# Patient Record
Sex: FEMALE | Race: WHITE | NOT HISPANIC OR LATINO | Employment: OTHER | URBAN - METROPOLITAN AREA
[De-identification: names, ages, dates, MRNs, and addresses within clinical notes are randomized per-mention and may not be internally consistent; named-entity substitution may affect disease eponyms.]

---

## 2017-01-04 ENCOUNTER — OFFICE VISIT (OUTPATIENT)
Dept: INTERNAL MEDICINE | Facility: CLINIC | Age: 77
End: 2017-01-04
Payer: MEDICARE

## 2017-01-04 VITALS
WEIGHT: 167.75 LBS | SYSTOLIC BLOOD PRESSURE: 132 MMHG | DIASTOLIC BLOOD PRESSURE: 86 MMHG | HEART RATE: 63 BPM | BODY MASS INDEX: 30.87 KG/M2 | HEIGHT: 62 IN

## 2017-01-04 DIAGNOSIS — R21 RASH: Primary | ICD-10-CM

## 2017-01-04 PROCEDURE — 99999 PR PBB SHADOW E&M-EST. PATIENT-LVL III: CPT | Mod: PBBFAC,,, | Performed by: INTERNAL MEDICINE

## 2017-01-04 PROCEDURE — 99213 OFFICE O/P EST LOW 20 MIN: CPT | Mod: S$PBB,,, | Performed by: INTERNAL MEDICINE

## 2017-01-04 PROCEDURE — 99213 OFFICE O/P EST LOW 20 MIN: CPT | Mod: PBBFAC | Performed by: INTERNAL MEDICINE

## 2017-01-04 RX ORDER — METOPROLOL SUCCINATE 25 MG/1
25 TABLET, EXTENDED RELEASE ORAL DAILY
COMMUNITY
End: 2017-08-16

## 2017-01-04 RX ORDER — PREDNISONE 20 MG/1
20 TABLET ORAL 2 TIMES DAILY
Qty: 6 TABLET | Refills: 0 | Status: SHIPPED | OUTPATIENT
Start: 2017-01-04 | End: 2017-01-07

## 2017-01-04 NOTE — PROGRESS NOTES
Subjective:       Patient ID: Luz Amaro is a 76 y.o. female.    Chief Complaint: Rash (x 1 week)    HPI Comments: Patient states she has no new clothing products detergent or perfume.  This started on the low back and then seemingly spread to the mid to lower abdomen.  Nothing on the wrists or ankles, eczema or between fingers or toes.  No new pets.  No one knew around the house.  No other family members with similar rashes.    Rash   The current episode started in the past 7 days. The problem has been waxing and waning since onset. The affected locations include the abdomen. The rash is characterized by itchiness (Small raised red bumps). She was exposed to nothing (She even had her relatively new house fumigated for insects but was told nothing was found.). Pertinent negatives include no cough, diarrhea, facial edema, fatigue, fever, joint pain, shortness of breath, sore throat or vomiting. Past treatments include nothing. Her past medical history is significant for allergies.     Review of Systems   Constitutional: Negative for appetite change, chills, fatigue and fever.   HENT: Negative for sore throat.    Respiratory: Negative for cough, shortness of breath and wheezing.    Cardiovascular: Negative for chest pain and leg swelling.   Gastrointestinal: Negative for abdominal pain, constipation, diarrhea and vomiting.   Genitourinary: Negative for difficulty urinating.   Musculoskeletal: Negative for joint pain, neck pain and neck stiffness.   Skin: Positive for rash.       Objective:      Physical Exam   Constitutional: She is oriented to person, place, and time. She appears well-developed and well-nourished. No distress.   HENT:   Head: Normocephalic and atraumatic.   Mouth/Throat: Oropharynx is clear and moist. No oropharyngeal exudate.   Eyes: Conjunctivae are normal. Pupils are equal, round, and reactive to light. No scleral icterus.   Neck: Normal range of motion. Neck supple. No thyromegaly  present.   Cardiovascular: Normal rate and regular rhythm.    No murmur heard.  Pulmonary/Chest: Effort normal and breath sounds normal. She has no wheezes.   Abdominal: Soft. Bowel sounds are normal. She exhibits no distension. There is no tenderness.   Musculoskeletal: She exhibits no tenderness.   Lymphadenopathy:     She has no cervical adenopathy.   Neurological: She is alert and oriented to person, place, and time.   Skin: Rash noted.   Mild rash on the mid to lower abdomen.  Diffuse small red bumps.  Nothing on the back arms legs neck or face.  She has approximately 20 small bumps that are very itchy and there are signs that she has been scratching them   Psychiatric: She has a normal mood and affect.       Assessment:       1. Rash        Plan:       Luz was seen today for rash.    Diagnoses and all orders for this visit:    Rash    Other orders  -     predniSONE (DELTASONE) 20 MG tablet; Take 1 tablet (20 mg total) by mouth 2 (two) times daily.         nonspecific rash, not  consistent with eczema or shingles or insect bites such as scabies or fleas.  Call if symptoms fail to improve or worsen with conservative treatment and observation

## 2017-01-04 NOTE — MR AVS SNAPSHOT
[facility]  [address]  [place] [address]  Phone: [phone]  Fax: [phone]                  Luz Amaro   2017 1:15 PM   Office Visit    Description:  Female : 1940   Provider:  Suman Moss MD   Department:  [facility]           Reason for Visit     Rash           Diagnoses this Visit        Comments    Rash    -  Primary            To Do List           Goals (5 Years of Data)     None       These Medications        Disp Refills Start End    predniSONE (DELTASONE) 20 MG tablet 6 tablet 0 2017    Take 1 tablet (20 mg total) by mouth 2 (two) times daily. - Oral    Pharmacy: [facility] [place] - [address]  #: [phone]         [facility]     [facility] Care [facility] -  Assistance  Registered nurses in the [facility] provide clinical advisement, health education, appointment booking, and other advisory services.  Call for this free service at [phone].             Medications           Message regarding Medications     Verify the changes and/or additions to your medication regime listed below are the same as discussed with your clinician today.  If any of these changes or additions are incorrect, please notify your healthcare provider.        START taking these NEW medications        Refills    predniSONE (DELTASONE) 20 MG tablet 0    Sig: Take 1 tablet (20 mg total) by mouth 2 (two) times daily.    Class: Normal    Route: Oral      STOP taking these medications     doxazosin (CARDURA) 8 MG Tab Take 1 tablet (8 mg total) by mouth once daily.           Verify that the below list of medications is an accurate representation of the medications you are currently taking.  If none reported, the list may be blank. If incorrect, please contact your healthcare provider. Carry this list with you in case of emergency.           Current Medications      "aspirin (ECOTRIN) 81 MG EC tablet Take 81 mg by mouth once daily.    atorvastatin (LIPITOR) 20 MG tablet Take 1 tablet (20 mg total) by mouth once daily.    cholecalciferol, vitamin D3, (VITAMIN D3) 5,000 unit Tab Take 5,000 Units by mouth once daily.    epinephrine (EPIPEN) 0.3 mg/0.3 mL AtIn Inject 0.3 mLs (0.3 mg total) into the muscle once.    lorazepam (ATIVAN) 0.5 MG tablet Take 1 tablet (0.5 mg total) by mouth every 12 (twelve) hours as needed for Anxiety (Panic Attack).    metoprolol succinate (TOPROL-XL) 25 MG 24 hr tablet Take 25 mg by mouth once daily.    oxybutynin (DITROPAN) 5 MG Tab Take 1 tablet (5 mg total) by mouth 2 (two) times daily.    predniSONE (DELTASONE) 20 MG tablet Take 1 tablet (20 mg total) by mouth 2 (two) times daily.           Clinical Reference Information           Vital Signs - Last Recorded  Most recent update: 1/4/2017  1:16 PM by Carol Rodríguez    BP Pulse Ht Wt BMI    132/86 63 5' 1.5" (1.562 m) 76.1 kg (167 lb 12.3 oz) 31.19 kg/m2      Blood Pressure          Most Recent Value    BP  132/86      Allergies as of 1/4/2017     Ace Inhibitors    Arb-angiotensin Receptor Antagonist    Amlodipine      Immunizations Administered on Date of Encounter - 1/4/2017     None      [facility] Sign-Up     Activating your [facility] account is as easy as 1-2-3!     1) Visit [email or web address], select Sign Up Now, enter this activation code and your date of birth, then select Next.  [email or web address]  Expires: 2/18/2017  1:43 PM      2) Create a username and password to use when you visit [facility] in the future and select a security question in case you lose your password and select Next.    3) Enter your e-mail address and click Sign Up!    Additional Information  If you have questions, please e-mail [email or web address] or call [phone] to talk to our [facility] staff. Remember, [facility] is NOT to be used for urgent needs. For medical emergencies, dial 911.         Instructions    Antihistamine " such as Zyrtec in addition to the prescription for the Prednisone.

## 2017-03-07 ENCOUNTER — HOSPITAL ENCOUNTER (OUTPATIENT)
Dept: RADIOLOGY | Facility: HOSPITAL | Age: 77
Discharge: HOME | End: 2017-03-07
Attending: INTERNAL MEDICINE
Payer: MEDICARE

## 2017-03-07 ENCOUNTER — OFFICE VISIT (OUTPATIENT)
Dept: INTERNAL MEDICINE | Facility: CLINIC | Age: 77
End: 2017-03-07
Payer: MEDICARE

## 2017-03-07 VITALS
BODY MASS INDEX: 31.63 KG/M2 | WEIGHT: 167.56 LBS | SYSTOLIC BLOOD PRESSURE: 130 MMHG | TEMPERATURE: 98 F | HEART RATE: 72 BPM | HEIGHT: 61 IN | DIASTOLIC BLOOD PRESSURE: 84 MMHG

## 2017-03-07 DIAGNOSIS — R05.9 COUGH: ICD-10-CM

## 2017-03-07 DIAGNOSIS — R09.89 ABNORMAL LUNG SOUNDS: ICD-10-CM

## 2017-03-07 DIAGNOSIS — I10 ESSENTIAL HYPERTENSION: ICD-10-CM

## 2017-03-07 DIAGNOSIS — R05.9 COUGH: Primary | ICD-10-CM

## 2017-03-07 PROCEDURE — 99999 PR PBB SHADOW E&M-EST. PATIENT-LVL III: CPT | Mod: PBBFAC,,, | Performed by: INTERNAL MEDICINE

## 2017-03-07 PROCEDURE — 71020 XR CHEST PA AND LATERAL: CPT | Mod: TC

## 2017-03-07 PROCEDURE — 99214 OFFICE O/P EST MOD 30 MIN: CPT | Mod: S$PBB,,, | Performed by: INTERNAL MEDICINE

## 2017-03-07 PROCEDURE — 71020 XR CHEST PA AND LATERAL: CPT | Mod: 26,,, | Performed by: RADIOLOGY

## 2017-03-07 RX ORDER — PREDNISONE 20 MG/1
20 TABLET ORAL SEE ADMIN INSTRUCTIONS
Qty: 12 TABLET | Refills: 0 | Status: SHIPPED | OUTPATIENT
Start: 2017-03-07 | End: 2017-03-13

## 2017-03-07 NOTE — PROGRESS NOTES
Subjective:       Patient ID: Luz Amaro is a 76 y.o. female.    Chief Complaint: Cough (x 9 days)    Cough   This is a new problem. The current episode started 1 to 4 weeks ago. The problem has been unchanged. Associated symptoms include nasal congestion, postnasal drip and rhinorrhea. Pertinent negatives include no chest pain, chills, ear congestion, ear pain, fever, headaches, heartburn, hemoptysis, rash, sore throat, shortness of breath, sweats or wheezing. She has tried OTC cough suppressant (Delsym and Nyquil) for the symptoms. The treatment provided mild relief. Her past medical history is significant for pneumonia (About age 28). There is no history of asthma, COPD or emphysema.     Review of Systems   Constitutional: Negative for chills and fever.   HENT: Positive for postnasal drip and rhinorrhea. Negative for ear pain and sore throat.    Respiratory: Positive for cough. Negative for hemoptysis, shortness of breath and wheezing.    Cardiovascular: Negative for chest pain.   Gastrointestinal: Negative for heartburn.   Skin: Negative for rash.   Neurological: Negative for headaches.       Objective:      Physical Exam   Constitutional: She appears well-developed and well-nourished.   HENT:   Head: Normocephalic and atraumatic.   Right Ear: External ear normal.   Left Ear: External ear normal.   Thick clear drainage in the back of the throat   Eyes: Conjunctivae and EOM are normal. Pupils are equal, round, and reactive to light.   Neck: Normal range of motion. Neck supple.   Cardiovascular: Normal rate and regular rhythm.    Pulmonary/Chest: No respiratory distress. She has wheezes (mid to upper posterior lung zone). She has rales (mid to upper posterior lung zone). She exhibits no tenderness.   Coughing throughout visit.   Skin: No rash noted. No pallor.   Psychiatric: She has a normal mood and affect. Her behavior is normal.       Assessment:       1. Cough    2. Abnormal lung sounds    3.  Essential hypertension        Plan:       Luz was seen today for cough.    Diagnoses and all orders for this visit:    Cough  -     X-Ray Chest PA And Lateral; Future    Abnormal lung sounds  -     X-Ray Chest PA And Lateral; Future    Essential hypertension        Chest x-ray with return    Addendum: Pt returns from CXR. Lungs are clear. Prednisone taper and contingent Doxy if not resolving

## 2017-03-27 ENCOUNTER — TELEPHONE (OUTPATIENT)
Dept: INTERNAL MEDICINE | Facility: CLINIC | Age: 77
End: 2017-03-27

## 2017-03-27 NOTE — TELEPHONE ENCOUNTER
----- Message from Kingsley Storm sent at 3/27/2017 10:08 AM CDT -----  Contact: self/[phone] cell  Pt would like to speak with someone in the office to discuss her mammogram.  She needs to know if she needs to have one this year or should she do it every couple of years.  Please call and advise.    Thank you

## 2017-03-27 NOTE — TELEPHONE ENCOUNTER
Spoke to pt, adv her mammogram every year is recommended. She stated she is choosing to skip her mammo this year. I adv her she can call to sched if she changes her mind. Put in recall for next year mammo.

## 2017-05-16 RX ORDER — METOPROLOL SUCCINATE 50 MG/1
TABLET, EXTENDED RELEASE ORAL
Qty: 90 TABLET | Refills: 2 | Status: SHIPPED | OUTPATIENT
Start: 2017-05-16 | End: 2018-04-15 | Stop reason: SDUPTHER

## 2017-05-24 ENCOUNTER — OFFICE VISIT (OUTPATIENT)
Dept: OPTOMETRY | Facility: CLINIC | Age: 77
End: 2017-05-24
Payer: MEDICARE

## 2017-05-24 DIAGNOSIS — H53.032 AMBLYOPIA, STRABISMIC, LEFT: ICD-10-CM

## 2017-05-24 DIAGNOSIS — H52.03 HYPEROPIA WITH PRESBYOPIA, BILATERAL: ICD-10-CM

## 2017-05-24 DIAGNOSIS — H25.13 NUCLEAR SCLEROSIS, BILATERAL: Primary | ICD-10-CM

## 2017-05-24 DIAGNOSIS — H52.4 HYPEROPIA WITH PRESBYOPIA, BILATERAL: ICD-10-CM

## 2017-05-24 PROCEDURE — 92014 COMPRE OPH EXAM EST PT 1/>: CPT | Mod: S$PBB,,, | Performed by: OPTOMETRIST

## 2017-05-24 PROCEDURE — 99212 OFFICE O/P EST SF 10 MIN: CPT | Mod: PBBFAC,PO | Performed by: OPTOMETRIST

## 2017-05-24 PROCEDURE — 92015 DETERMINE REFRACTIVE STATE: CPT | Mod: ,,, | Performed by: OPTOMETRIST

## 2017-05-24 PROCEDURE — 99999 PR PBB SHADOW E&M-EST. PATIENT-LVL II: CPT | Mod: PBBFAC,,, | Performed by: OPTOMETRIST

## 2017-05-25 NOTE — PROGRESS NOTES
HPI     TV not as clear.  Glasses about 2 yr old. Uses AT's 4 to 5 times a week.  Blur ou at dist, x mos, no assoc pain or red, no relief over time,   constant    Last edited by Cory Blakely, OD on 5/25/2017 10:13 AM. (History)        ROS     Negative for: Constitutional, Gastrointestinal, Neurological, Skin,   Genitourinary, Musculoskeletal, HENT, Endocrine, Cardiovascular, Eyes,   Respiratory, Psychiatric, Allergic/Imm, Heme/Lymph    Last edited by Cory Blakely, OD on 5/24/2017  3:18 PM. (History)        Assessment /Plan     For exam results, see Encounter Report.    Nuclear sclerosis, bilateral    Amblyopia, strabismic, left    Hyperopia with presbyopia, bilateral      1. Educated pt on presence of cataracts and effects on vision. No surgery at this time. Recheck in one year.  2. Monitor condition. Patient to report any changes. RTC 1 year recheck.  3. Spec Rx given. Different lens options discussed with patient. RTC 1 year full exam.

## 2017-07-05 ENCOUNTER — OFFICE VISIT (OUTPATIENT)
Dept: DERMATOLOGY | Facility: CLINIC | Age: 77
End: 2017-07-05
Payer: MEDICARE

## 2017-07-05 DIAGNOSIS — L57.0 AK (ACTINIC KERATOSIS): ICD-10-CM

## 2017-07-05 DIAGNOSIS — L82.0 INFLAMED SEBORRHEIC KERATOSIS: ICD-10-CM

## 2017-07-05 DIAGNOSIS — D18.00 ANGIOMA: ICD-10-CM

## 2017-07-05 DIAGNOSIS — L81.4 LENTIGO: ICD-10-CM

## 2017-07-05 DIAGNOSIS — D22.9 NEVUS: ICD-10-CM

## 2017-07-05 DIAGNOSIS — L90.5 SCAR: ICD-10-CM

## 2017-07-05 DIAGNOSIS — L82.1 SK (SEBORRHEIC KERATOSIS): Primary | ICD-10-CM

## 2017-07-05 DIAGNOSIS — L21.9 SEBORRHEIC DERMATITIS, UNSPECIFIED: ICD-10-CM

## 2017-07-05 DIAGNOSIS — Z85.828 PERSONAL HISTORY OF SKIN CANCER: ICD-10-CM

## 2017-07-05 PROCEDURE — 99212 OFFICE O/P EST SF 10 MIN: CPT | Mod: PBBFAC,PO | Performed by: DERMATOLOGY

## 2017-07-05 PROCEDURE — 17000 DESTRUCT PREMALG LESION: CPT | Mod: 59,PBBFAC,PO | Performed by: DERMATOLOGY

## 2017-07-05 PROCEDURE — 99214 OFFICE O/P EST MOD 30 MIN: CPT | Mod: 25,S$PBB,, | Performed by: DERMATOLOGY

## 2017-07-05 PROCEDURE — 1159F MED LIST DOCD IN RCRD: CPT | Mod: ,,, | Performed by: DERMATOLOGY

## 2017-07-05 PROCEDURE — 99999 PR PBB SHADOW E&M-EST. PATIENT-LVL II: CPT | Mod: PBBFAC,,, | Performed by: DERMATOLOGY

## 2017-07-05 PROCEDURE — 17000 DESTRUCT PREMALG LESION: CPT | Mod: 59,S$PBB,, | Performed by: DERMATOLOGY

## 2017-07-05 NOTE — PROGRESS NOTES
Subjective:       Patient ID:  Luz Amaro is a 76 y.o. female who presents for   Chief Complaint   Patient presents with    Lesion     Pt here today for a TBSE. Pt c/o scaly lesion on face x a few months. No bleeding or pain. No prev tx.   Pt also c/o red and irritated lesion on chest x a few years. No prev tx.         Review of Systems   Constitutional: Negative for fever, chills, weight loss, weight gain, fatigue, night sweats and malaise.   Skin: Positive for daily sunscreen use. Negative for sun sensitivity, activity-related sunscreen use and recent sunburn.   Hematologic/Lymphatic: Does not bruise/bleed easily.        Objective:    Physical Exam   Constitutional: She appears well-developed and well-nourished. No distress.   Neurological: She is alert and oriented to person, place, and time. She is not disoriented.   Psychiatric: She has a normal mood and affect.   Skin:   Areas Examined (abnormalities noted in diagram):   Scalp / Hair Palpated and Inspected  Head / Face Inspection Performed  Neck Inspection Performed  Chest / Axilla Inspection Performed  Abdomen Inspection Performed  Genitals / Buttocks / Groin Inspection Performed  Back Inspection Performed  RUE Inspected  LUE Inspection Performed  RLE Inspected  LLE Inspection Performed  Nails and Digits Inspection Performed                       Diagram Legend     Erythematous scaling macule/papule c/w actinic keratosis       Vascular papule c/w angioma      Pigmented verrucoid papule/plaque c/w seborrheic keratosis      Yellow umbilicated papule c/w sebaceous hyperplasia      Irregularly shaped tan macule c/w lentigo     1-2 mm smooth white papules consistent with Milia      Movable subcutaneous cyst with punctum c/w epidermal inclusion cyst      Subcutaneous movable cyst c/w pilar cyst      Firm pink to brown papule c/w dermatofibroma      Pedunculated fleshy papule(s) c/w skin tag(s)      Evenly pigmented macule c/w junctional nevus      Mildly variegated pigmented, slightly irregular-bordered macule c/w mildly atypical nevus      Flesh colored to evenly pigmented papule c/w intradermal nevus       Pink pearly papule/plaque c/w basal cell carcinoma      Erythematous hyperkeratotic cursted plaque c/w SCC      Surgical scar with no sign of skin cancer recurrence      Open and closed comedones      Inflammatory papules and pustules      Verrucoid papule consistent consistent with wart     Erythematous eczematous patches and plaques     Dystrophic onycholytic nail with subungual debris c/w onychomycosis     Umbilicated papule    Erythematous-base heme-crusted tan verrucoid plaque consistent with inflamed seborrheic keratosis     Erythematous Silvery Scaling Plaque c/w Psoriasis     See annotation      Assessment / Plan:        SK (seborrheic keratosis)  These are benign inherited growths without a malignant potential. Reassurance given to patient. No treatment is necessary.     AK (actinic keratosis)  Cryosurgery Procedure Note    Verbal consent from the patient is obtained and the patient is aware of the precancerous quality and need for treatment of these lesions. Liquid nitrogen cryosurgery is applied to the 1 actinic keratoses, as detailed in the physical exam, to produce a freeze injury. The patient is aware that blisters may form and is instructed on wound care with gentle cleansing and use of vaseline ointment to keep moist until healed. The patient is supplied a handout on cryosurgery and is instructed to call if lesions do not completely resolve.    Seborrheic dermatitis, unspecified    Recommend OTC medicated shampoo containing active ingredients of either selenium sulfide, zinc pyrithione, tar, salicylic acid, or ketoconazole. It is recommended that patient wash hair at least 2 times per week and let shampoo sit on damp scalp at least 5 minutes prior to rinsing.      Lentigo  This is a benign hyperpigmented sun induced lesion. Daily sun  protection will reduce the number of new lesions. Treatment of these benign lesions are considered cosmetic.  The nature of sun-induced photo-aging and skin cancers is discussed.  Sun avoidance, protective clothing, and the use of 30-SPF sunscreens is advised. Observe closely for skin damage/changes, and call if such occurs.    Angioma  These are benign vascular lesions that are inherited.  Treatment is not necessary.    Nevus  Discussed ABCDE's of nevi.  Monitor for new mole or moles that are becoming bigger, darker, irritated, or developing irregular borders. Brochure provided.    Personal history of skin cancer  Scar  Pt with history of non melanoma skin cancer  Total body skin examination performed today including at least 12 points as noted in physical examination. No suspicious lesions noted.    Inflamed seborrheic keratosis  Cryosurgery procedure note:    Verbal consent from the patient is obtained. Liquid nitrogen cryosurgery is applied to 2 lesions to produce a freeze injury. The patient is aware that blisters may form and is instructed on wound care with gentle cleansing and use of vaseline ointment to keep moist until healed. The patient is supplied a handout on cryosurgery and is instructed to call if lesions do not completely resolve.             Return in about 1 year (around 7/5/2018).

## 2017-08-16 ENCOUNTER — LAB VISIT (OUTPATIENT)
Dept: LAB | Facility: HOSPITAL | Age: 77
End: 2017-08-16
Attending: INTERNAL MEDICINE
Payer: MEDICARE

## 2017-08-16 ENCOUNTER — OFFICE VISIT (OUTPATIENT)
Dept: INTERNAL MEDICINE | Facility: CLINIC | Age: 77
End: 2017-08-16
Payer: MEDICARE

## 2017-08-16 VITALS
DIASTOLIC BLOOD PRESSURE: 86 MMHG | BODY MASS INDEX: 29.78 KG/M2 | SYSTOLIC BLOOD PRESSURE: 132 MMHG | WEIGHT: 161.81 LBS | HEART RATE: 68 BPM | HEIGHT: 62 IN

## 2017-08-16 DIAGNOSIS — T78.3XXA ANGIOEDEMA, INITIAL ENCOUNTER: ICD-10-CM

## 2017-08-16 DIAGNOSIS — I10 ESSENTIAL HYPERTENSION: Primary | ICD-10-CM

## 2017-08-16 DIAGNOSIS — E78.5 HYPERLIPIDEMIA, UNSPECIFIED HYPERLIPIDEMIA TYPE: ICD-10-CM

## 2017-08-16 DIAGNOSIS — R73.09 ELEVATED GLUCOSE: ICD-10-CM

## 2017-08-16 DIAGNOSIS — M79.672 LEFT FOOT PAIN: ICD-10-CM

## 2017-08-16 DIAGNOSIS — I10 ESSENTIAL HYPERTENSION: ICD-10-CM

## 2017-08-16 LAB
ALBUMIN SERPL BCP-MCNC: 4.1 G/DL
ALP SERPL-CCNC: 92 U/L
ALT SERPL W/O P-5'-P-CCNC: 53 U/L
ANION GAP SERPL CALC-SCNC: 13 MMOL/L
AST SERPL-CCNC: 38 U/L
BILIRUB SERPL-MCNC: 0.5 MG/DL
BUN SERPL-MCNC: 21 MG/DL
CALCIUM SERPL-MCNC: 9.6 MG/DL
CHLORIDE SERPL-SCNC: 102 MMOL/L
CHOLEST/HDLC SERPL: 3.1 {RATIO}
CO2 SERPL-SCNC: 25 MMOL/L
CREAT SERPL-MCNC: 0.7 MG/DL
EST. GFR  (AFRICAN AMERICAN): >60 ML/MIN/1.73 M^2
EST. GFR  (NON AFRICAN AMERICAN): >60 ML/MIN/1.73 M^2
GLUCOSE SERPL-MCNC: 94 MG/DL
HDL/CHOLESTEROL RATIO: 32 %
HDLC SERPL-MCNC: 247 MG/DL
HDLC SERPL-MCNC: 79 MG/DL
LDLC SERPL CALC-MCNC: 150 MG/DL
NONHDLC SERPL-MCNC: 168 MG/DL
POTASSIUM SERPL-SCNC: 4.6 MMOL/L
PROT SERPL-MCNC: 7.6 G/DL
SODIUM SERPL-SCNC: 140 MMOL/L
TRIGL SERPL-MCNC: 90 MG/DL

## 2017-08-16 PROCEDURE — 1126F AMNT PAIN NOTED NONE PRSNT: CPT | Mod: ,,, | Performed by: INTERNAL MEDICINE

## 2017-08-16 PROCEDURE — 99999 PR PBB SHADOW E&M-EST. PATIENT-LVL III: CPT | Mod: PBBFAC,,, | Performed by: INTERNAL MEDICINE

## 2017-08-16 PROCEDURE — 99214 OFFICE O/P EST MOD 30 MIN: CPT | Mod: S$PBB,,, | Performed by: INTERNAL MEDICINE

## 2017-08-16 PROCEDURE — 36415 COLL VENOUS BLD VENIPUNCTURE: CPT

## 2017-08-16 PROCEDURE — 99213 OFFICE O/P EST LOW 20 MIN: CPT | Mod: PBBFAC | Performed by: INTERNAL MEDICINE

## 2017-08-16 PROCEDURE — 80053 COMPREHEN METABOLIC PANEL: CPT

## 2017-08-16 PROCEDURE — 3079F DIAST BP 80-89 MM HG: CPT | Mod: ,,, | Performed by: INTERNAL MEDICINE

## 2017-08-16 PROCEDURE — 80061 LIPID PANEL: CPT

## 2017-08-16 PROCEDURE — 83036 HEMOGLOBIN GLYCOSYLATED A1C: CPT

## 2017-08-16 PROCEDURE — 3075F SYST BP GE 130 - 139MM HG: CPT | Mod: ,,, | Performed by: INTERNAL MEDICINE

## 2017-08-16 PROCEDURE — 1159F MED LIST DOCD IN RCRD: CPT | Mod: ,,, | Performed by: INTERNAL MEDICINE

## 2017-08-16 NOTE — PROGRESS NOTES
Subjective:       Patient ID: Luz Amaro is a 76 y.o. female.    Chief Complaint: Follow-up (yearly follow up)    History of present illness: Patient here for follow-up of medical problems including hypertension dyslipidemia and borderline glucose.  She's been having some left foot pain off and on for quite some time.  No trauma.  It is random.  Usually hurting when she has been walking for a length of time such as at the mall or grocery store.  It causes her to have to stop however and this is somewhat disconcerting.  Since she had angioedema she doesn't take anti-inflammatories and at times she felt like if she could take an Advil it may have helped the foot but she doesn't want to do that.  She has not tried Tylenol.  She doesn't want to see podiatry.  She uses good supportive shoes.   She has been following an Atkins diet for 2 weeks and has lost 3 pounds.  She hopes to lose about 10       Review of Systems   Constitutional: Negative for appetite change, chills and fever.   HENT: Negative for sore throat.    Respiratory: Negative for cough, shortness of breath and wheezing.    Cardiovascular: Negative for chest pain and leg swelling.   Gastrointestinal: Negative for abdominal pain, constipation and diarrhea.   Genitourinary: Negative for difficulty urinating.   Musculoskeletal: Positive for arthralgias (left foot). Negative for neck pain and neck stiffness.   Skin: Negative for rash.       Objective:      Physical Exam   Constitutional: She is oriented to person, place, and time. She appears well-developed and well-nourished. No distress.   HENT:   Head: Normocephalic and atraumatic.   Right Ear: External ear normal.   Left Ear: External ear normal.   Mouth/Throat: Oropharynx is clear and moist. No oropharyngeal exudate.   Eyes: Conjunctivae are normal. Pupils are equal, round, and reactive to light. No scleral icterus.   Neck: Normal range of motion. Neck supple. No thyromegaly present.    Cardiovascular: Normal rate and regular rhythm.    No murmur heard.  Pulmonary/Chest: Effort normal and breath sounds normal. She has no wheezes.   Abdominal: Soft. Bowel sounds are normal. She exhibits no distension. There is no tenderness.   Musculoskeletal: She exhibits no tenderness (  No bony deformities, no tenderness to palpation of the feet).   Lymphadenopathy:     She has no cervical adenopathy.   Neurological: She is alert and oriented to person, place, and time.   Skin: No rash noted.   Psychiatric: She has a normal mood and affect.       Assessment:       1. Essential hypertension    2. Hyperlipidemia, unspecified hyperlipidemia type    3. Angioedema, initial encounter    4. Left foot pain    5. Elevated glucose        Plan:       Luz was seen today for follow-up.    Diagnoses and all orders for this visit:    Essential hypertension  -     Lipid panel; Future  -     Comprehensive metabolic panel; Future  -     Hemoglobin A1c; Future    Hyperlipidemia, unspecified hyperlipidemia type  -     Lipid panel; Future  -     Comprehensive metabolic panel; Future  -     Hemoglobin A1c; Future    Angioedema, initial encounter  -     Lipid panel; Future  -     Comprehensive metabolic panel; Future  -     Hemoglobin A1c; Future    Left foot pain  -     Lipid panel; Future  -     Comprehensive metabolic panel; Future  -     Hemoglobin A1c; Future    Elevated glucose  -     Lipid panel; Future  -     Comprehensive metabolic panel; Future  -     Hemoglobin A1c; Future        Follow-up in 6 months

## 2017-08-17 ENCOUNTER — TELEPHONE (OUTPATIENT)
Dept: INTERNAL MEDICINE | Facility: CLINIC | Age: 77
End: 2017-08-17

## 2017-08-17 LAB
ESTIMATED AVG GLUCOSE: 111 MG/DL
HBA1C MFR BLD HPLC: 5.5 %

## 2017-08-17 NOTE — TELEPHONE ENCOUNTER
Spoke to patient and advised on test results. Pt stated that she's bad at remembering to take her cholesterol med sometimes. She states will try to take it on the regular basis now

## 2017-08-17 NOTE — TELEPHONE ENCOUNTER
Sugar is ok but cholesterol went up significantly. Did she stop or run out of her cholesterol medication?   \  Thanks for any info.     Suman Moss

## 2017-09-12 ENCOUNTER — LAB VISIT (OUTPATIENT)
Dept: LAB | Facility: HOSPITAL | Age: 77
End: 2017-09-12
Attending: INTERNAL MEDICINE
Payer: MEDICARE

## 2017-09-12 ENCOUNTER — OFFICE VISIT (OUTPATIENT)
Dept: INTERNAL MEDICINE | Facility: CLINIC | Age: 77
End: 2017-09-12
Payer: MEDICARE

## 2017-09-12 VITALS
HEART RATE: 66 BPM | DIASTOLIC BLOOD PRESSURE: 102 MMHG | BODY MASS INDEX: 29.98 KG/M2 | SYSTOLIC BLOOD PRESSURE: 160 MMHG | HEIGHT: 62 IN | WEIGHT: 162.94 LBS

## 2017-09-12 DIAGNOSIS — T78.3XXA ANGIOEDEMA, INITIAL ENCOUNTER: ICD-10-CM

## 2017-09-12 DIAGNOSIS — E78.5 HYPERLIPIDEMIA, UNSPECIFIED HYPERLIPIDEMIA TYPE: ICD-10-CM

## 2017-09-12 DIAGNOSIS — R35.0 FREQUENCY OF MICTURITION: ICD-10-CM

## 2017-09-12 DIAGNOSIS — R30.0 DYSURIA: Primary | ICD-10-CM

## 2017-09-12 DIAGNOSIS — R11.0 NAUSEA: ICD-10-CM

## 2017-09-12 DIAGNOSIS — R30.0 DYSURIA: ICD-10-CM

## 2017-09-12 PROCEDURE — 99213 OFFICE O/P EST LOW 20 MIN: CPT | Mod: PBBFAC | Performed by: INTERNAL MEDICINE

## 2017-09-12 PROCEDURE — 3080F DIAST BP >= 90 MM HG: CPT | Mod: ,,, | Performed by: INTERNAL MEDICINE

## 2017-09-12 PROCEDURE — 87186 SC STD MICRODIL/AGAR DIL: CPT

## 2017-09-12 PROCEDURE — 3077F SYST BP >= 140 MM HG: CPT | Mod: ,,, | Performed by: INTERNAL MEDICINE

## 2017-09-12 PROCEDURE — 99999 PR PBB SHADOW E&M-EST. PATIENT-LVL III: CPT | Mod: PBBFAC,,, | Performed by: INTERNAL MEDICINE

## 2017-09-12 PROCEDURE — 1126F AMNT PAIN NOTED NONE PRSNT: CPT | Mod: ,,, | Performed by: INTERNAL MEDICINE

## 2017-09-12 PROCEDURE — 87088 URINE BACTERIA CULTURE: CPT

## 2017-09-12 PROCEDURE — 1159F MED LIST DOCD IN RCRD: CPT | Mod: ,,, | Performed by: INTERNAL MEDICINE

## 2017-09-12 PROCEDURE — 87086 URINE CULTURE/COLONY COUNT: CPT

## 2017-09-12 PROCEDURE — 99213 OFFICE O/P EST LOW 20 MIN: CPT | Mod: S$PBB,,, | Performed by: INTERNAL MEDICINE

## 2017-09-12 PROCEDURE — 87077 CULTURE AEROBIC IDENTIFY: CPT

## 2017-09-12 RX ORDER — CETIRIZINE HYDROCHLORIDE 10 MG/1
10 TABLET ORAL DAILY
COMMUNITY

## 2017-09-12 RX ORDER — CIPROFLOXACIN 500 MG/1
500 TABLET ORAL 2 TIMES DAILY
Qty: 14 TABLET | Refills: 0 | Status: SHIPPED | OUTPATIENT
Start: 2017-09-12 | End: 2017-09-19

## 2017-09-12 NOTE — PROGRESS NOTES
Subjective:       Patient ID: Luz Amaro is a 77 y.o. female.    Chief Complaint: Urinary Tract Infection    Urinary Tract Infection    This is a new problem. The current episode started yesterday. The problem occurs every urination. The problem has been unchanged. The quality of the pain is described as burning. The pain is mild. There has been no fever. There is no history of pyelonephritis. Associated symptoms include frequency, nausea (One episode this AM) and urgency. Pertinent negatives include no flank pain, hematuria, possible pregnancy, weight loss or rash. She has tried home medications for the symptoms. The treatment provided mild (Less burning) relief. There is no history of diabetes mellitus, kidney stones, recurrent UTIs or a urological procedure.     Review of Systems   Constitutional: Negative for fever and weight loss.   Gastrointestinal: Positive for nausea (One episode this AM).   Genitourinary: Positive for frequency and urgency. Negative for flank pain and hematuria.   Skin: Negative for rash.       Objective:      Physical Exam   Constitutional: She appears well-developed and well-nourished.   HENT:   Head: Normocephalic and atraumatic.   Cardiovascular: Normal rate and regular rhythm.    Pulmonary/Chest: Effort normal and breath sounds normal.   Abdominal: Soft. Bowel sounds are normal. She exhibits no distension. There is no tenderness. There is no guarding.   Musculoskeletal: She exhibits no tenderness.   Skin: No rash noted.   Psychiatric: She has a normal mood and affect. Her behavior is normal.       Assessment:       1. Dysuria    2. Frequency of micturition    3. Angioedema, initial encounter    4. Hyperlipidemia, unspecified hyperlipidemia type    5. Nausea        Plan:       Luz was seen today for urinary tract infection.    Diagnoses and all orders for this visit:    Dysuria  Comments:  Started OTC pyridium  Orders:  -     Urine culture; Future  -     Cancel: POCT  urinalysis, dipstick or tablet reag    Frequency of micturition    Angioedema, initial encounter    Hyperlipidemia, unspecified hyperlipidemia type    Nausea    Other orders  -     ciprofloxacin HCl (CIPRO) 500 MG tablet; Take 1 tablet (500 mg total) by mouth 2 (two) times daily.        Review culture

## 2017-09-14 ENCOUNTER — TELEPHONE (OUTPATIENT)
Dept: INTERNAL MEDICINE | Facility: CLINIC | Age: 77
End: 2017-09-14

## 2017-09-14 LAB — BACTERIA UR CULT: NORMAL

## 2017-11-13 ENCOUNTER — OFFICE VISIT (OUTPATIENT)
Dept: INTERNAL MEDICINE | Facility: CLINIC | Age: 77
End: 2017-11-13
Payer: MEDICARE

## 2017-11-13 VITALS
BODY MASS INDEX: 30.08 KG/M2 | WEIGHT: 164.44 LBS | SYSTOLIC BLOOD PRESSURE: 160 MMHG | OXYGEN SATURATION: 99 % | HEART RATE: 55 BPM | DIASTOLIC BLOOD PRESSURE: 80 MMHG

## 2017-11-13 DIAGNOSIS — S39.012A STRAIN OF LUMBAR PARASPINOUS MUSCLE, INITIAL ENCOUNTER: Primary | ICD-10-CM

## 2017-11-13 PROCEDURE — 99999 PR PBB SHADOW E&M-EST. PATIENT-LVL III: CPT | Mod: PBBFAC,,, | Performed by: INTERNAL MEDICINE

## 2017-11-13 PROCEDURE — 99213 OFFICE O/P EST LOW 20 MIN: CPT | Mod: S$PBB,,, | Performed by: INTERNAL MEDICINE

## 2017-11-13 PROCEDURE — 99213 OFFICE O/P EST LOW 20 MIN: CPT | Mod: PBBFAC | Performed by: INTERNAL MEDICINE

## 2017-11-13 RX ORDER — CYCLOBENZAPRINE HCL 10 MG
10 TABLET ORAL 3 TIMES DAILY PRN
Qty: 30 TABLET | Refills: 0 | Status: SHIPPED | OUTPATIENT
Start: 2017-11-13 | End: 2017-11-25 | Stop reason: SDUPTHER

## 2017-11-13 NOTE — PROGRESS NOTES
Subjective:       Patient ID: Luz Amaro is a 77 y.o. female.    Chief Complaint: Back Pain    Back pain for 4 days. Started 15 minutes after mopping. Does not have pain down the leg. No urine or bowel troubles. Ice , Tylenol and Ace Wrap helping minimally.  She says she usually goes to her chiropractor for some treatment but has not been able to get there yet.  Last flare up was about 5 years ago.  She avoids anti-inflammatories fearing possible reaction or association with angioedema.  It was actually thought that Ace inhibitors however were the trigger medication.      Back Pain   Pertinent negatives include no abdominal pain, chest pain, fever, numbness or weakness.     Review of Systems   Constitutional: Negative for appetite change, chills and fever.   HENT: Negative for sore throat.    Respiratory: Negative for cough, shortness of breath and wheezing.    Cardiovascular: Negative for chest pain and leg swelling.   Gastrointestinal: Negative for abdominal pain, constipation and diarrhea.   Genitourinary: Negative for difficulty urinating.   Musculoskeletal: Positive for arthralgias, back pain and gait problem. Negative for neck pain and neck stiffness.   Skin: Negative for rash.   Neurological: Negative for weakness and numbness.       Objective:      Physical Exam   Constitutional: She is oriented to person, place, and time. She appears well-developed and well-nourished.   HENT:   Head: Normocephalic and atraumatic.   Cardiovascular: Normal rate and regular rhythm.    Pulmonary/Chest: Effort normal and breath sounds normal.   Abdominal: Soft. Bowel sounds are normal. She exhibits no distension.   Musculoskeletal: She exhibits tenderness. She exhibits no deformity.   Low back is tender to deep muscular palpation on the right and  Tilting, twisting and bending causes discomfort.  Straight leg raise however was negative   Neurological: She is alert and oriented to person, place, and time. No cranial  nerve deficit. Coordination normal.   Negative straight leg raise, normal sensation in the lower extremities   Skin: No rash noted.   Psychiatric: She has a normal mood and affect. Her behavior is normal.       Assessment:       1. Strain of lumbar paraspinous muscle, initial encounter        Plan:       Luz was seen today for back pain.    Diagnoses and all orders for this visit:    Strain of lumbar paraspinous muscle, initial encounter    Other orders  -     cyclobenzaprine (FLEXERIL) 10 MG tablet; Take 1 tablet (10 mg total) by mouth 3 (three) times daily as needed for Muscle spasms.         Tylenol, heat.  Side effects regarding Flexeril discussed.  Call if symptoms fail to improve worsen or change

## 2017-11-17 ENCOUNTER — HOSPITAL ENCOUNTER (OUTPATIENT)
Dept: RADIOLOGY | Facility: HOSPITAL | Age: 77
Discharge: HOME | End: 2017-11-17
Attending: NURSE PRACTITIONER
Payer: MEDICARE

## 2017-11-17 ENCOUNTER — OFFICE VISIT (OUTPATIENT)
Dept: INTERNAL MEDICINE | Facility: CLINIC | Age: 77
End: 2017-11-17
Payer: MEDICARE

## 2017-11-17 VITALS
WEIGHT: 164.25 LBS | DIASTOLIC BLOOD PRESSURE: 74 MMHG | HEART RATE: 71 BPM | SYSTOLIC BLOOD PRESSURE: 122 MMHG | OXYGEN SATURATION: 97 % | HEIGHT: 61 IN | BODY MASS INDEX: 31.01 KG/M2

## 2017-11-17 DIAGNOSIS — M54.5 ACUTE RIGHT-SIDED LOW BACK PAIN, WITH SCIATICA PRESENCE UNSPECIFIED: ICD-10-CM

## 2017-11-17 DIAGNOSIS — M54.5 ACUTE RIGHT-SIDED LOW BACK PAIN, WITH SCIATICA PRESENCE UNSPECIFIED: Primary | ICD-10-CM

## 2017-11-17 PROCEDURE — 72110 X-RAY EXAM L-2 SPINE 4/>VWS: CPT | Mod: TC

## 2017-11-17 PROCEDURE — 99214 OFFICE O/P EST MOD 30 MIN: CPT | Mod: PBBFAC | Performed by: NURSE PRACTITIONER

## 2017-11-17 PROCEDURE — 99213 OFFICE O/P EST LOW 20 MIN: CPT | Mod: S$PBB,,, | Performed by: NURSE PRACTITIONER

## 2017-11-17 PROCEDURE — 72110 X-RAY EXAM L-2 SPINE 4/>VWS: CPT | Mod: 26,,, | Performed by: RADIOLOGY

## 2017-11-17 PROCEDURE — 99999 PR PBB SHADOW E&M-EST. PATIENT-LVL IV: CPT | Mod: PBBFAC,,, | Performed by: NURSE PRACTITIONER

## 2017-11-17 RX ORDER — PREDNISONE 20 MG/1
20 TABLET ORAL DAILY
Qty: 10 TABLET | Refills: 0 | Status: SHIPPED | OUTPATIENT
Start: 2017-11-17 | End: 2017-11-27

## 2017-11-17 RX ORDER — CYCLOBENZAPRINE HCL 10 MG
10 TABLET ORAL 3 TIMES DAILY PRN
Qty: 30 TABLET | Refills: 0 | Status: SHIPPED | OUTPATIENT
Start: 2017-11-17 | End: 2017-12-17

## 2017-11-17 NOTE — PROGRESS NOTES
INTERNAL MEDICINE URGENT CARE NOTE    CHIEF COMPLAINT     Chief Complaint   Patient presents with    Follow-up     back pain, told to return by PCP        HPI     Luz Amaro is a 77 y.o. female with HTN, HLD, basal cell carcinoma of the left face and neck who presents for an urgent visit today.  She is an established pt of Dr. Moss.     Back pain- pt was seen by Dr. Moss 4 days ago with c/o lower back pain, Started 4 days prior after mopping. Pt avoid nsaids 2/2 concern about angioedema. Was started on flexeril and tylenol. Advised to use heat to the area.  Pt reports continued across lower back to the right buttock. No shooting down the leg. Denies numbness and tingling in the feet or legs. +muscle weakness- feels like right leg is giving out on her.   Flexeril decreases pain mildly.       Past Medical History:  Past Medical History:   Diagnosis Date    Amblyopia     Angio-edema     Basal cell carcinoma     [place] in [place]    Cataract     Hyperlipidemia     Hypertension     Strabismus        Home Medications:  Prior to Admission medications    Medication Sig Start Date End Date Taking? Authorizing Provider   aspirin (ECOTRIN) 81 MG EC tablet Take 81 mg by mouth once daily.    Historical Provider, MD   atorvastatin (LIPITOR) 20 MG tablet Take 1 tablet (20 mg total) by mouth once daily. 5/11/16   Suman Moss MD   cetirizine (ZYRTEC) 10 MG tablet Take 10 mg by mouth once daily.    Historical Provider, MD   cholecalciferol, vitamin D3, (VITAMIN D3) 5,000 unit Tab Take 5,000 Units by mouth once daily.    Historical Provider, MD   cyclobenzaprine (FLEXERIL) 10 MG tablet Take 1 tablet (10 mg total) by mouth 3 (three) times daily as needed for Muscle spasms. 11/13/17 12/13/17  Suman Moss MD   epinephrine (EPIPEN) 0.3 mg/0.3 mL AtIn Inject 0.3 mLs (0.3 mg total) into the muscle once. 9/27/16 9/12/17  Suman Moss MD   lorazepam (ATIVAN) 0.5 MG tablet Take 1  "tablet (0.5 mg total) by mouth every 12 (twelve) hours as needed for Anxiety (Panic Attack). 9/27/16   Suman Moss MD   MELATONIN ORAL Take by mouth.    Historical Provider, MD   metoprolol succinate (TOPROL-XL) 50 MG 24 hr tablet TAKE 1 TABLET DAILY 5/16/17   Suman Moss MD   oxybutynin (DITROPAN) 5 MG Tab Take 1 tablet (5 mg total) by mouth 2 (two) times daily. 2/24/16   Suman Moss MD       Review of Systems:  Review of Systems   Constitutional: Negative for chills and fever.   HENT: Negative for congestion, rhinorrhea, sinus pressure and sore throat.    Eyes: Negative for visual disturbance.   Respiratory: Negative for cough and shortness of breath.    Cardiovascular: Negative for chest pain, palpitations and leg swelling.   Gastrointestinal: Negative for abdominal pain, constipation, diarrhea, nausea and vomiting.   Genitourinary: Negative for dysuria, frequency and urgency.   Musculoskeletal: Positive for back pain. Negative for joint swelling and myalgias.   Neurological: Negative for dizziness, light-headedness and headaches.       Health Maintainence:   Immunizations:  Health Maintenance       Date Due Completion Date    Zoster Vaccine 08/24/1998 ---    Pneumococcal (65+) (2 of 2 - PPSV23) 10/14/2016 10/14/2015    DEXA SCAN 02/11/2018 2/11/2015    Lipid Panel 08/16/2022 8/16/2017    TETANUS VACCINE 09/27/2026 9/27/2016           PHYSICAL EXAM     /74 (BP Location: Left arm, Patient Position: Sitting, BP Method: Large (Manual))   Pulse 71   Ht 5' 1" (1.549 m)   Wt 74.5 kg (164 lb 3.9 oz)   SpO2 97%   BMI 31.03 kg/m²     Physical Exam   Constitutional: She is oriented to person, place, and time. She appears well-developed and well-nourished.   HENT:   Head: Normocephalic and atraumatic.   Eyes: Pupils are equal, round, and reactive to light.   Cardiovascular: Normal rate and regular rhythm.    Pulmonary/Chest: Effort normal.   Musculoskeletal:        Lumbar back: She exhibits " decreased range of motion, tenderness, bony tenderness, pain and spasm. She exhibits no swelling, no edema, no deformity, no laceration and normal pulse.        Back:    Neurological: She is alert and oriented to person, place, and time.   Reflex Scores:       Patellar reflexes are 1+ on the right side and 2+ on the left side.  Psychiatric: She has a normal mood and affect.       LABS     Lab Results   Component Value Date    HGBA1C 5.5 08/16/2017     CMP  Sodium   Date Value Ref Range Status   08/16/2017 140 136 - 145 mmol/L Final     Potassium   Date Value Ref Range Status   08/16/2017 4.6 3.5 - 5.1 mmol/L Final     Chloride   Date Value Ref Range Status   08/16/2017 102 95 - 110 mmol/L Final     CO2   Date Value Ref Range Status   08/16/2017 25 23 - 29 mmol/L Final     Glucose   Date Value Ref Range Status   08/16/2017 94 70 - 110 mg/dL Final     BUN, Bld   Date Value Ref Range Status   08/16/2017 21 8 - 23 mg/dL Final     Creatinine   Date Value Ref Range Status   08/16/2017 0.7 0.5 - 1.4 mg/dL Final     Calcium   Date Value Ref Range Status   08/16/2017 9.6 8.7 - 10.5 mg/dL Final     Total Protein   Date Value Ref Range Status   08/16/2017 7.6 6.0 - 8.4 g/dL Final     Albumin   Date Value Ref Range Status   08/16/2017 4.1 3.5 - 5.2 g/dL Final     Total Bilirubin   Date Value Ref Range Status   08/16/2017 0.5 0.1 - 1.0 mg/dL Final     Comment:     For infants and newborns, interpretation of results should be based  on gestational age, weight and in agreement with clinical  observations.  Premature Infant recommended reference ranges:  Up to 24 hours.............<8.0 mg/dL  Up to 48 hours............<12.0 mg/dL  3-5 days..................<15.0 mg/dL  6-29 days.................<15.0 mg/dL       Alkaline Phosphatase   Date Value Ref Range Status   08/16/2017 92 55 - 135 U/L Final     AST   Date Value Ref Range Status   08/16/2017 38 10 - 40 U/L Final     ALT   Date Value Ref Range Status   08/16/2017 53 (H) 10 - 44  U/L Final     Anion Gap   Date Value Ref Range Status   08/16/2017 13 8 - 16 mmol/L Final     eGFR if    Date Value Ref Range Status   08/16/2017 >60 >60 mL/min/1.73 m^2 Final     eGFR if non    Date Value Ref Range Status   08/16/2017 >60 >60 mL/min/1.73 m^2 Final     Comment:     Calculation used to obtain the estimated glomerular filtration  rate (eGFR) is the CKD-EPI equation. Since race is unknown   in our information system, the eGFR values for   -American and Non--American patients are given   for each creatinine result.       Lab Results   Component Value Date    WBC 5.65 10/09/2016    HGB 13.8 10/09/2016    HCT 41.0 10/09/2016    MCV 96 10/09/2016     10/09/2016     Lab Results   Component Value Date    CHOL 247 (H) 08/16/2017    CHOL 182 02/09/2015    CHOL 218 (H) 03/25/2014     Lab Results   Component Value Date    HDL 79 (H) 08/16/2017    HDL 55 02/09/2015    HDL 71 03/25/2014     Lab Results   Component Value Date    LDLCALC 150.0 08/16/2017    LDLCALC 87.8 02/09/2015    LDLCALC 109.0 03/25/2014     Lab Results   Component Value Date    TRIG 90 08/16/2017    TRIG 196 (H) 02/09/2015    TRIG 190 (H) 03/25/2014     Lab Results   Component Value Date    CHOLHDL 32.0 08/16/2017    CHOLHDL 30.2 02/09/2015    CHOLHDL 32.6 03/25/2014     Lab Results   Component Value Date    TSH 0.730 12/06/2016       ASSESSMENT/PLAN     Luz Amaro is a 77 y.o. female with  Past Medical History:   Diagnosis Date    Amblyopia     Angio-edema     Basal cell carcinoma     left cheek [place] in [place]    Cataract     Hyperlipidemia     Hypertension     Strabismus      Acute right-sided low back pain, with sciatica presence unspecified-  Still with c/o pain. Will cont flexeril. Unable to tolerate nsaids. Will start short steroid burst. May cont tylenol. Cont heat. Torres end for images.    -     X-Ray Lumbar Spine Complete 5 View; Future; Expected date:  11/17/2017  -     predniSONE (DELTASONE) 20 MG tablet; Take 1 tablet (20 mg total) by mouth once daily.  Dispense: 10 tablet; Refill: 0  -     cyclobenzaprine (FLEXERIL) 10 MG tablet; Take 1 tablet (10 mg total) by mouth 3 (three) times daily as needed for Muscle spasms.  Dispense: 30 tablet; Refill: 0      Follow up as needed     Patient education provided from [facility]. Patient was counseled on when and how to seek emergent care.       Gwendolyn GUEVARA, AGUS, FNP-c   Department of Internal Medicine - [facility]  4:24 PM

## 2017-11-19 ENCOUNTER — PATIENT MESSAGE (OUTPATIENT)
Dept: INTERNAL MEDICINE | Facility: CLINIC | Age: 77
End: 2017-11-19

## 2017-11-22 ENCOUNTER — PATIENT MESSAGE (OUTPATIENT)
Dept: INTERNAL MEDICINE | Facility: CLINIC | Age: 77
End: 2017-11-22

## 2017-11-25 RX ORDER — CYCLOBENZAPRINE HCL 10 MG
TABLET ORAL
Qty: 30 TABLET | Refills: 0 | Status: SHIPPED | OUTPATIENT
Start: 2017-11-25 | End: 2020-11-06

## 2018-04-15 RX ORDER — METOPROLOL SUCCINATE 50 MG/1
TABLET, EXTENDED RELEASE ORAL
Qty: 90 TABLET | Refills: 2 | Status: SHIPPED | OUTPATIENT
Start: 2018-04-15 | End: 2019-02-08 | Stop reason: SDUPTHER

## 2018-05-16 ENCOUNTER — OFFICE VISIT (OUTPATIENT)
Dept: INTERNAL MEDICINE | Facility: CLINIC | Age: 78
End: 2018-05-16
Payer: MEDICARE

## 2018-05-16 ENCOUNTER — TELEPHONE (OUTPATIENT)
Dept: INTERNAL MEDICINE | Facility: CLINIC | Age: 78
End: 2018-05-16

## 2018-05-16 VITALS
SYSTOLIC BLOOD PRESSURE: 134 MMHG | WEIGHT: 163.38 LBS | TEMPERATURE: 98 F | BODY MASS INDEX: 30.85 KG/M2 | DIASTOLIC BLOOD PRESSURE: 72 MMHG | HEART RATE: 70 BPM | OXYGEN SATURATION: 98 % | HEIGHT: 61 IN

## 2018-05-16 DIAGNOSIS — R05.9 COUGH: Primary | ICD-10-CM

## 2018-05-16 PROCEDURE — 99999 PR PBB SHADOW E&M-EST. PATIENT-LVL III: CPT | Mod: PBBFAC,,, | Performed by: INTERNAL MEDICINE

## 2018-05-16 PROCEDURE — 99213 OFFICE O/P EST LOW 20 MIN: CPT | Mod: S$PBB,,, | Performed by: INTERNAL MEDICINE

## 2018-05-16 PROCEDURE — 99213 OFFICE O/P EST LOW 20 MIN: CPT | Mod: PBBFAC | Performed by: INTERNAL MEDICINE

## 2018-05-16 RX ORDER — METHYLPREDNISOLONE 4 MG/1
TABLET ORAL
Qty: 1 PACKAGE | Refills: 0 | Status: SHIPPED | OUTPATIENT
Start: 2018-05-16 | End: 2018-05-28

## 2018-05-16 RX ORDER — HYDROCODONE BITARTRATE AND HOMATROPINE METHYLBROMIDE ORAL SOLUTION 5; 1.5 MG/5ML; MG/5ML
5 LIQUID ORAL EVERY 4 HOURS PRN
Qty: 120 ML | Refills: 0 | Status: SHIPPED | OUTPATIENT
Start: 2018-05-16 | End: 2018-05-26

## 2018-05-16 NOTE — TELEPHONE ENCOUNTER
----- Message from Nilton Bustos sent at 5/16/2018  1:23 PM CDT -----  Contact: self/[phone]  Pt is calling to speak with someone in the office in regards to getting a prescription for predniSONE (DELTASONE) 20 MG tablet like last time. She states that she has a horrible cough and it's been going on for over two weeks. She would like it sent to [facility] [id number] [facility] [address] [place] [address] AT [facility]. Please advise.    Thanks

## 2018-05-17 NOTE — PROGRESS NOTES
"Subjective:       Patient ID: Luz Amaro is a 77 y.o. female.    Chief Complaint: Cough (nasal congestion)    Cough   This is a new problem. The current episode started 1 to 4 weeks ago (2-3 weeks ago). Progression since onset: initially had "cold".  All those symptoms disappeared but cough has lingered.  Keeps her awake all night. The problem occurs constantly. The cough is non-productive. Associated symptoms include postnasal drip, rhinorrhea and a sore throat. Pertinent negatives include no chest pain, chills, ear congestion, ear pain, fever, headaches, heartburn, hemoptysis, myalgias, nasal congestion, rash, shortness of breath, sweats, weight loss or wheezing. The symptoms are aggravated by lying down. She has tried OTC cough suppressant for the symptoms. The treatment provided no relief.     Review of Systems   Constitutional: Negative for activity change, chills, fatigue, fever and weight loss.   HENT: Positive for postnasal drip, rhinorrhea and sore throat. Negative for congestion, ear pain, nosebleeds and sinus pressure.    Eyes: Negative.  Negative for visual disturbance.   Respiratory: Positive for cough. Negative for hemoptysis, chest tightness, shortness of breath and wheezing.    Cardiovascular: Negative for chest pain.   Gastrointestinal: Negative for abdominal pain, diarrhea, heartburn, nausea and vomiting.   Genitourinary: Negative for difficulty urinating, dysuria, frequency and urgency.   Musculoskeletal: Negative for arthralgias, myalgias and neck stiffness.   Skin: Negative for rash.   Neurological: Negative for dizziness, weakness and headaches.   Psychiatric/Behavioral: Negative for sleep disturbance. The patient is not nervous/anxious.        Objective:      Physical Exam   Constitutional: She is oriented to person, place, and time. She appears well-developed and well-nourished.  Non-toxic appearance. No distress.   HENT:   Head: Normocephalic and atraumatic.   Right Ear: Tympanic " membrane, external ear and ear canal normal.   Left Ear: Tympanic membrane, external ear and ear canal normal.   Eyes: EOM are normal. Pupils are equal, round, and reactive to light. No scleral icterus.   Neck: Normal range of motion. Neck supple. No thyromegaly present.   Cardiovascular: Normal rate, regular rhythm and normal heart sounds.    Pulmonary/Chest: Effort normal and breath sounds normal.   Abdominal: Soft. Bowel sounds are normal. She exhibits no mass. There is no tenderness. There is no rebound.   Musculoskeletal: Normal range of motion.   Lymphadenopathy:     She has no cervical adenopathy.   Neurological: She is alert and oriented to person, place, and time. She has normal reflexes. She displays normal reflexes. No cranial nerve deficit. She exhibits normal muscle tone. Coordination normal.   Skin: Skin is warm and dry.   Psychiatric: She has a normal mood and affect. Her behavior is normal.       Assessment:       1. Cough        Plan:   Luz was seen today for cough.    Diagnoses and all orders for this visit:    Cough    Other orders  -     methylPREDNISolone (MEDROL DOSEPACK) 4 mg tablet; use as directed  -     hydrocodone-homatropine 5-1.5 mg/5 ml (HYCODAN) 5-1.5 mg/5 mL Syrp; Take 5 mLs by mouth every 4 (four) hours as needed.

## 2018-05-28 ENCOUNTER — OFFICE VISIT (OUTPATIENT)
Dept: OPTOMETRY | Facility: CLINIC | Age: 78
End: 2018-05-28
Payer: MEDICARE

## 2018-05-28 DIAGNOSIS — H35.363 DEGENERATIVE RETINAL DRUSEN OF BOTH EYES: ICD-10-CM

## 2018-05-28 DIAGNOSIS — H52.03 HYPEROPIA WITH PRESBYOPIA, BILATERAL: ICD-10-CM

## 2018-05-28 DIAGNOSIS — H53.032 AMBLYOPIA, STRABISMIC, LEFT: ICD-10-CM

## 2018-05-28 DIAGNOSIS — H25.13 NUCLEAR SCLEROSIS, BILATERAL: Primary | ICD-10-CM

## 2018-05-28 DIAGNOSIS — H52.4 HYPEROPIA WITH PRESBYOPIA, BILATERAL: ICD-10-CM

## 2018-05-28 PROCEDURE — 92014 COMPRE OPH EXAM EST PT 1/>: CPT | Mod: S$PBB,,, | Performed by: OPTOMETRIST

## 2018-05-28 PROCEDURE — 92015 DETERMINE REFRACTIVE STATE: CPT | Mod: ,,, | Performed by: OPTOMETRIST

## 2018-05-28 PROCEDURE — 99212 OFFICE O/P EST SF 10 MIN: CPT | Mod: PBBFAC,PO | Performed by: OPTOMETRIST

## 2018-05-28 PROCEDURE — 99999 PR PBB SHADOW E&M-EST. PATIENT-LVL II: CPT | Mod: PBBFAC,,, | Performed by: OPTOMETRIST

## 2018-05-28 NOTE — PROGRESS NOTES
LAURA HUNT 05/2017 Glasses about 1 yr. Old.  Distance not as clear past few   months, gradual change. Doesn't clear with blinking.  Near still seems   fine.  Blur ou at dist, x mos, no assoc pain or red, no relief over time,   constant    Last edited by Cory Blakely, OD on 5/28/2018  2:51 PM. (History)            Assessment /Plan     For exam results, see Encounter Report.    Nuclear sclerosis, bilateral    Amblyopia, strabismic, left    Degenerative retinal drusen of both eyes    Hyperopia with presbyopia, bilateral      1. Educated pt on presence of cataracts and effects on vision. No surgery at this time. Recheck in one year.  2. Longstanding. Monitor condition. Patient to report any changes. RTC 1 year recheck.  3. Monitor condition. Patient to report any changes. RTC 1 year recheck.       4. Spec Rx given. Different lens options discussed with patient. RTC 1 year full exam.

## 2018-08-03 ENCOUNTER — TELEPHONE (OUTPATIENT)
Dept: INTERNAL MEDICINE | Facility: CLINIC | Age: 78
End: 2018-08-03

## 2018-08-03 DIAGNOSIS — I10 ESSENTIAL HYPERTENSION: Primary | ICD-10-CM

## 2018-08-03 DIAGNOSIS — E78.5 HYPERLIPIDEMIA, UNSPECIFIED HYPERLIPIDEMIA TYPE: ICD-10-CM

## 2018-08-03 NOTE — TELEPHONE ENCOUNTER
----- Message from Veronica Del Valle sent at 8/3/2018  8:41 AM CDT -----  Contact: fyi  Doctor appointment and lab have been scheduled.  Please link lab orders to the lab appointment.  Date of doctor appointment:  08/16/18  Physical or EP:  physical  Date of lab appointment:  08/09/18  Comments:

## 2018-08-07 ENCOUNTER — LAB VISIT (OUTPATIENT)
Dept: LAB | Facility: HOSPITAL | Age: 78
End: 2018-08-07
Attending: INTERNAL MEDICINE
Payer: MEDICARE

## 2018-08-07 DIAGNOSIS — E78.5 HYPERLIPIDEMIA, UNSPECIFIED HYPERLIPIDEMIA TYPE: ICD-10-CM

## 2018-08-07 DIAGNOSIS — I10 ESSENTIAL HYPERTENSION: ICD-10-CM

## 2018-08-07 LAB
ALBUMIN SERPL BCP-MCNC: 3.7 G/DL
ALP SERPL-CCNC: 91 U/L
ALT SERPL W/O P-5'-P-CCNC: 46 U/L
ANION GAP SERPL CALC-SCNC: 12 MMOL/L
AST SERPL-CCNC: 34 U/L
BASOPHILS # BLD AUTO: 0.03 K/UL
BASOPHILS NFR BLD: 0.7 %
BILIRUB SERPL-MCNC: 0.9 MG/DL
BUN SERPL-MCNC: 11 MG/DL
CALCIUM SERPL-MCNC: 9.4 MG/DL
CHLORIDE SERPL-SCNC: 104 MMOL/L
CHOLEST SERPL-MCNC: 240 MG/DL
CHOLEST/HDLC SERPL: 3.4 {RATIO}
CO2 SERPL-SCNC: 25 MMOL/L
CREAT SERPL-MCNC: 0.8 MG/DL
DIFFERENTIAL METHOD: ABNORMAL
EOSINOPHIL # BLD AUTO: 0.2 K/UL
EOSINOPHIL NFR BLD: 4.3 %
ERYTHROCYTE [DISTWIDTH] IN BLOOD BY AUTOMATED COUNT: 12.9 %
EST. GFR  (AFRICAN AMERICAN): >60 ML/MIN/1.73 M^2
EST. GFR  (NON AFRICAN AMERICAN): >60 ML/MIN/1.73 M^2
GLUCOSE SERPL-MCNC: 116 MG/DL
HCT VFR BLD AUTO: 43.3 %
HDLC SERPL-MCNC: 71 MG/DL
HDLC SERPL: 29.6 %
HGB BLD-MCNC: 14.1 G/DL
LDLC SERPL CALC-MCNC: 129.2 MG/DL
LYMPHOCYTES # BLD AUTO: 1.7 K/UL
LYMPHOCYTES NFR BLD: 37.1 %
MCH RBC QN AUTO: 32.5 PG
MCHC RBC AUTO-ENTMCNC: 32.6 G/DL
MCV RBC AUTO: 100 FL
MONOCYTES # BLD AUTO: 0.5 K/UL
MONOCYTES NFR BLD: 10.6 %
NEUTROPHILS # BLD AUTO: 2.1 K/UL
NEUTROPHILS NFR BLD: 47.1 %
NONHDLC SERPL-MCNC: 169 MG/DL
PLATELET # BLD AUTO: 194 K/UL
PMV BLD AUTO: 9.7 FL
POTASSIUM SERPL-SCNC: 4 MMOL/L
PROT SERPL-MCNC: 6.7 G/DL
RBC # BLD AUTO: 4.34 M/UL
SODIUM SERPL-SCNC: 141 MMOL/L
TRIGL SERPL-MCNC: 199 MG/DL
WBC # BLD AUTO: 4.45 K/UL

## 2018-08-07 PROCEDURE — 85025 COMPLETE CBC W/AUTO DIFF WBC: CPT

## 2018-08-07 PROCEDURE — 80061 LIPID PANEL: CPT

## 2018-08-07 PROCEDURE — 36415 COLL VENOUS BLD VENIPUNCTURE: CPT

## 2018-08-07 PROCEDURE — 80053 COMPREHEN METABOLIC PANEL: CPT

## 2018-08-16 ENCOUNTER — OFFICE VISIT (OUTPATIENT)
Dept: INTERNAL MEDICINE | Facility: CLINIC | Age: 78
End: 2018-08-16
Payer: MEDICARE

## 2018-08-16 VITALS
HEART RATE: 73 BPM | OXYGEN SATURATION: 98 % | HEIGHT: 61 IN | WEIGHT: 162.5 LBS | SYSTOLIC BLOOD PRESSURE: 136 MMHG | BODY MASS INDEX: 30.68 KG/M2 | DIASTOLIC BLOOD PRESSURE: 82 MMHG

## 2018-08-16 DIAGNOSIS — E78.5 HYPERLIPIDEMIA, UNSPECIFIED HYPERLIPIDEMIA TYPE: ICD-10-CM

## 2018-08-16 DIAGNOSIS — M85.80 OSTEOPENIA, UNSPECIFIED LOCATION: ICD-10-CM

## 2018-08-16 DIAGNOSIS — I10 ESSENTIAL HYPERTENSION: Primary | ICD-10-CM

## 2018-08-16 DIAGNOSIS — F40.243 FEAR OF FLYING: ICD-10-CM

## 2018-08-16 DIAGNOSIS — Z12.31 ENCOUNTER FOR SCREENING MAMMOGRAM FOR MALIGNANT NEOPLASM OF BREAST: ICD-10-CM

## 2018-08-16 PROCEDURE — 99999 PR PBB SHADOW E&M-EST. PATIENT-LVL IV: CPT | Mod: PBBFAC,,, | Performed by: INTERNAL MEDICINE

## 2018-08-16 PROCEDURE — 99214 OFFICE O/P EST MOD 30 MIN: CPT | Mod: S$PBB,,, | Performed by: INTERNAL MEDICINE

## 2018-08-16 PROCEDURE — 99214 OFFICE O/P EST MOD 30 MIN: CPT | Mod: PBBFAC | Performed by: INTERNAL MEDICINE

## 2018-08-16 RX ORDER — ATORVASTATIN CALCIUM 20 MG/1
20 TABLET, FILM COATED ORAL DAILY
Qty: 90 TABLET | Refills: 4 | Status: SHIPPED | OUTPATIENT
Start: 2018-08-16 | End: 2018-08-16 | Stop reason: SDUPTHER

## 2018-08-16 RX ORDER — LORAZEPAM 0.5 MG/1
0.5 TABLET ORAL EVERY 12 HOURS PRN
Qty: 20 TABLET | Refills: 1 | Status: SHIPPED | OUTPATIENT
Start: 2018-08-16 | End: 2020-04-27 | Stop reason: ALTCHOICE

## 2018-08-16 RX ORDER — ATORVASTATIN CALCIUM 20 MG/1
20 TABLET, FILM COATED ORAL DAILY
Qty: 90 TABLET | Refills: 4 | Status: SHIPPED | OUTPATIENT
Start: 2018-08-16 | End: 2019-11-21 | Stop reason: SDUPTHER

## 2018-08-16 NOTE — PROGRESS NOTES
Subjective:       Patient ID: Luz Amaro is a 77 y.o. female.    Chief Complaint: Annual Exam    Patient here for annual follow-up of problems including anxiety associated with flying, hypertension dyslipidemia arthritis and incontinence.  She is taking a trip to [facility] in 2 months and would like a prescription for anxiety medicine flying.  She understands benefits and side effects would like to proceed.  She expressed interest in losing weight and I gave her several tips for increasing exercise in decreasing calories and she will work on losing a bit of weight.  Labs remained stable.  We reviewed these in detail.  She is due mammogram      Review of Systems   Constitutional: Negative for appetite change, chills and fever.   HENT: Negative for nosebleeds, sinus pain and sore throat.    Eyes: Negative for visual disturbance.   Respiratory: Negative for cough, shortness of breath and wheezing.    Cardiovascular: Negative for chest pain and leg swelling.   Gastrointestinal: Negative for abdominal pain, constipation and diarrhea.   Genitourinary: Negative for difficulty urinating and hematuria.   Musculoskeletal: Positive for arthralgias. Negative for neck pain and neck stiffness.   Skin: Negative for pallor and rash.   Neurological: Negative for headaches.   Psychiatric/Behavioral: Negative for dysphoric mood and suicidal ideas. The patient is nervous/anxious (Flying anxiety).        Objective:      Physical Exam   Constitutional: She is oriented to person, place, and time. She appears well-developed and well-nourished. No distress.   HENT:   Head: Normocephalic and atraumatic.   Right Ear: External ear normal.   Left Ear: External ear normal.   Mouth/Throat: Oropharynx is clear and moist. No oropharyngeal exudate.   Eyes: Conjunctivae are normal. Pupils are equal, round, and reactive to light. No scleral icterus.   Neck: Normal range of motion. Neck supple. No thyromegaly present.    Cardiovascular: Normal rate and regular rhythm.   No murmur heard.  Pulmonary/Chest: Effort normal and breath sounds normal. She has no wheezes.   Abdominal: Soft. Bowel sounds are normal. She exhibits no distension. There is no tenderness.   Musculoskeletal: She exhibits no tenderness.   Lymphadenopathy:     She has no cervical adenopathy.   Neurological: She is alert and oriented to person, place, and time.   Skin: No rash noted.   Psychiatric: She has a normal mood and affect.       Assessment:       1. Essential hypertension    2. Fear of flying    3. Hyperlipidemia, unspecified hyperlipidemia type    4. Osteopenia, unspecified location    5. Encounter for screening mammogram for malignant neoplasm of breast        Plan:       Luz was seen today for annual exam.    Diagnoses and all orders for this visit:    Essential hypertension    Fear of flying    Hyperlipidemia, unspecified hyperlipidemia type    Osteopenia, unspecified location    Encounter for screening mammogram for malignant neoplasm of breast  -     Mammo Digital Screening Bilat with CAD; Future    Other orders  -     Discontinue: atorvastatin (LIPITOR) 20 MG tablet; Take 1 tablet (20 mg total) by mouth once daily.  -     LORazepam (ATIVAN) 0.5 MG tablet; Take 1 tablet (0.5 mg total) by mouth every 12 (twelve) hours as needed for Anxiety (Panic Attack).  -     atorvastatin (LIPITOR) 20 MG tablet; Take 1 tablet (20 mg total) by mouth once daily.

## 2018-08-17 ENCOUNTER — HOSPITAL ENCOUNTER (OUTPATIENT)
Dept: RADIOLOGY | Facility: HOSPITAL | Age: 78
Discharge: HOME | End: 2018-08-17
Attending: INTERNAL MEDICINE
Payer: MEDICARE

## 2018-08-17 DIAGNOSIS — Z12.31 ENCOUNTER FOR SCREENING MAMMOGRAM FOR MALIGNANT NEOPLASM OF BREAST: ICD-10-CM

## 2018-08-17 PROCEDURE — 77067 SCR MAMMO BI INCL CAD: CPT | Mod: TC

## 2018-08-17 PROCEDURE — 77067 SCR MAMMO BI INCL CAD: CPT | Mod: 26,,, | Performed by: RADIOLOGY

## 2018-09-12 ENCOUNTER — PES CALL (OUTPATIENT)
Dept: ADMINISTRATIVE | Facility: CLINIC | Age: 78
End: 2018-09-12

## 2018-09-19 ENCOUNTER — TELEPHONE (OUTPATIENT)
Dept: INTERNAL MEDICINE | Facility: CLINIC | Age: 78
End: 2018-09-19

## 2018-09-19 NOTE — TELEPHONE ENCOUNTER
[facility] is calling, stating the Rx is not available LORazepam (ATIVAN) 0.5 MG tablet, wants to know if Rx can be filled as 1 MG and patient can split in half. Thank you.

## 2018-09-19 NOTE — TELEPHONE ENCOUNTER
----- Message from Azael Torres sent at 9/19/2018  3:18 PM CDT -----  Contact: Pharmacy [facility] [phone]  Pharmacy calling, stating the Rx is not available LORazepam (ATIVAN) 0.5 MG tablet, wants to know if Rx can be filled as 1 MG and patient can split in half, would like a response please.    Pharmacy: [facility] [address] [place] [address] AT [facility] [phone] (Phone)  [phone] (Fax    Please call an advise  Thank you

## 2018-10-25 ENCOUNTER — PES CALL (OUTPATIENT)
Dept: ADMINISTRATIVE | Facility: CLINIC | Age: 78
End: 2018-10-25

## 2018-10-29 ENCOUNTER — OFFICE VISIT (OUTPATIENT)
Dept: DERMATOLOGY | Facility: CLINIC | Age: 78
End: 2018-10-29
Payer: MEDICARE

## 2018-10-29 DIAGNOSIS — D18.00 ANGIOMA: ICD-10-CM

## 2018-10-29 DIAGNOSIS — D22.9 NEVUS: ICD-10-CM

## 2018-10-29 DIAGNOSIS — L90.5 SCAR: ICD-10-CM

## 2018-10-29 DIAGNOSIS — L81.4 LENTIGO: ICD-10-CM

## 2018-10-29 DIAGNOSIS — L82.1 SK (SEBORRHEIC KERATOSIS): Primary | ICD-10-CM

## 2018-10-29 DIAGNOSIS — Z85.828 PERSONAL HISTORY OF SKIN CANCER: ICD-10-CM

## 2018-10-29 PROCEDURE — 99212 OFFICE O/P EST SF 10 MIN: CPT | Mod: PBBFAC,PO | Performed by: DERMATOLOGY

## 2018-10-29 PROCEDURE — 99999 PR PBB SHADOW E&M-EST. PATIENT-LVL II: CPT | Mod: PBBFAC,,, | Performed by: DERMATOLOGY

## 2018-10-29 PROCEDURE — 99214 OFFICE O/P EST MOD 30 MIN: CPT | Mod: S$PBB,,, | Performed by: DERMATOLOGY

## 2018-10-29 NOTE — PROGRESS NOTES
Subjective:       Patient ID:  Luz Amaro is a 78 y.o. female who presents for   Chief Complaint   Patient presents with    Skin Check     High risk pt with hx BCC x 2 here to check for the development of new lesions.  Pt denies any new or changing lesions            Review of Systems   Constitutional: Positive for malaise. Negative for fever, chills and fatigue.   Gastrointestinal: Positive for nausea, vomiting and diarrhea.        X 2 days   Skin: Positive for activity-related sunscreen use. Negative for daily sunscreen use.   Hematologic/Lymphatic: Bruises/bleeds easily.        Objective:    Physical Exam   Constitutional: She appears well-developed and well-nourished. No distress.   Neurological: She is alert and oriented to person, place, and time. She is not disoriented.   Psychiatric: She has a normal mood and affect.   Skin:   Areas Examined (abnormalities noted in diagram):   Scalp / Hair Palpated and Inspected  Head / Face Inspection Performed  Neck Inspection Performed  Chest / Axilla Inspection Performed  Abdomen Inspection Performed  Genitals / Buttocks / Groin Inspection Performed  Back Inspection Performed  RUE Inspected  LUE Inspection Performed  RLE Inspected  LLE Inspection Performed  Nails and Digits Inspection Performed                           Diagram Legend     Erythematous scaling macule/papule c/w actinic keratosis       Vascular papule c/w angioma      Pigmented verrucoid papule/plaque c/w seborrheic keratosis      Yellow umbilicated papule c/w sebaceous hyperplasia      Irregularly shaped tan macule c/w lentigo     1-2 mm smooth white papules consistent with Milia      Movable subcutaneous cyst with punctum c/w epidermal inclusion cyst      Subcutaneous movable cyst c/w pilar cyst      Firm pink to brown papule c/w dermatofibroma      Pedunculated fleshy papule(s) c/w skin tag(s)      Evenly pigmented macule c/w junctional nevus     Mildly variegated pigmented, slightly  irregular-bordered macule c/w mildly atypical nevus      Flesh colored to evenly pigmented papule c/w intradermal nevus       Pink pearly papule/plaque c/w basal cell carcinoma      Erythematous hyperkeratotic cursted plaque c/w SCC      Surgical scar with no sign of skin cancer recurrence      Open and closed comedones      Inflammatory papules and pustules      Verrucoid papule consistent consistent with wart     Erythematous eczematous patches and plaques     Dystrophic onycholytic nail with subungual debris c/w onychomycosis     Umbilicated papule    Erythematous-base heme-crusted tan verrucoid plaque consistent with inflamed seborrheic keratosis     Erythematous Silvery Scaling Plaque c/w Psoriasis     See annotation      Assessment / Plan:        SK (seborrheic keratosis)  These are benign inherited growths without a malignant potential. Reassurance given to patient. No treatment is necessary.     Angioma  These are benign vascular lesions that are inherited.  Treatment is not necessary.    Nevus  Discussed ABCDE's of nevi.  Monitor for new mole or moles that are becoming bigger, darker, irritated, or developing irregular borders. Brochure provided.    Lentigo  This is a benign hyperpigmented sun induced lesion. Daily sun protection will reduce the number of new lesions. Treatment of these benign lesions are considered cosmetic.  The nature of sun-induced photo-aging and skin cancers is discussed.  Sun avoidance, protective clothing, and the use of 30-SPF sunscreens is advised. Observe closely for skin damage/changes, and call if such occurs.    Personal history of skin cancer  Scar  Pt with history of non melanoma skin cancer  Total body skin examination performed today including at least 12 points as noted in physical examination. No suspicious lesions noted.             Follow-up in about 1 year (around 10/29/2019).

## 2018-11-08 ENCOUNTER — OFFICE VISIT (OUTPATIENT)
Dept: INTERNAL MEDICINE | Facility: CLINIC | Age: 78
End: 2018-11-08
Payer: MEDICARE

## 2018-11-08 VITALS
HEART RATE: 63 BPM | HEIGHT: 62 IN | WEIGHT: 162.94 LBS | OXYGEN SATURATION: 98 % | DIASTOLIC BLOOD PRESSURE: 84 MMHG | BODY MASS INDEX: 29.98 KG/M2 | SYSTOLIC BLOOD PRESSURE: 138 MMHG

## 2018-11-08 DIAGNOSIS — I10 ESSENTIAL HYPERTENSION: ICD-10-CM

## 2018-11-08 DIAGNOSIS — N39.46 MIXED INCONTINENCE: ICD-10-CM

## 2018-11-08 DIAGNOSIS — R42 VERTIGO: Primary | ICD-10-CM

## 2018-11-08 PROCEDURE — 99213 OFFICE O/P EST LOW 20 MIN: CPT | Mod: S$PBB,,, | Performed by: INTERNAL MEDICINE

## 2018-11-08 PROCEDURE — 99214 OFFICE O/P EST MOD 30 MIN: CPT | Mod: PBBFAC | Performed by: INTERNAL MEDICINE

## 2018-11-08 PROCEDURE — 99999 PR PBB SHADOW E&M-EST. PATIENT-LVL IV: CPT | Mod: PBBFAC,,, | Performed by: INTERNAL MEDICINE

## 2018-11-08 RX ORDER — MECLIZINE HYDROCHLORIDE 25 MG/1
25 TABLET ORAL 2 TIMES DAILY PRN
Qty: 20 TABLET | Refills: 0 | Status: SHIPPED | OUTPATIENT
Start: 2018-11-08 | End: 2021-06-03

## 2018-11-08 NOTE — PATIENT INSTRUCTIONS
Benign Paroxysmal Positional Vertigo    Benign paroxysmal positional vertigo is a common condition. You feel as if the room is spinning after changing position, moving your head quickly, or even just rolling over in bed.  Vertigo is a false feeling of motion plus disorientation that makes it seem as though the room is spinning. A vertigo attack may cause sudden nausea, vomiting, and heavy sweating. Severe vertigo causes a loss of balance. You may even fall down.  Vertigo is caused by a problem with the inner ear. The inner ear is located behind the middle ear. It is a part of the balance center of the body. It contains small calcium particles within fluid-filled canals (semi-circular canals). These particles can move out of position as a result of aging, head trauma, or disease of the inner ear. Once that happens, moving your head in certain ways may cause the particles to stimulate the inner ear. This creates the feeling of vertigo.  An episode of vertigo may last seconds, minutes, or hours. Once you are over the first episode of vertigo, it may never return. Sometimes symptoms return off and on over several weeks or longer.  Home care  Follow these guidelines when caring for yourself at home:  · Rest quietly in bed if your symptoms are severe. Change position slowly. There is usually one position that will feel best. This might be lying on one side or lying on your back with your head slightly raised on pillows.  · Do not drive or work with dangerous machinery for one week after symptoms disappear. This is in case symptoms return suddenly.  · Take medicine as prescribed to relieve your symptoms. Unless another medicine was prescribed for nausea, vomiting, and vertigo, you may use over-the-counter motion sickness medicine, such as meclizine or dimenhydrinate.  Follow-up care  Follow up with your healthcare provider, or as directed. Tell your provider about any ringing in the ear or hearing loss.  If you had a CT  or MRI scan, a specialist will review it. You will be told of any new findings that may affect your care.  When to seek medical advice  Call your healthcare provider right away if any of these occur:  · Vertigo gets worse even after taking prescribed medicine  · Repeated vomiting even after taking prescribed medicine  · Increased weakness or fainting  · Severe headache or unusual drowsiness or confusion  · Weakness of an arm or leg or one side of the face  · Difficulty walking  · Difficulty with speech or vision  · Seizure  · Trouble hearing  · Fever of 100.4ºF (38ºC) or higher, or as directed by your healthcare provider  · Fast heart rate  · Chest pain   Date Last Reviewed: 7/10/2015  © 8417-8875 [employer]. [address] [place] [address], [place] [address]. All rights reserved. This information is not intended as a substitute for professional medical care. Always follow your healthcare professional's instructions.

## 2018-11-08 NOTE — PROGRESS NOTES
Subjective:       Patient ID: Luz Amaro is a 78 y.o. female.    Chief Complaint: Dizziness (Started November 2nd ) and Nausea    HPI:  Patient with history of hypertension complains of spinning type dizziness for a few days with certain movements.  She started by reaching into the back seat of her car to get some flowers and as she turned everything started spinning.  It lasted a few minutes.  It has happened a couple of times over the last few days including getting out of bed.  She has had some nausea with it.  No headache double vision or hearing changes.  Minor sinus allergies but nothing significant.  No numbness or tingling or weakness in the extremities.  Retest blood pressure was slightly improved.      Review of Systems   Constitutional: Negative for appetite change, chills and fever.   HENT: Negative for nosebleeds, sinus pain and sore throat.    Eyes: Negative for visual disturbance.   Respiratory: Negative for cough, shortness of breath and wheezing.    Cardiovascular: Negative for chest pain and leg swelling.   Gastrointestinal: Positive for nausea. Negative for abdominal pain, constipation and diarrhea.   Genitourinary: Negative for difficulty urinating and hematuria.   Musculoskeletal: Negative for neck pain and neck stiffness.   Skin: Negative for pallor and rash.   Neurological: Positive for dizziness. Negative for headaches.   Psychiatric/Behavioral: Negative for dysphoric mood and suicidal ideas. The patient is not nervous/anxious.        Objective:      Physical Exam   Constitutional: She is oriented to person, place, and time. She appears well-developed and well-nourished. No distress.   HENT:   Head: Normocephalic and atraumatic.   Right Ear: External ear normal.   Left Ear: External ear normal.   Mouth/Throat: Oropharynx is clear and moist. No oropharyngeal exudate.   Eyes: Conjunctivae are normal. Pupils are equal, round, and reactive to light. No scleral icterus.   Neck: Normal  range of motion. Neck supple. No thyromegaly present.   Cardiovascular: Normal rate and regular rhythm.   No murmur heard.  Pulmonary/Chest: Effort normal and breath sounds normal. She has no wheezes.   Abdominal: Soft. Bowel sounds are normal. She exhibits no distension. There is no tenderness.   Musculoskeletal: She exhibits no tenderness.   Lymphadenopathy:     She has no cervical adenopathy.   Neurological: She is alert and oriented to person, place, and time. She displays normal reflexes. No cranial nerve deficit or sensory deficit. She exhibits normal muscle tone. Coordination normal.   Thorough neurologic exam was unremarkable.  No nystagmus   Skin: No rash noted.   Psychiatric: She has a normal mood and affect.       Assessment:       1. Vertigo    2. Essential hypertension    3. Mixed incontinence        Plan:       Luz was seen today for dizziness and nausea.    Diagnoses and all orders for this visit:    Vertigo    Essential hypertension    Mixed incontinence    Other orders  -     meclizine (ANTIVERT) 25 mg tablet; Take 1 tablet (25 mg total) by mouth 2 (two) times daily as needed.        side effects discussed.  Monitor blood pressure.  Rest.  Call if symptoms fail to improve or worsen

## 2019-02-08 RX ORDER — METOPROLOL SUCCINATE 50 MG/1
TABLET, EXTENDED RELEASE ORAL
Qty: 90 TABLET | Refills: 2 | Status: SHIPPED | OUTPATIENT
Start: 2019-02-08 | End: 2019-11-21 | Stop reason: SDUPTHER

## 2019-04-23 ENCOUNTER — PES CALL (OUTPATIENT)
Dept: ADMINISTRATIVE | Facility: CLINIC | Age: 79
End: 2019-04-23

## 2019-05-17 ENCOUNTER — OFFICE VISIT (OUTPATIENT)
Dept: INTERNAL MEDICINE | Facility: CLINIC | Age: 79
End: 2019-05-17
Payer: MEDICARE

## 2019-05-17 VITALS
OXYGEN SATURATION: 98 % | HEIGHT: 61 IN | SYSTOLIC BLOOD PRESSURE: 124 MMHG | DIASTOLIC BLOOD PRESSURE: 76 MMHG | WEIGHT: 165.56 LBS | HEART RATE: 71 BPM | BODY MASS INDEX: 31.26 KG/M2

## 2019-05-17 DIAGNOSIS — I10 ESSENTIAL HYPERTENSION: ICD-10-CM

## 2019-05-17 DIAGNOSIS — R01.1 CARDIAC MURMUR: Primary | ICD-10-CM

## 2019-05-17 DIAGNOSIS — R00.2 PALPITATION: ICD-10-CM

## 2019-05-17 PROCEDURE — 99214 OFFICE O/P EST MOD 30 MIN: CPT | Mod: PBBFAC,25 | Performed by: INTERNAL MEDICINE

## 2019-05-17 PROCEDURE — 99214 PR OFFICE/OUTPT VISIT, EST, LEVL IV, 30-39 MIN: ICD-10-PCS | Mod: S$PBB,,, | Performed by: INTERNAL MEDICINE

## 2019-05-17 PROCEDURE — 99214 OFFICE O/P EST MOD 30 MIN: CPT | Mod: S$PBB,,, | Performed by: INTERNAL MEDICINE

## 2019-05-17 PROCEDURE — 99999 PR PBB SHADOW E&M-EST. PATIENT-LVL IV: CPT | Mod: PBBFAC,,, | Performed by: INTERNAL MEDICINE

## 2019-05-17 PROCEDURE — 93010 EKG 12-LEAD: ICD-10-PCS | Mod: S$PBB,,, | Performed by: INTERNAL MEDICINE

## 2019-05-17 PROCEDURE — 99999 PR PBB SHADOW E&M-EST. PATIENT-LVL IV: ICD-10-PCS | Mod: PBBFAC,,, | Performed by: INTERNAL MEDICINE

## 2019-05-17 PROCEDURE — 93010 ELECTROCARDIOGRAM REPORT: CPT | Mod: S$PBB,,, | Performed by: INTERNAL MEDICINE

## 2019-05-17 PROCEDURE — 93005 ELECTROCARDIOGRAM TRACING: CPT | Mod: PBBFAC | Performed by: INTERNAL MEDICINE

## 2019-05-17 NOTE — PROGRESS NOTES
Subjective:       Patient ID: Luz Amaro is a 78 y.o. female.    Chief Complaint: Follow-up (Pt feels like her heart is skipping a beat)    HPI:  Patient comes in for complaint of noticing palpitations for a few days.  She said it felt like her heart was skipping a beat.  No chest pain or shortness of breath.  She has been under a little stress because she is trying to decide whether to moved to a new apartment because she hears traffic noise in airplanes.  She has been drinking a little more wine lately and wondered if that was contributing.  She denies any GI or  complaints.  No lower extremity edema PND or orthopnea.  No CHF or valvular heart disease in the family.  We did an EKG which showed normal sinus rhythm.  Some nonspecific changes and we will get cardiology's final reading on that.    Review of Systems   Constitutional: Negative for appetite change, chills and fever.   HENT: Negative for nosebleeds, sinus pain and sore throat.    Eyes: Negative for visual disturbance.   Respiratory: Negative for cough, shortness of breath and wheezing.    Cardiovascular: Positive for palpitations. Negative for chest pain and leg swelling.   Gastrointestinal: Negative for abdominal pain, constipation and diarrhea.   Genitourinary: Negative for difficulty urinating and hematuria.   Musculoskeletal: Negative for neck pain and neck stiffness.   Skin: Negative for pallor and rash.   Neurological: Negative for headaches.   Psychiatric/Behavioral: Negative for dysphoric mood and suicidal ideas. The patient is not nervous/anxious.        Objective:      Physical Exam   Constitutional: She is oriented to person, place, and time. She appears well-developed and well-nourished. No distress.   HENT:   Head: Normocephalic and atraumatic.   Right Ear: External ear normal.   Left Ear: External ear normal.   Mouth/Throat: Oropharynx is clear and moist. No oropharyngeal exudate.   No JVD   Eyes: Pupils are equal, round, and  reactive to light. Conjunctivae are normal. No scleral icterus.   Neck: Normal range of motion. Neck supple. No thyromegaly present.   Cardiovascular: Normal rate and regular rhythm.   Murmur (Murmur heard at the upper right sternal border and the lower left sternal border.  Systolic in nature 2 to 3/6) heard.  Pulmonary/Chest: Effort normal and breath sounds normal. She has no wheezes.   Abdominal: Soft. Bowel sounds are normal. She exhibits no distension. There is no tenderness.   Musculoskeletal: She exhibits no edema or tenderness.   Lymphadenopathy:     She has no cervical adenopathy.   Neurological: She is alert and oriented to person, place, and time.   Skin: No rash noted.   Psychiatric: She has a normal mood and affect.       Assessment:       1. Cardiac murmur    2. Essential hypertension    3. Palpitation        Plan:       Luz was seen today for follow-up.    Diagnoses and all orders for this visit:    Cardiac murmur  -     Transthoracic echo (TTE) 2D with Color Flow; Future  -     EKG 12-lead    Essential hypertension  -     Transthoracic echo (TTE) 2D with Color Flow; Future  -     EKG 12-lead    Palpitation          Patient sense of a skipped beat may have been a PVC year premature beat.  At this point I have asked her to decrease her alcohol, get better rest and monitor her symptoms however I would like to get a cardiac echo to evaluate the heart valves.

## 2019-05-20 ENCOUNTER — HOSPITAL ENCOUNTER (OUTPATIENT)
Dept: CARDIOLOGY | Facility: CLINIC | Age: 79
Discharge: HOME | End: 2019-05-20
Attending: INTERNAL MEDICINE
Payer: MEDICARE

## 2019-05-20 VITALS
SYSTOLIC BLOOD PRESSURE: 124 MMHG | HEART RATE: 65 BPM | HEIGHT: 61 IN | DIASTOLIC BLOOD PRESSURE: 76 MMHG | BODY MASS INDEX: 31.34 KG/M2 | WEIGHT: 166 LBS

## 2019-05-20 DIAGNOSIS — I10 ESSENTIAL HYPERTENSION: ICD-10-CM

## 2019-05-20 DIAGNOSIS — R01.1 CARDIAC MURMUR: ICD-10-CM

## 2019-05-20 LAB
ASCENDING AORTA: 3.1 CM
AV INDEX (PROSTH): 0.48
AV MEAN GRADIENT: 11.88 MMHG
AV PEAK GRADIENT: 21.34 MMHG
AV VALVE AREA: 1.65 CM2
AV VELOCITY RATIO: 0.48
BSA FOR ECHO PROCEDURE: 1.8 M2
CV ECHO LV RWT: 0.34 CM
DOP CALC AO PEAK VEL: 2.31 M/S
DOP CALC AO VTI: 58.54 CM
DOP CALC LVOT AREA: 3.46 CM2
DOP CALC LVOT DIAMETER: 2.1 CM
DOP CALC LVOT PEAK VEL: 1.12 M/S
DOP CALC LVOT STROKE VOLUME: 96.76 CM3
DOP CALCLVOT PEAK VEL VTI: 27.95 CM
E WAVE DECELERATION TIME: 204.93 MSEC
E/A RATIO: 0.84
E/E' RATIO: 22.22
ECHO LV POSTERIOR WALL: 0.83 CM (ref 0.6–1.1)
FRACTIONAL SHORTENING: 24 % (ref 28–44)
INTERVENTRICULAR SEPTUM: 0.83 CM (ref 0.6–1.1)
IVRT: 0.09 MSEC
LA MAJOR: 5.86 CM
LA MINOR: 5.2 CM
LA WIDTH: 3.66 CM
LEFT ATRIUM SIZE: 4.05 CM
LEFT ATRIUM VOLUME INDEX: 39.8 ML/M2
LEFT ATRIUM VOLUME: 69.43 CM3
LEFT INTERNAL DIMENSION IN SYSTOLE: 3.7 CM (ref 2.1–4)
LEFT VENTRICLE DIASTOLIC VOLUME INDEX: 64.01 ML/M2
LEFT VENTRICLE DIASTOLIC VOLUME: 111.71 ML
LEFT VENTRICLE MASS INDEX: 78.3 G/M2
LEFT VENTRICLE SYSTOLIC VOLUME INDEX: 33.4 ML/M2
LEFT VENTRICLE SYSTOLIC VOLUME: 58.28 ML
LEFT VENTRICULAR INTERNAL DIMENSION IN DIASTOLE: 4.88 CM (ref 3.5–6)
LEFT VENTRICULAR MASS: 136.64 G
LV LATERAL E/E' RATIO: 20
LV SEPTAL E/E' RATIO: 25
MV PEAK A VEL: 1.19 M/S
MV PEAK E VEL: 1 M/S
PISA TR MAX VEL: 2.27 M/S
PULM VEIN S/D RATIO: 1.7
PV PEAK D VEL: 0.43 M/S
PV PEAK S VEL: 0.73 M/S
RA MAJOR: 5.22 CM
RA PRESSURE: 3 MMHG
RA WIDTH: 4.08 CM
RIGHT VENTRICULAR END-DIASTOLIC DIMENSION: 3.61 CM
RV TISSUE DOPPLER FREE WALL SYSTOLIC VELOCITY 1 (APICAL 4 CHAMBER VIEW): 9.93 M/S
SINUS: 2.6 CM
STJ: 2.12 CM
TDI LATERAL: 0.05
TDI SEPTAL: 0.04
TDI: 0.05
TR MAX PG: 20.61 MMHG
TRICUSPID ANNULAR PLANE SYSTOLIC EXCURSION: 2.08 CM
TV REST PULMONARY ARTERY PRESSURE: 24 MMHG

## 2019-05-20 PROCEDURE — 93306 TTE W/DOPPLER COMPLETE: CPT | Mod: PBBFAC | Performed by: INTERNAL MEDICINE

## 2019-05-20 PROCEDURE — 93306 TRANSTHORACIC ECHO (TTE) COMPLETE (CUPID ONLY): ICD-10-PCS | Mod: 26,S$PBB,, | Performed by: INTERNAL MEDICINE

## 2019-05-24 ENCOUNTER — PATIENT MESSAGE (OUTPATIENT)
Dept: INTERNAL MEDICINE | Facility: CLINIC | Age: 79
End: 2019-05-24

## 2019-05-29 ENCOUNTER — OFFICE VISIT (OUTPATIENT)
Dept: OPTOMETRY | Facility: CLINIC | Age: 79
End: 2019-05-29
Payer: MEDICARE

## 2019-05-29 DIAGNOSIS — H25.13 NUCLEAR SCLEROSIS, BILATERAL: Primary | ICD-10-CM

## 2019-05-29 DIAGNOSIS — H35.363 DEGENERATIVE RETINAL DRUSEN OF BOTH EYES: ICD-10-CM

## 2019-05-29 DIAGNOSIS — H53.032 AMBLYOPIA, STRABISMIC, LEFT: ICD-10-CM

## 2019-05-29 PROCEDURE — 92014 COMPRE OPH EXAM EST PT 1/>: CPT | Mod: S$PBB,,, | Performed by: OPTOMETRIST

## 2019-05-29 PROCEDURE — 99999 PR PBB SHADOW E&M-EST. PATIENT-LVL II: ICD-10-PCS | Mod: PBBFAC,,, | Performed by: OPTOMETRIST

## 2019-05-29 PROCEDURE — 92014 PR EYE EXAM, EST PATIENT,COMPREHESV: ICD-10-PCS | Mod: S$PBB,,, | Performed by: OPTOMETRIST

## 2019-05-29 PROCEDURE — 99999 PR PBB SHADOW E&M-EST. PATIENT-LVL II: CPT | Mod: PBBFAC,,, | Performed by: OPTOMETRIST

## 2019-05-29 PROCEDURE — 99212 OFFICE O/P EST SF 10 MIN: CPT | Mod: PBBFAC,PO | Performed by: OPTOMETRIST

## 2019-05-29 NOTE — PROGRESS NOTES
LAURA HUNT 05/2018  Glasses about 2 yrs. Old.  Patient noticed some trouble   reading the words on TV but no trouble reading or seeing st. Signs. Uses   AT's prn.    Last edited by Chantal Dillard on 5/29/2019  9:31 AM. (History)            Assessment /Plan     For exam results, see Encounter Report.    Nuclear sclerosis, bilateral    Amblyopia, strabismic, left    Degenerative retinal drusen of both eyes      1. Causing some blur. Educated pt on presence of cataracts and effects on vision. No surgery at this time. Recheck in one year.  2. Monitor condition. Patient to report any changes. RTC 1 year recheck.  3. Monitor condition. Patient to report any changes. RTC 1 year recheck.

## 2019-06-02 ENCOUNTER — PATIENT MESSAGE (OUTPATIENT)
Dept: INTERNAL MEDICINE | Facility: CLINIC | Age: 79
End: 2019-06-02

## 2019-07-16 ENCOUNTER — TELEPHONE (OUTPATIENT)
Dept: DERMATOLOGY | Facility: CLINIC | Age: 79
End: 2019-07-16

## 2019-07-16 NOTE — TELEPHONE ENCOUNTER
I called the pt and put her on a waiting list and asked her to check on [email or web address] to see if someone cancel so she can move up her appt.    ----- Message from Anaid Alexander sent at 7/16/2019  3:31 PM CDT -----  Contact: patient  Please call above patient at [phone] wants sooner appointment thanks

## 2019-08-05 ENCOUNTER — OFFICE VISIT (OUTPATIENT)
Dept: DERMATOLOGY | Facility: CLINIC | Age: 79
End: 2019-08-05
Payer: MEDICARE

## 2019-08-05 DIAGNOSIS — D22.9 NEVUS: ICD-10-CM

## 2019-08-05 DIAGNOSIS — Z85.828 PERSONAL HISTORY OF SKIN CANCER: ICD-10-CM

## 2019-08-05 DIAGNOSIS — L57.0 AK (ACTINIC KERATOSIS): ICD-10-CM

## 2019-08-05 DIAGNOSIS — D18.00 ANGIOMA: ICD-10-CM

## 2019-08-05 DIAGNOSIS — L81.4 LENTIGO: Primary | ICD-10-CM

## 2019-08-05 DIAGNOSIS — L82.1 SK (SEBORRHEIC KERATOSIS): ICD-10-CM

## 2019-08-05 DIAGNOSIS — L30.9 DERMATITIS: ICD-10-CM

## 2019-08-05 PROCEDURE — 99214 OFFICE O/P EST MOD 30 MIN: CPT | Mod: 25,S$PBB,, | Performed by: DERMATOLOGY

## 2019-08-05 PROCEDURE — 17000 DESTRUCT PREMALG LESION: CPT | Mod: PBBFAC,PO | Performed by: DERMATOLOGY

## 2019-08-05 PROCEDURE — 99999 PR PBB SHADOW E&M-EST. PATIENT-LVL II: CPT | Mod: PBBFAC,,, | Performed by: DERMATOLOGY

## 2019-08-05 PROCEDURE — 99212 OFFICE O/P EST SF 10 MIN: CPT | Mod: PBBFAC,PO,25 | Performed by: DERMATOLOGY

## 2019-08-05 PROCEDURE — 99214 PR OFFICE/OUTPT VISIT, EST, LEVL IV, 30-39 MIN: ICD-10-PCS | Mod: 25,S$PBB,, | Performed by: DERMATOLOGY

## 2019-08-05 PROCEDURE — 17000 PR DESTRUCTION(LASER SURGERY,CRYOSURGERY,CHEMOSURGERY),PREMALIGNANT LESIONS,FIRST LESION: ICD-10-PCS | Mod: S$PBB,,, | Performed by: DERMATOLOGY

## 2019-08-05 PROCEDURE — 17000 DESTRUCT PREMALG LESION: CPT | Mod: S$PBB,,, | Performed by: DERMATOLOGY

## 2019-08-05 PROCEDURE — 99999 PR PBB SHADOW E&M-EST. PATIENT-LVL II: ICD-10-PCS | Mod: PBBFAC,,, | Performed by: DERMATOLOGY

## 2019-08-05 RX ORDER — TRIAMCINOLONE ACETONIDE 1 MG/G
CREAM TOPICAL
Qty: 60 G | Refills: 3 | Status: SHIPPED | OUTPATIENT
Start: 2019-08-05 | End: 2020-11-06 | Stop reason: ALTCHOICE

## 2019-08-05 NOTE — PROGRESS NOTES
"  Subjective:       Patient ID:  Luz Amaro is a 78 y.o. female who presents for   Chief Complaint   Patient presents with    Skin Check     Patient is here today for a "mole" check.   Pt has a history of no sun exposure in the past.   Pt recalls several blistering sunburns in the past- none  Pt has history of tanning bed use- none  Pt has  had moles removed in the past- 1 removed left wrist, x 30yrs ago,  Pt has history of melanoma in first degree relatives-  None    This is a high risk patient here to check for the development of new lesions. BCC x 2         C/o rash on post legs. Present for 1-2 months. Itches. Worse at night. No new meds. No treatment. Moderate in severity.       Review of Systems   Constitutional: Negative for fever and chills.   Skin: Positive for itching and rash. Negative for daily sunscreen use.   Hematologic/Lymphatic: Does not bruise/bleed easily.        Objective:    Physical Exam   Constitutional: She appears well-developed and well-nourished. No distress.   Neurological: She is alert and oriented to person, place, and time. She is not disoriented.   Psychiatric: She has a normal mood and affect.   Skin:   Areas Examined (abnormalities noted in diagram):   Scalp / Hair Palpated and Inspected  Head / Face Inspection Performed  Neck Inspection Performed  Chest / Axilla Inspection Performed  Abdomen Inspection Performed  Genitals / Buttocks / Groin Inspection Performed  Back Inspection Performed  RUE Inspected  LUE Inspection Performed  RLE Inspected  LLE Inspection Performed  Nails and Digits Inspection Performed                               Diagram Legend     Erythematous scaling macule/papule c/w actinic keratosis       Vascular papule c/w angioma      Pigmented verrucoid papule/plaque c/w seborrheic keratosis      Yellow umbilicated papule c/w sebaceous hyperplasia      Irregularly shaped tan macule c/w lentigo     1-2 mm smooth white papules consistent with Milia      " Movable subcutaneous cyst with punctum c/w epidermal inclusion cyst      Subcutaneous movable cyst c/w pilar cyst      Firm pink to brown papule c/w dermatofibroma      Pedunculated fleshy papule(s) c/w skin tag(s)      Evenly pigmented macule c/w junctional nevus     Mildly variegated pigmented, slightly irregular-bordered macule c/w mildly atypical nevus      Flesh colored to evenly pigmented papule c/w intradermal nevus       Pink pearly papule/plaque c/w basal cell carcinoma      Erythematous hyperkeratotic cursted plaque c/w SCC      Surgical scar with no sign of skin cancer recurrence      Open and closed comedones      Inflammatory papules and pustules      Verrucoid papule consistent consistent with wart     Erythematous eczematous patches and plaques     Dystrophic onycholytic nail with subungual debris c/w onychomycosis     Umbilicated papule    Erythematous-base heme-crusted tan verrucoid plaque consistent with inflamed seborrheic keratosis     Erythematous Silvery Scaling Plaque c/w Psoriasis     See annotation      Assessment / Plan:        Lentigo  This is a benign hyperpigmented sun induced lesion. Daily sun protection will reduce the number of new lesions. Treatment of these benign lesions are considered cosmetic.  The nature of sun-induced photo-aging and skin cancers is discussed.  Sun avoidance, protective clothing, and the use of 30-SPF sunscreens is advised. Observe closely for skin damage/changes, and call if such occurs.    Personal history of skin cancer  Pt with history of non melanoma skin cancer  Total body skin examination performed today including at least 12 points as noted in physical examination. No suspicious lesions noted.    SK (seborrheic keratosis)  These are benign inherited growths without a malignant potential. Reassurance given to patient. No treatment is necessary.     Dermatitis- lichenoid but unusual distribution   Discussed with patient good skin care regimen including  avoiding fragranced products and very hot showers.  Recommended dove sensitive skin bar soap or cerave hydrating cleanser or bar.  Recommend Cerave cream  For moisturization daily -2x daily.     -     triamcinolone acetonide 0.1% (KENALOG) 0.1 % cream; AAA bid; not more than 2 weeks straight in same location, avoid use on face and groin  Dispense: 60 g; Refill: 3    Angioma  These are benign vascular lesions that are inherited.  Treatment is not necessary.    Nevus  Discussed ABCDE's of nevi.  Monitor for new mole or moles that are becoming bigger, darker, irritated, or developing irregular borders. Brochure provided.    AK (actinic keratosis)  Cryosurgery Procedure Note    Verbal consent from the patient is obtained including, but not limited to, risk of hypopigmentation/hyperpigmentation, scar, recurrence of lesion. The patient is aware of the precancerous quality and need for treatment of these lesions. Liquid nitrogen cryosurgery is applied to the 1 actinic keratoses, as detailed in the physical exam, to produce a freeze injury. The patient is aware that blisters may form and is instructed on wound care with gentle cleansing and use of vaseline ointment to keep moist until healed. The patient is supplied a handout on cryosurgery and is instructed to call if lesions do not completely resolve.             Follow up in about 2 months (around 10/5/2019).

## 2019-10-04 ENCOUNTER — PATIENT OUTREACH (OUTPATIENT)
Dept: ADMINISTRATIVE | Facility: OTHER | Age: 79
End: 2019-10-04

## 2019-10-09 ENCOUNTER — OFFICE VISIT (OUTPATIENT)
Dept: DERMATOLOGY | Facility: CLINIC | Age: 79
End: 2019-10-09
Payer: MEDICARE

## 2019-10-09 DIAGNOSIS — L57.0 AK (ACTINIC KERATOSIS): Primary | ICD-10-CM

## 2019-10-09 PROCEDURE — 17000 PR DESTRUCTION(LASER SURGERY,CRYOSURGERY,CHEMOSURGERY),PREMALIGNANT LESIONS,FIRST LESION: ICD-10-PCS | Mod: S$PBB,,, | Performed by: DERMATOLOGY

## 2019-10-09 PROCEDURE — 99999 PR PBB SHADOW E&M-EST. PATIENT-LVL II: CPT | Mod: PBBFAC,,, | Performed by: DERMATOLOGY

## 2019-10-09 PROCEDURE — 17000 DESTRUCT PREMALG LESION: CPT | Mod: S$PBB,,, | Performed by: DERMATOLOGY

## 2019-10-09 PROCEDURE — 99499 UNLISTED E&M SERVICE: CPT | Mod: S$PBB,,, | Performed by: DERMATOLOGY

## 2019-10-09 PROCEDURE — 99212 OFFICE O/P EST SF 10 MIN: CPT | Mod: PBBFAC,PO,25 | Performed by: DERMATOLOGY

## 2019-10-09 PROCEDURE — 17000 DESTRUCT PREMALG LESION: CPT | Mod: PBBFAC,PO | Performed by: DERMATOLOGY

## 2019-10-09 PROCEDURE — 99999 PR PBB SHADOW E&M-EST. PATIENT-LVL II: ICD-10-PCS | Mod: PBBFAC,,, | Performed by: DERMATOLOGY

## 2019-10-09 PROCEDURE — 99499 NO LOS: ICD-10-PCS | Mod: S$PBB,,, | Performed by: DERMATOLOGY

## 2019-10-09 NOTE — PROGRESS NOTES
Subjective:       Patient ID:  Luz Amaro is a 79 y.o. female who presents for   Chief Complaint   Patient presents with    Actinic Keratosis     Pt here today for a 2 month follow up on a AK on right cheek tx with cryosurhgery.   Pt denies any new, dry,scaly or bleeding, lesions today.   Pt deferred skin check today.      Review of Systems     Objective:    Physical Exam   Skin:   Areas Examined (abnormalities noted in diagram):   Head / Face Inspection Performed              Diagram Legend     Erythematous scaling macule/papule c/w actinic keratosis       Vascular papule c/w angioma      Pigmented verrucoid papule/plaque c/w seborrheic keratosis      Yellow umbilicated papule c/w sebaceous hyperplasia      Irregularly shaped tan macule c/w lentigo     1-2 mm smooth white papules consistent with Milia      Movable subcutaneous cyst with punctum c/w epidermal inclusion cyst      Subcutaneous movable cyst c/w pilar cyst      Firm pink to brown papule c/w dermatofibroma      Pedunculated fleshy papule(s) c/w skin tag(s)      Evenly pigmented macule c/w junctional nevus     Mildly variegated pigmented, slightly irregular-bordered macule c/w mildly atypical nevus      Flesh colored to evenly pigmented papule c/w intradermal nevus       Pink pearly papule/plaque c/w basal cell carcinoma      Erythematous hyperkeratotic cursted plaque c/w SCC      Surgical scar with no sign of skin cancer recurrence      Open and closed comedones      Inflammatory papules and pustules      Verrucoid papule consistent consistent with wart     Erythematous eczematous patches and plaques     Dystrophic onycholytic nail with subungual debris c/w onychomycosis     Umbilicated papule    Erythematous-base heme-crusted tan verrucoid plaque consistent with inflamed seborrheic keratosis     Erythematous Silvery Scaling Plaque c/w Psoriasis     See annotation      Assessment / Plan:        AK (actinic keratosis)- small focal area  still present on right lower cheek but much improved from last OV  Cryosurgery Procedure Note    Verbal consent from the patient is obtained including, but not limited to, risk of hypopigmentation/hyperpigmentation, scar, recurrence of lesion. The patient is aware of the precancerous quality and need for treatment of these lesions. Liquid nitrogen cryosurgery is applied to the 1 actinic keratoses, as detailed in the physical exam, to produce a freeze injury. The patient is aware that blisters may form and is instructed on wound care with gentle cleansing and use of vaseline ointment to keep moist until healed. The patient is supplied a handout on cryosurgery and is instructed to call if lesions do not completely resolve.               Follow up in about 9 months (around 7/9/2020).

## 2019-11-21 RX ORDER — ATORVASTATIN CALCIUM 20 MG/1
TABLET, FILM COATED ORAL
Qty: 90 TABLET | Refills: 4 | Status: SHIPPED | OUTPATIENT
Start: 2019-11-21 | End: 2021-04-28

## 2019-11-21 RX ORDER — METOPROLOL SUCCINATE 50 MG/1
TABLET, EXTENDED RELEASE ORAL
Qty: 90 TABLET | Refills: 4 | Status: SHIPPED | OUTPATIENT
Start: 2019-11-21 | End: 2020-05-11 | Stop reason: ALTCHOICE

## 2020-01-20 ENCOUNTER — OFFICE VISIT (OUTPATIENT)
Dept: INTERNAL MEDICINE | Facility: CLINIC | Age: 80
End: 2020-01-20
Payer: MEDICARE

## 2020-01-20 VITALS
HEART RATE: 54 BPM | OXYGEN SATURATION: 97 % | DIASTOLIC BLOOD PRESSURE: 88 MMHG | WEIGHT: 161.81 LBS | SYSTOLIC BLOOD PRESSURE: 148 MMHG | BODY MASS INDEX: 30.58 KG/M2

## 2020-01-20 DIAGNOSIS — F32.A DEPRESSION, UNSPECIFIED DEPRESSION TYPE: Primary | ICD-10-CM

## 2020-01-20 DIAGNOSIS — E78.5 HYPERLIPIDEMIA, UNSPECIFIED HYPERLIPIDEMIA TYPE: ICD-10-CM

## 2020-01-20 DIAGNOSIS — I35.0 AORTIC VALVE STENOSIS, ETIOLOGY OF CARDIAC VALVE DISEASE UNSPECIFIED: ICD-10-CM

## 2020-01-20 DIAGNOSIS — I10 ESSENTIAL HYPERTENSION: ICD-10-CM

## 2020-01-20 PROCEDURE — 1159F PR MEDICATION LIST DOCUMENTED IN MEDICAL RECORD: ICD-10-PCS | Mod: ,,, | Performed by: INTERNAL MEDICINE

## 2020-01-20 PROCEDURE — 99214 OFFICE O/P EST MOD 30 MIN: CPT | Mod: S$PBB,,, | Performed by: INTERNAL MEDICINE

## 2020-01-20 PROCEDURE — 1126F AMNT PAIN NOTED NONE PRSNT: CPT | Mod: ,,, | Performed by: INTERNAL MEDICINE

## 2020-01-20 PROCEDURE — 99999 PR PBB SHADOW E&M-EST. PATIENT-LVL III: ICD-10-PCS | Mod: PBBFAC,,, | Performed by: INTERNAL MEDICINE

## 2020-01-20 PROCEDURE — 99213 OFFICE O/P EST LOW 20 MIN: CPT | Mod: PBBFAC | Performed by: INTERNAL MEDICINE

## 2020-01-20 PROCEDURE — 99999 PR PBB SHADOW E&M-EST. PATIENT-LVL III: CPT | Mod: PBBFAC,,, | Performed by: INTERNAL MEDICINE

## 2020-01-20 PROCEDURE — 1126F PR PAIN SEVERITY QUANTIFIED, NO PAIN PRESENT: ICD-10-PCS | Mod: ,,, | Performed by: INTERNAL MEDICINE

## 2020-01-20 PROCEDURE — 1159F MED LIST DOCD IN RCRD: CPT | Mod: ,,, | Performed by: INTERNAL MEDICINE

## 2020-01-20 PROCEDURE — 99214 PR OFFICE/OUTPT VISIT, EST, LEVL IV, 30-39 MIN: ICD-10-PCS | Mod: S$PBB,,, | Performed by: INTERNAL MEDICINE

## 2020-01-20 RX ORDER — BUPROPION HYDROCHLORIDE 150 MG/1
150 TABLET ORAL DAILY
Qty: 90 TABLET | Refills: 11 | Status: SHIPPED | OUTPATIENT
Start: 2020-01-20 | End: 2021-02-04

## 2020-01-20 NOTE — PROGRESS NOTES
Subjective:       Patient ID: Luz Amaro is a 79 y.o. female.    Chief Complaint: Depression    HPI:  Patient with a history of depression off and on over the years comes in to discuss same and consider restarting on medicine.  She said she has been on Wellbutrin in the past but not for years.  Things have been stable.  She has had some issues lately including her granddaughter fracturing her lower leg, being unable to care for herself and having to move in with her.  She worries about her financially and her daughter's 2 children who are currently with the father.  She has had other stresses over her life and just feels like her mood is declining.  Her son is a geriatrician and has suggested she talk with me about possibly getting back on medication which certainly seems reasonable.    Review of Systems   Constitutional: Negative for appetite change, chills and fever.   HENT: Negative for nosebleeds, sinus pain and sore throat.    Eyes: Negative for visual disturbance.   Respiratory: Negative for cough, shortness of breath and wheezing.    Cardiovascular: Negative for chest pain and leg swelling.   Gastrointestinal: Negative for abdominal pain, constipation and diarrhea.   Genitourinary: Negative for difficulty urinating and hematuria.   Musculoskeletal: Positive for arthralgias. Negative for neck pain and neck stiffness.   Skin: Negative for pallor and rash.   Neurological: Negative for headaches.   Psychiatric/Behavioral: Positive for dysphoric mood and sleep disturbance. Negative for suicidal ideas. The patient is not nervous/anxious.        Objective:      Physical Exam   Constitutional: She is oriented to person, place, and time. She appears well-developed and well-nourished.   HENT:   Head: Normocephalic and atraumatic.   Cardiovascular: Normal rate and regular rhythm.   Pulmonary/Chest: Effort normal and breath sounds normal.   Neurological: She is alert and oriented to person, place, and time.    Psychiatric: She has a normal mood and affect. Her behavior is normal.   Mood is down but behavior is appropriate.  Denies suicidal ideation.  Has good loulou, good relationships with her living children       Assessment:       1. Depression, unspecified depression type    2. Essential hypertension    3. Hyperlipidemia, unspecified hyperlipidemia type    4. Aortic valve stenosis, etiology of cardiac valve disease unspecified        Plan:       Luz was seen today for depression.    Diagnoses and all orders for this visit:    Depression, unspecified depression type    Essential hypertension    Hyperlipidemia, unspecified hyperlipidemia type    Aortic valve stenosis, etiology of cardiac valve disease unspecified    Other orders  -     buPROPion (WELLBUTRIN XL) 150 MG TB24 tablet; Take 1 tablet (150 mg total) by mouth once daily.          Benefits and side effects discussed.  Patient to update me in 2-4 weeks as to how she is progressing.

## 2020-03-27 ENCOUNTER — PATIENT MESSAGE (OUTPATIENT)
Dept: ADMINISTRATIVE | Facility: OTHER | Age: 80
End: 2020-03-27

## 2020-04-23 ENCOUNTER — PATIENT MESSAGE (OUTPATIENT)
Dept: INTERNAL MEDICINE | Facility: CLINIC | Age: 80
End: 2020-04-23

## 2020-04-23 RX ORDER — CLONIDINE HYDROCHLORIDE 0.1 MG/1
0.1 TABLET ORAL 2 TIMES DAILY
Qty: 20 TABLET | Refills: 0 | Status: SHIPPED | OUTPATIENT
Start: 2020-04-23 | End: 2020-05-05 | Stop reason: SDUPTHER

## 2020-04-24 ENCOUNTER — PATIENT MESSAGE (OUTPATIENT)
Dept: ADMINISTRATIVE | Facility: OTHER | Age: 80
End: 2020-04-24

## 2020-04-24 ENCOUNTER — PATIENT MESSAGE (OUTPATIENT)
Dept: INTERNAL MEDICINE | Facility: CLINIC | Age: 80
End: 2020-04-24

## 2020-04-27 ENCOUNTER — NURSE TRIAGE (OUTPATIENT)
Dept: ADMINISTRATIVE | Facility: CLINIC | Age: 80
End: 2020-04-27

## 2020-04-27 ENCOUNTER — OFFICE VISIT (OUTPATIENT)
Dept: INTERNAL MEDICINE | Facility: CLINIC | Age: 80
End: 2020-04-27
Payer: MEDICARE

## 2020-04-27 DIAGNOSIS — I10 ESSENTIAL HYPERTENSION: Primary | ICD-10-CM

## 2020-04-27 DIAGNOSIS — F10.939 ALCOHOL WITHDRAWAL SYNDROME WITH COMPLICATION: ICD-10-CM

## 2020-04-27 PROCEDURE — 99214 PR OFFICE/OUTPT VISIT, EST, LEVL IV, 30-39 MIN: ICD-10-PCS | Mod: 95,,, | Performed by: INTERNAL MEDICINE

## 2020-04-27 PROCEDURE — 99214 OFFICE O/P EST MOD 30 MIN: CPT | Mod: 95,,, | Performed by: INTERNAL MEDICINE

## 2020-04-27 RX ORDER — DIAZEPAM 2 MG/1
TABLET ORAL
Qty: 5 TABLET | Refills: 0 | Status: ON HOLD | OUTPATIENT
Start: 2020-04-27 | End: 2020-12-14 | Stop reason: HOSPADM

## 2020-04-27 RX ORDER — HYDROCHLOROTHIAZIDE 12.5 MG/1
12.5 CAPSULE ORAL DAILY
Qty: 30 CAPSULE | Refills: 11 | Status: SHIPPED | OUTPATIENT
Start: 2020-04-27 | End: 2021-05-27 | Stop reason: SDUPTHER

## 2020-04-27 NOTE — PROGRESS NOTES
The patient location is: [place], [place]  The chief complaint leading to consultation is: htn  Visit type: Virtual visit with synchronous audio and video  Total time spent with patient: 25 minutes  Each patient to whom he or she provides medical services by telemedicine is:  (1) informed of the relationship between the physician and patient and the respective role of any other health care provider with respect to management of the patient; and (2) notified that he or she may decline to receive medical services by telemedicine and may withdraw from such care at any time.      CHIEF COMPLAINT     No chief complaint on file.  TELEMEDICINE VISIT  CC: HTN    HPI     Luz Amaro is 79 y.o. female here today for hypertension    Patient daughter provide history during examination  Missed Sharda 79-year-old female with history of high blood pressure.  Reports that she checked her blood pressure approximately 5 days ago and had systolics in the 200s over low 100 diastolics.  Patient denies any symptoms of shortness of breath, leg swelling, headache, blurry vision.  She went to the emergency room at [facility] and had her home metoprolol increased from 50 mg to 100 mg.  Her blood pressure remained elevated.  Her primary care doctor gave her 0.1 mg clonidine b.i.d..  This medication has lowered her blood pressure however after taking medication she is sleepy, lightheaded, less alert.     Patient is unsure how long her blood pressure has been elevated.  Reports that she checked it 5 days ago but had not checked it frequently prior to that.  Of note patient reports that she was chronic daily drinker who quit cold turkey approximately 10 days ago.  Reports some increased anxiety over the past week but denies any sort of tremor.      Personally Reviewed Patient's Medical, surgical, family and social hx. Changes updated in [facility].  Care Team updated in Epic    Review of Systems:  Review of Systems   Eyes: Negative for  blurred vision.   Respiratory: Negative for cough.    Cardiovascular: Negative for chest pain and leg swelling.   Neurological: Negative for headaches.       Health Maintenance:   Reviewed with patient  Due for the following:      PHYSICAL EXAM       Gen: Well Appearing, NAD  HEENT: PERR, EOMI  Chest: Normal work of breathing,   Neuro: A&Ox3,  speech normal  Mood; Mood normal, behavior normal, thought process linear       LABS     Labs reviewed;   Lab Results   Component Value Date    CREATININE 0.8 08/07/2018    BUN 11 08/07/2018     08/07/2018    K 4.0 08/07/2018     08/07/2018    CO2 25 08/07/2018         ASSESSMENT     1. Essential hypertension  hydroCHLOROthiazide (MICROZIDE) 12.5 mg capsule    Basic metabolic panel   2. Alcohol withdrawal syndrome with complication  diazePAM (VALIUM) 2 MG tablet             Plan     Luz Amaro is a 79 y.o. female  1. Essential hypertension  New problem to me further treatment required  Uncontrolled,   Clonidine she has been taking has been causing symptoms of hypotension within 1 hr after taking medication.  Suspected brought alcohol cessation is contributing to hypertension, but suspect blood pressure is a more chronic issue due to lack of symptoms.  Patient has history of angioedema with Ace inhibitors and lower extremity edema with calcium channel blockers.  Will start patient on hydrochlorothiazide 12.5 mg daily.  Will hopefully be able to up titrate over the next week.  Decrease clonidine to 0.05 mg twice daily, will hopefully stop over the next 1-2 weeks  Continue metoprolol  mg daily  Patient is waiting establish with digital hypertension.  - hydroCHLOROthiazide (MICROZIDE) 12.5 mg capsule; Take 1 capsule (12.5 mg total) by mouth once daily.  Dispense: 30 capsule; Refill: 11  - Basic metabolic panel; Future    2. Alcohol withdrawal syndrome with complication  New problem further workup required.  Patient is approximately 10 days since last  drink.  Suspect that withdrawal may be contributing to the hypertension.Will start patient on low dose 7 day Valium taper.  Will have her take 2 mg daily for for 3 days then 1 mg tablets daily for the remaining 4 days.  - diazePAM (VALIUM) 2 MG tablet; Take 1 tablet daily for 3 days. Then take .5 tablet daily for the remaining 4 days.  Dispense: 5 tablet; Refill: 0        Thierno Arechiga MD

## 2020-04-27 NOTE — TELEPHONE ENCOUNTER
Pt's daughter states pt's BP is elevated. Pt was seen at [facility] ED for HTN Thursday. ED doubled pt's metoprolol and PCP called in clonidine Thursday. Pt has been taking medication as prescribed. Pt took clonidine TID yesterday d/t HTN. Current KF=786/108. Pt asymptomatic at this time.  Daughter advised per protocol and daughter verbalizes understanding. Daughter to be called back to set up virtual visit.     Reason for Disposition   Systolic BP >= 180 OR Diastolic >= 110    Additional Information   Negative: Sounds like a life-threatening emergency to the triager   Negative: Pregnant > 20 weeks and new hand or face swelling   Negative: Pregnant > 20 weeks and BP > 140/90   Negative: Systolic BP >= 160 OR Diastolic >= 100, and any cardiac or neurologic symptoms (e.g., chest pain, difficulty breathing, unsteady gait, blurred vision)   Negative: Patient sounds very sick or weak to the triager   Negative: BP Systolic BP >= 140 OR Diastolic >= 90 and postpartum < 4 weeks   Negative: Systolic BP >= 180 OR Diastolic >= 110, and missed most recent dose of blood pressure medication    Protocols used: HIGH BLOOD PRESSURE-A-OH

## 2020-04-28 ENCOUNTER — TELEPHONE (OUTPATIENT)
Dept: INTERNAL MEDICINE | Facility: CLINIC | Age: 80
End: 2020-04-28

## 2020-04-28 ENCOUNTER — OFFICE VISIT (OUTPATIENT)
Dept: INTERNAL MEDICINE | Facility: CLINIC | Age: 80
End: 2020-04-28
Payer: MEDICARE

## 2020-04-28 DIAGNOSIS — E78.5 HYPERLIPIDEMIA, UNSPECIFIED HYPERLIPIDEMIA TYPE: ICD-10-CM

## 2020-04-28 DIAGNOSIS — R73.09 ELEVATED GLUCOSE: ICD-10-CM

## 2020-04-28 DIAGNOSIS — I10 ESSENTIAL HYPERTENSION: Primary | ICD-10-CM

## 2020-04-28 DIAGNOSIS — F10.930 ALCOHOL WITHDRAWAL SYNDROME WITHOUT COMPLICATION: ICD-10-CM

## 2020-04-28 PROCEDURE — 99442 PR PHYSICIAN TELEPHONE EVALUATION 11-20 MIN: CPT | Mod: 95,,, | Performed by: INTERNAL MEDICINE

## 2020-04-28 PROCEDURE — 99442 PR PHYSICIAN TELEPHONE EVALUATION 11-20 MIN: ICD-10-PCS | Mod: 95,,, | Performed by: INTERNAL MEDICINE

## 2020-04-28 NOTE — PROGRESS NOTES
Subjective:       Patient ID: Luz Amaro is a 79 y.o. female.    Chief Complaint: Follow-up    The patient location is:  Home   The chief complaint leading to consultation is:  Elevated blood pressure, anxiety, possible alcohol withdrawal  Visit type: audio only  Total time spent with patient: 20 minutes  Each patient to whom he or she provides medical services by telemedicine is:  (1) informed of the relationship between the physician and patient and the respective role of any other health care provider with respect to management of the patient; and (2) notified that he or she may decline to receive medical services by telemedicine and may withdraw from such care at any time.    Notes:  Patient well known to me had a video visit with 1 of my colleagues yesterday.  The patient says that her blood pressure was improving but she was taking clonidine on a daily basis and it was making her sleepy.  After lengthy discussion they determined that she had been drinking several glasses a wine nightly and about a week or so ago she abruptly stop that and he was worried that she was having some alcohol withdrawals.  She did not want to go back on alcohol for now so he gave her a diazepam taper and asked her to reduce clonidine intake HCTZ.  She has only done that 1 day and her blood pressure has not improved much yet but she hopes to test regularly especially over the next couple days.  She probably will need some labs including blood count chemistry and thyroid.  She may need another evaluation of her heart and renal vasculature.  All of which would be reasonable    Review of Systems   Constitutional: Negative for fatigue and fever.   Respiratory: Negative for cough and shortness of breath.    Cardiovascular: Negative for chest pain and leg swelling.        Elevated blood pressure   Psychiatric/Behavioral: Positive for agitation. The patient is nervous/anxious.        Objective:      Physical Exam    Constitutional: She is oriented to person, place, and time.   Patient sounded well on the phone.  Able to talk and answer questions appropriately   Pulmonary/Chest: No respiratory distress.   Neurological: She is alert and oriented to person, place, and time. No cranial nerve deficit. Coordination normal.       Assessment:       1. Essential hypertension    2. Alcohol withdrawal syndrome without complication    3. Hyperlipidemia, unspecified hyperlipidemia type    4. Elevated glucose        Plan:       Luz was seen today for follow-up.    Diagnoses and all orders for this visit:    Essential hypertension  -     CBC auto differential; Future  -     Comprehensive metabolic panel; Future  -     Hemoglobin A1c; Future  -     TSH; Future    Alcohol withdrawal syndrome without complication  -     CBC auto differential; Future  -     Comprehensive metabolic panel; Future  -     Hemoglobin A1c; Future  -     TSH; Future    Hyperlipidemia, unspecified hyperlipidemia type  -     CBC auto differential; Future  -     Comprehensive metabolic panel; Future  -     Hemoglobin A1c; Future  -     TSH; Future    Elevated glucose  -     CBC auto differential; Future  -     Comprehensive metabolic panel; Future  -     Hemoglobin A1c; Future  -     TSH; Future

## 2020-04-28 NOTE — TELEPHONE ENCOUNTER
----- Message from Suman Moss MD sent at 4/28/2020 11:19 AM CDT -----  Pt needs an in person visit in 1-2 weeks to check BP

## 2020-05-01 ENCOUNTER — PATIENT OUTREACH (OUTPATIENT)
Dept: OTHER | Facility: OTHER | Age: 80
End: 2020-05-01

## 2020-05-01 NOTE — PROGRESS NOTES
Digital Medicine: Health  Introduction    Introduced Luz Amaro to Digital Medicine. Discussed health  role and recommended lifestyle modifications.    Helped patient set up device. Patient is very concerned about BP readings. Patient reports she is having mild symptoms. Encouraged patient to reach out to PCP. Provided patient with [facility] on call number.     The history is provided by the patient and a relative. No  was used.     HYPERTENSION  Our goal is to get BP to consistently below 130/80mmHg and make the process convenient so patient can avoid extra trips to the office. Getting your blood pressure below 130/80mmHg (definition of control) will reduce your risk for heart attack, kidney failure, stroke and death (as well as kidney failure, eye disease, & dementia)      Reviewed that the Digital Medicine care team - consisting of a clinician and a health  - will follow the most current evidence-based national guidelines for treating your condition.  The health  will focus on lifestyle modifications and motivation while the clinician will focus on medication therapy.  The care team will review all data on a regular basis and reach out as needed.      Explained that one of the key parts of the program is communication with the care team.  Asked patient to respond to outreach attempts and complete questionnaires.  Stressed importance of medication adherence.    Reviewed non-pharmacologic therapies and impact on BP.      Explained that we expect patient to obtain several blood pressures per week at random times of day.  Instructed patient not to allow anyone else to use phone and monitoring device.  Confirmed appropriate BP monitoring technique.      Explained to patient that the digital medicine team is not available for emergencies.  Patient will call [facility] on-call ([phone] or [phone]) or [phone] if needed.      Patient's BP goal is 140/90.Patient's BP average is  197/93 mmHg, which is above goal, per 2017 ACC/AHA Hypertension Guidelines.          Last 5 Patient Entered Readings                                      Current 30 Day Average: 197/93     Recent Readings 5/1/2020 4/30/2020 4/30/2020 4/30/2020 4/29/2020    SBP (mmHg) 215 194 207 214 222    DBP (mmHg) 97 92 93 96 96    Pulse 54 45 48 51 55        INTERVENTION(S)  encouragement/support    PLAN  patient verbalizes understanding, Clinician follow-up and continue monitoring      There are no preventive care reminders to display for this patient.    Reviewed the importance of self-monitoring, medication adherence, and that the health  can be used as a resource for lifestyle modifications to help reduce or maintain a healthy lifestyle.    Sent link to [facility] webpages and my contact information via [facility] for future questions. Follow up scheduled.    Screenings    I will follow-up in a couple weeks to complete health caoch introduction.

## 2020-05-01 NOTE — LETTER
May 1, 2020     Luz Amaro  [address] [address]       Dear Luz,    Welcome to [facility]! Our goal is to make care effective, proactive and convenient by using data you send us from home to better treat your chronic conditions.              My name is Josselyn Rodriguez, and I am your dedicated Digital Medicine clinician. As an expert in medication management, I will help ensure that the medications you are taking continue to provide the intended benefits and help you reach your goals. You can reach me directly at [phone] or by sending me a message directly through your [facility] account.      I am Valeri Alexander and I will be your health . My job is to help you identify lifestyle changes to improve your disease control. We will talk about nutrition, exercise, and other ways you may be able to adjust your current habits to better your health. Additionally, we will help ensure you are completing the tests and screenings that are necessary to help manage your conditions. You can reach me directly at [phone] or by sending me a message directly through your [facility] account.    Most importantly, YOU are at the center of this team. Together, we will work to improve your overall health and encourage you to meet your goals for a healthier lifestyle.     What we expect from YOU:  · Please take frequent home blood pressure measurements. We ask that you take at least 1 blood pressure reading per week, but more information will better help us get you know you. Be sure you rest for a few minutes before taking the reading in a quiet, comfortable place.     Be available to receive phone calls or [employer] messages, when appropriate, from your care team. Please let us know if there are any specific days or times that work best for us to reach you via phone.     Complete routine tests and screenings. Dont worry, we will help keep you on track!           What  you should expect from your Digital Medicine Care Team:   We will work with you to create a personalized plan of care and provide you with encouragement and education, including regarding lifestyle changes, that could help you manage your disease states.     We will adjust your current medications, if needed, and continue to monitor your long-term progress.     We will provide you and your physician with monthly progress reports after you have been in the program for more than 30 days.     We will send you reminders through [employer] and text messages to help ensure you do not miss any testing deadlines to help manage your disease states.    You will be able to reach us by phone or through your [facility] account by clicking our names under Care Team on the right side of the home screen.    I look forward to working with you to achieve your blood pressure goals!    We look forward to working with you to help manage your health,    Sincerely,    Your Digital Medicine Team    Please visit our websites to learn more:   · Hypertension: [email or web address]      Remember, we are not available for emergencies. If you have an emergency, please contact your doctors office directly or call [facility] ([phone] or [phone]) or [phone].

## 2020-05-04 ENCOUNTER — PATIENT MESSAGE (OUTPATIENT)
Dept: INTERNAL MEDICINE | Facility: CLINIC | Age: 80
End: 2020-05-04

## 2020-05-05 ENCOUNTER — HOSPITAL ENCOUNTER (OUTPATIENT)
Dept: CARDIOLOGY | Facility: CLINIC | Age: 80
Discharge: HOME | End: 2020-05-05
Attending: INTERNAL MEDICINE
Payer: MEDICARE

## 2020-05-05 ENCOUNTER — OFFICE VISIT (OUTPATIENT)
Dept: INTERNAL MEDICINE | Facility: CLINIC | Age: 80
End: 2020-05-05
Payer: MEDICARE

## 2020-05-05 ENCOUNTER — HOSPITAL ENCOUNTER (OUTPATIENT)
Dept: RADIOLOGY | Facility: HOSPITAL | Age: 80
Discharge: HOME | End: 2020-05-05
Attending: INTERNAL MEDICINE
Payer: MEDICARE

## 2020-05-05 VITALS
BODY MASS INDEX: 29.45 KG/M2 | SYSTOLIC BLOOD PRESSURE: 152 MMHG | DIASTOLIC BLOOD PRESSURE: 69 MMHG | HEART RATE: 68 BPM | OXYGEN SATURATION: 99 % | WEIGHT: 155.88 LBS

## 2020-05-05 VITALS
DIASTOLIC BLOOD PRESSURE: 78 MMHG | SYSTOLIC BLOOD PRESSURE: 122 MMHG | HEIGHT: 61 IN | HEART RATE: 56 BPM | WEIGHT: 155 LBS | BODY MASS INDEX: 29.27 KG/M2

## 2020-05-05 DIAGNOSIS — I10 ESSENTIAL HYPERTENSION: Primary | ICD-10-CM

## 2020-05-05 DIAGNOSIS — I10 ESSENTIAL HYPERTENSION: ICD-10-CM

## 2020-05-05 DIAGNOSIS — I35.0 AORTIC VALVE STENOSIS, ETIOLOGY OF CARDIAC VALVE DISEASE UNSPECIFIED: ICD-10-CM

## 2020-05-05 LAB
ASCENDING AORTA: 2.82 CM
AV INDEX (PROSTH): 0.4
AV MEAN GRADIENT: 20 MMHG
AV PEAK GRADIENT: 36 MMHG
AV VALVE AREA: 1.38 CM2
AV VELOCITY RATIO: 0.41
BSA FOR ECHO PROCEDURE: 1.74 M2
CV ECHO LV RWT: 0.38 CM
DOP CALC AO PEAK VEL: 2.98 M/S
DOP CALC AO VTI: 80.42 CM
DOP CALC LVOT AREA: 3.4 CM2
DOP CALC LVOT DIAMETER: 2.09 CM
DOP CALC LVOT PEAK VEL: 1.22 M/S
DOP CALC LVOT STROKE VOLUME: 111 CM3
DOP CALCLVOT PEAK VEL VTI: 32.37 CM
E WAVE DECELERATION TIME: 236.93 MSEC
E/A RATIO: 0.97
E/E' RATIO: 19.78 M/S
ECHO LV POSTERIOR WALL: 0.87 CM (ref 0.6–1.1)
FRACTIONAL SHORTENING: 35 % (ref 28–44)
INTERVENTRICULAR SEPTUM: 0.84 CM (ref 0.6–1.1)
IVRT: 114.18 MSEC
LA MAJOR: 5.7 CM
LA MINOR: 5.24 CM
LA WIDTH: 4.2 CM
LEFT ATRIUM SIZE: 3.9 CM
LEFT ATRIUM VOLUME INDEX: 44.8 ML/M2
LEFT ATRIUM VOLUME: 76.02 CM3
LEFT INTERNAL DIMENSION IN SYSTOLE: 2.97 CM (ref 2.1–4)
LEFT VENTRICLE DIASTOLIC VOLUME INDEX: 56.71 ML/M2
LEFT VENTRICLE DIASTOLIC VOLUME: 96.14 ML
LEFT VENTRICLE MASS INDEX: 75 G/M2
LEFT VENTRICLE SYSTOLIC VOLUME INDEX: 20.1 ML/M2
LEFT VENTRICLE SYSTOLIC VOLUME: 34.11 ML
LEFT VENTRICULAR INTERNAL DIMENSION IN DIASTOLE: 4.58 CM (ref 3.5–6)
LEFT VENTRICULAR MASS: 127.72 G
LV LATERAL E/E' RATIO: 17.8 M/S
LV SEPTAL E/E' RATIO: 22.25 M/S
MV PEAK A VEL: 0.92 M/S
MV PEAK E VEL: 0.89 M/S
PISA TR MAX VEL: 2.36 M/S
PULM VEIN S/D RATIO: 1.2
PV PEAK D VEL: 0.66 M/S
PV PEAK S VEL: 0.79 M/S
RA MAJOR: 4.94 CM
RA PRESSURE: 3 MMHG
RA WIDTH: 3.45 CM
RIGHT VENTRICULAR END-DIASTOLIC DIMENSION: 3.2 CM
RV TISSUE DOPPLER FREE WALL SYSTOLIC VELOCITY 1 (APICAL 4 CHAMBER VIEW): 8.61 CM/S
SINUS: 2.57 CM
STJ: 2.2 CM
TDI LATERAL: 0.05 M/S
TDI SEPTAL: 0.04 M/S
TDI: 0.05 M/S
TR MAX PG: 22 MMHG
TRICUSPID ANNULAR PLANE SYSTOLIC EXCURSION: 2.03 CM
TV REST PULMONARY ARTERY PRESSURE: 25 MMHG

## 2020-05-05 PROCEDURE — 93975 VASCULAR STUDY: CPT | Mod: 26,,, | Performed by: RADIOLOGY

## 2020-05-05 PROCEDURE — 99999 PR PBB SHADOW E&M-EST. PATIENT-LVL IV: CPT | Mod: PBBFAC,,, | Performed by: INTERNAL MEDICINE

## 2020-05-05 PROCEDURE — 99214 OFFICE O/P EST MOD 30 MIN: CPT | Mod: S$PBB,,, | Performed by: INTERNAL MEDICINE

## 2020-05-05 PROCEDURE — 93306 TTE W/DOPPLER COMPLETE: CPT | Mod: PBBFAC | Performed by: INTERNAL MEDICINE

## 2020-05-05 PROCEDURE — 99214 OFFICE O/P EST MOD 30 MIN: CPT | Mod: PBBFAC | Performed by: INTERNAL MEDICINE

## 2020-05-05 PROCEDURE — 93975 US RENAL ARTERY STENOSIS HYPERTEN (XPD): ICD-10-PCS | Mod: 26,,, | Performed by: RADIOLOGY

## 2020-05-05 PROCEDURE — 99214 PR OFFICE/OUTPT VISIT, EST, LEVL IV, 30-39 MIN: ICD-10-PCS | Mod: S$PBB,,, | Performed by: INTERNAL MEDICINE

## 2020-05-05 PROCEDURE — 93306 ECHO (CUPID ONLY): ICD-10-PCS | Mod: 26,S$PBB,, | Performed by: INTERNAL MEDICINE

## 2020-05-05 PROCEDURE — 99999 PR PBB SHADOW E&M-EST. PATIENT-LVL IV: ICD-10-PCS | Mod: PBBFAC,,, | Performed by: INTERNAL MEDICINE

## 2020-05-05 PROCEDURE — 93975 VASCULAR STUDY: CPT | Mod: TC

## 2020-05-05 RX ORDER — CLONIDINE HYDROCHLORIDE 0.1 MG/1
0.1 TABLET ORAL 2 TIMES DAILY
Qty: 30 TABLET | Refills: 2 | Status: SHIPPED | OUTPATIENT
Start: 2020-05-05 | End: 2020-05-11 | Stop reason: ALTCHOICE

## 2020-05-05 NOTE — PROGRESS NOTES
Subjective:       Patient ID: Luz Amaro is a 79 y.o. female.    Chief Complaint: Hypertension    HPI:  Patient known to me is attended by her daughter because she has just felt a bit unsteady possibly from medication.  She was feeling poorly, at some point stop drinking alcohol which she says she was doing a few times per day.  She stopped taking that then thought she was maybe having some anxiety issues but that did not seem to fix things.  She went to the emergency room at [facility] her pressure was 196 systolic.  They gave her clonidine and it came down nicely.  She was enrolled in digital hypertension and saw 1 of my partners and had medications added.  I am worried that her home machine is giving bad readings because my staff got about 160/70, I repeated it manually and got a similar reading.  Her machine gave 200/80.  Then I got our automated machine and got 159/69.  She will reach out to the digital hypertension program asking about consideration for a new machine.  Still her readings or perfect but I feel better about these readings.  She has reducing salt and has lost a few lb and is walking.  Last year we heard an aortic murmur so I would like to get a cardiac ultrasound.     Hypertension   This is a recurrent problem. The current episode started more than 1 year ago. The problem has been rapidly worsening since onset. Associated symptoms include anxiety, headaches and malaise/fatigue. Pertinent negatives include no blurred vision, chest pain, neck pain, orthopnea, palpitations, peripheral edema, PND, shortness of breath or sweats. Agents associated with hypertension include NSAIDs. Risk factors for coronary artery disease include dyslipidemia, family history, obesity and stress. The current treatment provides no improvement. Compliance problems include exercise.      Review of Systems   Constitutional: Positive for malaise/fatigue. Negative for appetite change, chills and fever.   HENT:  Negative for nosebleeds, sinus pain and sore throat.    Eyes: Negative for blurred vision and visual disturbance.   Respiratory: Negative for cough, shortness of breath and wheezing.    Cardiovascular: Negative for chest pain, palpitations, orthopnea, leg swelling and PND.   Gastrointestinal: Negative for abdominal pain, constipation and diarrhea.   Genitourinary: Negative for difficulty urinating and hematuria.   Musculoskeletal: Negative for neck pain and neck stiffness.   Skin: Negative for pallor and rash.   Neurological: Positive for headaches.   Psychiatric/Behavioral: Negative for dysphoric mood and suicidal ideas. The patient is not nervous/anxious.        Objective:      Physical Exam   Constitutional: She is oriented to person, place, and time. She appears well-developed and well-nourished. No distress.   HENT:   Head: Normocephalic and atraumatic.   Mouth/Throat: Oropharynx is clear and moist. No oropharyngeal exudate.   Eyes: Pupils are equal, round, and reactive to light. Conjunctivae are normal. No scleral icterus.   Neck: Normal range of motion. Neck supple. No thyromegaly present.   Cardiovascular: Normal rate and regular rhythm.   Murmur heard.  Pulmonary/Chest: Effort normal and breath sounds normal. She has no wheezes.   Abdominal: Soft. Bowel sounds are normal. She exhibits no distension. There is no tenderness.   Musculoskeletal: She exhibits no tenderness.   Lymphadenopathy:     She has no cervical adenopathy.   Neurological: She is alert and oriented to person, place, and time.   Skin: No rash noted.   Psychiatric: She has a normal mood and affect.       Assessment:       1. Essential hypertension    2. Aortic valve stenosis, etiology of cardiac valve disease unspecified        Plan:       Luz was seen today for hypertension.    Diagnoses and all orders for this visit:    Essential hypertension  -     US Renal Artery Stenosis [employer]; Future  -     Echo Color Flow Doppler?  Yes    Aortic valve stenosis, etiology of cardiac valve disease unspecified  -     US Renal Artery Stenosis [employer]; Future  -     Echo Color Flow Doppler? Yes    Other orders  -     cloNIDine (CATAPRES) 0.1 MG tablet; Take 1 tablet (0.1 mg total) by mouth 2 (two) times daily.        continue to monitor pressure but discuss getting a new machine with digital hypertension.  Review labs +ultrasounds and proceed

## 2020-05-06 ENCOUNTER — PATIENT OUTREACH (OUTPATIENT)
Dept: OTHER | Facility: OTHER | Age: 80
End: 2020-05-06

## 2020-05-06 ENCOUNTER — PATIENT MESSAGE (OUTPATIENT)
Dept: INTERNAL MEDICINE | Facility: CLINIC | Age: 80
End: 2020-05-06

## 2020-05-06 ENCOUNTER — PATIENT MESSAGE (OUTPATIENT)
Dept: OTHER | Facility: OTHER | Age: 80
End: 2020-05-06

## 2020-05-08 ENCOUNTER — PATIENT MESSAGE (OUTPATIENT)
Dept: INTERNAL MEDICINE | Facility: CLINIC | Age: 80
End: 2020-05-08

## 2020-05-08 DIAGNOSIS — I35.0 AORTIC VALVE STENOSIS, ETIOLOGY OF CARDIAC VALVE DISEASE UNSPECIFIED: Primary | ICD-10-CM

## 2020-05-08 DIAGNOSIS — I10 HYPERTENSION, UNSPECIFIED TYPE: ICD-10-CM

## 2020-05-11 ENCOUNTER — OFFICE VISIT (OUTPATIENT)
Dept: CARDIOLOGY | Facility: CLINIC | Age: 80
End: 2020-05-11
Payer: MEDICARE

## 2020-05-11 ENCOUNTER — PATIENT MESSAGE (OUTPATIENT)
Dept: CARDIOLOGY | Facility: CLINIC | Age: 80
End: 2020-05-11

## 2020-05-11 ENCOUNTER — PATIENT OUTREACH (OUTPATIENT)
Dept: ADMINISTRATIVE | Facility: OTHER | Age: 80
End: 2020-05-11

## 2020-05-11 ENCOUNTER — TELEPHONE (OUTPATIENT)
Dept: INTERNAL MEDICINE | Facility: CLINIC | Age: 80
End: 2020-05-11

## 2020-05-11 VITALS
SYSTOLIC BLOOD PRESSURE: 170 MMHG | WEIGHT: 156.06 LBS | HEART RATE: 58 BPM | OXYGEN SATURATION: 98 % | BODY MASS INDEX: 29.47 KG/M2 | DIASTOLIC BLOOD PRESSURE: 72 MMHG | HEIGHT: 61 IN

## 2020-05-11 DIAGNOSIS — I51.89 DIASTOLIC DYSFUNCTION: ICD-10-CM

## 2020-05-11 DIAGNOSIS — M76.60 ACHILLES TENDINITIS, UNSPECIFIED LATERALITY: Primary | ICD-10-CM

## 2020-05-11 DIAGNOSIS — I35.0 AORTIC VALVE STENOSIS, ETIOLOGY OF CARDIAC VALVE DISEASE UNSPECIFIED: ICD-10-CM

## 2020-05-11 DIAGNOSIS — I35.0 NONRHEUMATIC AORTIC VALVE STENOSIS: ICD-10-CM

## 2020-05-11 DIAGNOSIS — I10 HYPERTENSION, UNSPECIFIED TYPE: Primary | ICD-10-CM

## 2020-05-11 DIAGNOSIS — E78.5 HYPERLIPIDEMIA, UNSPECIFIED HYPERLIPIDEMIA TYPE: ICD-10-CM

## 2020-05-11 PROCEDURE — 99999 PR PBB SHADOW E&M-EST. PATIENT-LVL IV: CPT | Mod: PBBFAC,,, | Performed by: INTERNAL MEDICINE

## 2020-05-11 PROCEDURE — 99204 PR OFFICE/OUTPT VISIT, NEW, LEVL IV, 45-59 MIN: ICD-10-PCS | Mod: S$PBB,,, | Performed by: INTERNAL MEDICINE

## 2020-05-11 PROCEDURE — 99214 OFFICE O/P EST MOD 30 MIN: CPT | Mod: PBBFAC | Performed by: INTERNAL MEDICINE

## 2020-05-11 PROCEDURE — 99204 OFFICE O/P NEW MOD 45 MIN: CPT | Mod: S$PBB,,, | Performed by: INTERNAL MEDICINE

## 2020-05-11 PROCEDURE — 99999 PR PBB SHADOW E&M-EST. PATIENT-LVL IV: ICD-10-PCS | Mod: PBBFAC,,, | Performed by: INTERNAL MEDICINE

## 2020-05-11 RX ORDER — NIFEDIPINE 30 MG/1
30 TABLET, EXTENDED RELEASE ORAL DAILY
Qty: 30 TABLET | Refills: 11 | Status: SHIPPED | OUTPATIENT
Start: 2020-05-11 | End: 2020-11-06 | Stop reason: SDUPTHER

## 2020-05-11 RX ORDER — METOPROLOL SUCCINATE 50 MG/1
50 TABLET, EXTENDED RELEASE ORAL 2 TIMES DAILY
COMMUNITY
End: 2020-05-11 | Stop reason: ALTCHOICE

## 2020-05-11 RX ORDER — CARVEDILOL 12.5 MG/1
12.5 TABLET ORAL 2 TIMES DAILY WITH MEALS
Qty: 60 TABLET | Refills: 11 | Status: SHIPPED | OUTPATIENT
Start: 2020-05-11 | End: 2021-03-23 | Stop reason: SDUPTHER

## 2020-05-11 NOTE — PATIENT INSTRUCTIONS
.Mediteranian diet recommended with sodium restriction  Work up to Graded exercise for 30 minutes a day at least 5 days a week suggested.  Change Metoprolol to carvedilol  Stop Clonidine  Start Nifedipine 30 mg twice daily ( may cause some ankle puffiness)

## 2020-05-11 NOTE — LETTER
May 11, 2020      Suman Moss MD  [address]  [facility] [address]           [facility]  [address]  [facility] [address]  Phone: [phone]          Patient: Luz Amaro   MR Number: [id number]   YOB: 1940   Date of Visit: 5/11/2020       Dear Dr. Suman Moss:    Thank you for referring Luz Amaro to me for evaluation. Attached you will find relevant portions of my assessment and plan of care.    If you have questions, please do not hesitate to call me. I look forward to following Luz Amaro along with you.    Sincerely,    Michael Moore MD    Enclosure  CC:  No Recipients    If you would like to receive this communication electronically, please contact [email or web address] or [phone] to request more information on [facility] access.    For providers and/or their staff who would like to refer a patient to [facility], please contact us through our one-stop-shop provider referral line, [facility] , at [phone].    If you feel you have received this communication in error or would no longer like to receive these types of communications, please e-mail [email or web address]

## 2020-05-11 NOTE — TELEPHONE ENCOUNTER
Pt called back. She is having pains in her left achilles tendon for 5 days. She thinks its tendonitis like she has had before. She uses ice, massages and does the exercises she remembers from last time. No over the counter meds taken.     She would like a referral to Ortho please

## 2020-05-11 NOTE — PROGRESS NOTES
"Subjective:   Patient ID:  Luz Amaro is a 79 y.o. female who presents for evaluation of Hypertension      HPI:   Luz Amaro presents for assistance in managing hypertension and valvular heart disease with moderate AS on recent echo.The patient has had angioneurotic edema for ACE and ARBs resulting in 3 trips to the ED. Currently in the Digital Monitoring Program but her Monitor appears to be giving falsely high readings although Chcks with other monitors still not in normal range.She is on low dose Clonidine BID. Swelling is reported with Amlodipine but she has no recollection of that.She has become more sedentary with her daughter stating she has progressively shortened their walks due to fatigue and SOB ( has diastolic dysfunction) . Luz Amaro denies chest pain, h, palpitations, presyncope , or syncope. Luz Amaro has dyslipidemia. on moderate intensity statin therapy due for a recheck.  Review of Systems   Constitution: Negative for malaise/fatigue, weight gain and weight loss.   Eyes: Negative for blurred vision.   Cardiovascular: Negative for chest pain, claudication, cyanosis, dyspnea on exertion, irregular heartbeat, leg swelling, near-syncope, orthopnea, palpitations, paroxysmal nocturnal dyspnea and syncope.        Occasional " skipped beat"   Respiratory: Negative for cough, shortness of breath and wheezing.    Musculoskeletal: Positive for back pain. Negative for falls and myalgias.        Achilles strain   Gastrointestinal: Positive for constipation. Negative for abdominal pain, heartburn, nausea and vomiting.   Genitourinary: Negative for nocturia.   Neurological: Negative for brief paralysis, dizziness, focal weakness, headaches, numbness, paresthesias and weakness.   Psychiatric/Behavioral: Negative for altered mental status.       Current Outpatient Medications   Medication Sig    aspirin (ECOTRIN) 81 MG EC tablet Take 81 mg by mouth once daily.    " atorvastatin (LIPITOR) 20 MG tablet TAKE 1 TABLET DAILY    buPROPion (WELLBUTRIN XL) 150 MG TB24 tablet Take 1 tablet (150 mg total) by mouth once daily.    cetirizine (ZYRTEC) 10 MG tablet Take 10 mg by mouth once daily.    cholecalciferol, vitamin D3, (VITAMIN D3) 5,000 unit Tab Take 5,000 Units by mouth once daily.    hydrALAZINE (APRESOLINE) 10 MG tablet Take 1 tablet (10 mg total) by mouth 3 (three) times daily.    hydroCHLOROthiazide (MICROZIDE) 12.5 mg capsule Take 1 capsule (12.5 mg total) by mouth once daily.    MELATONIN ORAL Take by mouth.    carvediloL (COREG) 12.5 MG tablet Take 1 tablet (12.5 mg total) by mouth 2 (two) times daily with meals.    cyclobenzaprine (FLEXERIL) 10 MG tablet TAKE 1 TABLET(10 MG) BY MOUTH THREE TIMES DAILY AS NEEDED FOR MUSCLE SPASMS (Patient not taking: Reported on 1/20/2020)    diazePAM (VALIUM) 2 MG tablet Take 1 tablet daily for 3 days. Then take .5 tablet daily for the remaining 4 days. (Patient not taking: Reported on 5/5/2020)    epinephrine (EPIPEN) 0.3 mg/0.3 mL AtIn Inject 0.3 mLs (0.3 mg total) into the muscle once. (Patient not taking: Reported on 1/20/2020)    meclizine (ANTIVERT) 25 mg tablet Take 1 tablet (25 mg total) by mouth 2 (two) times daily as needed. (Patient not taking: Reported on 5/5/2020)    NIFEdipine (PROCARDIA-XL) 30 MG (OSM) 24 hr tablet Take 1 tablet (30 mg total) by mouth once daily.    oxybutynin (DITROPAN) 5 MG Tab Take 1 tablet (5 mg total) by mouth 2 (two) times daily. (Patient not taking: Reported on 1/20/2020)    triamcinolone acetonide 0.1% (KENALOG) 0.1 % cream AAA bid; not more than 2 weeks straight in same location, avoid use on face and groin (Patient not taking: Reported on 5/11/2020)     No current facility-administered medications for this visit.      Objective:   Physical Exam   Constitutional: She is oriented to person, place, and time. She appears well-developed. No distress.   BP (!) 170/72 (BP Location: Left  "arm, Patient Position: Sitting, BP Method: Large (Manual))   Pulse (!) 58   Ht 5' 1" (1.549 m)   Wt 70.8 kg (156 lb 1.4 oz)   SpO2 98%   BMI 29.49 kg/m²    HENT:   Head: Normocephalic.   Eyes: Pupils are equal, round, and reactive to light. Conjunctivae are normal. No scleral icterus.   Neck: Neck supple. No JVD present. No thyromegaly present.   Cardiovascular: Normal rate, regular rhythm and intact distal pulses. PMI is not displaced. Exam reveals no gallop and no friction rub.   Murmur heard.   Harsh midsystolic murmur is present with a grade of 2/6 at the upper right sternal border, upper left sternal border and lower left sternal border radiating to the neck.  Pulmonary/Chest: Effort normal and breath sounds normal. No respiratory distress. She has no wheezes. She has no rales.   Abdominal: Soft. She exhibits no distension. There is no splenomegaly or hepatomegaly. There is no tenderness.   Musculoskeletal: She exhibits no edema or tenderness.   In a wheelchair   Neurological: She is alert and oriented to person, place, and time.   Skin: Skin is warm and dry. She is not diaphoretic.   Psychiatric: She has a normal mood and affect. Her behavior is normal.       Lab Results   Component Value Date     05/05/2020    K 3.9 05/05/2020     05/05/2020    CO2 28 05/05/2020    BUN 14 05/05/2020    CREATININE 0.8 05/05/2020     (H) 05/05/2020    HGBA1C 5.4 05/05/2020    AST 18 05/05/2020    ALT 16 05/05/2020    ALBUMIN 3.9 05/05/2020    PROT 7.5 05/05/2020    BILITOT 0.6 05/05/2020    WBC 6.75 05/05/2020    HGB 14.0 05/05/2020    HCT 44.2 05/05/2020     (H) 05/05/2020     05/05/2020    TSH 0.506 05/05/2020    CHOL 240 (H) 08/07/2018    HDL 71 08/07/2018    LDLCALC 129.2 08/07/2018    TRIG 199 (H) 08/07/2018       Assessment:     1. Hypertension, unspecified type : Not well controlled       2. Nonrheumatic aortic valve stenosis : Moderate   4. Diastolic dysfunction : Compensated but " may be contributing to sx   5. Hyperlipidemia, unspecified hyperlipidemia type :  on moderate intensity statin therapy.       Plan:     Luz was seen today for hypertension.    Diagnoses and all orders for this visit:    Hypertension, unspecified type    -     carvediloL (COREG) 12.5 MG tablet; Take 1 tablet (12.5 mg total) by mouth 2 (two) times daily with meals in place of Metoprolol.  -     NIFEdipine (PROCARDIA-XL) 30 MG (OSM) 24 hr tablet; Take 1 tablet (30 mg total) by mouth once daily ( informed of the potential for edema).  She will get a new Monitor from the [facility] as well as a reliable backup ..  Stop Clonidine    Nonrheumatic aortic valve stenosis  Periodic repeat echos  Discussed anatomy and clinical implications to the patient and daughterDiastolic dysfunction    Hyperlipidemia, unspecified hyperlipidemia type  -     Lipid Panel; Future; Expected date: 05/11/2020  .Mediteranian diet recommended with sodium restriction  Other orders  -     Discontinue: metoprolol succinate (TOPROL-XL) 50 MG 24 hr tablet; Take 50 mg by mouth 2 (two) times daily.

## 2020-05-11 NOTE — TELEPHONE ENCOUNTER
----- Message from Veronica Del Valle sent at 5/11/2020  3:45 PM CDT -----  Contact: patient [phone]  Patient would like to get a referral.  Referral to what specialty:  orthopedics  Does the patient want the referral with a specific physician  no:    [facility] the [facility]:    Reason (be specific):  Painful left achilles tendon pain x 5 days  Does the patient already have the specialty clinic appointment scheduled:  no  If yes, what date is the appointment scheduled:     Is the insurance listed in Epic correct?  Yes  Comments:  Patient lives close to the [facility] but wants to see the first available doctor either at [place] or [facility].    ##Advise the patient that once the physician approves this either a nurse or the  will return their call##

## 2020-05-11 NOTE — TELEPHONE ENCOUNTER
----- Message from Veronica Del Valle sent at 5/11/2020  3:45 PM CDT -----  Contact: patient [phone]  Patient would like to get a referral.  Referral to what specialty:  orthopedics  Does the patient want the referral with a specific physician  no:    [facility] the specialist [facility]:    Reason (be specific):  Painful left achilles tendon pain x 5 days  Does the patient already have the specialty clinic appointment scheduled:  no  If yes, what date is the appointment scheduled:     Is the insurance listed in Epic correct?  Yes  Comments:  Patient lives close to the [facility] but wants to see the first available doctor either at [facility] or [facility].    ##Advise the patient that once the physician approves this either a nurse or the  will return their call##

## 2020-05-12 ENCOUNTER — HOSPITAL ENCOUNTER (OUTPATIENT)
Dept: RADIOLOGY | Facility: HOSPITAL | Age: 80
Discharge: HOME | End: 2020-05-12
Attending: NEUROMUSCULOSKELETAL MEDICINE & OMM
Payer: MEDICARE

## 2020-05-12 ENCOUNTER — OFFICE VISIT (OUTPATIENT)
Dept: SPORTS MEDICINE | Facility: CLINIC | Age: 80
End: 2020-05-12
Payer: MEDICARE

## 2020-05-12 VITALS
WEIGHT: 156 LBS | HEIGHT: 61 IN | DIASTOLIC BLOOD PRESSURE: 83 MMHG | BODY MASS INDEX: 29.45 KG/M2 | SYSTOLIC BLOOD PRESSURE: 151 MMHG | HEART RATE: 55 BPM

## 2020-05-12 DIAGNOSIS — M76.62 ACHILLES TENDINITIS OF LEFT LOWER EXTREMITY: ICD-10-CM

## 2020-05-12 DIAGNOSIS — M99.06 SOMATIC DYSFUNCTION OF LOWER EXTREMITY: ICD-10-CM

## 2020-05-12 DIAGNOSIS — M77.52 RETROCALCANEAL BURSITIS (BACK OF HEEL), LEFT: ICD-10-CM

## 2020-05-12 DIAGNOSIS — M76.62 ACHILLES TENDINITIS OF LEFT LOWER EXTREMITY: Primary | ICD-10-CM

## 2020-05-12 PROCEDURE — 97110 THERAPEUTIC EXERCISES: CPT | Mod: GP,S$PBB,, | Performed by: NEUROMUSCULOSKELETAL MEDICINE & OMM

## 2020-05-12 PROCEDURE — 99999 PR PBB SHADOW E&M-EST. PATIENT-LVL III: CPT | Mod: PBBFAC,,, | Performed by: NEUROMUSCULOSKELETAL MEDICINE & OMM

## 2020-05-12 PROCEDURE — 99204 OFFICE O/P NEW MOD 45 MIN: CPT | Mod: 25,S$PBB,, | Performed by: NEUROMUSCULOSKELETAL MEDICINE & OMM

## 2020-05-12 PROCEDURE — 99213 OFFICE O/P EST LOW 20 MIN: CPT | Mod: PBBFAC,25 | Performed by: NEUROMUSCULOSKELETAL MEDICINE & OMM

## 2020-05-12 PROCEDURE — 98925 OSTEOPATH MANJ 1-2 REGIONS: CPT | Mod: PBBFAC | Performed by: NEUROMUSCULOSKELETAL MEDICINE & OMM

## 2020-05-12 PROCEDURE — 98925 OSTEOPATH MANJ 1-2 REGIONS: CPT | Mod: S$PBB,,, | Performed by: NEUROMUSCULOSKELETAL MEDICINE & OMM

## 2020-05-12 PROCEDURE — 99204 PR OFFICE/OUTPT VISIT, NEW, LEVL IV, 45-59 MIN: ICD-10-PCS | Mod: 25,S$PBB,, | Performed by: NEUROMUSCULOSKELETAL MEDICINE & OMM

## 2020-05-12 PROCEDURE — 73610 X-RAY EXAM OF ANKLE: CPT | Mod: 26,LT,, | Performed by: RADIOLOGY

## 2020-05-12 PROCEDURE — 73610 X-RAY EXAM OF ANKLE: CPT | Mod: TC,LT

## 2020-05-12 PROCEDURE — 99999 PR PBB SHADOW E&M-EST. PATIENT-LVL III: ICD-10-PCS | Mod: PBBFAC,,, | Performed by: NEUROMUSCULOSKELETAL MEDICINE & OMM

## 2020-05-12 PROCEDURE — 97110 PR THERAPEUTIC EXERCISES: ICD-10-PCS | Mod: GP,S$PBB,, | Performed by: NEUROMUSCULOSKELETAL MEDICINE & OMM

## 2020-05-12 PROCEDURE — 98925 PR OSTEOPATHIC MANIP,1-2 BODY REGN: ICD-10-PCS | Mod: S$PBB,,, | Performed by: NEUROMUSCULOSKELETAL MEDICINE & OMM

## 2020-05-12 PROCEDURE — 73610 XR ANKLE COMPLETE 3 VIEW LEFT: ICD-10-PCS | Mod: 26,LT,, | Performed by: RADIOLOGY

## 2020-05-12 RX ORDER — DICLOFENAC SODIUM 10 MG/G
2 GEL TOPICAL 3 TIMES DAILY
Qty: 1 TUBE | Refills: 2 | Status: SHIPPED | OUTPATIENT
Start: 2020-05-12 | End: 2020-07-10 | Stop reason: ALTCHOICE

## 2020-05-12 NOTE — PROGRESS NOTES
Subjective:     Luz Amaro     Chief Complaint   Patient presents with    Left Ankle - Pain       HPI    Luz is a 79 y.o. female coming in today for left achilles pain that began about 5-6  day(s) ago, referred by Dr. Moss. Pt. describes the pain as a 4/10 sharp pain that does not radiate. She is very point tender at the back of her heel. There was not a fall/injury/ or trauma associated with the onset of symptoms. She had driven to the [facility] to watch the [employer] [place] flyover and when she returned home had pain in her posterior heel and calf. The pain is better with rest and worse with walking, standing, shoe wear. She took aspirin with minimal relief. She is hesitant to take NSAIDs because she has has angioedema several times and felt it may be related to these medications. Pt. Denies any other musculoskeletal complaints at this time.     Joint instability? no  Mechanical locking/clicking? no  Affecting ADL's? yes  Affecting sleep? yes    Occupation: retired    Review of Systems   Constitutional: Negative for chills and fever.   HENT: Negative for hearing loss and tinnitus.    Eyes: Negative for blurred vision and photophobia.   Respiratory: Negative for cough and shortness of breath.    Cardiovascular: Negative for chest pain and leg swelling.   Gastrointestinal: Negative for abdominal pain, heartburn, nausea and vomiting.   Genitourinary: Negative for dysuria and hematuria.   Musculoskeletal: Positive for joint pain. Negative for back pain, falls, myalgias and neck pain.   Skin: Negative for rash.   Neurological: Negative for dizziness, tingling, focal weakness, weakness and headaches.   Endo/Heme/Allergies: Negative for environmental allergies. Does not bruise/bleed easily.   Psychiatric/Behavioral: Positive for depression. The patient is nervous/anxious.        PAST MEDICAL HISTORY:   Past Medical History:   Diagnosis Date    Amblyopia     Angio-edema     Basal cell carcinoma 2011     left cheek     BCC (basal cell carcinoma) 2010    left neck    Cataract     Hyperlipidemia     Hypertension     Strabismus      PAST SURGICAL HISTORY:   Past Surgical History:   Procedure Laterality Date    APPENDECTOMY      CHOLECYSTECTOMY      ECTOPIC PREGNANCY SURGERY      OOPHORECTOMY      SKIN BIOPSY       FAMILY HISTORY:   Family History   Problem Relation Age of Onset    Hypertension Mother     Stroke Mother     Cancer Father         prostate cancer    Asthma Sister     Amblyopia Neg Hx     Blindness Neg Hx     Cataracts Neg Hx     Diabetes Neg Hx     Glaucoma Neg Hx     Macular degeneration Neg Hx     Retinal detachment Neg Hx     Strabismus Neg Hx     Thyroid disease Neg Hx     Melanoma Neg Hx      SOCIAL HISTORY:   Social History     Socioeconomic History    Marital status:      Spouse name: Not on file    Number of children: Not on file    Years of education: Not on file    Highest education level: Not on file   Occupational History    Not on file   Social Needs    Financial resource strain: Not hard at all    Food insecurity:     Worry: Never true     Inability: Never true    Transportation needs:     Medical: No     Non-medical: No   Tobacco Use    Smoking status: Former Smoker     Types: Cigarettes     Last attempt to quit: 2/10/1983     Years since quittin.2    Smokeless tobacco: Never Used   Substance and Sexual Activity    Alcohol use: Yes     Alcohol/week: 2.0 standard drinks     Types: 2 Glasses of wine per week     Frequency: 4 or more times a week     Drinks per session: 3 or 4     Binge frequency: Less than monthly     Comment: wine daily     Drug use: No    Sexual activity: Not on file   Lifestyle    Physical activity:     Days per week: 3 days     Minutes per session: 60 min    Stress: Rather much   Relationships    Social connections:     Talks on phone: More than three times a week     Gets together: Three times a week     Attends  [employer] service: Never     Active member of club or organization: No     Attends meetings of clubs or organizations: Never     Relationship status:    Other Topics Concern    Are you pregnant or think you may be? 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       MEDICATIONS:   Current Outpatient Medications:     aspirin (ECOTRIN) 81 MG EC tablet, Take 81 mg by mouth once daily., Disp: , Rfl:     atorvastatin (LIPITOR) 20 MG tablet, TAKE 1 TABLET DAILY, Disp: 90 tablet, Rfl: 4    buPROPion (WELLBUTRIN XL) 150 MG TB24 tablet, Take 1 tablet (150 mg total) by mouth once daily., Disp: 90 tablet, Rfl: 11    carvediloL (COREG) 12.5 MG tablet, Take 1 tablet (12.5 mg total) by mouth 2 (two) times daily with meals., Disp: 60 tablet, Rfl: 11    cetirizine (ZYRTEC) 10 MG tablet, Take 10 mg by mouth once daily., Disp: , Rfl:     cholecalciferol, vitamin D3, (VITAMIN D3) 5,000 unit Tab, Take 5,000 Units by mouth once daily., Disp: , Rfl:     hydrALAZINE (APRESOLINE) 10 MG tablet, Take 1 tablet (10 mg total) by mouth 3 (three) times daily., Disp: 90 tablet, Rfl: 0    hydroCHLOROthiazide (MICROZIDE) 12.5 mg capsule, Take 1 capsule (12.5 mg total) by mouth once daily., Disp: 30 capsule, Rfl: 11    NIFEdipine (PROCARDIA-XL) 30 MG (OSM) 24 hr tablet, Take 1 tablet (30 mg total) by mouth once daily., Disp: 30 tablet, Rfl: 11    cyclobenzaprine (FLEXERIL) 10 MG tablet, TAKE 1 TABLET(10 MG) BY MOUTH THREE TIMES DAILY AS NEEDED FOR MUSCLE SPASMS (Patient not taking: Reported on 1/20/2020), Disp: 30 tablet, Rfl: 0    diazePAM (VALIUM) 2 MG tablet, Take 1 tablet daily for 3 days. Then take .5 tablet daily for the remaining 4 days. (Patient not taking: Reported on 5/5/2020), Disp: 5 tablet, Rfl: 0    diclofenac sodium (VOLTAREN) 1 % Gel, Apply 2 g topically 3 (three) times daily., Disp: 1 Tube, Rfl: 2    epinephrine (EPIPEN) 0.3 mg/0.3 mL  "AtIn, Inject 0.3 mLs (0.3 mg total) into the muscle once. (Patient not taking: Reported on 1/20/2020), Disp: 0.3 mL, Rfl: 0    meclizine (ANTIVERT) 25 mg tablet, Take 1 tablet (25 mg total) by mouth 2 (two) times daily as needed. (Patient not taking: Reported on 5/5/2020), Disp: 20 tablet, Rfl: 0    MELATONIN ORAL, Take by mouth., Disp: , Rfl:     oxybutynin (DITROPAN) 5 MG Tab, Take 1 tablet (5 mg total) by mouth 2 (two) times daily. (Patient not taking: Reported on 1/20/2020), Disp: 180 tablet, Rfl: 3    triamcinolone acetonide 0.1% (KENALOG) 0.1 % cream, AAA bid; not more than 2 weeks straight in same location, avoid use on face and groin (Patient not taking: Reported on 5/11/2020), Disp: 60 g, Rfl: 3  ALLERGIES:   Review of patient's allergies indicates:   Allergen Reactions    Ace inhibitors     Arb-angiotensin receptor antagonist     Amlodipine Swelling     Tongue swells       Objective:     VITAL SIGNS: BP (!) 151/83   Pulse (!) 55   Ht 5' 1" (1.549 m)   Wt 70.8 kg (156 lb)   BMI 29.48 kg/m²    General    Nursing note and vitals reviewed.  Constitutional: She is oriented to person, place, and time. She appears well-developed and well-nourished.   HENT:   Head: Normocephalic and atraumatic.   no nasal discharge, no external ear redness or discharge   Eyes:   EOM is full and smooth  No eye redness or discharge   Neck: Neck supple. No tracheal deviation present.   Cardiovascular: Normal rate.    2+ Radial pulse bilaterally  2+ Dorsalis Pedis pulse bilaterally  No LE edema appreciated   Pulmonary/Chest: Effort normal. No respiratory distress.   Abdominal: She exhibits no distension.   No rigidity   Neurological: She is alert and oriented to person, place, and time. She exhibits normal muscle tone. Coordination normal.   See details below   Psychiatric: She has a normal mood and affect. Her behavior is normal.               MUSCULOSKELETAL EXAM  ANKLE: left ANKLE  The affected ankle is compared to the " contralateral ankle.    Observation:    There is no edema, erythema, or ecchymosis. + mild retrocalcaneal bursa edema on left compared to right  Shoes reveal a normal wear pattern.  No structural deformities including pes planus/cavus, hallux valgus, or toe deformities.  Negative too-many toes sign.    Normal callus pattern on the feet bilaterally.    No leg or intrinsic foot muscle atrophy.  Squatting reveals symmetric pronation of the bilateral feet.   Able to rise on toes with symmetric supination.    left antalgic gait    ROM (* = with pain):  Active dorsiflexion to 20° on left and 20° on right  Active plantarflexion to 50° on left and 50° on right    Active ankle inversion to 35° on left and 35° on right  Active ankle eversion to 15° on left and 15° on right  Full active flexion/extension of the toes bilaterally.   Heel cords without tightness bilaterally.    Tenderness To Palpation:  No tenderness at the ATFL, CFL, PTFL, or deltoid ligaments  No tenderness over the distal anterior syndesmosis, distal tibia/fibula, fibular head/shaft  No tenderness at medial or lateral malleoli   No tenderness at navicular, cuboid, cuneiforms, talus, or calcaneous  No tenderness along the metatarsals or phalanges  + tenderness at the Achilles tendon calcaneal insertion  + tenderness at the retrocalcaneal bursa  No tenderness at the posterior tibial or peroneal tendons    Strength Testing (* = with pain):  Dorsiflexion - 5/5 on left and 5/5 on right  Platarflexion - 5/5 on left and 5/5 on right  Resisted Inversion - 5/5 on left and 5/5 on right  Resisted Eversion - 5/5 on left and 5/5 on right  Great Toe Extension - 5/5 on left and 5/5 on right  Great Toe Flexion - 5/5 on left and 5/5 on right    Special Tests:  Anterior talar drawer - negative and without dimpling  Talar tilt - negative  Reverse Talar tilt - negative    External rotation stress (Kleiger) test - negative  Quintero squeeze test - negative    Metatarsal squeeze  test - negative  Midfoot stress test - negative  Calcaneal squeeze test - negative    No subluxation of the peroneal tendons with resisted eversion.    Vascular/Sensory Exam:  DP and PT pulses intact bilaterally  No skin changes, no abnormal hair distribution  Sensation intact to light touch throughout the saphenous, sural, superficial peroneal, deep peroneal, and tibial nerve distributions  Negative Tinel's test over tarsal tunnel  2+/4 reflexes at L4 and S1 dermatomes  Capillary refill intact <2 seconds in all toes bilaterally.    TART (Tissue texture abnormality, Asymmetry,  Restriction of motion and/or Tenderness) changes:    Lower Extremity:  · Leg lengths symmetric    Location/joint Finding/restriction   Fibular head Neutral   Tibia Neutral   Talocrural joint Left   Subtalar Joint Neutral   Cuboid Neutral   Talo-navicular joint Left   Navicular-cuneiform joint Left   1st, 2nd, 3rd, 4th, 5th Cuneiform-metatarsal joint Neutral   1st, 2nd, 3rd, 4th, 5th metatarsal Neutral   1st, 2nd, 3rd, 4th, 5th phalange Neutral       Key   F= Flexed   E = Extended   R = Rotated   S = Sidebent   TTA = tissue texture abnormality     IMAGIN. X-ray ordered due to left ankle pain. (AP, lateral, and oblique views) taken today.   2. X-ray images were reviewed personally by me and then directly with patient.  3. FINDINGS: X-ray images obtained demonstrate is diffuse osteopenia.  Talar dome maintains rounded contour.  Mortise joint appears intact.  Bulky inferior and posterior calcaneal spurs are present.  There is also degenerative change and spurring in the midfoot. No fracture, dislocation, radiopaque retained foreign body, lytic or blastic lesion, erosion or chondrocalcinosis appreciated.  4. IMPRESSION: Degenerative changes of the midfoot. Bulky inferior and posterior calcaneal spurs are present.      Assessment:      Encounter Diagnoses   Name Primary?    Achilles tendinitis of left lower extremity Yes    Retrocalcaneal  bursitis (back of heel), left     Somatic dysfunction of lower extremity           Plan:   1. Acute left heel pain secondary to left Achilles tendinitis with associated retrocalcaneal bursitis   - Continue Ice up to 20 minutes at a time prn for pain control  - Patient prescribed Voltaren 1% gel to apply up to 3 times a day to left heel as needed for pain control  - OMT performed today to address associated biomechanical restrictions  and HEP started.   - Pt. Given bilateral heel cups to wear in her shoes for the next 2 weeks consistently, then prn for pain control  -  X-ray images of left ankle taken today (AP, lateral, and oblique views) showed degenerative changes of the midfoot. Bulky inferior and posterior calcaneal spurs are present. Images were personally reviewed with patient.    2. OMT 1-2 regions. Oral consent obtained.  Reviewed benefits and potential side effects.   - OMT indicated today due to signs and symptoms as well as local and remote somatic dysfunction findings and their related neurokinetic, lymphatic, fascial and/or arteriovenous body connections.   - OMT techniques used: Muscle Energy and Articulatory   - Treatment was tolerated well. Improvement noted in segmental mobility post-treatment in dysfunctional regions. There were no adverse events and no complications immediately following treatment.     3. Pt. Given the following HEP:  A) Start with:   A) Isotonic calf contraction:  Hold toe raise for 30 sec, repeating 10 reps twice daily.  Handout given  Once non-painful, add in:  B) Bilateral Calf stretching with knee flexed and extended: hold stretch for 30 seconds, repeating 2-3 times on each side. Do stretch twice daily  Once non-painful, add in:  C) Eccentric calf stretches:  Lower heels off of a step slowly until reaching end rand plantarflexion, repeating 10 reps twice daily.  Handout given      [id number] HOME EXERCISE PROGRAM (HEP):  The patient was taught a homegoing physical therapy regimen  as described above. The patient demonstrated understanding of the exercises and proper technique of their execution. This interaction took 15 minutes.     4. Follow-up in 4  weeks for reevaluation via a virtual visit    5. Patient agreeable to today's plan and all questions were answered    This note is dictated using the M*Modal Fluency Direct word recognition program. There are word recognition mistakes that are occasionally missed on review.    As per the guidelines from [facility], this patient's condition is considered time sensitive.  The visit could not be done via telemedicine. Treatment performed during this visit was medically necessary is to avoid further harm to the patient's underlying condition.

## 2020-05-12 NOTE — LETTER
May 12, 2020      Suman Moss MD  [address]  [facility] [address]           [facility]  [address]  [facility] [address]  Phone: [phone]          Patient: Luz Amaro   MR Number: [id number]   YOB: 1940   Date of Visit: 5/12/2020       Dear Dr. Suman Moss:    Thank you for referring Luz Amaro to me for evaluation. Attached you will find relevant portions of my assessment and plan of care.    If you have questions, please do not hesitate to call me. I look forward to following Luz Amaro along with you.    Sincerely,    Zhanna Poole,     Enclosure  CC:  No Recipients    If you would like to receive this communication electronically, please contact [email or web address] or [phone] to request more information on [facility] Link access.    For providers and/or their staff who would like to refer a patient to [facility], please contact us through our one-stop-shop provider referral line, [facility], at [phone].    If you feel you have received this communication in error or would no longer like to receive these types of communications, please e-mail [email or web address]

## 2020-05-18 ENCOUNTER — PATIENT MESSAGE (OUTPATIENT)
Dept: CARDIOLOGY | Facility: CLINIC | Age: 80
End: 2020-05-18

## 2020-05-25 ENCOUNTER — PATIENT MESSAGE (OUTPATIENT)
Dept: OTHER | Facility: OTHER | Age: 80
End: 2020-05-25

## 2020-05-31 PROCEDURE — 99454 REM MNTR PHYSIOL PARAM 16-30: CPT | Mod: PBBFAC | Performed by: INTERNAL MEDICINE

## 2020-07-09 NOTE — PROGRESS NOTES
Subjective:     Luz Amaro     Chief Complaint   Patient presents with    Right Foot - Pain       HPI    Luz is a 79 y.o. female coming in today for right foot pain that began about 10 day(s) ago, referred by self (patient seen for L Achilles pain 5/2020). Pt. describes the pain as a 4/10 achy pain that does not radiate. There was not a fall/injury/ or trauma associated with the onset of symptoms. The pain is better with rest, ice and worse with activity, standing, walking, dorsiflexion. Patient has been using Voltaren topical gel,  Doing her home exercise program, and wearing heel cups without any significant relief. Pt. Denies any other musculoskeletal complaints at this time.     Joint instability? no  Mechanical locking/clicking? no  Affecting ADL's? yes  Affecting sleep? yes    Occupation: retired    Review of Systems   Constitutional: Negative for chills and fever.   HENT: Negative for hearing loss and tinnitus.    Eyes: Negative for blurred vision and photophobia.   Respiratory: Negative for cough and shortness of breath.    Cardiovascular: Positive for leg swelling (LE edema from cardiac meds). Negative for chest pain.   Gastrointestinal: Negative for abdominal pain, heartburn, nausea and vomiting.   Genitourinary: Negative for dysuria and hematuria.   Musculoskeletal: Positive for myalgias. Negative for back pain, falls, joint pain and neck pain.   Skin: Negative for rash.   Neurological: Negative for dizziness, tingling, focal weakness, weakness and headaches.   Endo/Heme/Allergies: Negative for environmental allergies. Does not bruise/bleed easily.   Psychiatric/Behavioral: Negative for depression. The patient is not nervous/anxious.        PAST MEDICAL HISTORY:   Past Medical History:   Diagnosis Date    Amblyopia     Angio-edema     Basal cell carcinoma 2011    left cheek     BCC (basal cell carcinoma) 2010    left neck    Cataract     Hyperlipidemia     Hypertension      Strabismus      PAST SURGICAL HISTORY:   Past Surgical History:   Procedure Laterality Date    APPENDECTOMY      CHOLECYSTECTOMY      ECTOPIC PREGNANCY SURGERY      OOPHORECTOMY      SKIN BIOPSY       FAMILY HISTORY:   Family History   Problem Relation Age of Onset    Hypertension Mother     Stroke Mother     Cancer Father         prostate cancer    Asthma Sister     Amblyopia Neg Hx     Blindness Neg Hx     Cataracts Neg Hx     Diabetes Neg Hx     Glaucoma Neg Hx     Macular degeneration Neg Hx     Retinal detachment Neg Hx     Strabismus Neg Hx     Thyroid disease Neg Hx     Melanoma Neg Hx      SOCIAL HISTORY:   Social History     Socioeconomic History    Marital status:      Spouse name: Not on file    Number of children: Not on file    Years of education: Not on file    Highest education level: Not on file   Occupational History    Not on file   Social Needs    Financial resource strain: Not hard at all    Food insecurity     Worry: Never true     Inability: Never true    Transportation needs     Medical: No     Non-medical: No   Tobacco Use    Smoking status: Former Smoker     Types: Cigarettes     Quit date: 2/10/1983     Years since quittin.4    Smokeless tobacco: Never Used   Substance and Sexual Activity    Alcohol use: Yes     Alcohol/week: 2.0 standard drinks     Types: 2 Glasses of wine per week     Frequency: 4 or more times a week     Drinks per session: 3 or 4     Binge frequency: Less than monthly     Comment: wine daily     Drug use: No    Sexual activity: Not on file   Lifestyle    Physical activity     Days per week: 3 days     Minutes per session: 60 min    Stress: Rather much   Relationships    Social connections     Talks on phone: More than three times a week     Gets together: Three times a week     Attends [employer] service: Never     Active member of club or organization: No     Attends meetings of clubs or organizations: Never      Relationship status:    Other Topics Concern    Are you pregnant or think you may be? 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       MEDICATIONS:   Current Outpatient Medications:     aspirin (ECOTRIN) 81 MG EC tablet, Take 81 mg by mouth once daily., Disp: , Rfl:     atorvastatin (LIPITOR) 20 MG tablet, TAKE 1 TABLET DAILY, Disp: 90 tablet, Rfl: 4    buPROPion (WELLBUTRIN XL) 150 MG TB24 tablet, Take 1 tablet (150 mg total) by mouth once daily., Disp: 90 tablet, Rfl: 11    carvediloL (COREG) 12.5 MG tablet, Take 1 tablet (12.5 mg total) by mouth 2 (two) times daily with meals., Disp: 60 tablet, Rfl: 11    cetirizine (ZYRTEC) 10 MG tablet, Take 10 mg by mouth once daily., Disp: , Rfl:     cholecalciferol, vitamin D3, (VITAMIN D3) 5,000 unit Tab, Take 5,000 Units by mouth once daily., Disp: , Rfl:     diazePAM (VALIUM) 2 MG tablet, Take 1 tablet daily for 3 days. Then take .5 tablet daily for the remaining 4 days., Disp: 5 tablet, Rfl: 0    hydroCHLOROthiazide (MICROZIDE) 12.5 mg capsule, Take 1 capsule (12.5 mg total) by mouth once daily., Disp: 30 capsule, Rfl: 11    MELATONIN ORAL, Take by mouth., Disp: , Rfl:     NIFEdipine (PROCARDIA-XL) 30 MG (OSM) 24 hr tablet, Take 1 tablet (30 mg total) by mouth once daily., Disp: 30 tablet, Rfl: 11    oxybutynin (DITROPAN) 5 MG Tab, Take 1 tablet (5 mg total) by mouth 2 (two) times daily., Disp: 180 tablet, Rfl: 3    triamcinolone acetonide 0.1% (KENALOG) 0.1 % cream, AAA bid; not more than 2 weeks straight in same location, avoid use on face and groin, Disp: 60 g, Rfl: 3    cyclobenzaprine (FLEXERIL) 10 MG tablet, TAKE 1 TABLET(10 MG) BY MOUTH THREE TIMES DAILY AS NEEDED FOR MUSCLE SPASMS (Patient not taking: Reported on 1/20/2020), Disp: 30 tablet, Rfl: 0    epinephrine (EPIPEN) 0.3 mg/0.3 mL AtIn, Inject 0.3 mLs (0.3 mg total) into the muscle once. (Patient not taking:  "Reported on 1/20/2020), Disp: 0.3 mL, Rfl: 0    hydrALAZINE (APRESOLINE) 10 MG tablet, Take 1 tablet (10 mg total) by mouth 3 (three) times daily., Disp: 90 tablet, Rfl: 0    meclizine (ANTIVERT) 25 mg tablet, Take 1 tablet (25 mg total) by mouth 2 (two) times daily as needed. (Patient not taking: Reported on 5/5/2020), Disp: 20 tablet, Rfl: 0    meloxicam (MOBIC) 7.5 MG tablet, Take 1 tablet (7.5 mg total) by mouth once daily. Take with food, Disp: 30 tablet, Rfl: 0  ALLERGIES:   Review of patient's allergies indicates:   Allergen Reactions    Ace inhibitors     Arb-angiotensin receptor antagonist     Amlodipine Swelling     Tongue swells       Objective:     VITAL SIGNS: BP (!) 153/81   Pulse 73   Ht 5' 1" (1.549 m)   Wt 70.8 kg (156 lb)   BMI 29.48 kg/m²    General    Nursing note and vitals reviewed.  Constitutional: She is oriented to person, place, and time. She appears well-developed and well-nourished.   HENT:   Head: Normocephalic and atraumatic.   no nasal discharge, no external ear redness or discharge   Eyes:   EOM is full and smooth  No eye redness or discharge   Neck: Neck supple. No tracheal deviation present.   Cardiovascular: Normal rate.    2+ Radial pulse bilaterally  2+ Dorsalis Pedis pulse bilaterally  No LE edema appreciated   Pulmonary/Chest: Effort normal. No respiratory distress.   Abdominal: She exhibits no distension.   No rigidity   Neurological: She is alert and oriented to person, place, and time. She exhibits normal muscle tone. Coordination normal.   See details below   Psychiatric: She has a normal mood and affect. Her behavior is normal.               MUSCULOSKELETAL EXAM  ANKLE: right ANKLE  The affected ankle is compared to the contralateral ankle.    Observation:    There is + edema and erythema at retrocalcaneal bursa. + right dorsal 1st MTP ecchymosis.   Shoes reveal a normal wear pattern.  No structural deformities including pes planus/cavus, hallux valgus, or toe " deformities.  Negative too-many toes sign.    Normal callus pattern on the feet bilaterally.    No leg or intrinsic foot muscle atrophy.   right antalgic gait    ROM (* = with pain):  Active dorsiflexion to 20° on left and 20° on right  Active plantarflexion to 50° on left and 50° on right*    Active ankle inversion to 35° on left and 35° on right  Active ankle eversion to 15° on left and 15° on right  Full active flexion/extension of the toes bilaterally.   Heel cords without tightness bilaterally.    Tenderness To Palpation:  No tenderness at the ATFL, CFL, PTFL, or deltoid ligaments  No tenderness over the distal anterior syndesmosis, distal tibia/fibula, fibular head/shaft  No tenderness at medial or lateral malleoli   No tenderness at navicular, cuboid, cuneiforms, talus, or calcaneous  No tenderness along the metatarsals or phalanges  + tenderness at the Achilles tendon calcaneal insertion  +  Tenderness at the retrocalcaneal bursa  No tenderness at the posterior tibial or peroneal tendons    Strength Testing (* = with pain):  Dorsiflexion - 5/5 on left and 5/5 on right  Platarflexion - 5/5 on left and 5-/5 on right*  Resisted Inversion - 5/5 on left and 5/5 on right  Resisted Eversion - 5/5 on left and 5/5 on right  Great Toe Extension - 5/5 on left and 5/5 on right  Great Toe Flexion - 5/5 on left and 5/5 on right    Special Tests:  Anterior talar drawer - negative and without dimpling  Talar tilt - negative  Reverse Talar tilt - negative    Heel tap test - negative  Distal tib/fib squeeze test - negative  External rotation stress (Kleiger) test - negative  Quintero squeeze test - negative    Metatarsal squeeze test - negative  Midfoot stress test - negative  Calcaneal squeeze test -  positive    No subluxation of the peroneal tendons with resisted eversion.    Vascular/Sensory Exam:  DP and PT pulses intact bilaterally  No skin changes, no abnormal hair distribution  Sensation intact to light touch  throughout the saphenous, sural, superficial peroneal, deep peroneal, and tibial nerve distributions  Negative Tinel's test over tarsal tunnel  2+/4 reflexes at L4 and S1 dermatomes  Capillary refill intact <2 seconds in all toes bilaterally.    TART (Tissue texture abnormality, Asymmetry,  Restriction of motion and/or Tenderness) changes:      Lower Extremity:  · Leg lengths symmetric     Location/joint Finding/restriction   Fibular head Neutral   Tibia Neutral   Talocrural joint right   Subtalar Joint Neutral   Cuboid Neutral   Talo-navicular joint right   Navicular-cuneiform joint right   1st, 2nd, 3rd, 4th, 5th Cuneiform-metatarsal joint Neutral   1st, 2nd, and 3rd metatarsal right   1st, 2nd, 3rd, 4th, 5th phalange Neutral        Key   F= Flexed   E = Extended   R = Rotated   S = Sidebent   TTA = tissue texture abnormality     IMAGIN. X-ray ordered due to right foot pain. (AP, lateral, and oblique views) taken today.   2. X-ray images were reviewed personally by me and then directly with patient.  3. FINDINGS: X-ray images obtained demonstrate is baseline DJD at the mid and forefoot.  There is significant spurring on the calcaneus at both the plantar surface and Achilles attachment with associated soft tissue edema posterior to the Achilles tendon.  No fracture dislocation bone destruction or coalition seen.  4. 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  There is significant spurring on the calcaneus at both the plantar surface and the achilles attachment with associated soft tissue edema posterior to the achilles tendon.      Assessment:      Encounter Diagnoses   Name Primary?    Right foot pain     Retrocalcaneal bursitis, right Yes    Somatic dysfunction of lower extremity           Plan:   1.  Acute right foot pain secondary to retrocalcaneal bursitis.  No significant relief with topical Voltaren gel, heel cups, and home exercise program.    - Patient given a right Cam walking boot today,  fitted by Ren Chan, brace specialist, to wear for the next 3 weeks to complete rest during the day  -  Prescription given for Meloxicam 7.5 mg p.o. q.day x 14 days then prn for pain control. Pt. Advised to avoid all other NSAIDS while on this medication.   -  Continue ice of 20 min at a time as needed pain control  - OMT performed today to address associated biomechanical restrictions of the foot  - Referral to outpatient PT ([facility]) for right retrocalcaneal bursa iontophoresis and progression to calf eccentric stretching program once pain improves  -  X-ray images of right foot taken today (AP, lateral, and oblique views) showed baseline DJD at the mid and forefoot.  There is significant spurring on the calcaneus at both the plantar surface and the achilles attachment with associated soft tissue edema posterior to the achilles tendon.. Images were personally reviewed with patient.    2. OMT 1-2 regions. Oral consent obtained.  Reviewed benefits and potential side effects.   - OMT indicated today due to signs and symptoms as well as local and remote somatic dysfunction findings and their related neurokinetic, lymphatic, fascial and/or arteriovenous body connections.   - OMT techniques used: Articulatory   - Treatment was tolerated well. Improvement noted in segmental mobility post-treatment in dysfunctional regions. There were no adverse events and no complications immediately following treatment.     3.  Follow-up in 3 weeks for reevaluation    4. Patient agreeable to today's plan and all questions were answered    This note is dictated using the M*Modal Fluency Direct word recognition program. There are word recognition mistakes that are occasionally missed on review.

## 2020-07-10 ENCOUNTER — OFFICE VISIT (OUTPATIENT)
Dept: SPORTS MEDICINE | Facility: CLINIC | Age: 80
End: 2020-07-10
Payer: MEDICARE

## 2020-07-10 ENCOUNTER — HOSPITAL ENCOUNTER (OUTPATIENT)
Dept: RADIOLOGY | Facility: HOSPITAL | Age: 80
Discharge: HOME | End: 2020-07-10
Attending: NEUROMUSCULOSKELETAL MEDICINE & OMM
Payer: MEDICARE

## 2020-07-10 VITALS
SYSTOLIC BLOOD PRESSURE: 153 MMHG | DIASTOLIC BLOOD PRESSURE: 81 MMHG | HEART RATE: 73 BPM | HEIGHT: 61 IN | WEIGHT: 156 LBS | BODY MASS INDEX: 29.45 KG/M2

## 2020-07-10 DIAGNOSIS — M79.671 RIGHT FOOT PAIN: ICD-10-CM

## 2020-07-10 DIAGNOSIS — M99.06 SOMATIC DYSFUNCTION OF LOWER EXTREMITY: ICD-10-CM

## 2020-07-10 DIAGNOSIS — M77.51 RETROCALCANEAL BURSITIS, RIGHT: Primary | ICD-10-CM

## 2020-07-10 PROCEDURE — 99214 OFFICE O/P EST MOD 30 MIN: CPT | Mod: 25,S$PBB,, | Performed by: NEUROMUSCULOSKELETAL MEDICINE & OMM

## 2020-07-10 PROCEDURE — 98925 PR OSTEOPATHIC MANIP,1-2 BODY REGN: ICD-10-PCS | Mod: S$PBB,,, | Performed by: NEUROMUSCULOSKELETAL MEDICINE & OMM

## 2020-07-10 PROCEDURE — 73630 X-RAY EXAM OF FOOT: CPT | Mod: 26,RT,, | Performed by: RADIOLOGY

## 2020-07-10 PROCEDURE — 98925 OSTEOPATH MANJ 1-2 REGIONS: CPT | Mod: S$PBB,,, | Performed by: NEUROMUSCULOSKELETAL MEDICINE & OMM

## 2020-07-10 PROCEDURE — 99214 PR OFFICE/OUTPT VISIT, EST, LEVL IV, 30-39 MIN: ICD-10-PCS | Mod: 25,S$PBB,, | Performed by: NEUROMUSCULOSKELETAL MEDICINE & OMM

## 2020-07-10 PROCEDURE — 98925 OSTEOPATH MANJ 1-2 REGIONS: CPT | Mod: PBBFAC | Performed by: NEUROMUSCULOSKELETAL MEDICINE & OMM

## 2020-07-10 PROCEDURE — 73630 X-RAY EXAM OF FOOT: CPT | Mod: TC,RT

## 2020-07-10 PROCEDURE — 99999 PR PBB SHADOW E&M-EST. PATIENT-LVL V: ICD-10-PCS | Mod: PBBFAC,,, | Performed by: NEUROMUSCULOSKELETAL MEDICINE & OMM

## 2020-07-10 PROCEDURE — 99215 OFFICE O/P EST HI 40 MIN: CPT | Mod: PBBFAC,25 | Performed by: NEUROMUSCULOSKELETAL MEDICINE & OMM

## 2020-07-10 PROCEDURE — 73630 XR FOOT COMPLETE 3 VIEW RIGHT: ICD-10-PCS | Mod: 26,RT,, | Performed by: RADIOLOGY

## 2020-07-10 PROCEDURE — 99999 PR PBB SHADOW E&M-EST. PATIENT-LVL V: CPT | Mod: PBBFAC,,, | Performed by: NEUROMUSCULOSKELETAL MEDICINE & OMM

## 2020-07-10 RX ORDER — MELOXICAM 7.5 MG/1
7.5 TABLET ORAL DAILY
Qty: 30 TABLET | Refills: 0 | Status: SHIPPED | OUTPATIENT
Start: 2020-07-10 | End: 2020-07-29 | Stop reason: SDUPTHER

## 2020-07-12 ENCOUNTER — PATIENT OUTREACH (OUTPATIENT)
Dept: ADMINISTRATIVE | Facility: OTHER | Age: 80
End: 2020-07-12

## 2020-07-13 ENCOUNTER — OFFICE VISIT (OUTPATIENT)
Dept: OPTOMETRY | Facility: CLINIC | Age: 80
End: 2020-07-13
Payer: MEDICARE

## 2020-07-13 DIAGNOSIS — H25.13 NUCLEAR SCLEROSIS, BILATERAL: Primary | ICD-10-CM

## 2020-07-13 DIAGNOSIS — H52.7 REFRACTIVE ERROR: ICD-10-CM

## 2020-07-13 DIAGNOSIS — H53.032 AMBLYOPIA, STRABISMIC, LEFT: ICD-10-CM

## 2020-07-13 DIAGNOSIS — H35.363 DEGENERATIVE RETINAL DRUSEN OF BOTH EYES: ICD-10-CM

## 2020-07-13 PROCEDURE — 92015 PR REFRACTION: ICD-10-PCS | Mod: ,,, | Performed by: OPTOMETRIST

## 2020-07-13 PROCEDURE — 92014 COMPRE OPH EXAM EST PT 1/>: CPT | Mod: S$PBB,,, | Performed by: OPTOMETRIST

## 2020-07-13 PROCEDURE — 99213 OFFICE O/P EST LOW 20 MIN: CPT | Mod: PBBFAC,PO | Performed by: OPTOMETRIST

## 2020-07-13 PROCEDURE — 99999 PR PBB SHADOW E&M-EST. PATIENT-LVL III: CPT | Mod: PBBFAC,,, | Performed by: OPTOMETRIST

## 2020-07-13 PROCEDURE — 92014 PR EYE EXAM, EST PATIENT,COMPREHESV: ICD-10-PCS | Mod: S$PBB,,, | Performed by: OPTOMETRIST

## 2020-07-13 PROCEDURE — 99999 PR PBB SHADOW E&M-EST. PATIENT-LVL III: ICD-10-PCS | Mod: PBBFAC,,, | Performed by: OPTOMETRIST

## 2020-07-13 PROCEDURE — 92015 DETERMINE REFRACTIVE STATE: CPT | Mod: ,,, | Performed by: OPTOMETRIST

## 2020-07-13 NOTE — PROGRESS NOTES
HPI     DLS 05/29/2019  HX   Nuclear sclerosis  Patient complaint of distance vision going blurry, Cant read articles on   television. Patient deny any near vision changes at present.  Patient deny   HA-  DIPLOPIA-  FLASHES-  FLOATERS-  PAIN-      Last edited by Deny Holbrook on 7/13/2020  2:48 PM. (History)            Assessment /Plan     For exam results, see Encounter Report.    Nuclear sclerosis, bilateral    Amblyopia, strabismic, left    Degenerative retinal drusen of both eyes    Refractive error      1. Educated pt on presence of cataracts and effects on vision. No surgery at this time. Recheck in one year.  2. Monitor condition. Patient to report any changes. RTC 1 year recheck.  3. Monitor condition. Patient to report any changes. RTC 1 year recheck.     4. New Spec Rx given. Different lens options discussed with patient. RTC 1 year full exam.

## 2020-07-17 DIAGNOSIS — Z71.89 COMPLEX CARE COORDINATION: ICD-10-CM

## 2020-07-28 ENCOUNTER — PATIENT OUTREACH (OUTPATIENT)
Dept: ADMINISTRATIVE | Facility: OTHER | Age: 80
End: 2020-07-28

## 2020-07-28 NOTE — PROGRESS NOTES
Subjective:     Luz Amaro     Chief Complaint   Patient presents with    Follow-up     R Achilles       HPI      Luz is a 79 y.o. female coming in today for right foot pain. Since last visit the pain has Improved. The pain is better with rest, boot (wearing left even up lift as recommended), meloxicam and worse with increased activity outside of the boot with posterior shoe irritation. Pt. describes the pain as a 1-2/10 achy pain that does not radiate. There has not been any new a fall/injury/ or traumas since last visit.  Pt. denies any new musculoskeletal complaints at this time.      Office note from 7/10/20 reviewed    Joint instability? no  Mechanical locking/clicking? no  Affecting ADL's? yes  Affecting sleep? yes    Occupation: retired    Review of Systems   Constitutional: Negative for chills and fever.   Cardiovascular: Positive for leg swelling.   Musculoskeletal: Positive for myalgias. Negative for back pain, falls, joint pain and neck pain.   Neurological: Negative for dizziness, tingling, focal weakness, weakness and headaches.       PAST MEDICAL HISTORY:   Past Medical History:   Diagnosis Date    Amblyopia     Angio-edema     Basal cell carcinoma 2011    left cheek     BCC (basal cell carcinoma) 2010    left neck    Cataract     Hyperlipidemia     Hypertension     Strabismus      PAST SURGICAL HISTORY:   Past Surgical History:   Procedure Laterality Date    APPENDECTOMY      CHOLECYSTECTOMY      ECTOPIC PREGNANCY SURGERY      OOPHORECTOMY      SKIN BIOPSY       FAMILY HISTORY:   Family History   Problem Relation Age of Onset    Hypertension Mother     Stroke Mother     Cancer Father         prostate cancer    Asthma Sister     Amblyopia Neg Hx     Blindness Neg Hx     Cataracts Neg Hx     Diabetes Neg Hx     Glaucoma Neg Hx     Macular degeneration Neg Hx     Retinal detachment Neg Hx     Strabismus Neg Hx     Thyroid disease Neg Hx     Melanoma Neg Hx       SOCIAL HISTORY:   Social History     Socioeconomic History    Marital status:      Spouse name: Not on file    Number of children: Not on file    Years of education: Not on file    Highest education level: Not on file   Occupational History    Not on file   Social Needs    Financial resource strain: Not hard at all    Food insecurity     Worry: Never true     Inability: Never true    Transportation needs     Medical: No     Non-medical: No   Tobacco Use    Smoking status: Former Smoker     Types: Cigarettes     Quit date: 2/10/1983     Years since quittin.4    Smokeless tobacco: Never Used   Substance and Sexual Activity    Alcohol use: Yes     Alcohol/week: 2.0 standard drinks     Types: 2 Glasses of wine per week     Frequency: 4 or more times a week     Drinks per session: 3 or 4     Binge frequency: Less than monthly     Comment: wine daily     Drug use: No    Sexual activity: Not on file   Lifestyle    Physical activity     Days per week: 3 days     Minutes per session: 60 min    Stress: Rather much   Relationships    Social connections     Talks on phone: More than three times a week     Gets together: Three times a week     Attends [employer] service: Never     Active member of club or organization: No     Attends meetings of clubs or organizations: Never     Relationship status:    Other Topics Concern    Are you pregnant or think you may be? 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       MEDICATIONS:   Current Outpatient Medications:     aspirin (ECOTRIN) 81 MG EC tablet, Take 81 mg by mouth once daily., Disp: , Rfl:     atorvastatin (LIPITOR) 20 MG tablet, TAKE 1 TABLET DAILY, Disp: 90 tablet, Rfl: 4    buPROPion (WELLBUTRIN XL) 150 MG TB24 tablet, Take 1 tablet (150 mg total) by mouth once daily., Disp: 90 tablet, Rfl: 11    carvediloL (COREG) 12.5 MG tablet, Take 1 tablet (12.5 mg  total) by mouth 2 (two) times daily with meals., Disp: 60 tablet, Rfl: 11    cetirizine (ZYRTEC) 10 MG tablet, Take 10 mg by mouth once daily., Disp: , Rfl:     cholecalciferol, vitamin D3, (VITAMIN D3) 5,000 unit Tab, Take 5,000 Units by mouth once daily., Disp: , Rfl:     cyclobenzaprine (FLEXERIL) 10 MG tablet, TAKE 1 TABLET(10 MG) BY MOUTH THREE TIMES DAILY AS NEEDED FOR MUSCLE SPASMS (Patient not taking: Reported on 1/20/2020), Disp: 30 tablet, Rfl: 0    diazePAM (VALIUM) 2 MG tablet, Take 1 tablet daily for 3 days. Then take .5 tablet daily for the remaining 4 days., Disp: 5 tablet, Rfl: 0    epinephrine (EPIPEN) 0.3 mg/0.3 mL AtIn, Inject 0.3 mLs (0.3 mg total) into the muscle once. (Patient not taking: Reported on 1/20/2020), Disp: 0.3 mL, Rfl: 0    hydrALAZINE (APRESOLINE) 10 MG tablet, Take 1 tablet (10 mg total) by mouth 3 (three) times daily., Disp: 90 tablet, Rfl: 0    hydroCHLOROthiazide (MICROZIDE) 12.5 mg capsule, Take 1 capsule (12.5 mg total) by mouth once daily., Disp: 30 capsule, Rfl: 11    meclizine (ANTIVERT) 25 mg tablet, Take 1 tablet (25 mg total) by mouth 2 (two) times daily as needed. (Patient not taking: Reported on 5/5/2020), Disp: 20 tablet, Rfl: 0    MELATONIN ORAL, Take by mouth., Disp: , Rfl:     meloxicam (MOBIC) 7.5 MG tablet, Take 1 tablet (7.5 mg total) by mouth once daily. Take with food, Disp: 30 tablet, Rfl: 0    NIFEdipine (PROCARDIA-XL) 30 MG (OSM) 24 hr tablet, Take 1 tablet (30 mg total) by mouth once daily., Disp: 30 tablet, Rfl: 11    oxybutynin (DITROPAN) 5 MG Tab, Take 1 tablet (5 mg total) by mouth 2 (two) times daily., Disp: 180 tablet, Rfl: 3    triamcinolone acetonide 0.1% (KENALOG) 0.1 % cream, AAA bid; not more than 2 weeks straight in same location, avoid use on face and groin, Disp: 60 g, Rfl: 3  ALLERGIES:   Review of patient's allergies indicates:   Allergen Reactions    Ace inhibitors     Arb-angiotensin receptor antagonist     Amlodipine  "Swelling     Tongue swells     IMAGIN. X-ray ordered due to right foot pain. (AP, lateral, and oblique views) taken 7/10/2020.   2. X-ray images were reviewed personally by me and then directly with patient.  3. FINDINGS: X-ray images obtained demonstrate is baseline DJD at the mid and forefoot.  There is significant spurring on the calcaneus at both the plantar surface and Achilles attachment with associated soft tissue edema posterior to the Achilles tendon.  No fracture dislocation bone destruction or coalition seen.  4. 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  There is significant spurring on the calcaneus at both the plantar surface and the achilles attachment with associated soft tissue edema posterior to the achilles tendon.     Objective:     VITAL SIGNS: /68   Ht 5' 1" (1.549 m)   Wt 70.8 kg (156 lb)   BMI 29.48 kg/m²    General    Vitals reviewed.  Constitutional: She is oriented to person, place, and time. She appears well-developed and well-nourished.   Neurological: She is alert and oriented to person, place, and time.   Psychiatric: She has a normal mood and affect. Her behavior is normal.               MUSCULOSKELETAL EXAM  ANKLE: right ANKLE  The affected ankle is compared to the contralateral ankle.    Observation:    There no edema and erythema at retrocalcaneal bursa.   + bony farida's deformity present bilaterally  No structural deformities including pes planus/cavus, hallux valgus, or toe deformities.  Negative too-many toes sign.    Normal callus pattern on the feet bilaterally.    No leg or intrinsic foot muscle atrophy.  Non-antalgic gait    ROM (* = with pain):  Active dorsiflexion to 20° on left and 20° on right  Active plantarflexion to 50° on left and 50° on right    Active ankle inversion to 35° on left and 35° on right  Active ankle eversion to 15° on left and 15° on right  Full active flexion/extension of the toes bilaterally.   + right Heel cords tightness "     Tenderness To Palpation:  No tenderness at the ATFL, CFL, PTFL, or deltoid ligaments  No tenderness over the distal anterior syndesmosis, distal tibia/fibula, fibular head/shaft  No tenderness at medial or lateral malleoli   No tenderness at navicular, cuboid, cuneiforms, talus, or calcaneous  No tenderness along the metatarsals or phalanges  + tenderness at the Achilles tendon calcaneal insertion  +  Tenderness at the retrocalcaneal bursa/farida's deformity  No tenderness at the posterior tibial or peroneal tendons    Strength Testing (* = with pain):  Dorsiflexion - 5/5 on left and 5/5 on right  Platarflexion - 5/5 on left and 5/5 on right  Resisted Inversion - 5/5 on left and 5/5 on right  Resisted Eversion - 5/5 on left and 5/5 on right  Great Toe Extension - 5/5 on left and 5/5 on right  Great Toe Flexion - 5/5 on left and 5/5 on right    Special Tests:  Anterior talar drawer - negative and without dimpling  Talar tilt - negative  Reverse Talar tilt - negative    Heel tap test - negative  Distal tib/fib squeeze test - negative  External rotation stress (Kleiger) test - negative  Quintero squeeze test - negative    Metatarsal squeeze test - negative  Midfoot stress test - negative  Calcaneal squeeze test -  negative    No subluxation of the peroneal tendons with resisted eversion.    Vascular/Sensory Exam:  DP and PT pulses intact bilaterally  No skin changes, no abnormal hair distribution  Sensation intact to light touch throughout the saphenous, sural, superficial peroneal, deep peroneal, and tibial nerve distributions  Negative Tinel's test over tarsal tunnel  2+/4 reflexes at L4 and S1 dermatomes  Capillary refill intact <2 seconds in all toes bilaterally.    TART (Tissue texture abnormality, Asymmetry,  Restriction of motion and/or Tenderness) changes:      Lower Extremity:  · Leg lengths symmetric     Location/joint Finding/restriction   Fibular head Neutral   Tibia Neutral   Talocrural joint right    Subtalar Joint Neutral   Cuboid Neutral   Talo-navicular joint right   Navicular-cuneiform joint right   1st, 2nd, 3rd, 4th, 5th Cuneiform-metatarsal joint Neutral   1st, 2nd, and 3rd metatarsal right   1st, 2nd, 3rd, 4th, 5th phalange Neutral        Key   F= Flexed   E = Extended   R = Rotated   S = Sidebent   TTA = tissue texture abnormality       Assessment:      Encounter Diagnoses   Name Primary?    Retrocalcaneal bursitis, right Yes    Sushil's deformity of both heels           Plan:   1.  Acute right foot pain secondary to retrocalcaneal bursitis- improved with underlying bilateral sushil's deformities.  Patient cleared to discontinue right walking boot this point, transitioning to a firm soled supportive shoe with heel cups instead. Once heel cord tightness improves with PT, recommend transitioning out of heel cup wear as well.   -  Recommend continuing Meloxicam 7.5 mg p.o. q.day prn for pain control, prescription refilled today. Pt. Advised to avoid all other NSAIDS while on this medication.   - Continue ice of 20 min at a time as needed pain control  - patient starting outpatient PT ([facility]) next week for right retrocalcaneal bursa iontophoresis and progression to calf eccentric stretching program. Recommend balance retraining as well.   -  X-ray images of right foot taken 7/10/20 (AP, lateral, and oblique views) showed baseline DJD at the mid and forefoot.  There is significant spurring on the calcaneus at both the plantar surface and the achilles attachment with associated soft tissue edema posterior to the achilles tendon.     2.  Follow-up upon completion of PT if pain persist or deteriorates    3. Patient agreeable to today's plan and all questions were answered    This note is dictated using the M*Modal Fluency Direct word recognition program. There are word recognition mistakes that are occasionally missed on review.

## 2020-07-29 ENCOUNTER — OFFICE VISIT (OUTPATIENT)
Dept: ORTHOPEDICS | Facility: CLINIC | Age: 80
End: 2020-07-29
Payer: MEDICARE

## 2020-07-29 VITALS
BODY MASS INDEX: 29.45 KG/M2 | SYSTOLIC BLOOD PRESSURE: 110 MMHG | WEIGHT: 156 LBS | HEIGHT: 61 IN | DIASTOLIC BLOOD PRESSURE: 68 MMHG

## 2020-07-29 DIAGNOSIS — M92.61 HAGLUND'S DEFORMITY OF BOTH HEELS: ICD-10-CM

## 2020-07-29 DIAGNOSIS — M92.62 HAGLUND'S DEFORMITY OF BOTH HEELS: ICD-10-CM

## 2020-07-29 DIAGNOSIS — M77.51 RETROCALCANEAL BURSITIS, RIGHT: Primary | ICD-10-CM

## 2020-07-29 PROCEDURE — 99213 OFFICE O/P EST LOW 20 MIN: CPT | Mod: PBBFAC,PO | Performed by: NEUROMUSCULOSKELETAL MEDICINE & OMM

## 2020-07-29 PROCEDURE — 99999 PR PBB SHADOW E&M-EST. PATIENT-LVL III: ICD-10-PCS | Mod: PBBFAC,,, | Performed by: NEUROMUSCULOSKELETAL MEDICINE & OMM

## 2020-07-29 PROCEDURE — 99999 PR PBB SHADOW E&M-EST. PATIENT-LVL III: CPT | Mod: PBBFAC,,, | Performed by: NEUROMUSCULOSKELETAL MEDICINE & OMM

## 2020-07-29 PROCEDURE — 99213 OFFICE O/P EST LOW 20 MIN: CPT | Mod: S$PBB,,, | Performed by: NEUROMUSCULOSKELETAL MEDICINE & OMM

## 2020-07-29 PROCEDURE — 99213 PR OFFICE/OUTPT VISIT, EST, LEVL III, 20-29 MIN: ICD-10-PCS | Mod: S$PBB,,, | Performed by: NEUROMUSCULOSKELETAL MEDICINE & OMM

## 2020-07-29 RX ORDER — MELOXICAM 7.5 MG/1
7.5 TABLET ORAL DAILY
Qty: 30 TABLET | Refills: 0 | Status: SHIPPED | OUTPATIENT
Start: 2020-07-29 | End: 2020-11-06

## 2020-08-03 ENCOUNTER — CLINICAL SUPPORT (OUTPATIENT)
Dept: REHABILITATION | Facility: HOSPITAL | Age: 80
End: 2020-08-03
Attending: NEUROMUSCULOSKELETAL MEDICINE & OMM
Payer: MEDICARE

## 2020-08-03 DIAGNOSIS — M25.674 DECREASED RANGE OF MOTION OF RIGHT FOOT: ICD-10-CM

## 2020-08-03 DIAGNOSIS — M77.51 RETROCALCANEAL BURSITIS, RIGHT: ICD-10-CM

## 2020-08-03 DIAGNOSIS — Z74.09 IMPAIRED FUNCTIONAL MOBILITY, BALANCE, AND ENDURANCE: ICD-10-CM

## 2020-08-03 DIAGNOSIS — R53.1 DECREASED STRENGTH: ICD-10-CM

## 2020-08-03 PROBLEM — M25.676 DECREASED RANGE OF MOTION OF FOOT: Status: ACTIVE | Noted: 2020-08-03

## 2020-08-03 PROCEDURE — 97110 THERAPEUTIC EXERCISES: CPT | Mod: PO

## 2020-08-03 PROCEDURE — 97161 PT EVAL LOW COMPLEX 20 MIN: CPT | Mod: PO

## 2020-08-03 NOTE — PLAN OF CARE
"[facility]  Physical Therapy Initial Evaluation    Date: 8/3/2020   Name: Luz Amaro  Clinic Number: [id number]    Therapy Diagnosis:   Encounter Diagnoses   Name Primary?    Retrocalcaneal bursitis, right     Decreased range of motion of right foot     Decreased strength     Impaired functional mobility, balance, and endurance      Physician: Zhanna Poole DO    Physician Orders: PT eval and treat  Medical Diagnosis from Referral: M77.51 (ICD-10-CM) - Retrocalcaneal bursitis, right  Evaluation Date: 8/3/2020  Authorization Period Expiration: 7/10/2021  Plan of Care Expiration: 11/3/20  Visit # / Visits authorized: 1/ 1    Time In: 12:00pm  Time Out: 12:50  Total Appointment Time (timed & untimed codes): 50 minutes    Precautions: Standard    Subjective   Date of onset: R heel pain started about 3-4 weeks ago.  History of current condition - Luz reports: When it first started, I was trying not to walk on it because it hurt too badly. Went to MD. This happened to L heel, and the pain went away. For L heel, she put ice on it. MD started pt on exercises, put Voltarin on it and it went away. Took a while.  "I'm also concerned about my balance."     Medical History:   Past Medical History:   Diagnosis Date    Amblyopia     Angio-edema     Basal cell carcinoma 2011    left cheek     BCC (basal cell carcinoma) 2010    left neck    Cataract     Hyperlipidemia     Hypertension     Strabismus        Surgical History:   Luz Amaro  has a past surgical history that includes Appendectomy; Ectopic pregnancy surgery; Oophorectomy; Cholecystectomy; and Skin biopsy.    Medications:   Luz has a current medication list which includes the following prescription(s): aspirin, atorvastatin, bupropion, carvedilol, cetirizine, cholecalciferol (vitamin d3), cyclobenzaprine, diazepam, epinephrine, hydralazine, hydrochlorothiazide, meclizine, melatonin, meloxicam, " nifedipine, oxybutynin, and triamcinolone acetonide 0.1%.    Allergies:   Review of patient's allergies indicates:   Allergen Reactions    Ace inhibitors     Arb-angiotensin receptor antagonist     Amlodipine Swelling     Tongue swells        Imaging, CT scan films: FINDINGS:  Three views: There is baseline DJD.  There is spurring on the calcaneus.  No fracture dislocation bone destruction or coalition seen.    Prior Therapy: Yes for Achilles tendonitis. 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   Prior Level of Function: Independent. Feels at risk with stepping over the tub.  DME: none  Bathroom set up: Handle on side of tub.  Current Level of Function: Balance is getting worse. Used to walk with friend 3x/wk and has stopped lately.     Pain:  Current 1/10, worst 9/10, best 1/10   After therapy: 0/10 pain  Location: right heel  Description: Variable, sore. Haglun's deformity.  Aggravating Factors: Walking and Night Time  Easing Factors: relaxation, pain medication and ice    Patients goals: To be completely pain and discomfort free and well-balanced.    Objective     Observation/gait: (standing) Ambulates with toe-out, swing-through gait pattern, no assistive device, independent.  Slight eversion of calcaneus but no overpronation noted. No navicular drop. Sufficient medial longitudinal arch.  Right foot- Significant Sushil deformity or calcaneal spur prominent.    Posture: Slight forward shoulders.        Ankle Active/Passive ROM: (measured in degrees)   Ankle RLE LLE   Dorsiflexion with knee straight  4 degrees  8 degrees   Dorsiflexion with knee bent 2 degrees NT   Plantarflexion  62 degrees 52 degrees      Strength: (graded 1-5 out of 5)    RLE LLE   Hip flexion:  4/5 4+/5   Hip ER: 4+/5 5/5   Hip IR: 4/5 5/5   Knee extension: 5/5 5/5   Ankle DF: 5/5 5/5   Great Toe extension: 4/5 5/5   Posterior fibers of Gluteus medius: 4+/5 4+/5   Ankle IV: 4+/5 5/5    Ankle EV: 5/5 5/5   Knee flexion:  5/5 5/5   Ankle PF 5/5 5/5   Hip extension 4/5 4/5     Special Tests:    RLE LLE   Quintero Test NT NT   Calcaneal Squeeze test - -   Subtalar Neutral - -   Marianne's sign - -   Double leg squat NT NT   Repeated Heel raises - -     Sensation: Intact    Joint Mobility: Slightly soft end-feel/ increased joint play with Anterior drawer test but not sufficient enough to be a concern (anterior talofibular ligament). Increased joint play with talar tilt test (calcaneofibular ligament) in neutral. Both lateral stability ligaments.    Palpation: tenderness and pain over farida's deformity; tenderness noted over gastrocnemius of R foot. Symmetrical tone in both gastrocnemius muscles.    Flexibility:  Test: RLE LLE   90/90 HS length - -   Abdias test - -   Gladis's test - -   Ely's Test- rectus femoris + +     Edema: n/a    FRITZ BALANCE SCALE     1.SITTING TO STANDING:  (4) Pt able to stand without using hands and stabilize independently     2. STANDING UNSUPPORTED: (4) Pt able to stand safely 2 minutes     3. SITTING WITH BACK UNSUPPORTED BUT FEET SUPPORTED ON FLOOR: (4) Pt able to sit safely and securely 2 minutes     4. STANDING TO SITTING:(4) Pt sits safely with minimal use of hands     5. TRANSFERS:(4) Pt  able to transfer safely with minor use of hands    6. STANDING UNSUPPORTED WITH EYES CLOSED:(4) Pt  able to stand 10 seconds safely    7. STANDING UNSUPPORTED WITH FEET TOGETHER:(4) Pt   able to place feet together independently and stand 1 minute safely    8. REACHING FORWARD WITH OUTSTRETCHED ARM WHILE STANDING:(3) Pt can reach forward >12.5 cm safely (5 inches)    9.  OBJECT FROM THE FLOOR FROM A STANDING POSITION:(3) Pt able to  slipper but needs supervision    10. TURNING TO LOOK BEHIND OVER LEFT AND RIGHT SHOULDERS WHILE STANDING:(3) Pt looks behind one side only other side shows less weight shift    11. TURN 360 DEGREES:(2) Pt able to turn 360 degrees safely but  slowly    12. PLACING ALTERNATE FOOT ON STEP OR STOOL WHILE STANDING UNSUPPORTED:(1) Pt able to complete >2 steps needs minimal assist    13. STANDING UNSUPPORTED ONE FOOT IN FRONT: (0) Pt loses balance while stepping or standing     14. STANDING ON ONE LEG:(2)  Pt able to lift leg independently and hold = or >3 seconds    Total Score 42/56      41-56 = low fall risk  21-40 = medium fall risk  0 -20 = high fall risk        Test Sensitivity/specificity (%) Right Left   Bump test (stress fx, distal calaneus)  -    Metatarsal load test, longitudinal load)  NT    Anterior drawer, ATF laxity, translate calcaneus/talus anterior on tibia/fib 86/88 -    Talar tilt- laxity of ATF (PF), CF (neutral), PTF (DF)- varus stress  Slight laxity noted in neutral (calcaneofibular)    Syndesmotic squeeze test (prox to dist)  NT    Cotton (shunk) test- Lat/med movement of calc. (High ankle sprain)  NT    ER Stress test (ER in PF, deltoid + ER in DF, syndesmosis) Spec: 95 NT    Impingement sign (talocrural, pressure into DF from tibia as foot is brought into DF) 95/88 NT    Quintero test- Achilles (passively flex knee, squeeze middle 1/3 of calf 96/93 NT    Matles test- prone, flexed 90, angle of dangle 88/85 NT    Peroneal Tendon dislocation- prone, DF/PF against resist, look for dislocation behind lat mall.  NT    Windlass test- plantar fasciitis, NWB- DF big toe, WB- DF big toe (pain along medial long. Arch)    Spec: 100 -    Lopez Block test- stand on edge of block, assess posterior tib tightness  NT    Marsh test- squeeze tranverse arch  NT    Tinel test- Tap over posterior tibial nerve (inf/post to med.mall) 58 -    Homans sign (thrombophlebiti)s 48/41 -            Limitation/Restriction for FOTO Foot/ankle Survey    Therapist reviewed FOTO scores for Luz Amaro on 8/3/2020.   FOTO documents entered into [employer] - see Media section.    Limitation Score: 67%         TREATMENT   Treatment Time In: 1245  Treatment Time  Out: 1250  Total Treatment time (time-based codes) separate from Evaluation: 8 minutes    Luz received therapeutic exercises to develop strength, endurance, ROM and flexibility for 5 minutes including:  · Heel raises (focus on eccentric control) x 10 reps (progression: at edge of step, when ready)  · Heel cord stretch (gastroc and soleus) in standing (via lunge) x 10 second hold for each  · Side-lying hip abduction with orange band x 10 reps    Next session:  · Bike x 5 mins to start to promote good blood flow, active range of motion in both ankles  · Heel raises, gastroc and soleus stretch, side-lying abduction  · Rectus femoris stretch (prone or otherwise), IT band stretch, 4-ways with orange band (inversion, eversion, dorsiflexion, plantarflexion), balance pad (with eyes open, eyes closed- depending on patient), reverse clam shells (working internal rotators with orange band)  · Avoid overstretching the Achilles tendon.      Home Exercises and Patient Education Provided    Education provided:   -Maintaining home exercise program  -Icing for 20 mins at the end of the day    Written Home Exercises Provided: yes.  Exercises were reviewed and Luz was able to demonstrate them prior to the end of the session.  Luz demonstrated good  understanding of the education provided.     See EMR under Patient Instructions for exercises provided 8/3/2020.    Assessment   Luz is a 79 y.o. female referred to outpatient Physical Therapy with a medical diagnosis of retrocalcaneal bursitis. Patient presents with impaired rectus femoris length bilaterally (+Ely's test), impaired R hip strength (internal, external rotation, extension, abduction), impaired active range of motion into dorsiflexion on both feet (right more so than left), slightly increased joint play of subtalar joint (thus slight laxity) and complaints of pain, limiting functional mobility and activities such as walking with her friend 3x/wk and nervousness  regarding her balance. She will benefit from continued PT services, focusing on improving her lower extremity strength, core strength, overall balance/proprioception and flexibility to return to her every day activities with confidence.    Patient prognosis is Good.   Patientt will benefit from skilled outpatient Physical Therapy to address the deficits stated above and in the chart below, provide patient /family education, and to maximize patientt's level of independence.     Plan of care discussed with patient: Yes  Patient's spiritual, cultural and educational needs considered and patient is agreeable to the plan of care and goals as stated below:     Anticipated Barriers for therapy: none  2  Medical Necessity is demonstrated by the following  History  Co-morbidities and personal factors that may impact the plan of care Co-morbidities:   advanced age    Personal Factors:   age     low   Examination  Body Structures and Functions, activity limitations and participation restrictions that may impact the plan of care Body Regions:   lower extremities    Body Systems:    ROM  strength  balance    Participation Restrictions:   Decreased ability to walk with friend, Impaired balance    Activity limitations:   Learning and applying knowledge  no deficits    General Tasks and Commands  no deficits    Communication  no deficits    Mobility  no deficits    Self care  no deficits    Domestic Life  no deficits    Interactions/Relationships  no deficits    Life Areas  no deficits    Community and Social Life  no deficits         high   Clinical Presentation evolving clinical presentation with changing clinical characteristics low   Decision Making/ Complexity Score: low     Goals:  Short Term Goals: 4 weeks - 9/7  Pt will increase her R foot dorsiflexion from 4 degrees to 8 degrees, symmetrical with L foot, to promote better gait mechanics and functional mobility.  Pt will present with a negative Ely test to demonstrate  sufficient flexibility in her rectus femoris, promoting better gait mechanics and posture.  Pt will improve her Ge Balance score from 42 to 47 (Minimal detectable change is 4.9) to denote improvement in her balance and a lower fall risk.  Pt will improve her FOTO foot/ankle score from 67% to 77%, denoting progress functionally since evaluation.  Pt will endorse 1/10 pain continually for 1 week to denote progress in treatment and the ability to increase her activity level.    Long Term Goals: 10 weeks - 10/5  Pt will be able to ambulate 1 mile (5280 feet) with minimal difficulty, allowing her to return to taking walks with her friend 3x/wk  Pt will improve both feet dorsiflexion from 8 degrees to 10 degrees, allowing for better gait mechanics and functional mobility.  Pt will improve her Ge balnace score to 52, denoting an improvement in balance and a lower fall risk.  Pt will improve her FOTO foot/ankle score to 87 %, denoting progress functionally since evaluation, promoting more functional activity.  Pt will demonstrate a negative SANDY's test to denote flexibility in her Tensor Fascia Latae and Iliotibial band.  Pt will endorse 0/10 pain continually for 1 week to denote the ability to manage her own care at home, returning to prior level of function.      Plan   Plan of care Certification: 8/3/2020 to 11/3/20.    Outpatient Physical Therapy 2 times weekly for 10 weeks to include the following interventions: Gait Training, Manual Therapy, Moist Heat/ Ice, Neuromuscular Re-ed, Patient Education, Therapeutic Activites and Therapeutic Exercise.     Chiquis Amaya, PT

## 2020-08-06 NOTE — PROGRESS NOTES
Physical Therapy Daily Treatment Note     Name: Luz Jo [facility] Number: [id number]    Therapy Diagnosis:   Encounter Diagnoses   Name Primary?    Decreased range of motion of right foot     Decreased strength     Impaired functional mobility, balance, and endurance      Physician: Zhanna Poole DO    Visit Date: 8/7/2020    Physician Orders: PT eval and treat  Medical Diagnosis from Referral: M77.51 (ICD-10-CM) - Retrocalcaneal bursitis, right  Evaluation Date: 8/3/2020  Authorization Period Expiration: 8/3/2021  Plan of Care Expiration: 11/3/20  Visit # / Visits authorized: 1/ pending (2 visits total)      Time In: 12:30 pm   Time Out: 1:15 pm   Total Billable Time: 45 minutes      Precautions: Standard    Subjective     Pt reports: she feels an ever so slight twinge, but she does not have any real pain anymore.   She was compliant with home exercise program.  Response to previous treatment: no adverse effects   Functional change: none    Pain: 0/10  Location: Right heel     Objective     Luz received therapeutic exercises to develop strength, endurance, ROM and flexibility for 45 minutes including:    · Recumbent bike: x 5 mins to start to promote good blood flow, active range of motion in both ankles  · Heel raises (focus on eccentric control): 3 x 5 reps    · Heel cord stretch (gastroc and soleus) in standing (via lunge): 4 x 10 second hold for each  · Side-lying hip abduction with orange band x 10 reps  · 4-ways with orange band (inversion, eversion, dorsiflexion, plantarflexion): x 10 ea way  ·  Reverse clam shells: x 10 reps (try with band next if able)     Try to perform at next session :   · Rectus femoris stretch (prone or otherwise)  · IT band stretch  · balance pad (with eyes open, eyes closed- depending on patient)    Home Exercises Provided and Patient Education Provided     Education provided:   - Continue previous HEP and addition of new HEP provided today (added bety  reverse clamshells, and ankle thera-band 4-way).    Written Home Exercises Provided: yes.  Exercises were reviewed and Luz was able to demonstrate them prior to the end of the session.  Luz demonstrated good  understanding of the education provided.     See EMR under Patient Instructions for exercises provided 8/7/2020.    Assessment     Pt presents for first session following initial evaluation . She presents with no pain and a good response to her HEP sofar. She performed addition of recumbent bike, ankle 4-way , clamshells and reverse clamshells which she tolerated well. She exhibited significant muscular tightness in her R achilles and soleus and benefits from stretches performed. She demonstrated poor endurance overall and was only able to complete low repetitions and required frequent rest breaks. Will continue to progress stretching and strengthening next as tolerated.   Luz is progressing well towards her goals.   Pt prognosis is Good.     Pt will continue to benefit from skilled outpatient physical therapy to address the deficits listed in the problem list box on initial evaluation, provide pt/family education and to maximize pt's level of independence in the home and community environment.   Pt's spiritual, cultural and educational needs considered and pt agreeable to plan of care and goals.    Anticipated barriers to physical therapy: none    Goals:  Short Term Goals: 4 weeks - 9/7  Pt will increase her R foot dorsiflexion from 4 degrees to 8 degrees, symmetrical with L foot, to promote better gait mechanics and functional mobility.  Pt will present with a negative Ely test to demonstrate sufficient flexibility in her rectus femoris, promoting better gait mechanics and posture.  Pt will improve her Ge Balance score from 42 to 47 (Minimal detectable change is 4.9) to denote improvement in her balance and a lower fall risk.  Pt will improve her FOTO foot/ankle score from 67% to 77%, denoting  progress functionally since evaluation.  Pt will endorse 1/10 pain continually for 1 week to denote progress in treatment and the ability to increase her activity level.     Long Term Goals: 10 weeks - 10/5  Pt will be able to ambulate 1 mile (5280 feet) with minimal difficulty, allowing her to return to taking walks with her friend 3x/wk  Pt will improve both feet dorsiflexion from 8 degrees to 10 degrees, allowing for better gait mechanics and functional mobility.  Pt will improve her Ge balnace score to 52, denoting an improvement in balance and a lower fall risk.  Pt will improve her FOTO foot/ankle score to 87 %, denoting progress functionally since evaluation, promoting more functional activity.  Pt will demonstrate a negative SANDY's test to denote flexibility in her Tensor Fascia Latae and Iliotibial band.  Pt will endorse 0/10 pain continually for 1 week to denote the ability to manage her own care at home, returning to prior level of function.    Plan     Continue to progress ankle mobility and strengthening next .     Ronel Miranda, PTA

## 2020-08-07 ENCOUNTER — CLINICAL SUPPORT (OUTPATIENT)
Dept: REHABILITATION | Facility: HOSPITAL | Age: 80
End: 2020-08-07
Attending: NEUROMUSCULOSKELETAL MEDICINE & OMM
Payer: MEDICARE

## 2020-08-07 DIAGNOSIS — Z74.09 IMPAIRED FUNCTIONAL MOBILITY, BALANCE, AND ENDURANCE: ICD-10-CM

## 2020-08-07 DIAGNOSIS — M25.674 DECREASED RANGE OF MOTION OF RIGHT FOOT: ICD-10-CM

## 2020-08-07 DIAGNOSIS — R53.1 DECREASED STRENGTH: ICD-10-CM

## 2020-08-07 PROCEDURE — 97110 THERAPEUTIC EXERCISES: CPT | Mod: PO,CQ

## 2020-08-10 ENCOUNTER — CLINICAL SUPPORT (OUTPATIENT)
Dept: REHABILITATION | Facility: HOSPITAL | Age: 80
End: 2020-08-10
Attending: NEUROMUSCULOSKELETAL MEDICINE & OMM
Payer: MEDICARE

## 2020-08-10 DIAGNOSIS — Z74.09 IMPAIRED FUNCTIONAL MOBILITY, BALANCE, AND ENDURANCE: ICD-10-CM

## 2020-08-10 DIAGNOSIS — R53.1 DECREASED STRENGTH: ICD-10-CM

## 2020-08-10 DIAGNOSIS — M25.674 DECREASED RANGE OF MOTION OF RIGHT FOOT: ICD-10-CM

## 2020-08-10 PROCEDURE — 97110 THERAPEUTIC EXERCISES: CPT | Mod: PO,CQ

## 2020-08-10 NOTE — PROGRESS NOTES
Physical Therapy Daily Treatment Note     Name: Luz Jo [facility] [place]  Clinic Number: [id number]    Therapy Diagnosis:   Encounter Diagnoses   Name Primary?    Decreased range of motion of right foot     Decreased strength     Impaired functional mobility, balance, and endurance      Physician: Zhanna Poole DO    Visit Date: 8/10/2020    Physician Orders: PT eval and treat  Medical Diagnosis from Referral: M77.51 (ICD-10-CM) - Retrocalcaneal bursitis, right  Evaluation Date: 8/3/2020  Authorization Period Expiration: 8/3/2021  Plan of Care Expiration: 11/3/20  Visit # / Visits authorized: 2/ pending (3 visits total)      Time In: 12:45 PM  Time Out: 1:30 PM  Total Billable Time: 45 min     Precautions: Standard    Subjective     Pt reports: she has a stationary bike at home that she has been using. She also stated that she has been doing her exercises every morning before getting out of bed.   She was compliant with home exercise program.  Response to previous treatment: no adverse effects   Functional change: none    Pain: 0/10  Location: n/a    Objective     Luz received therapeutic exercises to develop strength, endurance, ROM and flexibility for 45 minutes including:    · Recumbent bike: x 5 mins to start to promote good blood flow, active range of motion in both ankles  · Heel raises (focus on eccentric control): 3 x 5 reps    · Heel cord stretch (gastroc and soleus) in standing (via lunge): 4 x 10 second hold for each  · Side-lying clamshells with orange band x 10 reps  · 4-ways with orange band (inversion, eversion, dorsiflexion, plantarflexion): x 10 ea way  ·  Reverse clam shells: x 10 reps (try with band next if able)   · Hip flexor stretch with LE off mat: [id number]  · Standing on balance pad eyes open: [id number]    Try to perform at next session :   · IT band stretch      Home Exercises Provided and Patient Education Provided     Education provided:   - Continue previous HEP, exercise technique.      Written Home Exercises Provided: yes.  Exercises were reviewed and Luz was able to demonstrate them prior to the end of the session.  Luz demonstrated good  understanding of the education provided.     See EMR under Patient Instructions for exercises provided 8/7/2020.    Assessment     Patient tolerated treatment well with no adverse effects. Patient required cueing and demonstration for proper technique during stretching and exercises.  Patient was able to tolerate increased activity this session and is improving with balance and strength.     Luz is progressing well towards her goals.   Pt prognosis is Good.     Pt will continue to benefit from skilled outpatient physical therapy to address the deficits listed in the problem list box on initial evaluation, provide pt/family education and to maximize pt's level of independence in the home and community environment.   Pt's spiritual, cultural and educational needs considered and pt agreeable to plan of care and goals.    Anticipated barriers to physical therapy: none    Goals:  Short Term Goals: 4 weeks - 9/7  Pt will increase her R foot dorsiflexion from 4 degrees to 8 degrees, symmetrical with L foot, to promote better gait mechanics and functional mobility.  Pt will present with a negative Ely test to demonstrate sufficient flexibility in her rectus femoris, promoting better gait mechanics and posture.  Pt will improve her Ge Balance score from 42 to 47 (Minimal detectable change is 4.9) to denote improvement in her balance and a lower fall risk.  Pt will improve her FOTO foot/ankle score from 67% to 77%, denoting progress functionally since evaluation.  Pt will endorse 1/10 pain continually for 1 week to denote progress in treatment and the ability to increase her activity level.     Long Term Goals: 10 weeks - 10/5  Pt will be able to ambulate 1 mile (5280 feet) with minimal difficulty, allowing her to return to taking walks with her friend  3x/wk  Pt will improve both feet dorsiflexion from 8 degrees to 10 degrees, allowing for better gait mechanics and functional mobility.  Pt will improve her Ge balnace score to 52, denoting an improvement in balance and a lower fall risk.  Pt will improve her FOTO foot/ankle score to 87 %, denoting progress functionally since evaluation, promoting more functional activity.  Pt will demonstrate a negative SANDY's test to denote flexibility in her Tensor Fascia Latae and Iliotibial band.  Pt will endorse 0/10 pain continually for 1 week to denote the ability to manage her own care at home, returning to prior level of function.    Plan     Continue PT POC to progress ankle mobility and strengthening next .     Vickie Cardenas, PTA

## 2020-08-13 NOTE — PROGRESS NOTES
"  Physical Therapy Daily Treatment Note     [place]: [place] [facility] [place]  [place] Number: [id number]    Therapy Diagnosis:   Encounter Diagnoses   Name Primary?    Decreased range of motion of right foot     Decreased strength     Impaired functional mobility, balance, and endurance      Physician: Zhanna Poole DO    Visit Date: 8/17/2020    Physician Orders: PT eval and treat  Medical Diagnosis from Referral: M77.51 (ICD-10-CM) - Retrocalcaneal bursitis, right  Evaluation Date: 8/3/2020  Authorization Period Expiration: 8/3/2021  Plan of Care Expiration: 11/3/20  Visit # / Visits authorized: 4/ pending 20      Time In: 2:50pm- 3:45pm  Total Billable Time: 55 min   1 MT, 3 TE    Precautions: Standard    Subjective     Pt reports: "I have no discomfort at all." "I struggled with the pain about 2 weeks before coming here." "I haven't felt pain for at least a week."  She was compliant with home exercise program.  Response to previous treatment: no adverse effects   Functional change: No pain    Pain: 0/10  After therapy: 0/10  Location: R heel      Objective     Luz received therapeutic exercises to develop strength, endurance, ROM and flexibility for 45 minutes including:    · Recumbent bike: x 5 mins, L2, to start to promote good blood flow, active range of motion in both ankles  · Standing on balance pad eyes open (feet together): 60 seconds  · Standing on balance pad, eyes closed (feet together): 20 seconds x 3 trials  · Heel raises (focus on eccentric control): 3 x 10 reps    · Standing hip extenesion (glute max) x 10 reps  · Heel cord stretch (gastroc and soleus) in standing (via lunge): 6 x 10 second hold for each    · Side-lying clamshells with orange band x 20 reps (hip ER)  · Reverse clam shells with orange band: x 10 reps (hip IR)  · 4-ways with orange band (inversion, eversion, dorsiflexion, plantarflexion): x 30 ea way  · Hip flexor stretch with LE off mat: [id number]  · Side-lying IT band stretch " 3x30 sec hold    Active dorsiflexion (knee extended) RLE: +17 degrees (goal met)  Positive Ely's test (LLE)    Manual therapy: 10 mins to RLE -- soft tissue massage to gastroc/soleus complex, mobilization of Achilles tendon, passive dorsiflexion, soft tissue massage to plantar aspect of foot. No reports of pain.    Home Exercises Provided and Patient Education Provided     Education provided:   - Continue previous HEP, exercise technique.     Written Home Exercises Provided: yes.  Exercises were reviewed and Luz was able to demonstrate them prior to the end of the session.  Luz demonstrated good  understanding of the education provided.     See EMR under Patient Instructions for exercises provided 8/7/2020.    Assessment   Pt continues to report no pain upon entrance to PT clinic and met two goals noted below. She is responding positively to therapy treatment and/or her home exercise program. Will continue to benefit from therapy services to show functional gains via goals met below combined with no pain reported with/without activity.     Luz is progressing well towards her goals.   Pt prognosis is Good.     Pt will continue to benefit from skilled outpatient physical therapy to address the deficits listed in the problem list box on initial evaluation, provide pt/family education and to maximize pt's level of independence in the home and community environment.   Pt's spiritual, cultural and educational needs considered and pt agreeable to plan of care and goals.    Anticipated barriers to physical therapy: none    Goals:  Short Term Goals: 4 weeks - 9/7  Pt will increase her R foot dorsiflexion from 4 degrees to 8 degrees, symmetrical with L foot, to promote better gait mechanics and functional mobility. Met (8/17/2020)  Pt will present with a bilateral negative Ely test to demonstrate sufficient flexibility in her rectus femoris, promoting better gait mechanics and posture.  Pt will improve her Ge  Balance score from 42 to 47 (Minimal detectable change is 4.9) to denote improvement in her balance and a lower fall risk.  Pt will improve her FOTO foot/ankle score from 67% to 77%, denoting progress functionally since evaluation.  Pt will endorse 1/10 pain continually for 1 week to denote progress in treatment and the ability to increase her activity level. Met (8/17/2020)     Long Term Goals: 10 weeks - 10/5  Pt will be able to ambulate 1 mile (5280 feet) with minimal difficulty, allowing her to return to taking walks with her friend 3x/wk  Pt will improve both feet dorsiflexion from 8 degrees to 10 degrees, allowing for better gait mechanics and functional mobility.  Pt will improve her Ge balnace score to 52, denoting an improvement in balance and a lower fall risk.  Pt will improve her FOTO foot/ankle score to 87 %, denoting progress functionally since evaluation, promoting more functional activity.  Pt will demonstrate a negative SANDY's test to denote flexibility in her Tensor Fascia Latae and Iliotibial band.  Pt will endorse 0/10 pain continually for 1 week to denote the ability to manage her own care at home, returning to prior level of function.    Plan     Plan of care Certification: 8/3/2020 to 11/3/20.     Outpatient Physical Therapy 2 times weekly for 10 weeks to include the following interventions: Gait Training, Manual Therapy, Moist Heat/ Ice, Neuromuscular Re-ed, Patient Education, Therapeutic Activites and Therapeutic Exercise.     Chiquis Amaya, PT

## 2020-08-17 ENCOUNTER — CLINICAL SUPPORT (OUTPATIENT)
Dept: REHABILITATION | Facility: HOSPITAL | Age: 80
End: 2020-08-17
Attending: NEUROMUSCULOSKELETAL MEDICINE & OMM
Payer: MEDICARE

## 2020-08-17 DIAGNOSIS — Z74.09 IMPAIRED FUNCTIONAL MOBILITY, BALANCE, AND ENDURANCE: ICD-10-CM

## 2020-08-17 DIAGNOSIS — M25.674 DECREASED RANGE OF MOTION OF RIGHT FOOT: ICD-10-CM

## 2020-08-17 DIAGNOSIS — R53.1 DECREASED STRENGTH: ICD-10-CM

## 2020-08-17 PROCEDURE — 97140 MANUAL THERAPY 1/> REGIONS: CPT | Mod: PO

## 2020-08-17 PROCEDURE — 97110 THERAPEUTIC EXERCISES: CPT | Mod: PO

## 2020-08-18 NOTE — PROGRESS NOTES
Physical Therapy Daily Treatment Note     Name: Luz Jo [facility] [place]  Clinic Number: [id number]    Therapy Diagnosis:   Encounter Diagnoses   Name Primary?    Decreased range of motion of right foot     Decreased strength     Impaired functional mobility, balance, and endurance      Physician: Zhanna Poole DO    Visit Date: 8/19/2020    Physician Orders: PT eval and treat  Medical Diagnosis from Referral: M77.51 (ICD-10-CM) - Retrocalcaneal bursitis, right  Evaluation Date: 8/3/2020  Authorization Period Expiration: 8/3/2021  Plan of Care Expiration: 11/3/20  Visit # / Visits authorized: 5/ pending 20      Time In: 12:30 pm   Time Out: 1:15 pm  Total Billable Time: 45 minutes     Precautions: Standard    Subjective     Pt reports: she really does not have any pain anymore and has been doing well.   She was compliant with home exercise program.  Response to previous treatment: no adverse effects   Functional change: No pain    Pain: 0/10  After therapy: 0/10  Location: R heel      Objective     Luz received therapeutic exercises to develop strength, endurance, ROM and flexibility for 45 minutes including:    · Recumbent bike: x 5 mins, L2, to start to promote good blood flow, active range of motion in both ankles  · Standing on balance pad eyes open (feet together): 60 seconds  · Standing on balance pad, eyes closed (feet together): 20 seconds x 3 trials  · Heel raises (focus on eccentric control): 3 x 10 reps    · Standing hip extenesion (glute max) x 10 reps  · Heel cord stretch (gastroc and soleus) in standing (via lunge): 6 x 10 second hold for each (used slant board today at L3)    · Side-lying clamshells with orange band x 20 reps (hip ER)  · Reverse clam shells with orange band: x 10 reps (hip IR)  · 4-ways with orange band (inversion, eversion, dorsiflexion, plantarflexion): x 30 ea way  · Hip flexor stretch with LE off mat: [id number]  · Side-lying IT band stretch 3x30 sec hold      Home Exercises  Provided and Patient Education Provided     Education provided:   - Continue previous HEP, exercise technique.     Written Home Exercises Provided: yes.  Exercises were reviewed and Luz was able to demonstrate them prior to the end of the session.  Luz demonstrated good  understanding of the education provided.     See EMR under Patient Instructions for exercises provided 8/7/2020.    Assessment     Pt presents with no pain and reports that she has not suffered with any discomfort lately . She exhibited improved endurance and mobility , especially in dorsiflexion. Pt was able to complete session with no adverse effects . Will continue to progress next as tolerated.     Luz is progressing well towards her goals.   Pt prognosis is Good.     Pt will continue to benefit from skilled outpatient physical therapy to address the deficits listed in the problem list box on initial evaluation, provide pt/family education and to maximize pt's level of independence in the home and community environment.   Pt's spiritual, cultural and educational needs considered and pt agreeable to plan of care and goals.    Anticipated barriers to physical therapy: none    Goals:  Short Term Goals: 4 weeks - 9/7  Pt will increase her R foot dorsiflexion from 4 degrees to 8 degrees, symmetrical with L foot, to promote better gait mechanics and functional mobility. Met (8/19/2020)  Pt will present with a bilateral negative Ely test to demonstrate sufficient flexibility in her rectus femoris, promoting better gait mechanics and posture.  Pt will improve her Ge Balance score from 42 to 47 (Minimal detectable change is 4.9) to denote improvement in her balance and a lower fall risk.  Pt will improve her FOTO foot/ankle score from 67% to 77%, denoting progress functionally since evaluation.  Pt will endorse 1/10 pain continually for 1 week to denote progress in treatment and the ability to increase her activity level. Met  (8/19/2020)     Long Term Goals: 10 weeks - 10/5  Pt will be able to ambulate 1 mile (5280 feet) with minimal difficulty, allowing her to return to taking walks with her friend 3x/wk  Pt will improve both feet dorsiflexion from 8 degrees to 10 degrees, allowing for better gait mechanics and functional mobility.  Pt will improve her Ge balnace score to 52, denoting an improvement in balance and a lower fall risk.  Pt will improve her FOTO foot/ankle score to 87 %, denoting progress functionally since evaluation, promoting more functional activity.  Pt will demonstrate a negative SANDY's test to denote flexibility in her Tensor Fascia Latae and Iliotibial band.  Pt will endorse 0/10 pain continually for 1 week to denote the ability to manage her own care at home, returning to prior level of function.    Plan     Plan of care Certification: 8/3/2020 to 11/3/20.     Outpatient Physical Therapy 2 times weekly for 10 weeks to include the following interventions: Gait Training, Manual Therapy, Moist Heat/ Ice, Neuromuscular Re-ed, Patient Education, Therapeutic Activites and Therapeutic Exercise.     Ronel Miranda, PTA

## 2020-08-19 ENCOUNTER — CLINICAL SUPPORT (OUTPATIENT)
Dept: REHABILITATION | Facility: HOSPITAL | Age: 80
End: 2020-08-19
Attending: NEUROMUSCULOSKELETAL MEDICINE & OMM
Payer: MEDICARE

## 2020-08-19 DIAGNOSIS — R53.1 DECREASED STRENGTH: ICD-10-CM

## 2020-08-19 DIAGNOSIS — M25.674 DECREASED RANGE OF MOTION OF RIGHT FOOT: ICD-10-CM

## 2020-08-19 DIAGNOSIS — Z74.09 IMPAIRED FUNCTIONAL MOBILITY, BALANCE, AND ENDURANCE: ICD-10-CM

## 2020-08-19 PROCEDURE — 97110 THERAPEUTIC EXERCISES: CPT | Mod: PO,CQ

## 2020-08-25 NOTE — PROGRESS NOTES
Physical Therapy Daily PROGRESS* Note     Name: Luz Jo [facility] Number: [id number]    Therapy Diagnosis:   Encounter Diagnoses   Name Primary?    Decreased range of motion of right foot     Decreased strength     Impaired functional mobility, balance, and endurance      Physician: Zhanna Poole DO    Visit Date: 8/27/2020    Physician Orders: PT eval and treat  Medical Diagnosis from Referral: M77.51 (ICD-10-CM) - Retrocalcaneal bursitis, right  Evaluation Date: 8/3/2020  Authorization Period Expiration: 8/3/2021  Plan of Care Expiration: 11/3/20  Visit # / Visits authorized: 6/ 20      Time In: 11:15am   Time Out:12:00 pm  Total Billable Time: 45 minutes     Precautions: Standard    Subjective     Pt reports: Pt feels as though she does not need to come any more due to complete pain relief.   She was compliant with home exercise program.  Response to previous treatment: no adverse effects   Functional change: No pain    Pain: 0/10  After therapy: 0/10  Location: R heel      Objective     Luz received therapeutic exercises to develop strength, endurance, ROM and flexibility for 45 minutes including:    · Treadmill: x 10 mins, no pain (Speed-- 0.7mph) to start to promote good blood flow, active range of motion in both ankles  · Heel cord stretch (gastroc and soleus) in standing (via lunge): 6 x 10 second hold for each (used slant board today at L3)    Gladis test RLE: negative  Ely's test RLE: negative  DF- 11 degrees    Ge Balance Scale:    TOTAL SCORE 53/56    Interpretation:  41-56 = low fall risk  21-40 = medium fall risk  0 -20 = high fall risk        Home Exercises Provided and Patient Education Provided     Education provided:   - Continue previous HEP, exercise technique.     Written Home Exercises Provided: yes.  Exercises were reviewed and Luz was able to demonstrate them prior to the end of the session.  Luz demonstrated good  understanding of the education provided.      See EMR under Patient Instructions for exercises provided 8/7/2020.    Assessment   Pt had no pain today and is agreeable to next session being her last. She met nearly all of her short and long-term goals except for three (2 FOTO goals, 1 gait goal). Will attempt the gait (1/2 mile) goal next session.    Luz is progressing well towards her goals.   Pt prognosis is Good.     Pt will continue to benefit from skilled outpatient physical therapy to address the deficits listed in the problem list box on initial evaluation, provide pt/family education and to maximize pt's level of independence in the home and community environment.   Pt's spiritual, cultural and educational needs considered and pt agreeable to plan of care and goals.    Anticipated barriers to physical therapy: none    Goals:  Short Term Goals: 4 weeks - 9/7  Pt will increase her R foot dorsiflexion from 4 degrees to 8 degrees, symmetrical with L foot, to promote better gait mechanics and functional mobility. Met (8/27/2020)  Pt will present with a bilateral negative Ely test to demonstrate sufficient flexibility in her rectus femoris, promoting better gait mechanics and posture. Met (8/27/2020)  Pt will improve her Ge Balance score from 42 to 47 (Minimal detectable change is 4.9) to denote improvement in her balance and a lower fall risk. Met (8/27/2020)  Pt will improve her FOTO foot/ankle score from 67% to 77%, denoting progress functionally since evaluation. Not met  Pt will endorse 1/10 pain continually for 1 week to denote progress in treatment and the ability to increase her activity level. Met (8/27/2020)     Long Term Goals: 10 weeks - 10/5  Pt will be able to ambulate 1/2 mile with minimal difficulty, allowing her to return to taking walks with her friend 3x/wk. Not met- not attempted.  Pt will improve both feet dorsiflexion from 8 degrees to 10 degrees, allowing for better gait mechanics and functional mobility. Met (8/27/2020)  Pt  will improve her Ge balance score to 52, denoting an improvement in balance and a lower fall risk. Met (8/27/2020)  Pt will improve her FOTO foot/ankle score to 87 %, denoting progress functionally since evaluation, promoting more functional activity.  Pt will demonstrate a negative SANDY's test to denote flexibility in her Tensor Fascia Latae and Iliotibial band. Met (8/27/2020)  Pt will endorse 0/10 pain continually for 1 week to denote the ability to manage her own care at home, returning to prior level of function. Met (8/27)    Plan     Plan of care Certification: 8/3/2020 to 11/3/20.     Outpatient Physical Therapy 2 times weekly for 10 weeks to include the following interventions: Gait Training, Manual Therapy, Moist Heat/ Ice, Neuromuscular Re-ed, Patient Education, Therapeutic Activites and Therapeutic Exercise.     Chiquis Amaya, PT

## 2020-08-27 ENCOUNTER — CLINICAL SUPPORT (OUTPATIENT)
Dept: REHABILITATION | Facility: HOSPITAL | Age: 80
End: 2020-08-27
Attending: NEUROMUSCULOSKELETAL MEDICINE & OMM
Payer: MEDICARE

## 2020-08-27 DIAGNOSIS — Z74.09 IMPAIRED FUNCTIONAL MOBILITY, BALANCE, AND ENDURANCE: ICD-10-CM

## 2020-08-27 DIAGNOSIS — M25.674 DECREASED RANGE OF MOTION OF RIGHT FOOT: ICD-10-CM

## 2020-08-27 DIAGNOSIS — R53.1 DECREASED STRENGTH: ICD-10-CM

## 2020-08-27 PROCEDURE — 97150 GROUP THERAPEUTIC PROCEDURES: CPT | Mod: PO

## 2020-08-27 PROCEDURE — 97140 MANUAL THERAPY 1/> REGIONS: CPT | Mod: PO

## 2020-08-27 PROCEDURE — 97110 THERAPEUTIC EXERCISES: CPT | Mod: PO

## 2020-08-27 NOTE — PLAN OF CARE
Testing performed today:  Gladis test Right lower extremity (RLE): negative  Ely's test RLE: negative  Dorsiflexion of R foot: 11 degrees    Ge Balance Scale:    SITTING TO STANDING 4  INSTRUCTIONS: Please stand up. Try not to use your hand for support.  4 able to stand without using hands and stabilize independently  3 able to stand independently using hands  2 able to stand using hands after several tries  1 needs minimal aid to stand or stabilize  0 needs moderate or maximal assist to stand    STANDING UNSUPPORTED 4  INSTRUCTIONS: Please stand for two minutes without holding on.  4 able to stand safely for 2 minutes  3 able to stand 2 minutes with supervision  2 able to stand 30 seconds unsupported  1 needs several tries to stand 30 seconds unsupported  0 unable to stand 30 seconds unsupported    SITTING WITH BACK UNSUPPORTED BUT FEET SUPPORTED ON FLOOR OR ON A STOOL 4  INSTRUCTIONS: Please sit with arms folded for 2 minutes.  4 able to sit safely and securely for 2 minutes  3 able to sit 2 minutes under supervision  2 able to able to sit 30 seconds  1 able to sit 10 seconds  0 unable to sit without support 10 seconds    STANDING TO SITTING 4  INSTRUCTIONS: Please sit down.  4 sits safely with minimal use of hands  3 controls descent by using hands  2 uses back of legs against chair to control descent  1 sits independently but has uncontrolled descent  0 needs assist to sit    TRANSFERS 4  INSTRUCTIONS: Arrange chair(s) for pivot transfer. Ask subject to transfer one way toward a seat with armrests and one way  toward a seat without armrests. You may use two chairs (one with and one without armrests) or a bed and a chair.  4 able to transfer safely with minor use of hands  3 able to transfer safely definite need of hands  2 able to transfer with verbal cuing and/or supervision  1 needs one person to assist  0 needs two people to assist or supervise to be saf    STANDING UNSUPPORTED WITH EYES CLOSED  4  INSTRUCTIONS: Please close your eyes and stand still for 10 seconds.  4 able to stand 10 seconds safely  3 able to stand 10 seconds with supervision  2 able to stand 3 seconds  1 unable to keep eyes closed 3 seconds but stays safely  0 needs help to keep from falling    STANDING UNSUPPORTED WITH FEET TOGETHER 4  INSTRUCTIONS: Place your feet together and stand without holding on.  4 able to place feet together independently and stand 1 minute safely  3 able to place feet together independently and stand 1 minute with supervision  2 able to place feet together independently but unable to hold for 30 seconds  1 needs help to attain position but able to stand 15 seconds feet together  0 needs help to attain position and unable to hold for 15 seconds     REACHING FORWARD WITH OUTSTRETCHED ARM WHILE STANDING 3  INSTRUCTIONS: Lift arm to 90 degrees. Stretch out your fingers and reach forward as far as you can. (Examiner places a ruler at  the end of fingertips when arm is at 90 degrees. Fingers should not touch the ruler while reaching forward. The recorded measure is  the distance forward that the fingers reach while the subject is in the most forward lean position. When possible, ask subject to use  both arms when reaching to avoid rotation of the trunk.)  4 can reach forward confidently 25 cm (10 inches)  3 can reach forward 12 cm (5 inches)  2 can reach forward 5 cm (2 inches)  1 reaches forward but needs supervision  0 loses balance while trying/requires external support     OBJECT FROM THE FLOOR FROM A STANDING POSITION 4  INSTRUCTIONS:  the shoe/slipper, which is in front of your feet.  4 able to  slipper safely and easily  3 able to  slipper but needs supervision  2 unable to  but reaches 2-5 cm(1-2 inches) from slipper and keeps balance independently  1 unable to  and needs supervision while trying  0 unable to try/needs assist to keep from losing balance or  falling    TURNING TO LOOK BEHIND OVER LEFT AND RIGHT SHOULDERS WHILE STANDING 4  INSTRUCTIONS: Turn to look directly behind you over toward the left shoulder. Repeat to the right. (Examiner may pick an object  to look at directly behind the subject to encourage a better twist turn.)  4 looks behind from both sides and weight shifts well  3 looks behind one side only other side shows less weight shift  2 turns sideways only but maintains balance  1 needs supervision when turning  0 needs assist to keep from losing balance or falling    TURN 360 DEGREES  INSTRUCTIONS: Turn completely around in a full [place]. Pause. Then turn a full [place] in the other direction. 4  4 able to turn 360 degrees safely in 4 seconds or less  3 able to turn 360 degrees safely one side only 4 seconds or less  2 able to turn 360 degrees safely but slowly  1 needs close supervision or verbal cuing  0 needs assistance while turning    PLACE ALTERNATE FOOT ON STEP OR STOOL WHILE STANDING UNSUPPORTED 4  INSTRUCTIONS: Place each foot alternately on the step/stool. Continue until each foot has touched the step/stool four times.  4 able to stand independently and safely and complete 8 steps in 20 seconds  3 able to stand independently and complete 8 steps in > 20 seconds  2 able to complete 4 steps without aid with supervision  1 able to complete > 2 steps needs minimal assist  0 needs assistance to keep from falling/unable to try    STANDING UNSUPPORTED ONE FOOT IN FRONT  INSTRUCTIONS: (DEMONSTRATE TO SUBJECT) Place one foot directly in front of the other. If you feel that you cannot place your foot directly in front, try to step far enough ahead that the heel of your forward foot is ahead of the toes of the other foot. 3  4 able to place foot tandem independently and hold 30 seconds  3 able to place foot ahead independently and hold 30 seconds  2 able to take small step independently and hold 30 seconds  1 needs help to step but can hold 15  seconds  0 loses balance while stepping or standing    STANDING ON ONE LEG  INSTRUCTIONS: Stand on one leg as long as you can without holding on. 3  4 able to lift leg independently and hold > 10 seconds  3 able to lift leg independently and hold 5-10 seconds  2 able to lift leg independently and hold = 3 seconds  1 tries to lift leg unable to hold 3 seconds but remains standing independently.  0 unable to try of needs assist to prevent fall      TOTAL SCORE 53/56      Interpretation:  41-56 = low fall risk  21-40 = medium fall risk  0 -20 = high fall risk

## 2020-08-28 NOTE — PROGRESS NOTES
"  Physical Therapy Daily DISCHARGE Note     Name: Luz Jo [facility] Number: [id number]    Therapy Diagnosis:   Encounter Diagnoses   Name Primary?    Decreased range of motion of right foot     Decreased strength     Impaired functional mobility, balance, and endurance      Physician: Zhanna Poole DO    Visit Date: 8/31/2020    Physician Orders: PT eval and treat  Medical Diagnosis from Referral: M77.51 (ICD-10-CM) - Retrocalcaneal bursitis, right  Evaluation Date: 8/3/2020  Authorization Period Expiration: 8/3/2021  Plan of Care Expiration: 11/3/20  Visit # / Visits authorized: 7/ 20      Time In: 12:00- 12:38 pm  Total Billable Time: 38 minutes  2TE, 1 TA     Precautions: Standard    Subjective     Pt reports: No pain. "I'm excited to start walking again with my friend."  She was compliant with home exercise program.  Response to previous treatment: no adverse effects   Functional change: No pain    Pain: 0/10  After therapy: 0/10  Location: R heel      Objective     Luz received therapeutic exercises to develop strength, endurance, ROM and flexibility for 45 minutes including:    Ambulated 1/2 mile with no assistance, no assistive device, 15 mins.  20 heel raises, 3 gastroc and soleus stretches with RLE.  All questions answered within scope of practice. Recommended pt continue her HEP to prevent further pain in R heel due to calcaneal bone spur.    FOTO outake score: 86%      Home Exercises Provided and Patient Education Provided     Education provided:   - Continue previous HEP, exercise technique.     Written Home Exercises Provided: yes.  Exercises were reviewed and Luz was able to demonstrate them prior to the end of the session.  Luz demonstrated good  understanding of the education provided.     See EMR under Patient Instructions for exercises provided 8/7/2020.      Assessment   Pt has met all of her goals and is ready to discharge from PT services.    Luz is progressing " well towards her goals.   Pt prognosis is Good.     Pt will continue to benefit from skilled outpatient physical therapy to address the deficits listed in the problem list box on initial evaluation, provide pt/family education and to maximize pt's level of independence in the home and community environment.   Pt's spiritual, cultural and educational needs considered and pt agreeable to plan of care and goals.    Anticipated barriers to physical therapy: none    Goals:  Short Term Goals: 4 weeks - 9/7  Pt will increase her R foot dorsiflexion from 4 degrees to 8 degrees, symmetrical with L foot, to promote better gait mechanics and functional mobility. Met (8/31/2020)  Pt will present with a bilateral negative Ely test to demonstrate sufficient flexibility in her rectus femoris, promoting better gait mechanics and posture. Met (8/31/2020)  Pt will improve her Ge Balance score from 42 to 47 (Minimal detectable change is 4.9) to denote improvement in her balance and a lower fall risk. Met (8/31/2020)  Pt will endorse 1/10 pain continually for 1 week to denote progress in treatment and the ability to increase her activity level. Met (8/31/2020)     Long Term Goals: 10 weeks - 10/5  Pt will be able to ambulate 1/2 mile with minimal difficulty, allowing her to return to taking walks with her friend 3x/wk. Met (8/31/2020)  Pt will improve both feet dorsiflexion from 8 degrees to 10 degrees, allowing for better gait mechanics and functional mobility. Met (8/31/2020)  Pt will improve her Ge balance score to 52, denoting an improvement in balance and a lower fall risk. Met (8/31/2020)  Pt will improve her FOTO foot/ankle score to 87 % (+/- 2 points), denoting progress functionally since evaluation, promoting more functional activity.met (8/31/2020)  Pt will demonstrate a negative SANDY's test to denote flexibility in her Tensor Fascia Latae and Iliotibial band. Met (8/31/2020)  Pt will endorse 0/10 pain continually for 1  week to denote the ability to manage her own care at home, returning to prior level of function. Met (8/27)    Plan     Pt will be discharging from therapy today, as she has met all of her goals and has remained pain-free for the last 2-3 weeks. She will benefit from continuing her home exercise program to avoid return of pain and/or tightness of the Achilles tendon.    Chiquis Amaya, PT

## 2020-08-31 ENCOUNTER — CLINICAL SUPPORT (OUTPATIENT)
Dept: REHABILITATION | Facility: HOSPITAL | Age: 80
End: 2020-08-31
Attending: NEUROMUSCULOSKELETAL MEDICINE & OMM
Payer: MEDICARE

## 2020-08-31 DIAGNOSIS — M25.674 DECREASED RANGE OF MOTION OF RIGHT FOOT: ICD-10-CM

## 2020-08-31 DIAGNOSIS — R53.1 DECREASED STRENGTH: ICD-10-CM

## 2020-08-31 DIAGNOSIS — Z74.09 IMPAIRED FUNCTIONAL MOBILITY, BALANCE, AND ENDURANCE: ICD-10-CM

## 2020-08-31 PROBLEM — M25.676 DECREASED RANGE OF MOTION OF FOOT: Status: RESOLVED | Noted: 2020-08-03 | Resolved: 2020-08-31

## 2020-08-31 PROCEDURE — 97530 THERAPEUTIC ACTIVITIES: CPT | Mod: PO

## 2020-08-31 PROCEDURE — 97110 THERAPEUTIC EXERCISES: CPT | Mod: PO

## 2020-09-14 ENCOUNTER — OFFICE VISIT (OUTPATIENT)
Dept: INTERNAL MEDICINE | Facility: CLINIC | Age: 80
End: 2020-09-14
Payer: MEDICARE

## 2020-09-14 VITALS
OXYGEN SATURATION: 98 % | HEART RATE: 82 BPM | WEIGHT: 155.19 LBS | SYSTOLIC BLOOD PRESSURE: 118 MMHG | DIASTOLIC BLOOD PRESSURE: 74 MMHG | BODY MASS INDEX: 29.33 KG/M2

## 2020-09-14 DIAGNOSIS — Z12.11 COLON CANCER SCREENING: ICD-10-CM

## 2020-09-14 DIAGNOSIS — K59.00 CONSTIPATION, UNSPECIFIED CONSTIPATION TYPE: Primary | ICD-10-CM

## 2020-09-14 PROCEDURE — 99213 PR OFFICE/OUTPT VISIT, EST, LEVL III, 20-29 MIN: ICD-10-PCS | Mod: S$PBB,,, | Performed by: INTERNAL MEDICINE

## 2020-09-14 PROCEDURE — 99215 OFFICE O/P EST HI 40 MIN: CPT | Mod: PBBFAC | Performed by: INTERNAL MEDICINE

## 2020-09-14 PROCEDURE — 99999 PR PBB SHADOW E&M-EST. PATIENT-LVL V: CPT | Mod: PBBFAC,,, | Performed by: INTERNAL MEDICINE

## 2020-09-14 PROCEDURE — 99999 PR PBB SHADOW E&M-EST. PATIENT-LVL V: ICD-10-PCS | Mod: PBBFAC,,, | Performed by: INTERNAL MEDICINE

## 2020-09-14 PROCEDURE — 99213 OFFICE O/P EST LOW 20 MIN: CPT | Mod: S$PBB,,, | Performed by: INTERNAL MEDICINE

## 2020-09-14 NOTE — PROGRESS NOTES
Subjective:       Patient ID: Luz Amaro is a 80 y.o. female.    Chief Complaint: Constipation    HPI:  Patient complains of about 3 months of change in bowel habits with a tendency to her constipation.  She seem like it almost happened overnight.  She said she typically was having bowel movement every morning after her cup of coffee and then she noticed she was going sometimes 3-4 days without a bowel movement.  She said her last colonoscopy was about 14 years ago that she says she stopped having them because they were too uncomfortable to do while awake.  I asked her if she was sure it was colonoscopies are may be flexible sigmoidoscopies and she was not sure but she said she was awake and could watch the monitor.  I explained that we generally used propofol  for our sedation and that patients are not awake.  No blood in the stool.  Colace allows her to have a bowel movement but sometimes it still isn't every day for the in the stool is hard.  If sedated she said she would definitely take the colonoscopy.    Review of Systems   Constitutional: Negative for appetite change, chills and fever.   HENT: Negative for nosebleeds and sore throat.    Eyes: Negative for visual disturbance.   Respiratory: Negative for cough, shortness of breath and wheezing.    Cardiovascular: Negative for chest pain and leg swelling.   Gastrointestinal: Positive for change in bowel habit, constipation and change in bowel habit. Negative for abdominal distention, abdominal pain, anal bleeding, blood in stool and diarrhea.   Genitourinary: Negative for difficulty urinating and hematuria.   Musculoskeletal: Negative for neck pain and neck stiffness.   Integumentary:  Negative for pallor and rash.   Neurological: Negative for headaches.   Psychiatric/Behavioral: Negative for dysphoric mood and suicidal ideas. The patient is not nervous/anxious.          Objective:      Physical Exam  Constitutional:       General: She is not in  acute distress.     Appearance: She is well-developed.   HENT:      Head: Normocephalic and atraumatic.      Right Ear: External ear normal.      Left Ear: External ear normal.      Mouth/Throat:      Pharynx: No oropharyngeal exudate.   Eyes:      General: No scleral icterus.     Conjunctiva/sclera: Conjunctivae normal.      Pupils: Pupils are equal, round, and reactive to light.   Neck:      Musculoskeletal: Normal range of motion and neck supple.      Thyroid: No thyromegaly.   Cardiovascular:      Rate and Rhythm: Normal rate and regular rhythm.      Heart sounds: No murmur.   Pulmonary:      Effort: Pulmonary effort is normal.      Breath sounds: Normal breath sounds. No wheezing.   Abdominal:      General: Bowel sounds are normal. There is no distension.      Palpations: Abdomen is soft. There is no mass.      Tenderness: There is no abdominal tenderness. There is no guarding or rebound.      Hernia: No hernia is present.   Musculoskeletal:         General: No tenderness.   Lymphadenopathy:      Cervical: No cervical adenopathy.   Skin:     Findings: No rash.   Neurological:      Mental Status: She is alert and oriented to person, place, and time.         Assessment:       1. Constipation, unspecified constipation type    2. Colon cancer screening        Plan:       Luz was seen today for constipation.    Diagnoses and all orders for this visit:    Constipation, unspecified constipation type  -     Case request GI: COLONOSCOPY    Colon cancer screening  -     Case request GI: COLONOSCOPY        recent labs were reviewed including thyroid.  That was stable.  May continue with Colace or could tried MiraLax.  Increase fiber in the diet.  Keep hydrated, exercise may help as well  Call for any changes or problems.  Will review C scope

## 2020-09-14 NOTE — PATIENT INSTRUCTIONS
OK to try Colace or Miralax to regulate bowel movements.   But we should get a C-scope to make sure there are no polyps or other kinks, obstructions etc.     Call [phone] to schedule the colonoscopy.

## 2020-10-20 ENCOUNTER — PATIENT OUTREACH (OUTPATIENT)
Dept: ADMINISTRATIVE | Facility: OTHER | Age: 80
End: 2020-10-20

## 2020-10-21 ENCOUNTER — OFFICE VISIT (OUTPATIENT)
Dept: DERMATOLOGY | Facility: CLINIC | Age: 80
End: 2020-10-21
Payer: MEDICARE

## 2020-10-21 DIAGNOSIS — L57.0 AK (ACTINIC KERATOSIS): Primary | ICD-10-CM

## 2020-10-21 DIAGNOSIS — D22.9 NEVUS: ICD-10-CM

## 2020-10-21 DIAGNOSIS — D48.5 NEOPLASM OF UNCERTAIN BEHAVIOR OF SKIN: ICD-10-CM

## 2020-10-21 DIAGNOSIS — L90.5 SCAR: ICD-10-CM

## 2020-10-21 DIAGNOSIS — D18.01 CHERRY ANGIOMA: ICD-10-CM

## 2020-10-21 DIAGNOSIS — L81.4 LENTIGO: ICD-10-CM

## 2020-10-21 DIAGNOSIS — Z85.828 PERSONAL HISTORY OF SKIN CANCER: ICD-10-CM

## 2020-10-21 DIAGNOSIS — L30.8 PSORIASIFORM DERMATITIS: ICD-10-CM

## 2020-10-21 PROCEDURE — 17003 DESTRUCT PREMALG LES 2-14: CPT | Mod: 59,S$PBB,, | Performed by: DERMATOLOGY

## 2020-10-21 PROCEDURE — 17000 DESTRUCT PREMALG LESION: CPT | Mod: 59,S$PBB,, | Performed by: DERMATOLOGY

## 2020-10-21 PROCEDURE — 99213 OFFICE O/P EST LOW 20 MIN: CPT | Mod: PBBFAC,PO | Performed by: DERMATOLOGY

## 2020-10-21 PROCEDURE — 99214 OFFICE O/P EST MOD 30 MIN: CPT | Mod: 25,S$PBB,, | Performed by: DERMATOLOGY

## 2020-10-21 PROCEDURE — 88305 TISSUE EXAM BY PATHOLOGIST: ICD-10-PCS | Mod: 26,,, | Performed by: PATHOLOGY

## 2020-10-21 PROCEDURE — 11102 TANGNTL BX SKIN SINGLE LES: CPT | Mod: S$PBB,,, | Performed by: DERMATOLOGY

## 2020-10-21 PROCEDURE — 17000 PR DESTRUCTION(LASER SURGERY,CRYOSURGERY,CHEMOSURGERY),PREMALIGNANT LESIONS,FIRST LESION: ICD-10-PCS | Mod: 59,S$PBB,, | Performed by: DERMATOLOGY

## 2020-10-21 PROCEDURE — 11102 PR TANGENTIAL BIOPSY, SKIN, SINGLE LESION: ICD-10-PCS | Mod: S$PBB,,, | Performed by: DERMATOLOGY

## 2020-10-21 PROCEDURE — 88305 TISSUE EXAM BY PATHOLOGIST: CPT | Performed by: PATHOLOGY

## 2020-10-21 PROCEDURE — 99214 PR OFFICE/OUTPT VISIT, EST, LEVL IV, 30-39 MIN: ICD-10-PCS | Mod: 25,S$PBB,, | Performed by: DERMATOLOGY

## 2020-10-21 PROCEDURE — 17000 DESTRUCT PREMALG LESION: CPT | Mod: 59,PBBFAC,PO | Performed by: DERMATOLOGY

## 2020-10-21 PROCEDURE — 88305 TISSUE EXAM BY PATHOLOGIST: CPT | Mod: 26,,, | Performed by: PATHOLOGY

## 2020-10-21 PROCEDURE — 99999 PR PBB SHADOW E&M-EST. PATIENT-LVL III: CPT | Mod: PBBFAC,,, | Performed by: DERMATOLOGY

## 2020-10-21 PROCEDURE — 17003 DESTRUCT PREMALG LES 2-14: CPT | Mod: 59,PBBFAC,PO | Performed by: DERMATOLOGY

## 2020-10-21 PROCEDURE — 17003 DESTRUCTION, PREMALIGNANT LESIONS; SECOND THROUGH 14 LESIONS: ICD-10-PCS | Mod: 59,S$PBB,, | Performed by: DERMATOLOGY

## 2020-10-21 PROCEDURE — 99999 PR PBB SHADOW E&M-EST. PATIENT-LVL III: ICD-10-PCS | Mod: PBBFAC,,, | Performed by: DERMATOLOGY

## 2020-10-21 PROCEDURE — 11102 TANGNTL BX SKIN SINGLE LES: CPT | Mod: PBBFAC,PO | Performed by: DERMATOLOGY

## 2020-10-21 RX ORDER — BETAMETHASONE DIPROPIONATE 0.5 MG/G
CREAM TOPICAL
Qty: 60 G | Refills: 1 | Status: SHIPPED | OUTPATIENT
Start: 2020-10-21 | End: 2021-06-03

## 2020-10-21 NOTE — PROGRESS NOTES
"  Subjective:       Patient ID:  Luz Amaro is a 80 y.o. female who presents for   Chief Complaint   Patient presents with    Skin Check     Pt here today for a TBSE  Pt c/o lesions to both legs x 1yr. Prev tx with TAC. Recurred in the 3-4 wks. occ itch. No pain or bleeding.  C/o lesion to L chest x 1 yr. Itches. Irritated. Prev tx OTC antibiotic cream. No relief.    Patient is here today for a "mole" check.   Pt has a history of no sun exposure in the past.   Pt recalls several blistering sunburns in the past- none  Pt has history of tanning bed use- none  Pt has  had moles removed in the past- 1 removed left wrist, x 30yrs ago,  Pt has history of melanoma in first degree relatives-  None      Review of Systems   Skin: Positive for itching, rash and activity-related sunscreen use. Negative for tendency to form keloidal scars.   Hematologic/Lymphatic: Bruises/bleeds easily.        Objective:    Physical Exam   Constitutional: She appears well-developed and well-nourished. No distress.   Neurological: She is alert and oriented to person, place, and time. She is not disoriented.   Psychiatric: She has a normal mood and affect.   Skin:   Areas Examined (abnormalities noted in diagram):   Scalp / Hair Palpated and Inspected  Head / Face Inspection Performed  Neck Inspection Performed  Chest / Axilla Inspection Performed  Abdomen Inspection Performed  Genitals / Buttocks / Groin Inspection Performed  Back Inspection Performed  RUE Inspected  LUE Inspection Performed  RLE Inspected  LLE Inspection Performed  Nails and Digits Inspection Performed                                   Diagram Legend     Erythematous scaling macule/papule c/w actinic keratosis       Vascular papule c/w angioma      Pigmented verrucoid papule/plaque c/w seborrheic keratosis      Yellow umbilicated papule c/w sebaceous hyperplasia      Irregularly shaped tan macule c/w lentigo     1-2 mm smooth white papules consistent with Milia      " Movable subcutaneous cyst with punctum c/w epidermal inclusion cyst      Subcutaneous movable cyst c/w pilar cyst      Firm pink to brown papule c/w dermatofibroma      Pedunculated fleshy papule(s) c/w skin tag(s)      Evenly pigmented macule c/w junctional nevus     Mildly variegated pigmented, slightly irregular-bordered macule c/w mildly atypical nevus      Flesh colored to evenly pigmented papule c/w intradermal nevus       Pink pearly papule/plaque c/w basal cell carcinoma      Erythematous hyperkeratotic cursted plaque c/w SCC      Surgical scar with no sign of skin cancer recurrence      Open and closed comedones      Inflammatory papules and pustules      Verrucoid papule consistent consistent with wart     Erythematous eczematous patches and plaques     Dystrophic onycholytic nail with subungual debris c/w onychomycosis     Umbilicated papule    Erythematous-base heme-crusted tan verrucoid plaque consistent with inflamed seborrheic keratosis     Erythematous Silvery Scaling Plaque c/w Psoriasis     See annotation      Assessment / Plan:      Pathology Orders:     Normal Orders This Visit    Specimen to Pathology, Dermatology     Questions:    Procedure Type: Dermatology and skin neoplasms    Number of Specimens: 1    ------------------------: -------------------------    Spec 1 Procedure: Biopsy    Spec 1 Clinical Impression: r/o scc v inflamed keratosis    Spec 1 Source: left upper chest        AK (actinic keratosis)  Cryosurgery Procedure Note    Verbal consent from the patient is obtained including, but not limited to, risk of hypopigmentation/hyperpigmentation, scar, recurrence of lesion. The patient is aware of the precancerous quality and need for treatment of these lesions. Liquid nitrogen cryosurgery is applied to the 2 actinic keratoses, as detailed in the physical exam, to produce a freeze injury. The patient is aware that blisters may form and is instructed on wound care with gentle cleansing  and use of vaseline ointment to keep moist until healed. The patient is supplied a handout on cryosurgery and is instructed to call if lesions do not completely resolve.    Neoplasm of uncertain behavior of skin  Shave biopsy procedure note:    Shave biopsy performed after verbal consent including risk of infection, scar, recurrence, need for additional treatment of site. Area prepped with alcohol, anesthetized with approximately 1.0cc of 1% lidocaine with epinephrine. Lesional tissue shaved with razor blade. Hemostasis achieved with application of aluminum chloride followed by hyfrecation. No complications. Dressing applied. Wound care explained.    If biopsy positive, schedule procedure for definitive excision.   -     Specimen to Pathology, Dermatology    Lentigo  This is a benign hyperpigmented sun induced lesion. Daily sun protection will reduce the number of new lesions. Treatment of these benign lesions are considered cosmetic.  The nature of sun-induced photo-aging and skin cancers is discussed.  Sun avoidance, protective clothing, and the use of 30-SPF sunscreens is advised. Observe closely for skin damage/changes, and call if such occurs.    Nevus  Discussed ABCDE's of nevi.  Monitor for new mole or moles that are becoming bigger, darker, irritated, or developing irregular borders. Brochure provided.    Psoriasiform dermatitis  Discussed with patient good skin care regimen including avoiding fragranced products and very hot showers.  Recommended dove sensitive skin bar soap or cerave hydrating cleanser or bar.  Recommend Cerave cream  For moisturization daily -2x daily.     -     betamethasone dipropionate (DIPROLENE) 0.05 % cream; AAA bid to legs prn flare; not more than 2 weeks in same location. Avoid use on face and groin  Dispense: 60 g; Refill: 1  Pt educated that overuse of steroids can lead to skin thinning/atrophy, hypopigmentation, striae.    Cherry angioma  These are benign vascular lesions that  are inherited.  Treatment is not necessary.    Personal history of skin cancer\  Scar  Patient with a history of non melanoma skin cancer.   Total body skin examination performed today including at least 12 points as noted in physical examination. Suspicious lesions noted.             Follow up in 1 year (on 10/21/2021) for prn bx report.

## 2020-10-26 LAB
FINAL PATHOLOGIC DIAGNOSIS: NORMAL
GROSS: NORMAL

## 2020-10-28 ENCOUNTER — PATIENT MESSAGE (OUTPATIENT)
Dept: DERMATOLOGY | Facility: CLINIC | Age: 80
End: 2020-10-28

## 2020-11-05 ENCOUNTER — PATIENT OUTREACH (OUTPATIENT)
Dept: OTHER | Facility: OTHER | Age: 80
End: 2020-11-05

## 2020-11-05 DIAGNOSIS — I10 HYPERTENSION, UNSPECIFIED TYPE: ICD-10-CM

## 2020-11-06 RX ORDER — NIFEDIPINE 30 MG/1
30 TABLET, EXTENDED RELEASE ORAL DAILY
Qty: 90 TABLET | Refills: 3 | Status: SHIPPED | OUTPATIENT
Start: 2020-11-06 | End: 2020-11-06 | Stop reason: CLARIF

## 2020-11-06 RX ORDER — NIFEDIPINE 30 MG/1
30 TABLET, EXTENDED RELEASE ORAL DAILY
Qty: 90 TABLET | Refills: 3 | Status: SHIPPED | OUTPATIENT
Start: 2020-11-06 | End: 2020-12-09 | Stop reason: SDUPTHER

## 2020-11-06 NOTE — PROGRESS NOTES
Digital Medicine: Clinician Introduction    Luz mAaro is a 80 y.o. female who is newly enrolled in the Digital Medicine Clinic.    Luz Amaro returned missed clinician outreach for introduction to Hypertension Digital Medicine program. BP avg 164/78.    Things are going well. In reviewing her blood pressure regimen, patient reports that hydralazine was stopped by cardiologist Oscar after starting carvedilol and nifedipine. She is also not taking carvedilol twice daily as prescribed. She is taking blood pressure BEFORE blood pressure medications are administered and following up with measure afterwards. She is also only measuring blood pressure every 10-14 days. Discussed my role in program, her technique and the need for more frequent readings for monitoring in great detail.    Patient reports angioedema was assumed to be caused by ACEI/ARBs but it was never confirmed. She has not filled prescription for Epipen due to cost.    Completed medication reconciliation. [facility] is her preferred pharmacy. Patient requests 90-day supply of nifedipine prescription.        The history is provided by the patient.      Review of patient's allergies indicates:   -- Ace inhibitors    -- Arb-angiotensin receptor antagonist    -- Amlodipine -- Swelling    --  Tongue swells  Completed Medication Reconciliation  Verified pharmacy information.    HYPERTENSION  Explained hypertension digital medicine goals including BP goal less than or equal to 130/80mmHg, improved convenience of BP management and reduced risk of heart attack, kidney failure, stroke, eye disease, dementia, and death.     Explained non-pharmacologic therapies like low salt diet and physical activity can reduce blood pressure.       Explained that we expect patient to submit several blood pressure readings per week at random times of the day, but at least 30 minutes after taking blood pressure medications. Instructed patient not to allow anyone else  to use their blood pressure monitor and phone as data submitted is directly entered into medical record. Reviewed and confirmed appropriate blood pressure monitoring technique.         Reviewed signs/symptoms of hypertension (headache, changes in vision, chest pain, shortness of breath)   Reviewed signs/symptoms of hypotension (lightheaded, dizziness, weakness)     Patient's BP goal is less than or equal to 140/90. Patients BP average is 164/78 mmHg, which is above goal, per 2017 ACC/AHA Hypertension Guidelines.         Last 5 Patient Entered Readings                                      Current 30 Day Average: 164/78     Recent Readings 11/2/2020 10/24/2020 10/15/2020 10/5/2020 9/27/2020    SBP (mmHg) 166 168 158 144 123    DBP (mmHg) 81 77 75 67 69    Pulse 78 70 76 82 73                Depression Screening  Did not address depression screening.    Sleep Apnea Screening    Did not address sleep apnea screening.     Medication Affordability Screening  Did not address medication affordability screening.     Medication Adherence-Medication adherence was assessed.    Patient reported missing medication: more than once a month.        Patient is not taking carvedilol twice daily as prescribed. Reviewed medication instructions with patient where she confirmed label states twice daily. She was unaware and agrees to correct.      ASSESSMENT(S)  Patients BP average is 164/78 mmHg, which is above goal. Patient's BP goal is less than or equal to 140/90.     Home BP is poorly controlled with current regimen likely 2/2 poor medication adherence where carvedilol is not being taken twice daily, inaccurate measuring technique where BP is measured before medication is taken      Hypertension Plan  Hypertension Medication Change. Correct administration of carvedilol - take twice daily.  Additional monitoring needed.  Continue current therapy.  Continue current diet/physical activity routine.  Provided patient  education.  Discussed signs/symptoms of hyper/hypotension; reviewed BP measuring technique; reviewed dose schedule; reviewed device/charging/frequency of measuring       Addressed patient questions and patient has my contact information if needed prior to next outreach. Patient verbalizes understanding.      Explained the importance of self-monitoring and medication adherence. Encouraged the patient to communicate with their health  for lifestyle modifications to help improve or maintain a healthy lifestyle.        Sent link to [facility] [employer] [facility] webpages and my contact information via [employer] for future questions.        Explained to the patient that the Digital Medicine team is not available for emergencies. Advised patient call [facility] ([phone] or [phone]) or 911 if needed.            There are no preventive care reminders to display for this patient.       Current Medication Regimen:  Hypertension Medications             carvediloL (COREG) 12.5 MG tablet Take 1 tablet (12.5 mg total) by mouth 2 (two) times daily with meals.    hydroCHLOROthiazide (MICROZIDE) 12.5 mg capsule Take 1 capsule (12.5 mg total) by mouth once daily.    NIFEdipine (PROCARDIA-XL) 30 MG (OSM) 24 hr tablet Take 1 tablet (30 mg total) by mouth once daily.

## 2020-11-16 DIAGNOSIS — Z01.818 PRE-OP TESTING: Primary | ICD-10-CM

## 2020-11-16 DIAGNOSIS — Z12.11 SCREENING FOR COLON CANCER: Primary | ICD-10-CM

## 2020-11-16 RX ORDER — SODIUM, POTASSIUM,MAG SULFATES 17.5-3.13G
SOLUTION, RECONSTITUTED, ORAL ORAL
Qty: 1 KIT | Refills: 0 | Status: ON HOLD | OUTPATIENT
Start: 2020-11-16 | End: 2020-12-14

## 2020-11-30 PROCEDURE — 99457 PR MONITORING, PHYSIOL PARAM, REMOTE, 1ST 20 MINS, PER MONTH: ICD-10-PCS | Mod: S$PBB,,, | Performed by: INTERNAL MEDICINE

## 2020-11-30 PROCEDURE — 99457 RPM TX MGMT 1ST 20 MIN: CPT | Mod: S$PBB,,, | Performed by: INTERNAL MEDICINE

## 2020-12-09 ENCOUNTER — PATIENT OUTREACH (OUTPATIENT)
Dept: OTHER | Facility: OTHER | Age: 80
End: 2020-12-09

## 2020-12-09 DIAGNOSIS — I10 HYPERTENSION, UNSPECIFIED TYPE: ICD-10-CM

## 2020-12-09 RX ORDER — NIFEDIPINE 30 MG/1
30 TABLET, EXTENDED RELEASE ORAL DAILY
Qty: 90 TABLET | Refills: 3 | Status: SHIPPED | OUTPATIENT
Start: 2020-12-09 | End: 2021-09-10

## 2020-12-09 NOTE — PROGRESS NOTES
Digital Medicine: Clinician Follow-Up    Called Luz Amaro for Hypertension Digital Medicine follow-up due to medication change where patient was instructed to take carvedilol twice daily as prescribed instead of once daily: BP avg 132/69.    Patient confirmed taking carvedilol twice daily as instructed. She is very pleased with the improved BP readings. She denies any SEs or AEs of concern. She continues to take HCTZ and nifedipine as prescribed.     Patient does express concern that mail order pharmacy is not sending her a 90-day supply of nifedipine and causing her an increase out of pocket expense. Confirmed prescription is for 90-day. Offered to call [facility] to attempt to resolve on her behalf.     The history is provided by the patient.      Review of patient's allergies indicates:   -- Ace inhibitors    -- Arb-angiotensin receptor antagonist    -- Amlodipine -- Swelling    --  Tongue swells  Follow-up reason(s): medication change follow-up.     Hypertension    Readings are trending down due to medication adherence.       Patient did make medication change.    Is patient tolerating med change? yes            Last 5 Patient Entered Readings                                      Current 30 Day Average: 132/69     Recent Readings 12/9/2020 12/7/2020 12/7/2020 12/1/2020 11/30/2020    SBP (mmHg) 131 124 124 138 125    DBP (mmHg) 72 67 67 70 64    Pulse 62 70 70 64 79                 Depression Screening  Did not address depression screening.    Sleep Apnea Screening    Did not address sleep apnea screening.     Medication Affordability Screening  Did not address medication affordability screening.     Medication Adherence-Medication adherence was assessed.        Taking BP medications as prescribed.       ASSESSMENT(S)  Patients BP average is 132/69 mmHg, which is at goal. Patient's BP goal is less than or equal to 140/90.    Home BP readings are meeting goal 100% of the time. BP avg significantly  improved from 164/78 to 132/69 with carvedilol twice daily. BP now meeting goal. No additional medication changes needed at this time.     Called [facility] mail order pharmacy. They did not have prescription sent on 11/6/20 on file. New electronic prescription sent. Confirmed receipt and process of 90-day supply for nifedipine as it was time for refill. Called patient back to inform.    F/u in 6 months.      Hypertension Plan  Continue current therapy.  Continue current diet/physical activity routine.       Addressed patient questions and patient has my contact information if needed prior to next outreach. Patient verbalizes understanding.             There are no preventive care reminders to display for this patient.  There are no preventive care reminders to display for this patient.      Hypertension Medications             carvediloL (COREG) 12.5 MG tablet Take 1 tablet (12.5 mg total) by mouth 2 (two) times daily with meals.    hydroCHLOROthiazide (MICROZIDE) 12.5 mg capsule Take 1 capsule (12.5 mg total) by mouth once daily.    NIFEdipine (PROCARDIA-XL) 30 MG (OSM) 24 hr tablet Take 1 tablet (30 mg total) by mouth once daily.

## 2020-12-11 ENCOUNTER — PATIENT MESSAGE (OUTPATIENT)
Dept: ADMINISTRATIVE | Facility: OTHER | Age: 80
End: 2020-12-11

## 2020-12-11 ENCOUNTER — LAB VISIT (OUTPATIENT)
Dept: INTERNAL MEDICINE | Facility: CLINIC | Age: 80
End: 2020-12-11
Payer: MEDICARE

## 2020-12-11 DIAGNOSIS — Z01.818 PRE-OP TESTING: ICD-10-CM

## 2020-12-11 LAB — SARS-COV-2 RNA RESP QL NAA+PROBE: NOT DETECTED

## 2020-12-11 PROCEDURE — U0003 INFECTIOUS AGENT DETECTION BY NUCLEIC ACID (DNA OR RNA); SEVERE ACUTE RESPIRATORY SYNDROME CORONAVIRUS 2 (SARS-COV-2) (CORONAVIRUS DISEASE [COVID-19]), AMPLIFIED PROBE TECHNIQUE, MAKING USE OF HIGH THROUGHPUT TECHNOLOGIES AS DESCRIBED BY CMS-2020-01-R: HCPCS

## 2020-12-14 ENCOUNTER — ANESTHESIA (OUTPATIENT)
Dept: ENDOSCOPY | Facility: HOSPITAL | Age: 80
End: 2020-12-14
Payer: MEDICARE

## 2020-12-14 ENCOUNTER — HOSPITAL ENCOUNTER (OUTPATIENT)
Facility: HOSPITAL | Age: 80
Discharge: HOME | End: 2020-12-14
Attending: STUDENT IN AN ORGANIZED HEALTH CARE EDUCATION/TRAINING PROGRAM | Admitting: STUDENT IN AN ORGANIZED HEALTH CARE EDUCATION/TRAINING PROGRAM
Payer: MEDICARE

## 2020-12-14 ENCOUNTER — ANESTHESIA EVENT (OUTPATIENT)
Dept: ENDOSCOPY | Facility: HOSPITAL | Age: 80
End: 2020-12-14
Payer: MEDICARE

## 2020-12-14 VITALS
HEART RATE: 69 BPM | WEIGHT: 154 LBS | RESPIRATION RATE: 19 BRPM | OXYGEN SATURATION: 96 % | TEMPERATURE: 98 F | SYSTOLIC BLOOD PRESSURE: 127 MMHG | BODY MASS INDEX: 29.07 KG/M2 | HEIGHT: 61 IN | DIASTOLIC BLOOD PRESSURE: 61 MMHG

## 2020-12-14 DIAGNOSIS — Z86.010 ENCOUNTER FOR COLONOSCOPY DUE TO HISTORY OF COLONIC POLYP: Primary | ICD-10-CM

## 2020-12-14 DIAGNOSIS — Z12.11 ENCOUNTER FOR COLONOSCOPY DUE TO HISTORY OF COLONIC POLYP: Primary | ICD-10-CM

## 2020-12-14 DIAGNOSIS — Z86.010 HISTORY OF COLON POLYPS: ICD-10-CM

## 2020-12-14 PROBLEM — Z86.0100 HISTORY OF COLON POLYPS: Status: ACTIVE | Noted: 2020-12-14

## 2020-12-14 PROCEDURE — 45385 PR COLONOSCOPY,REMV LESN,SNARE: ICD-10-PCS | Mod: ,,, | Performed by: STUDENT IN AN ORGANIZED HEALTH CARE EDUCATION/TRAINING PROGRAM

## 2020-12-14 PROCEDURE — 45385 COLONOSCOPY W/LESION REMOVAL: CPT | Performed by: STUDENT IN AN ORGANIZED HEALTH CARE EDUCATION/TRAINING PROGRAM

## 2020-12-14 PROCEDURE — 27201089 HC SNARE, DISP (ANY): Performed by: STUDENT IN AN ORGANIZED HEALTH CARE EDUCATION/TRAINING PROGRAM

## 2020-12-14 PROCEDURE — 25000003 PHARM REV CODE 250: Performed by: STUDENT IN AN ORGANIZED HEALTH CARE EDUCATION/TRAINING PROGRAM

## 2020-12-14 PROCEDURE — 63600175 PHARM REV CODE 636 W HCPCS: Performed by: NURSE ANESTHETIST, CERTIFIED REGISTERED

## 2020-12-14 PROCEDURE — 88305 TISSUE EXAM BY PATHOLOGIST: CPT | Mod: 26,,, | Performed by: PATHOLOGY

## 2020-12-14 PROCEDURE — 37000009 HC ANESTHESIA EA ADD 15 MINS: Performed by: STUDENT IN AN ORGANIZED HEALTH CARE EDUCATION/TRAINING PROGRAM

## 2020-12-14 PROCEDURE — 37000008 HC ANESTHESIA 1ST 15 MINUTES: Performed by: STUDENT IN AN ORGANIZED HEALTH CARE EDUCATION/TRAINING PROGRAM

## 2020-12-14 PROCEDURE — 25000003 PHARM REV CODE 250: Performed by: NURSE ANESTHETIST, CERTIFIED REGISTERED

## 2020-12-14 PROCEDURE — 88305 TISSUE EXAM BY PATHOLOGIST: ICD-10-PCS | Mod: 26,,, | Performed by: PATHOLOGY

## 2020-12-14 PROCEDURE — 88305 TISSUE EXAM BY PATHOLOGIST: CPT | Performed by: PATHOLOGY

## 2020-12-14 PROCEDURE — 45385 COLONOSCOPY W/LESION REMOVAL: CPT | Mod: ,,, | Performed by: STUDENT IN AN ORGANIZED HEALTH CARE EDUCATION/TRAINING PROGRAM

## 2020-12-14 PROCEDURE — E9220 PRA ENDO ANESTHESIA: ICD-10-PCS | Mod: ,,, | Performed by: NURSE ANESTHETIST, CERTIFIED REGISTERED

## 2020-12-14 PROCEDURE — E9220 PRA ENDO ANESTHESIA: HCPCS | Mod: ,,, | Performed by: NURSE ANESTHETIST, CERTIFIED REGISTERED

## 2020-12-14 RX ORDER — SODIUM CHLORIDE 9 MG/ML
INJECTION, SOLUTION INTRAVENOUS CONTINUOUS
Status: DISCONTINUED | OUTPATIENT
Start: 2020-12-14 | End: 2020-12-14 | Stop reason: HOSPADM

## 2020-12-14 RX ORDER — PROPOFOL 10 MG/ML
VIAL (ML) INTRAVENOUS
Status: DISCONTINUED | OUTPATIENT
Start: 2020-12-14 | End: 2020-12-14

## 2020-12-14 RX ORDER — SODIUM CHLORIDE 0.9 % (FLUSH) 0.9 %
10 SYRINGE (ML) INJECTION
Status: CANCELLED | OUTPATIENT
Start: 2020-12-14

## 2020-12-14 RX ORDER — PROPOFOL 10 MG/ML
VIAL (ML) INTRAVENOUS CONTINUOUS PRN
Status: DISCONTINUED | OUTPATIENT
Start: 2020-12-14 | End: 2020-12-14

## 2020-12-14 RX ORDER — LIDOCAINE HYDROCHLORIDE 20 MG/ML
INJECTION INTRAVENOUS
Status: DISCONTINUED | OUTPATIENT
Start: 2020-12-14 | End: 2020-12-14

## 2020-12-14 RX ADMIN — LIDOCAINE HYDROCHLORIDE 80 MG: 20 INJECTION, SOLUTION INTRAVENOUS at 09:12

## 2020-12-14 RX ADMIN — SODIUM CHLORIDE 10 ML/HR: 0.9 INJECTION, SOLUTION INTRAVENOUS at 09:12

## 2020-12-14 RX ADMIN — PROPOFOL 20 MG: 10 INJECTION, EMULSION INTRAVENOUS at 09:12

## 2020-12-14 RX ADMIN — PROPOFOL 150 MCG/KG/MIN: 10 INJECTION, EMULSION INTRAVENOUS at 09:12

## 2020-12-14 RX ADMIN — PROPOFOL 30 MG: 10 INJECTION, EMULSION INTRAVENOUS at 09:12

## 2020-12-14 NOTE — H&P
Short Stay Endoscopy History and Physical    PCP - Suman Moss MD  Referring Physician - Suman Moss MD  [address] [place] [address]    Procedure - Colonoscopy  ASA - per anesthesia  Mallampati - per anesthesia  History of Anesthesia problems - no  Family history Anesthesia problems -  no   Plan of anesthesia - General    HPI  80 y.o. female  Reason for procedure:   Screen for colon cancer [Z12.11]  Personal history of colonic polyps [Z86.010]        ROS:  Constitutional: No fevers, chills, No weight loss  CV: No chest pain  Pulm: No cough, No shortness of breath  GI: see HPI    Medical History:  has a past medical history of Amblyopia, Angio-edema, Basal cell carcinoma (2011), BCC (basal cell carcinoma) (2010), Cataract, Hyperlipidemia, Hypertension, and Strabismus.    Surgical History:  has a past surgical history that includes Appendectomy; Ectopic pregnancy surgery; Oophorectomy; Cholecystectomy; and Skin biopsy.    Family History: family history includes Asthma in her sister; Cancer in her father; Hypertension in her mother; Stroke in her mother..    Social History:  reports that she quit smoking about 37 years ago. Her smoking use included cigarettes. She has never used smokeless tobacco. She reports current alcohol use of about 2.0 standard drinks of alcohol per week. She reports that she does not use drugs.    Review of patient's allergies indicates:   Allergen Reactions    Ace inhibitors     Arb-angiotensin receptor antagonist     Amlodipine Swelling     Tongue swells       Medications:   Medications Prior to Admission   Medication Sig Dispense Refill Last Dose    aspirin (ECOTRIN) 81 MG EC tablet Take 81 mg by mouth once daily.       atorvastatin (LIPITOR) 20 MG tablet TAKE 1 TABLET DAILY 90 tablet 4     betamethasone dipropionate (DIPROLENE) 0.05 % cream AAA bid to legs prn flare; not more than 2 weeks in same location. Avoid use on face and groin 60 g 1      buPROPion (WELLBUTRIN XL) 150 MG TB24 tablet Take 1 tablet (150 mg total) by mouth once daily. 90 tablet 11     carvediloL (COREG) 12.5 MG tablet Take 1 tablet (12.5 mg total) by mouth 2 (two) times daily with meals. 60 tablet 11     cetirizine (ZYRTEC) 10 MG tablet Take 10 mg by mouth once daily.       cholecalciferol, vitamin D3, (VITAMIN D3) 5,000 unit Tab Take 5,000 Units by mouth once daily.       diazePAM (VALIUM) 2 MG tablet Take 1 tablet daily for 3 days. Then take .5 tablet daily for the remaining 4 days. (Patient not taking: Reported on 9/14/2020) 5 tablet 0     epinephrine (EPIPEN) 0.3 mg/0.3 mL AtIn Inject 0.3 mLs (0.3 mg total) into the muscle once. (Patient not taking: Reported on 1/20/2020) 0.3 mL 0     hydroCHLOROthiazide (MICROZIDE) 12.5 mg capsule Take 1 capsule (12.5 mg total) by mouth once daily. 30 capsule 11     meclizine (ANTIVERT) 25 mg tablet Take 1 tablet (25 mg total) by mouth 2 (two) times daily as needed. 20 tablet 0     MELATONIN ORAL Take by mouth.       multivit-min/iron/folic/lutein (CENTRUM SILVER WOMEN ORAL) Take 1 tablet by mouth every morning.       NIFEdipine (PROCARDIA-XL) 30 MG (OSM) 24 hr tablet Take 1 tablet (30 mg total) by mouth once daily. 90 tablet 3     sodium,potassium,mag sulfates (SUPREP BOWEL PREP KIT) 17.5-3.13-1.6 gram SolR Please dispense as directed/ 1 kit 1 kit 0        Physical Exam:    Vital Signs: There were no vitals filed for this visit.    General Appearance: Well appearing in no acute distress  Abdomen: Soft, non tender, non distended with normal bowel sounds, no masses    Labs:  Lab Results   Component Value Date    WBC 6.75 05/05/2020    HGB 14.0 05/05/2020    HCT 44.2 05/05/2020     05/05/2020    CHOL 122 05/12/2020    TRIG 75 05/12/2020    HDL 54 05/12/2020    ALT 16 05/05/2020    AST 18 05/05/2020     05/05/2020    K 3.9 05/05/2020     05/05/2020    CREATININE 0.8 05/05/2020    BUN 14 05/05/2020    CO2 28 05/05/2020     TSH 0.506 05/05/2020    HGBA1C 5.4 05/05/2020       I have explained the risks and benefits of this endoscopic procedure to the patient including but not limited to bleeding, inflammation, infection, perforation, and death.      Giovanny Chao MD

## 2020-12-14 NOTE — [employer] PATIENT INSTRUCTIONS
Discharge Summary/Instructions after an Endoscopic Procedure  Patient Name: Luz Amaro  Patient MRN: [id number]  Patient YOB: 1940 Monday, December 14, 2020  Giovanny Chao MD  RESTRICTIONS:  During your procedure today, you received medications for sedation.  These   medications may affect your judgment, balance and coordination.  Therefore,   for 24 hours, you have the following restrictions:   - DO NOT drive a car, operate machinery, make legal/financial decisions,   sign important papers or drink alcohol.    ACTIVITY:  Today: no heavy lifting, straining or running due to procedural   sedation/anesthesia.  The following day: return to full activity including work.  DIET:  Eat and drink normally unless instructed otherwise.     TREATMENT FOR COMMON SIDE EFFECTS:  - Mild abdominal pain, nausea, belching, bloating or excessive gas:  rest,   eat lightly and use a heating pad.  - Sore Throat: treat with throat lozenges and/or gargle with warm salt   water.  - Because air was used during the procedure, expelling large amounts of air   from your rectum or belching is normal.  - If a bowel prep was taken, you may not have a bowel movement for 1-3 days.    This is normal.  SYMPTOMS TO WATCH FOR AND REPORT TO YOUR PHYSICIAN:  1. Abdominal pain or bloating, other than gas cramps.  2. Chest pain.  3. Back pain.  4. Signs of infection such as: chills or fever occurring within 24 hours   after the procedure.  5. Rectal bleeding, which would show as bright red, maroon, or black stools.   (A tablespoon of blood from the rectum is not serious, especially if   hemorrhoids are present.)  6. Vomiting.  7. Weakness or dizziness.  GO DIRECTLY TO THE NEAREST EMERGENCY ROOM IF YOU HAVE ANY OF THE FOLLOWING:      Difficulty breathing              Chills and/or fever over 101 F   Persistent vomiting and/or vomiting blood   Severe abdominal pain   Severe chest pain   Black, tarry stools   Bleeding- more than one  tablespoon   Any other symptom or condition that you feel may need urgent attention  Your doctor recommends these additional instructions:  If any biopsies were taken, your doctors clinic will contact you in 1 to 2   weeks with any results.  - Discharge patient to home.   - The findings and recommendations were discussed with the patient.   - Repeat colonoscopy is not recommended due to current age (66 years or   older) for screening purposes.   - Patient has a contact number available for emergencies.  The signs and   symptoms of potential delayed complications were discussed with the   patient.  Return to normal activities tomorrow.  Written discharge   instructions were provided to the patient.  For questions, problems or results please call your physician - Giovanny Chao MD at Work:  [phone].  [facility], EMERGENCY ROOM PHONE NUMBER: [phone]  IF A COMPLICATION OR EMERGENCY SITUATION ARISES AND YOU ARE UNABLE TO REACH   YOUR PHYSICIAN - GO DIRECTLY TO THE EMERGENCY ROOM.  Giovanny Chao MD  12/14/2020 10:09:04 AM  This report has been verified and signed electronically.  [employer]

## 2020-12-14 NOTE — ANESTHESIA PREPROCEDURE EVALUATION
12/14/2020  Luz Amaro is a 80 y.o., female.    Past Medical History:   Diagnosis Date    Amblyopia     Angio-edema     Basal cell carcinoma 2011    left cheek     BCC (basal cell carcinoma) 2010    left neck    Cataract     Hyperlipidemia     Hypertension     Strabismus      Patient Active Problem List   Diagnosis    Hypertension    Hyperlipidemia    Mixed incontinence    Nuclear sclerosis    Amblyopia, strabismic    Fear of flying    Chronic sialoadenitis    Osteopenia    Angioedema    Personal history of skin cancer    Nonrheumatic aortic valve stenosis    Diastolic dysfunction     Past Surgical History:   Procedure Laterality Date    APPENDECTOMY      CHOLECYSTECTOMY      ECTOPIC PREGNANCY SURGERY      OOPHORECTOMY      SKIN BIOPSY           Anesthesia Evaluation    I have reviewed the Patient Summary Reports.    I have reviewed the Nursing Notes. I have reviewed the NPO Status.   I have reviewed the Medications.     Review of Systems  Anesthesia Hx:  No problems with previous Anesthesia  Denies Family Hx of Anesthesia complications.   Denies Personal Hx of Anesthesia complications.   Social:  Social Alcohol Use    Hematology/Oncology:  Hematology Normal   Oncology Normal     EENT/Dental:EENT/Dental Normal   Cardiovascular:   Exercise tolerance: good Hypertension ECG has been reviewed.    Pulmonary:  Pulmonary Normal    Renal/:  Renal/ Normal     Hepatic/GI:  Hepatic/GI Normal    Musculoskeletal:  Musculoskeletal Normal    Neurological:  Neurology Normal    Endocrine:  Endocrine Normal    Dermatological:  Skin Normal    Psych:  Psychiatric Normal           Physical Exam  General:  Well nourished    Airway/Jaw/Neck:  Airway Findings: Mouth Opening: Normal Tongue: Normal  General Airway Assessment: Adult  Mallampati: II  TM Distance: Normal, at least 6 cm  Jaw/Neck  Findings:     Neck ROM: Normal ROM     Eyes/Ears/Nose:  Eyes/Ears/Nose Findings:    Dental:  Dental Findings: Upper Dentures, Lower Dentures   Chest/Lungs:  Chest/Lungs Findings: Clear to auscultation, Normal Respiratory Rate     Heart/Vascular:  Heart Findings: Rate: Normal  Rhythm: Regular Rhythm  Sounds: Normal     Abdomen:  Abdomen Findings: Normal    Musculoskeletal:  Musculoskeletal Findings: Normal   Skin:  Skin Findings: Normal    Mental Status:  Mental Status Findings:  Cooperative         Anesthesia Plan  Type of Anesthesia, risks & benefits discussed:  Anesthesia Type:  general  Patient's Preference:   Intra-op Monitoring Plan: standard ASA monitors  Intra-op Monitoring Plan Comments:   Post Op Pain Control Plan:   Post Op Pain Control Plan Comments:   Induction:   IV  Beta Blocker:  Patient is on a Beta-Blocker and has received one dose within the past 24 hours (No further documentation required).       Informed Consent: Patient understands risks and agrees with Anesthesia plan.  Questions answered. Anesthesia consent signed with patient.  ASA Score: 2     Day of Surgery Review of History & Physical: I have interviewed and examined the patient. I have reviewed the patient's H&P dated:  There are no significant changes.  H&P update referred to the provider.         Ready For Surgery From Anesthesia Perspective.

## 2020-12-14 NOTE — DISCHARGE INSTRUCTIONS
Colonoscopy     A camera attached to a flexible tube with a viewing lens is used to take video pictures.     Colonoscopy is a test to view the inside of your lower digestive tract (colon and rectum). Sometimes it can show the last part of the small intestine (ileum). During the test, small pieces of tissue may be removed for testing. This is called a biopsy. Small growths, such as polyps, may also be removed.   Why is colonoscopy done?  The test is done to help look for colon cancer. And it can help find the source of abdominal pain, bleeding, and changes in bowel habits. It may be needed once a year, depending on factors such as your:  · Age  · Health history  · Family health history  · Symptoms  · Results from any prior colonoscopy  Risks and possible complications  These include:  · Bleeding               · A puncture or tear in the colon   · Risks of anesthesia  · A cancer lesion not being seen  Getting ready   To prepare for the test:  · Talk with your healthcare provider about the risks of the test (see below). Also ask your healthcare provider about alternatives to the test.  · Tell your healthcare provider about any medicines you take. Also tell him or her about any health conditions you may have.  · Make sure your rectum and colon are empty for the test. Follow the diet and bowel prep instructions exactly. If you dont, the test may need to be rescheduled.  · Plan for a friend or family member to drive you home after the test.     Colonoscopy provides an inside view of the entire colon.     You may discuss the results with your doctor right away or at a future visit.  During the test   The test is usually done in the hospital on an outpatient basis. This means you go home the same day. The procedure takes about 30 minutes. During that time:  · You are given relaxing (sedating) medicine through an IV line. You may be drowsy, or fully asleep.  · The healthcare provider will first give you a physical exam to  check for anal and rectal problems.  · Then the anus is lubricated and the scope inserted.  · If you are awake, you may have a feeling similar to needing to have a bowel movement. You may also feel pressure as air is pumped into the colon. Its OK to pass gas during the procedure.  · Biopsy, polyp removal, or other treatments may be done during the test.  After the test   You may have gas right after the test. It can help to try to pass it to help prevent later bloating. Your healthcare provider may discuss the results with you right away. Or you may need to schedule a follow-up visit to talk about the results. After the test, you can go back to your normal eating and other activities. You may be tired from the sedation and need to rest for a few hours.  Date Last Reviewed: 11/1/2016 © 2000-2017 [employer]. [address] [place] [address], [place] [address]. All rights reserved. This information is not intended as a substitute for professional medical care. Always follow your healthcare professional's instructions.

## 2020-12-14 NOTE — TRANSFER OF CARE
"Anesthesia Transfer of Care Note    Patient: Luz Amaro    Procedure(s) Performed: Procedure(s) (LRB):  COLONOSCOPY (N/A)    Patient location: GI    Anesthesia Type: general    Transport from OR: Transported from OR on room air with adequate spontaneous ventilation    Post pain: adequate analgesia    Post assessment: no apparent anesthetic complications and tolerated procedure well    Post vital signs: stable    Level of consciousness: alert and awake    Nausea/Vomiting: no nausea/vomiting    Complications: none    Transfer of care protocol was followed      Last vitals:   Visit Vitals  BP (!) 118/56   Pulse 61   Temp 36.4 °C (97.5 °F)   Resp 20   Ht 5' 1" (1.549 m)   Wt 69.9 kg (154 lb)   SpO2 96%   Breastfeeding No   BMI 29.10 kg/m²     "

## 2020-12-14 NOTE — ANESTHESIA POSTPROCEDURE EVALUATION
Anesthesia Post Evaluation    Patient: Luz Amaro    Procedure(s) Performed: Procedure(s) (LRB):  COLONOSCOPY (N/A)    Final Anesthesia Type: general      Patient location during evaluation: PACU  Patient participation: Yes- Able to Participate  Level of consciousness: awake and alert  Post-procedure vital signs: reviewed and stable  Pain management: adequate  Airway patency: patent    PONV status at discharge: No PONV  Anesthetic complications: no      Cardiovascular status: stable  Respiratory status: unassisted, room air and spontaneous ventilation  Hydration status: euvolemic  Follow-up not needed.          Vitals Value Taken Time   /61 12/14/20 1042   Temp 36.4 °C (97.5 °F) 12/14/20 1012   Pulse 69 12/14/20 1042   Resp 19 12/14/20 1042   SpO2 96 % 12/14/20 1042         Event Time   Out of Recovery 10:43:14         Pain/Denton Score: Denton Score: 10 (12/14/2020 10:42 AM)

## 2020-12-17 LAB
FINAL PATHOLOGIC DIAGNOSIS: NORMAL
GROSS: NORMAL
Lab: NORMAL

## 2021-01-09 ENCOUNTER — IMMUNIZATION (OUTPATIENT)
Dept: INTERNAL MEDICINE | Facility: CLINIC | Age: 81
End: 2021-01-09
Payer: MEDICARE

## 2021-01-09 DIAGNOSIS — Z23 NEED FOR VACCINATION: ICD-10-CM

## 2021-01-09 PROCEDURE — 91300 COVID-19, MRNA, LNP-S, PF, 30 MCG/0.3 ML DOSE VACCINE: CPT | Mod: PBBFAC | Performed by: NURSE PRACTITIONER

## 2021-01-30 ENCOUNTER — IMMUNIZATION (OUTPATIENT)
Dept: INTERNAL MEDICINE | Facility: CLINIC | Age: 81
End: 2021-01-30
Payer: MEDICARE

## 2021-01-30 DIAGNOSIS — Z23 NEED FOR VACCINATION: Primary | ICD-10-CM

## 2021-01-30 PROCEDURE — 0002A PR IMMUNIZ ADMIN, SARS-COV-2 COVID-19 VACC, 30MCG/0.3ML, 2ND DOSE: CPT | Mod: PBBFAC

## 2021-01-30 PROCEDURE — 91300 PR SARS-COV- 2 COVID-19 VACCINE, NO PRSV, 30MCG/0.3ML, IM: CPT | Mod: PBBFAC

## 2021-01-30 RX ADMIN — RNA INGREDIENT BNT-162B2 0.3 ML: 0.23 INJECTION, SUSPENSION INTRAMUSCULAR at 02:01

## 2021-02-02 ENCOUNTER — TELEPHONE (OUTPATIENT)
Dept: INTERNAL MEDICINE | Facility: CLINIC | Age: 81
End: 2021-02-02

## 2021-02-22 ENCOUNTER — TELEPHONE (OUTPATIENT)
Dept: INTERNAL MEDICINE | Facility: CLINIC | Age: 81
End: 2021-02-22

## 2021-02-28 ENCOUNTER — EXTERNAL CHRONIC CARE MANAGEMENT (OUTPATIENT)
Dept: PRIMARY CARE CLINIC | Facility: CLINIC | Age: 81
End: 2021-02-28
Payer: MEDICARE

## 2021-02-28 PROCEDURE — 99439 CHRNC CARE MGMT STAF EA ADDL: CPT | Mod: S$PBB,,, | Performed by: INTERNAL MEDICINE

## 2021-02-28 PROCEDURE — 99439 CHRNC CARE MGMT STAF EA ADDL: CPT | Mod: PBBFAC | Performed by: INTERNAL MEDICINE

## 2021-02-28 PROCEDURE — 99490 CHRNC CARE MGMT STAFF 1ST 20: CPT | Mod: S$PBB,,, | Performed by: INTERNAL MEDICINE

## 2021-02-28 PROCEDURE — 99439 PR CHRONIC CARE MGMT, EA ADDTL 20 MIN: ICD-10-PCS | Mod: S$PBB,,, | Performed by: INTERNAL MEDICINE

## 2021-02-28 PROCEDURE — 99490 PR CHRONIC CARE MGMT, 1ST 20 MIN: ICD-10-PCS | Mod: S$PBB,,, | Performed by: INTERNAL MEDICINE

## 2021-02-28 PROCEDURE — 99490 CHRNC CARE MGMT STAFF 1ST 20: CPT | Mod: PBBFAC | Performed by: INTERNAL MEDICINE

## 2021-03-01 ENCOUNTER — OFFICE VISIT (OUTPATIENT)
Dept: INTERNAL MEDICINE | Facility: CLINIC | Age: 81
End: 2021-03-01
Payer: MEDICARE

## 2021-03-01 VITALS
SYSTOLIC BLOOD PRESSURE: 130 MMHG | HEIGHT: 61 IN | OXYGEN SATURATION: 97 % | HEART RATE: 67 BPM | BODY MASS INDEX: 30.39 KG/M2 | DIASTOLIC BLOOD PRESSURE: 82 MMHG | WEIGHT: 160.94 LBS

## 2021-03-01 DIAGNOSIS — F32.A DEPRESSION, UNSPECIFIED DEPRESSION TYPE: ICD-10-CM

## 2021-03-01 DIAGNOSIS — E78.5 HYPERLIPIDEMIA, UNSPECIFIED HYPERLIPIDEMIA TYPE: ICD-10-CM

## 2021-03-01 DIAGNOSIS — R73.09 ELEVATED GLUCOSE: ICD-10-CM

## 2021-03-01 DIAGNOSIS — I10 HYPERTENSION, UNSPECIFIED TYPE: ICD-10-CM

## 2021-03-01 DIAGNOSIS — K59.00 CONSTIPATION, UNSPECIFIED CONSTIPATION TYPE: ICD-10-CM

## 2021-03-01 DIAGNOSIS — F41.9 ANXIETY: Primary | ICD-10-CM

## 2021-03-01 PROCEDURE — 99213 PR OFFICE/OUTPT VISIT, EST, LEVL III, 20-29 MIN: ICD-10-PCS | Mod: S$PBB,,, | Performed by: INTERNAL MEDICINE

## 2021-03-01 PROCEDURE — 99999 PR PBB SHADOW E&M-EST. PATIENT-LVL IV: CPT | Mod: PBBFAC,,, | Performed by: INTERNAL MEDICINE

## 2021-03-01 PROCEDURE — 99999 PR PBB SHADOW E&M-EST. PATIENT-LVL IV: ICD-10-PCS | Mod: PBBFAC,,, | Performed by: INTERNAL MEDICINE

## 2021-03-01 PROCEDURE — 99214 OFFICE O/P EST MOD 30 MIN: CPT | Mod: PBBFAC | Performed by: INTERNAL MEDICINE

## 2021-03-01 PROCEDURE — 99213 OFFICE O/P EST LOW 20 MIN: CPT | Mod: S$PBB,,, | Performed by: INTERNAL MEDICINE

## 2021-03-01 RX ORDER — BUPROPION HYDROCHLORIDE 300 MG/1
300 TABLET ORAL DAILY
Qty: 90 TABLET | Refills: 4 | Status: SHIPPED | OUTPATIENT
Start: 2021-03-01 | End: 2022-03-14 | Stop reason: SDUPTHER

## 2021-03-01 RX ORDER — LORAZEPAM 0.5 MG/1
0.5 TABLET ORAL DAILY PRN
Qty: 30 TABLET | Refills: 0 | Status: SHIPPED | OUTPATIENT
Start: 2021-03-01 | End: 2023-03-07

## 2021-03-23 DIAGNOSIS — I10 HYPERTENSION, UNSPECIFIED TYPE: ICD-10-CM

## 2021-03-23 RX ORDER — CARVEDILOL 12.5 MG/1
12.5 TABLET ORAL 2 TIMES DAILY WITH MEALS
Qty: 60 TABLET | Refills: 3 | Status: SHIPPED | OUTPATIENT
Start: 2021-03-23 | End: 2021-06-03

## 2021-03-31 ENCOUNTER — EXTERNAL CHRONIC CARE MANAGEMENT (OUTPATIENT)
Dept: PRIMARY CARE CLINIC | Facility: CLINIC | Age: 81
End: 2021-03-31
Payer: MEDICARE

## 2021-03-31 PROCEDURE — 99490 PR CHRONIC CARE MGMT, 1ST 20 MIN: ICD-10-PCS | Mod: S$PBB,,, | Performed by: INTERNAL MEDICINE

## 2021-03-31 PROCEDURE — 99490 CHRNC CARE MGMT STAFF 1ST 20: CPT | Mod: PBBFAC | Performed by: INTERNAL MEDICINE

## 2021-03-31 PROCEDURE — 99490 CHRNC CARE MGMT STAFF 1ST 20: CPT | Mod: S$PBB,,, | Performed by: INTERNAL MEDICINE

## 2021-04-26 ENCOUNTER — LAB VISIT (OUTPATIENT)
Dept: LAB | Facility: HOSPITAL | Age: 81
End: 2021-04-26
Attending: INTERNAL MEDICINE
Payer: MEDICARE

## 2021-04-26 DIAGNOSIS — R73.09 ELEVATED GLUCOSE: ICD-10-CM

## 2021-04-26 DIAGNOSIS — E78.5 HYPERLIPIDEMIA, UNSPECIFIED HYPERLIPIDEMIA TYPE: ICD-10-CM

## 2021-04-26 DIAGNOSIS — F32.A DEPRESSION, UNSPECIFIED DEPRESSION TYPE: ICD-10-CM

## 2021-04-26 DIAGNOSIS — F41.9 ANXIETY: ICD-10-CM

## 2021-04-26 DIAGNOSIS — K59.00 CONSTIPATION, UNSPECIFIED CONSTIPATION TYPE: ICD-10-CM

## 2021-04-26 DIAGNOSIS — I10 ESSENTIAL HYPERTENSION: ICD-10-CM

## 2021-04-26 DIAGNOSIS — I10 HYPERTENSION, UNSPECIFIED TYPE: ICD-10-CM

## 2021-04-26 LAB
ALBUMIN SERPL BCP-MCNC: 3.8 G/DL (ref 3.5–5.2)
ALBUMIN SERPL BCP-MCNC: 3.8 G/DL (ref 3.5–5.2)
ALP SERPL-CCNC: 157 U/L (ref 55–135)
ALP SERPL-CCNC: 157 U/L (ref 55–135)
ALT SERPL W/O P-5'-P-CCNC: 18 U/L (ref 10–44)
ALT SERPL W/O P-5'-P-CCNC: 18 U/L (ref 10–44)
ANION GAP SERPL CALC-SCNC: 12 MMOL/L (ref 8–16)
ANION GAP SERPL CALC-SCNC: 12 MMOL/L (ref 8–16)
AST SERPL-CCNC: 17 U/L (ref 10–40)
AST SERPL-CCNC: 17 U/L (ref 10–40)
BASOPHILS # BLD AUTO: 0.05 K/UL (ref 0–0.2)
BASOPHILS NFR BLD: 1 % (ref 0–1.9)
BILIRUB SERPL-MCNC: 0.5 MG/DL (ref 0.1–1)
BILIRUB SERPL-MCNC: 0.5 MG/DL (ref 0.1–1)
BUN SERPL-MCNC: 16 MG/DL (ref 8–23)
BUN SERPL-MCNC: 16 MG/DL (ref 8–23)
CALCIUM SERPL-MCNC: 9.5 MG/DL (ref 8.7–10.5)
CALCIUM SERPL-MCNC: 9.5 MG/DL (ref 8.7–10.5)
CHLORIDE SERPL-SCNC: 105 MMOL/L (ref 95–110)
CHLORIDE SERPL-SCNC: 105 MMOL/L (ref 95–110)
CHOLEST SERPL-MCNC: 216 MG/DL (ref 120–199)
CHOLEST/HDLC SERPL: 2.7 {RATIO} (ref 2–5)
CO2 SERPL-SCNC: 26 MMOL/L (ref 23–29)
CO2 SERPL-SCNC: 26 MMOL/L (ref 23–29)
CREAT SERPL-MCNC: 0.8 MG/DL (ref 0.5–1.4)
CREAT SERPL-MCNC: 0.8 MG/DL (ref 0.5–1.4)
DIFFERENTIAL METHOD: ABNORMAL
EOSINOPHIL # BLD AUTO: 0.2 K/UL (ref 0–0.5)
EOSINOPHIL NFR BLD: 4.2 % (ref 0–8)
ERYTHROCYTE [DISTWIDTH] IN BLOOD BY AUTOMATED COUNT: 13.2 % (ref 11.5–14.5)
EST. GFR  (AFRICAN AMERICAN): >60 ML/MIN/1.73 M^2
EST. GFR  (AFRICAN AMERICAN): >60 ML/MIN/1.73 M^2
EST. GFR  (NON AFRICAN AMERICAN): >60 ML/MIN/1.73 M^2
EST. GFR  (NON AFRICAN AMERICAN): >60 ML/MIN/1.73 M^2
ESTIMATED AVG GLUCOSE: 117 MG/DL (ref 68–131)
GLUCOSE SERPL-MCNC: 111 MG/DL (ref 70–110)
GLUCOSE SERPL-MCNC: 111 MG/DL (ref 70–110)
HBA1C MFR BLD: 5.7 % (ref 4–5.6)
HCT VFR BLD AUTO: 41.9 % (ref 37–48.5)
HDLC SERPL-MCNC: 79 MG/DL (ref 40–75)
HDLC SERPL: 36.6 % (ref 20–50)
HGB BLD-MCNC: 13.6 G/DL (ref 12–16)
IMM GRANULOCYTES # BLD AUTO: 0.02 K/UL (ref 0–0.04)
IMM GRANULOCYTES NFR BLD AUTO: 0.4 % (ref 0–0.5)
LDLC SERPL CALC-MCNC: 120.2 MG/DL (ref 63–159)
LYMPHOCYTES # BLD AUTO: 1.7 K/UL (ref 1–4.8)
LYMPHOCYTES NFR BLD: 34.2 % (ref 18–48)
MCH RBC QN AUTO: 31.6 PG (ref 27–31)
MCHC RBC AUTO-ENTMCNC: 32.5 G/DL (ref 32–36)
MCV RBC AUTO: 97 FL (ref 82–98)
MONOCYTES # BLD AUTO: 0.5 K/UL (ref 0.3–1)
MONOCYTES NFR BLD: 10.3 % (ref 4–15)
NEUTROPHILS # BLD AUTO: 2.5 K/UL (ref 1.8–7.7)
NEUTROPHILS NFR BLD: 49.9 % (ref 38–73)
NONHDLC SERPL-MCNC: 137 MG/DL
NRBC BLD-RTO: 0 /100 WBC
PLATELET # BLD AUTO: 245 K/UL (ref 150–450)
PMV BLD AUTO: 9.6 FL (ref 9.2–12.9)
POTASSIUM SERPL-SCNC: 4 MMOL/L (ref 3.5–5.1)
POTASSIUM SERPL-SCNC: 4 MMOL/L (ref 3.5–5.1)
PROT SERPL-MCNC: 7.6 G/DL (ref 6–8.4)
PROT SERPL-MCNC: 7.6 G/DL (ref 6–8.4)
RBC # BLD AUTO: 4.3 M/UL (ref 4–5.4)
SODIUM SERPL-SCNC: 143 MMOL/L (ref 136–145)
SODIUM SERPL-SCNC: 143 MMOL/L (ref 136–145)
TRIGL SERPL-MCNC: 84 MG/DL (ref 30–150)
TSH SERPL DL<=0.005 MIU/L-ACNC: 1.41 UIU/ML (ref 0.4–4)
WBC # BLD AUTO: 5.03 K/UL (ref 3.9–12.7)

## 2021-04-26 PROCEDURE — 80061 LIPID PANEL: CPT | Performed by: INTERNAL MEDICINE

## 2021-04-26 PROCEDURE — 84443 ASSAY THYROID STIM HORMONE: CPT | Performed by: INTERNAL MEDICINE

## 2021-04-26 PROCEDURE — 85025 COMPLETE CBC W/AUTO DIFF WBC: CPT | Performed by: INTERNAL MEDICINE

## 2021-04-26 PROCEDURE — 83036 HEMOGLOBIN GLYCOSYLATED A1C: CPT | Performed by: INTERNAL MEDICINE

## 2021-04-26 PROCEDURE — 36415 COLL VENOUS BLD VENIPUNCTURE: CPT | Performed by: INTERNAL MEDICINE

## 2021-04-26 PROCEDURE — 80053 COMPREHEN METABOLIC PANEL: CPT | Performed by: INTERNAL MEDICINE

## 2021-04-27 ENCOUNTER — PATIENT MESSAGE (OUTPATIENT)
Dept: INTERNAL MEDICINE | Facility: CLINIC | Age: 81
End: 2021-04-27

## 2021-04-28 RX ORDER — ATORVASTATIN CALCIUM 20 MG/1
TABLET, FILM COATED ORAL
Qty: 90 TABLET | Refills: 0 | Status: SHIPPED | OUTPATIENT
Start: 2021-04-28 | End: 2021-05-03 | Stop reason: SDUPTHER

## 2021-04-30 ENCOUNTER — EXTERNAL CHRONIC CARE MANAGEMENT (OUTPATIENT)
Dept: PRIMARY CARE CLINIC | Facility: CLINIC | Age: 81
End: 2021-04-30
Payer: MEDICARE

## 2021-04-30 PROCEDURE — 99490 PR CHRONIC CARE MGMT, 1ST 20 MIN: ICD-10-PCS | Mod: S$PBB,,, | Performed by: INTERNAL MEDICINE

## 2021-04-30 PROCEDURE — 99490 CHRNC CARE MGMT STAFF 1ST 20: CPT | Mod: PBBFAC | Performed by: INTERNAL MEDICINE

## 2021-04-30 PROCEDURE — 99490 CHRNC CARE MGMT STAFF 1ST 20: CPT | Mod: S$PBB,,, | Performed by: INTERNAL MEDICINE

## 2021-05-03 RX ORDER — ATORVASTATIN CALCIUM 20 MG/1
20 TABLET, FILM COATED ORAL DAILY
Qty: 90 TABLET | Refills: 4 | Status: SHIPPED | OUTPATIENT
Start: 2021-05-03 | End: 2021-06-03

## 2021-05-13 ENCOUNTER — PES CALL (OUTPATIENT)
Dept: ADMINISTRATIVE | Facility: CLINIC | Age: 81
End: 2021-05-13

## 2021-05-24 ENCOUNTER — PATIENT MESSAGE (OUTPATIENT)
Dept: ADMINISTRATIVE | Facility: OTHER | Age: 81
End: 2021-05-24

## 2021-05-25 ENCOUNTER — PATIENT OUTREACH (OUTPATIENT)
Dept: ADMINISTRATIVE | Facility: OTHER | Age: 81
End: 2021-05-25

## 2021-05-26 ENCOUNTER — OFFICE VISIT (OUTPATIENT)
Dept: DERMATOLOGY | Facility: CLINIC | Age: 81
End: 2021-05-26
Payer: MEDICARE

## 2021-05-26 DIAGNOSIS — L82.1 SK (SEBORRHEIC KERATOSIS): ICD-10-CM

## 2021-05-26 DIAGNOSIS — L30.9 DERMATITIS: ICD-10-CM

## 2021-05-26 DIAGNOSIS — D48.5 NEOPLASM OF UNCERTAIN BEHAVIOR OF SKIN: Primary | ICD-10-CM

## 2021-05-26 PROCEDURE — 88305 TISSUE EXAM BY PATHOLOGIST: CPT | Mod: 59 | Performed by: PATHOLOGY

## 2021-05-26 PROCEDURE — 99214 OFFICE O/P EST MOD 30 MIN: CPT | Mod: 25,S$PBB,, | Performed by: DERMATOLOGY

## 2021-05-26 PROCEDURE — 88342 IMHCHEM/IMCYTCHM 1ST ANTB: CPT | Performed by: PATHOLOGY

## 2021-05-26 PROCEDURE — 11104 PR PUNCH BIOPSY, SKIN, SINGLE LESION: ICD-10-PCS | Mod: S$PBB,,, | Performed by: DERMATOLOGY

## 2021-05-26 PROCEDURE — 99213 OFFICE O/P EST LOW 20 MIN: CPT | Mod: PBBFAC,PO,25 | Performed by: DERMATOLOGY

## 2021-05-26 PROCEDURE — 11103 PR TANGENTIAL BIOPSY, SKIN, EA ADDTL LESION: ICD-10-PCS | Mod: S$PBB,,, | Performed by: DERMATOLOGY

## 2021-05-26 PROCEDURE — 11104 PUNCH BX SKIN SINGLE LESION: CPT | Mod: S$PBB,,, | Performed by: DERMATOLOGY

## 2021-05-26 PROCEDURE — 99999 PR PBB SHADOW E&M-EST. PATIENT-LVL III: CPT | Mod: PBBFAC,,, | Performed by: DERMATOLOGY

## 2021-05-26 PROCEDURE — 88342 CHG IMMUNOCYTOCHEMISTRY: ICD-10-PCS | Mod: 26,,, | Performed by: PATHOLOGY

## 2021-05-26 PROCEDURE — 11103 TANGNTL BX SKIN EA SEP/ADDL: CPT | Mod: S$PBB,,, | Performed by: DERMATOLOGY

## 2021-05-26 PROCEDURE — 88342 IMHCHEM/IMCYTCHM 1ST ANTB: CPT | Mod: 26,,, | Performed by: PATHOLOGY

## 2021-05-26 PROCEDURE — 99999 PR PBB SHADOW E&M-EST. PATIENT-LVL III: ICD-10-PCS | Mod: PBBFAC,,, | Performed by: DERMATOLOGY

## 2021-05-26 PROCEDURE — 11102 TANGNTL BX SKIN SINGLE LES: CPT | Mod: PBBFAC,PO | Performed by: DERMATOLOGY

## 2021-05-26 PROCEDURE — 11104 PUNCH BX SKIN SINGLE LESION: CPT | Mod: PBBFAC,PO | Performed by: DERMATOLOGY

## 2021-05-26 PROCEDURE — 88305 TISSUE EXAM BY PATHOLOGIST: ICD-10-PCS | Mod: 26,,, | Performed by: PATHOLOGY

## 2021-05-26 PROCEDURE — 88305 TISSUE EXAM BY PATHOLOGIST: CPT | Mod: 26,,, | Performed by: PATHOLOGY

## 2021-05-26 PROCEDURE — 99214 PR OFFICE/OUTPT VISIT, EST, LEVL IV, 30-39 MIN: ICD-10-PCS | Mod: 25,S$PBB,, | Performed by: DERMATOLOGY

## 2021-05-26 RX ORDER — MOMETASONE FUROATE 1 MG/ML
SOLUTION TOPICAL
Qty: 60 ML | Refills: 3 | Status: SHIPPED | OUTPATIENT
Start: 2021-05-26 | End: 2021-09-27

## 2021-05-27 ENCOUNTER — PATIENT MESSAGE (OUTPATIENT)
Dept: INTERNAL MEDICINE | Facility: CLINIC | Age: 81
End: 2021-05-27

## 2021-05-27 DIAGNOSIS — I10 ESSENTIAL HYPERTENSION: ICD-10-CM

## 2021-05-27 RX ORDER — HYDROCHLOROTHIAZIDE 12.5 MG/1
12.5 CAPSULE ORAL DAILY
Qty: 30 CAPSULE | Refills: 11 | Status: SHIPPED | OUTPATIENT
Start: 2021-05-27 | End: 2021-06-03 | Stop reason: ALTCHOICE

## 2021-05-29 ENCOUNTER — PATIENT MESSAGE (OUTPATIENT)
Dept: DERMATOLOGY | Facility: CLINIC | Age: 81
End: 2021-05-29

## 2021-05-31 ENCOUNTER — EXTERNAL CHRONIC CARE MANAGEMENT (OUTPATIENT)
Dept: PRIMARY CARE CLINIC | Facility: CLINIC | Age: 81
End: 2021-05-31
Payer: MEDICARE

## 2021-05-31 PROCEDURE — 99490 PR CHRONIC CARE MGMT, 1ST 20 MIN: ICD-10-PCS | Mod: S$PBB,,, | Performed by: INTERNAL MEDICINE

## 2021-05-31 PROCEDURE — 99490 CHRNC CARE MGMT STAFF 1ST 20: CPT | Mod: PBBFAC | Performed by: INTERNAL MEDICINE

## 2021-05-31 PROCEDURE — 99490 CHRNC CARE MGMT STAFF 1ST 20: CPT | Mod: S$PBB,,, | Performed by: INTERNAL MEDICINE

## 2021-06-03 ENCOUNTER — OFFICE VISIT (OUTPATIENT)
Dept: CARDIOLOGY | Facility: CLINIC | Age: 81
End: 2021-06-03
Payer: MEDICARE

## 2021-06-03 VITALS
BODY MASS INDEX: 30.2 KG/M2 | SYSTOLIC BLOOD PRESSURE: 136 MMHG | HEIGHT: 61 IN | WEIGHT: 159.94 LBS | DIASTOLIC BLOOD PRESSURE: 65 MMHG | HEART RATE: 64 BPM

## 2021-06-03 DIAGNOSIS — M54.2 NECK PAIN: ICD-10-CM

## 2021-06-03 DIAGNOSIS — E78.5 HYPERLIPIDEMIA, UNSPECIFIED HYPERLIPIDEMIA TYPE: ICD-10-CM

## 2021-06-03 DIAGNOSIS — I10 HYPERTENSION, UNSPECIFIED TYPE: ICD-10-CM

## 2021-06-03 DIAGNOSIS — I35.0 NONRHEUMATIC AORTIC VALVE STENOSIS: Primary | ICD-10-CM

## 2021-06-03 PROCEDURE — 99204 PR OFFICE/OUTPT VISIT, NEW, LEVL IV, 45-59 MIN: ICD-10-PCS | Mod: S$PBB,,, | Performed by: INTERNAL MEDICINE

## 2021-06-03 PROCEDURE — 99999 PR PBB SHADOW E&M-EST. PATIENT-LVL III: ICD-10-PCS | Mod: PBBFAC,,, | Performed by: INTERNAL MEDICINE

## 2021-06-03 PROCEDURE — 99204 OFFICE O/P NEW MOD 45 MIN: CPT | Mod: S$PBB,,, | Performed by: INTERNAL MEDICINE

## 2021-06-03 PROCEDURE — 99999 PR PBB SHADOW E&M-EST. PATIENT-LVL III: CPT | Mod: PBBFAC,,, | Performed by: INTERNAL MEDICINE

## 2021-06-03 PROCEDURE — 93005 ELECTROCARDIOGRAM TRACING: CPT | Mod: PBBFAC,PO | Performed by: INTERNAL MEDICINE

## 2021-06-03 PROCEDURE — 93010 EKG 12-LEAD: ICD-10-PCS | Mod: S$PBB,,, | Performed by: INTERNAL MEDICINE

## 2021-06-03 PROCEDURE — 93010 ELECTROCARDIOGRAM REPORT: CPT | Mod: S$PBB,,, | Performed by: INTERNAL MEDICINE

## 2021-06-03 PROCEDURE — 99213 OFFICE O/P EST LOW 20 MIN: CPT | Mod: PBBFAC,PO | Performed by: INTERNAL MEDICINE

## 2021-06-03 RX ORDER — ATORVASTATIN CALCIUM 20 MG/1
40 TABLET, FILM COATED ORAL DAILY
Qty: 90 TABLET | Refills: 4 | Status: SHIPPED | OUTPATIENT
Start: 2021-06-03 | End: 2021-09-27

## 2021-06-03 RX ORDER — CARVEDILOL 12.5 MG/1
25 TABLET ORAL 2 TIMES DAILY WITH MEALS
Qty: 60 TABLET | Refills: 3 | Status: SHIPPED | OUTPATIENT
Start: 2021-06-03 | End: 2021-07-19

## 2021-06-07 LAB
FINAL PATHOLOGIC DIAGNOSIS: NORMAL
GROSS: NORMAL
Lab: NORMAL
MICROSCOPIC EXAM: NORMAL

## 2021-06-09 ENCOUNTER — TELEPHONE (OUTPATIENT)
Dept: INTERNAL MEDICINE | Facility: CLINIC | Age: 81
End: 2021-06-09

## 2021-06-10 ENCOUNTER — OFFICE VISIT (OUTPATIENT)
Dept: INTERNAL MEDICINE | Facility: CLINIC | Age: 81
End: 2021-06-10
Payer: MEDICARE

## 2021-06-10 ENCOUNTER — OFFICE VISIT (OUTPATIENT)
Dept: DERMATOLOGY | Facility: CLINIC | Age: 81
End: 2021-06-10
Payer: MEDICARE

## 2021-06-10 VITALS
SYSTOLIC BLOOD PRESSURE: 124 MMHG | HEART RATE: 75 BPM | OXYGEN SATURATION: 98 % | DIASTOLIC BLOOD PRESSURE: 68 MMHG | HEIGHT: 61 IN | BODY MASS INDEX: 30.63 KG/M2 | WEIGHT: 162.25 LBS

## 2021-06-10 DIAGNOSIS — F33.41 RECURRENT MAJOR DEPRESSIVE DISORDER, IN PARTIAL REMISSION: ICD-10-CM

## 2021-06-10 DIAGNOSIS — I35.0 NONRHEUMATIC AORTIC VALVE STENOSIS: ICD-10-CM

## 2021-06-10 DIAGNOSIS — M85.80 OSTEOPENIA, UNSPECIFIED LOCATION: ICD-10-CM

## 2021-06-10 DIAGNOSIS — I10 ESSENTIAL HYPERTENSION: ICD-10-CM

## 2021-06-10 DIAGNOSIS — I70.0 AORTIC ATHEROSCLEROSIS: ICD-10-CM

## 2021-06-10 DIAGNOSIS — Z78.0 POST-MENOPAUSAL: ICD-10-CM

## 2021-06-10 DIAGNOSIS — E78.5 HYPERLIPIDEMIA, UNSPECIFIED HYPERLIPIDEMIA TYPE: ICD-10-CM

## 2021-06-10 DIAGNOSIS — N39.46 MIXED INCONTINENCE: ICD-10-CM

## 2021-06-10 DIAGNOSIS — I51.89 DIASTOLIC DYSFUNCTION: ICD-10-CM

## 2021-06-10 DIAGNOSIS — D04.30 SQUAMOUS CELL CARCINOMA IN SITU (SCCIS) OF SKIN OF FACE: Primary | ICD-10-CM

## 2021-06-10 DIAGNOSIS — Z00.00 ENCOUNTER FOR PREVENTIVE HEALTH EXAMINATION: Primary | ICD-10-CM

## 2021-06-10 DIAGNOSIS — L43.9 LICHEN PLANUS: ICD-10-CM

## 2021-06-10 PROBLEM — M54.2 NECK PAIN: Status: RESOLVED | Noted: 2021-06-03 | Resolved: 2021-06-10

## 2021-06-10 PROCEDURE — 99999 PR PBB SHADOW E&M-EST. PATIENT-LVL V: ICD-10-PCS | Mod: PBBFAC,,, | Performed by: NURSE PRACTITIONER

## 2021-06-10 PROCEDURE — 99999 PR PBB SHADOW E&M-EST. PATIENT-LVL III: CPT | Mod: PBBFAC,,, | Performed by: DERMATOLOGY

## 2021-06-10 PROCEDURE — 99215 OFFICE O/P EST HI 40 MIN: CPT | Mod: PBBFAC | Performed by: NURSE PRACTITIONER

## 2021-06-10 PROCEDURE — 99214 PR OFFICE/OUTPT VISIT, EST, LEVL IV, 30-39 MIN: ICD-10-PCS | Mod: S$PBB,,, | Performed by: DERMATOLOGY

## 2021-06-10 PROCEDURE — G0439 PR MEDICARE ANNUAL WELLNESS SUBSEQUENT VISIT: ICD-10-PCS | Mod: ,,, | Performed by: NURSE PRACTITIONER

## 2021-06-10 PROCEDURE — G0439 PPPS, SUBSEQ VISIT: HCPCS | Mod: ,,, | Performed by: NURSE PRACTITIONER

## 2021-06-10 PROCEDURE — 99999 PR PBB SHADOW E&M-EST. PATIENT-LVL III: ICD-10-PCS | Mod: PBBFAC,,, | Performed by: DERMATOLOGY

## 2021-06-10 PROCEDURE — 99214 OFFICE O/P EST MOD 30 MIN: CPT | Mod: S$PBB,,, | Performed by: DERMATOLOGY

## 2021-06-10 PROCEDURE — 99999 PR PBB SHADOW E&M-EST. PATIENT-LVL V: CPT | Mod: PBBFAC,,, | Performed by: NURSE PRACTITIONER

## 2021-06-10 PROCEDURE — 99213 OFFICE O/P EST LOW 20 MIN: CPT | Mod: PBBFAC,27,PO | Performed by: DERMATOLOGY

## 2021-06-10 RX ORDER — IMIQUIMOD 12.5 MG/.25G
CREAM TOPICAL
Qty: 12 PACKET | Refills: 1 | Status: SHIPPED | OUTPATIENT
Start: 2021-06-10 | End: 2021-09-27

## 2021-06-10 RX ORDER — TACROLIMUS 1 MG/G
OINTMENT TOPICAL
Qty: 60 G | Refills: 2 | Status: SHIPPED | OUTPATIENT
Start: 2021-06-10 | End: 2024-03-05

## 2021-06-17 ENCOUNTER — HOSPITAL ENCOUNTER (OUTPATIENT)
Dept: CARDIOLOGY | Facility: HOSPITAL | Age: 81
Discharge: HOME | End: 2021-06-17
Attending: INTERNAL MEDICINE
Payer: MEDICARE

## 2021-06-17 VITALS
DIASTOLIC BLOOD PRESSURE: 64 MMHG | SYSTOLIC BLOOD PRESSURE: 128 MMHG | HEIGHT: 61 IN | WEIGHT: 159 LBS | BODY MASS INDEX: 30.02 KG/M2 | HEART RATE: 60 BPM

## 2021-06-17 DIAGNOSIS — I35.0 NONRHEUMATIC AORTIC VALVE STENOSIS: ICD-10-CM

## 2021-06-17 LAB
ASCENDING AORTA: 3.15 CM
AV INDEX (PROSTH): 0.36
AV MEAN GRADIENT: 29 MMHG
AV PEAK GRADIENT: 47 MMHG
AV VALVE AREA: 1.08 CM2
AV VELOCITY RATIO: 0.36
BSA FOR ECHO PROCEDURE: 1.76 M2
CV ECHO LV RWT: 0.38 CM
DOP CALC AO PEAK VEL: 3.41 M/S
DOP CALC AO VTI: 91.61 CM
DOP CALC LVOT AREA: 3 CM2
DOP CALC LVOT DIAMETER: 1.96 CM
DOP CALC LVOT PEAK VEL: 1.23 M/S
DOP CALC LVOT STROKE VOLUME: 99.28 CM3
DOP CALCLVOT PEAK VEL VTI: 32.92 CM
E WAVE DECELERATION TIME: 169.91 MSEC
E/A RATIO: 0.94
E/E' RATIO: 17.64 M/S
ECHO LV POSTERIOR WALL: 0.86 CM (ref 0.6–1.1)
EJECTION FRACTION: 65 %
FRACTIONAL SHORTENING: 39 % (ref 28–44)
INTERVENTRICULAR SEPTUM: 0.99 CM (ref 0.6–1.1)
IVRT: 82.78 MSEC
LA MAJOR: 5.37 CM
LA MINOR: 5.12 CM
LA WIDTH: 3.52 CM
LEFT ATRIUM SIZE: 3.58 CM
LEFT ATRIUM VOLUME INDEX MOD: 29.2 ML/M2
LEFT ATRIUM VOLUME INDEX: 32.8 ML/M2
LEFT ATRIUM VOLUME MOD: 49.87 CM3
LEFT ATRIUM VOLUME: 56.15 CM3
LEFT INTERNAL DIMENSION IN SYSTOLE: 2.74 CM (ref 2.1–4)
LEFT VENTRICLE DIASTOLIC VOLUME INDEX: 53.39 ML/M2
LEFT VENTRICLE DIASTOLIC VOLUME: 91.3 ML
LEFT VENTRICLE MASS INDEX: 80 G/M2
LEFT VENTRICLE SYSTOLIC VOLUME INDEX: 16.4 ML/M2
LEFT VENTRICLE SYSTOLIC VOLUME: 28.07 ML
LEFT VENTRICULAR INTERNAL DIMENSION IN DIASTOLE: 4.48 CM (ref 3.5–6)
LEFT VENTRICULAR MASS: 136.82 G
LV LATERAL E/E' RATIO: 16.17 M/S
LV SEPTAL E/E' RATIO: 19.4 M/S
MV PEAK A VEL: 1.03 M/S
MV PEAK E VEL: 0.97 M/S
MV STENOSIS PRESSURE HALF TIME: 49.27 MS
MV VALVE AREA P 1/2 METHOD: 4.47 CM2
PISA TR MAX VEL: 2.55 M/S
PULM VEIN S/D RATIO: 1.56
PV PEAK D VEL: 0.52 M/S
PV PEAK S VEL: 0.81 M/S
RA MAJOR: 4.82 CM
RA PRESSURE: 8 MMHG
RA WIDTH: 3.22 CM
RIGHT VENTRICULAR END-DIASTOLIC DIMENSION: 2.38 CM
SINUS: 2.65 CM
STJ: 2.35 CM
TDI LATERAL: 0.06 M/S
TDI SEPTAL: 0.05 M/S
TDI: 0.06 M/S
TR MAX PG: 26 MMHG
TRICUSPID ANNULAR PLANE SYSTOLIC EXCURSION: 1.76 CM
TV REST PULMONARY ARTERY PRESSURE: 34 MMHG

## 2021-06-17 PROCEDURE — 93306 TTE W/DOPPLER COMPLETE: CPT | Mod: 26,,, | Performed by: INTERNAL MEDICINE

## 2021-06-17 PROCEDURE — 93306 ECHO (CUPID ONLY): ICD-10-PCS | Mod: 26,,, | Performed by: INTERNAL MEDICINE

## 2021-06-17 PROCEDURE — 93306 TTE W/DOPPLER COMPLETE: CPT

## 2021-06-30 ENCOUNTER — EXTERNAL CHRONIC CARE MANAGEMENT (OUTPATIENT)
Dept: PRIMARY CARE CLINIC | Facility: CLINIC | Age: 81
End: 2021-06-30
Payer: MEDICARE

## 2021-06-30 PROCEDURE — 99490 PR CHRONIC CARE MGMT, 1ST 20 MIN: ICD-10-PCS | Mod: S$PBB,,, | Performed by: INTERNAL MEDICINE

## 2021-06-30 PROCEDURE — 99490 CHRNC CARE MGMT STAFF 1ST 20: CPT | Mod: S$PBB,,, | Performed by: INTERNAL MEDICINE

## 2021-06-30 PROCEDURE — 99490 CHRNC CARE MGMT STAFF 1ST 20: CPT | Mod: PBBFAC | Performed by: INTERNAL MEDICINE

## 2021-07-18 DIAGNOSIS — I10 HYPERTENSION, UNSPECIFIED TYPE: ICD-10-CM

## 2021-07-19 RX ORDER — CARVEDILOL 25 MG/1
25 TABLET ORAL 2 TIMES DAILY WITH MEALS
Qty: 180 TABLET | Refills: 3 | Status: SHIPPED | OUTPATIENT
Start: 2021-07-19 | End: 2022-10-16 | Stop reason: SDUPTHER

## 2021-08-07 ENCOUNTER — PATIENT OUTREACH (OUTPATIENT)
Dept: ADMINISTRATIVE | Facility: OTHER | Age: 81
End: 2021-08-07

## 2021-08-09 ENCOUNTER — OFFICE VISIT (OUTPATIENT)
Dept: DERMATOLOGY | Facility: CLINIC | Age: 81
End: 2021-08-09
Payer: MEDICARE

## 2021-08-09 DIAGNOSIS — D18.01 CHERRY ANGIOMA: ICD-10-CM

## 2021-08-09 DIAGNOSIS — L90.5 SCAR: Primary | ICD-10-CM

## 2021-08-09 DIAGNOSIS — D22.9 NEVUS: ICD-10-CM

## 2021-08-09 DIAGNOSIS — Z85.828 PERSONAL HISTORY OF SKIN CANCER: ICD-10-CM

## 2021-08-09 DIAGNOSIS — D04.30 SQUAMOUS CELL CARCINOMA IN SITU (SCCIS) OF SKIN OF FACE: ICD-10-CM

## 2021-08-09 DIAGNOSIS — L82.1 SK (SEBORRHEIC KERATOSIS): ICD-10-CM

## 2021-08-09 DIAGNOSIS — L57.0 AK (ACTINIC KERATOSIS): ICD-10-CM

## 2021-08-09 DIAGNOSIS — L43.9 LICHEN PLANUS: ICD-10-CM

## 2021-08-09 PROCEDURE — 17003 DESTRUCTION, PREMALIGNANT LESIONS; SECOND THROUGH 14 LESIONS: ICD-10-PCS | Mod: S$PBB,,, | Performed by: DERMATOLOGY

## 2021-08-09 PROCEDURE — 17003 DESTRUCT PREMALG LES 2-14: CPT | Mod: PBBFAC,PO | Performed by: DERMATOLOGY

## 2021-08-09 PROCEDURE — 99213 OFFICE O/P EST LOW 20 MIN: CPT | Mod: 25,S$PBB,, | Performed by: DERMATOLOGY

## 2021-08-09 PROCEDURE — 99999 PR PBB SHADOW E&M-EST. PATIENT-LVL III: CPT | Mod: PBBFAC,,, | Performed by: DERMATOLOGY

## 2021-08-09 PROCEDURE — 99213 OFFICE O/P EST LOW 20 MIN: CPT | Mod: PBBFAC,PO | Performed by: DERMATOLOGY

## 2021-08-09 PROCEDURE — 99999 PR PBB SHADOW E&M-EST. PATIENT-LVL III: ICD-10-PCS | Mod: PBBFAC,,, | Performed by: DERMATOLOGY

## 2021-08-09 PROCEDURE — 17000 DESTRUCT PREMALG LESION: CPT | Mod: PBBFAC,PO | Performed by: DERMATOLOGY

## 2021-08-09 PROCEDURE — 17000 DESTRUCT PREMALG LESION: CPT | Mod: S$PBB,,, | Performed by: DERMATOLOGY

## 2021-08-09 PROCEDURE — 17003 DESTRUCT PREMALG LES 2-14: CPT | Mod: S$PBB,,, | Performed by: DERMATOLOGY

## 2021-08-09 PROCEDURE — 99213 PR OFFICE/OUTPT VISIT, EST, LEVL III, 20-29 MIN: ICD-10-PCS | Mod: 25,S$PBB,, | Performed by: DERMATOLOGY

## 2021-08-09 PROCEDURE — 17000 PR DESTRUCTION(LASER SURGERY,CRYOSURGERY,CHEMOSURGERY),PREMALIGNANT LESIONS,FIRST LESION: ICD-10-PCS | Mod: S$PBB,,, | Performed by: DERMATOLOGY

## 2021-08-17 ENCOUNTER — HOSPITAL ENCOUNTER (OUTPATIENT)
Dept: RADIOLOGY | Facility: CLINIC | Age: 81
Discharge: HOME | End: 2021-08-17
Attending: NURSE PRACTITIONER
Payer: MEDICARE

## 2021-08-17 DIAGNOSIS — Z78.0 POST-MENOPAUSAL: ICD-10-CM

## 2021-08-17 PROCEDURE — 77080 DXA BONE DENSITY AXIAL: CPT | Mod: 26,,, | Performed by: INTERNAL MEDICINE

## 2021-08-17 PROCEDURE — 77080 DEXA BONE DENSITY SPINE HIP: ICD-10-PCS | Mod: 26,,, | Performed by: INTERNAL MEDICINE

## 2021-08-17 PROCEDURE — 77080 DXA BONE DENSITY AXIAL: CPT | Mod: TC

## 2021-08-23 ENCOUNTER — LAB VISIT (OUTPATIENT)
Dept: LAB | Facility: HOSPITAL | Age: 81
End: 2021-08-23
Attending: INTERNAL MEDICINE
Payer: MEDICARE

## 2021-08-23 ENCOUNTER — OFFICE VISIT (OUTPATIENT)
Dept: INTERNAL MEDICINE | Facility: CLINIC | Age: 81
End: 2021-08-23
Payer: MEDICARE

## 2021-08-23 VITALS
HEART RATE: 68 BPM | OXYGEN SATURATION: 99 % | WEIGHT: 158 LBS | DIASTOLIC BLOOD PRESSURE: 78 MMHG | SYSTOLIC BLOOD PRESSURE: 126 MMHG | BODY MASS INDEX: 29.83 KG/M2 | HEIGHT: 61 IN

## 2021-08-23 DIAGNOSIS — E78.5 HYPERLIPIDEMIA, UNSPECIFIED HYPERLIPIDEMIA TYPE: ICD-10-CM

## 2021-08-23 DIAGNOSIS — I10 ESSENTIAL HYPERTENSION: ICD-10-CM

## 2021-08-23 DIAGNOSIS — R29.898 WEAKNESS OF BOTH LOWER EXTREMITIES: ICD-10-CM

## 2021-08-23 DIAGNOSIS — R53.83 FATIGUE, UNSPECIFIED TYPE: ICD-10-CM

## 2021-08-23 DIAGNOSIS — R73.09 ELEVATED GLUCOSE LEVEL: ICD-10-CM

## 2021-08-23 DIAGNOSIS — R53.83 FATIGUE, UNSPECIFIED TYPE: Primary | ICD-10-CM

## 2021-08-23 LAB
ALBUMIN SERPL BCP-MCNC: 4 G/DL (ref 3.5–5.2)
ALP SERPL-CCNC: 169 U/L (ref 55–135)
ALT SERPL W/O P-5'-P-CCNC: 20 U/L (ref 10–44)
ANION GAP SERPL CALC-SCNC: 12 MMOL/L (ref 8–16)
AST SERPL-CCNC: 21 U/L (ref 10–40)
BASOPHILS # BLD AUTO: 0.07 K/UL (ref 0–0.2)
BASOPHILS NFR BLD: 1.2 % (ref 0–1.9)
BILIRUB SERPL-MCNC: 0.6 MG/DL (ref 0.1–1)
BUN SERPL-MCNC: 14 MG/DL (ref 8–23)
CALCIUM SERPL-MCNC: 9.6 MG/DL (ref 8.7–10.5)
CHLORIDE SERPL-SCNC: 105 MMOL/L (ref 95–110)
CHOLEST SERPL-MCNC: 166 MG/DL (ref 120–199)
CHOLEST/HDLC SERPL: 2.4 {RATIO} (ref 2–5)
CO2 SERPL-SCNC: 23 MMOL/L (ref 23–29)
CREAT SERPL-MCNC: 0.8 MG/DL (ref 0.5–1.4)
DIFFERENTIAL METHOD: ABNORMAL
EOSINOPHIL # BLD AUTO: 0.3 K/UL (ref 0–0.5)
EOSINOPHIL NFR BLD: 4.9 % (ref 0–8)
ERYTHROCYTE [DISTWIDTH] IN BLOOD BY AUTOMATED COUNT: 12.8 % (ref 11.5–14.5)
ERYTHROCYTE [SEDIMENTATION RATE] IN BLOOD BY WESTERGREN METHOD: 40 MM/HR (ref 0–36)
EST. GFR  (AFRICAN AMERICAN): >60 ML/MIN/1.73 M^2
EST. GFR  (NON AFRICAN AMERICAN): >60 ML/MIN/1.73 M^2
ESTIMATED AVG GLUCOSE: 111 MG/DL (ref 68–131)
GLUCOSE SERPL-MCNC: 119 MG/DL (ref 70–110)
HBA1C MFR BLD: 5.5 % (ref 4–5.6)
HCT VFR BLD AUTO: 42 % (ref 37–48.5)
HDLC SERPL-MCNC: 69 MG/DL (ref 40–75)
HDLC SERPL: 41.6 % (ref 20–50)
HGB BLD-MCNC: 13.5 G/DL (ref 12–16)
IMM GRANULOCYTES # BLD AUTO: 0.01 K/UL (ref 0–0.04)
IMM GRANULOCYTES NFR BLD AUTO: 0.2 % (ref 0–0.5)
LDLC SERPL CALC-MCNC: 72.2 MG/DL (ref 63–159)
LYMPHOCYTES # BLD AUTO: 1.4 K/UL (ref 1–4.8)
LYMPHOCYTES NFR BLD: 24.1 % (ref 18–48)
MCH RBC QN AUTO: 32.4 PG (ref 27–31)
MCHC RBC AUTO-ENTMCNC: 32.1 G/DL (ref 32–36)
MCV RBC AUTO: 101 FL (ref 82–98)
MONOCYTES # BLD AUTO: 0.5 K/UL (ref 0.3–1)
MONOCYTES NFR BLD: 8.4 % (ref 4–15)
NEUTROPHILS # BLD AUTO: 3.6 K/UL (ref 1.8–7.7)
NEUTROPHILS NFR BLD: 61.2 % (ref 38–73)
NONHDLC SERPL-MCNC: 97 MG/DL
NRBC BLD-RTO: 0 /100 WBC
PLATELET # BLD AUTO: 255 K/UL (ref 150–450)
PMV BLD AUTO: 9.9 FL (ref 9.2–12.9)
POTASSIUM SERPL-SCNC: 5.1 MMOL/L (ref 3.5–5.1)
PROT SERPL-MCNC: 7.7 G/DL (ref 6–8.4)
RBC # BLD AUTO: 4.17 M/UL (ref 4–5.4)
SODIUM SERPL-SCNC: 140 MMOL/L (ref 136–145)
TRIGL SERPL-MCNC: 124 MG/DL (ref 30–150)
TSH SERPL DL<=0.005 MIU/L-ACNC: 0.77 UIU/ML (ref 0.4–4)
WBC # BLD AUTO: 5.93 K/UL (ref 3.9–12.7)

## 2021-08-23 PROCEDURE — 80053 COMPREHEN METABOLIC PANEL: CPT | Performed by: INTERNAL MEDICINE

## 2021-08-23 PROCEDURE — 80061 LIPID PANEL: CPT | Performed by: INTERNAL MEDICINE

## 2021-08-23 PROCEDURE — 99214 PR OFFICE/OUTPT VISIT, EST, LEVL IV, 30-39 MIN: ICD-10-PCS | Mod: S$PBB,,, | Performed by: INTERNAL MEDICINE

## 2021-08-23 PROCEDURE — 99999 PR PBB SHADOW E&M-EST. PATIENT-LVL IV: CPT | Mod: PBBFAC,,, | Performed by: INTERNAL MEDICINE

## 2021-08-23 PROCEDURE — 36415 COLL VENOUS BLD VENIPUNCTURE: CPT | Performed by: INTERNAL MEDICINE

## 2021-08-23 PROCEDURE — 83036 HEMOGLOBIN GLYCOSYLATED A1C: CPT | Performed by: INTERNAL MEDICINE

## 2021-08-23 PROCEDURE — 99214 OFFICE O/P EST MOD 30 MIN: CPT | Mod: PBBFAC | Performed by: INTERNAL MEDICINE

## 2021-08-23 PROCEDURE — 99214 OFFICE O/P EST MOD 30 MIN: CPT | Mod: S$PBB,,, | Performed by: INTERNAL MEDICINE

## 2021-08-23 PROCEDURE — 82550 ASSAY OF CK (CPK): CPT | Performed by: INTERNAL MEDICINE

## 2021-08-23 PROCEDURE — 84443 ASSAY THYROID STIM HORMONE: CPT | Performed by: INTERNAL MEDICINE

## 2021-08-23 PROCEDURE — 99999 PR PBB SHADOW E&M-EST. PATIENT-LVL IV: ICD-10-PCS | Mod: PBBFAC,,, | Performed by: INTERNAL MEDICINE

## 2021-08-23 PROCEDURE — 85025 COMPLETE CBC W/AUTO DIFF WBC: CPT | Performed by: INTERNAL MEDICINE

## 2021-08-23 PROCEDURE — 85652 RBC SED RATE AUTOMATED: CPT | Performed by: INTERNAL MEDICINE

## 2021-08-24 LAB — CK SERPL-CCNC: 94 U/L (ref 20–180)

## 2021-08-26 ENCOUNTER — PATIENT MESSAGE (OUTPATIENT)
Dept: INTERNAL MEDICINE | Facility: CLINIC | Age: 81
End: 2021-08-26

## 2021-09-07 ENCOUNTER — PATIENT OUTREACH (OUTPATIENT)
Dept: ADMINISTRATIVE | Facility: OTHER | Age: 81
End: 2021-09-07

## 2021-09-09 DIAGNOSIS — I10 HYPERTENSION, UNSPECIFIED TYPE: ICD-10-CM

## 2021-09-10 RX ORDER — NIFEDIPINE 30 MG/1
TABLET, EXTENDED RELEASE ORAL
Qty: 90 TABLET | Refills: 3 | Status: SHIPPED | OUTPATIENT
Start: 2021-09-10 | End: 2022-12-14

## 2021-09-16 ENCOUNTER — TELEPHONE (OUTPATIENT)
Dept: OPTOMETRY | Facility: CLINIC | Age: 81
End: 2021-09-16

## 2021-09-22 ENCOUNTER — TELEPHONE (OUTPATIENT)
Dept: OPHTHALMOLOGY | Facility: CLINIC | Age: 81
End: 2021-09-22

## 2021-09-27 ENCOUNTER — OFFICE VISIT (OUTPATIENT)
Dept: CARDIOLOGY | Facility: CLINIC | Age: 81
End: 2021-09-27
Payer: MEDICARE

## 2021-09-27 VITALS
HEART RATE: 61 BPM | BODY MASS INDEX: 29.54 KG/M2 | RESPIRATION RATE: 20 BRPM | SYSTOLIC BLOOD PRESSURE: 142 MMHG | WEIGHT: 156.44 LBS | HEIGHT: 61 IN | DIASTOLIC BLOOD PRESSURE: 75 MMHG

## 2021-09-27 DIAGNOSIS — I35.0 NONRHEUMATIC AORTIC VALVE STENOSIS: ICD-10-CM

## 2021-09-27 DIAGNOSIS — E78.5 HYPERLIPIDEMIA, UNSPECIFIED HYPERLIPIDEMIA TYPE: ICD-10-CM

## 2021-09-27 DIAGNOSIS — I10 ESSENTIAL HYPERTENSION: ICD-10-CM

## 2021-09-27 PROCEDURE — 99213 OFFICE O/P EST LOW 20 MIN: CPT | Mod: PBBFAC,PO | Performed by: INTERNAL MEDICINE

## 2021-09-27 PROCEDURE — 99999 PR PBB SHADOW E&M-EST. PATIENT-LVL III: ICD-10-PCS | Mod: PBBFAC,,, | Performed by: INTERNAL MEDICINE

## 2021-09-27 PROCEDURE — 99999 PR PBB SHADOW E&M-EST. PATIENT-LVL III: CPT | Mod: PBBFAC,,, | Performed by: INTERNAL MEDICINE

## 2021-09-27 PROCEDURE — 99214 PR OFFICE/OUTPT VISIT, EST, LEVL IV, 30-39 MIN: ICD-10-PCS | Mod: S$PBB,,, | Performed by: INTERNAL MEDICINE

## 2021-09-27 PROCEDURE — 99214 OFFICE O/P EST MOD 30 MIN: CPT | Mod: S$PBB,,, | Performed by: INTERNAL MEDICINE

## 2021-10-05 ENCOUNTER — OFFICE VISIT (OUTPATIENT)
Dept: OPTOMETRY | Facility: CLINIC | Age: 81
End: 2021-10-05
Payer: MEDICARE

## 2021-10-05 ENCOUNTER — IMMUNIZATION (OUTPATIENT)
Dept: INTERNAL MEDICINE | Facility: CLINIC | Age: 81
End: 2021-10-05
Payer: MEDICARE

## 2021-10-05 DIAGNOSIS — H52.7 REFRACTIVE ERROR: ICD-10-CM

## 2021-10-05 DIAGNOSIS — H25.13 NUCLEAR SCLEROSIS, BILATERAL: Primary | ICD-10-CM

## 2021-10-05 DIAGNOSIS — Z23 NEED FOR VACCINATION: Primary | ICD-10-CM

## 2021-10-05 DIAGNOSIS — H04.123 BILATERAL DRY EYES: ICD-10-CM

## 2021-10-05 DIAGNOSIS — H35.363 DEGENERATIVE RETINAL DRUSEN OF BOTH EYES: ICD-10-CM

## 2021-10-05 DIAGNOSIS — H53.032 AMBLYOPIA, STRABISMIC, LEFT: ICD-10-CM

## 2021-10-05 PROCEDURE — 91300 COVID-19, MRNA, LNP-S, PF, 30 MCG/0.3 ML DOSE VACCINE: CPT | Mod: PBBFAC

## 2021-10-05 PROCEDURE — 99213 OFFICE O/P EST LOW 20 MIN: CPT | Mod: PBBFAC | Performed by: OPTOMETRIST

## 2021-10-05 PROCEDURE — 99999 PR PBB SHADOW E&M-EST. PATIENT-LVL III: ICD-10-PCS | Mod: PBBFAC,,, | Performed by: OPTOMETRIST

## 2021-10-05 PROCEDURE — 92014 COMPRE OPH EXAM EST PT 1/>: CPT | Mod: S$PBB,,, | Performed by: OPTOMETRIST

## 2021-10-05 PROCEDURE — 92015 PR REFRACTION: ICD-10-PCS | Mod: ,,, | Performed by: OPTOMETRIST

## 2021-10-05 PROCEDURE — 0003A COVID-19, MRNA, LNP-S, PF, 30 MCG/0.3 ML DOSE VACCINE: CPT | Mod: CV19,PBBFAC | Performed by: INTERNAL MEDICINE

## 2021-10-05 PROCEDURE — 92014 PR EYE EXAM, EST PATIENT,COMPREHESV: ICD-10-PCS | Mod: S$PBB,,, | Performed by: OPTOMETRIST

## 2021-10-05 PROCEDURE — 99999 PR PBB SHADOW E&M-EST. PATIENT-LVL III: CPT | Mod: PBBFAC,,, | Performed by: OPTOMETRIST

## 2021-10-05 PROCEDURE — 92015 DETERMINE REFRACTIVE STATE: CPT | Mod: ,,, | Performed by: OPTOMETRIST

## 2021-10-19 ENCOUNTER — HOSPITAL ENCOUNTER (OUTPATIENT)
Dept: CARDIOLOGY | Facility: HOSPITAL | Age: 81
Discharge: HOME | End: 2021-10-19
Attending: INTERNAL MEDICINE
Payer: MEDICARE

## 2021-10-19 VITALS
BODY MASS INDEX: 29.45 KG/M2 | HEIGHT: 61 IN | SYSTOLIC BLOOD PRESSURE: 110 MMHG | WEIGHT: 156 LBS | HEART RATE: 59 BPM | DIASTOLIC BLOOD PRESSURE: 60 MMHG

## 2021-10-19 DIAGNOSIS — I35.0 NONRHEUMATIC AORTIC VALVE STENOSIS: ICD-10-CM

## 2021-10-19 LAB
ASCENDING AORTA: 2.53 CM
AV INDEX (PROSTH): 0.32
AV MEAN GRADIENT: 28 MMHG
AV PEAK GRADIENT: 46 MMHG
AV VALVE AREA: 0.92 CM2
AV VELOCITY RATIO: 0.37
BSA FOR ECHO PROCEDURE: 1.75 M2
CV ECHO LV RWT: 0.36 CM
DOP CALC AO PEAK VEL: 3.39 M/S
DOP CALC AO VTI: 95.6 CM
DOP CALC LVOT AREA: 2.8 CM2
DOP CALC LVOT DIAMETER: 1.9 CM
DOP CALC LVOT PEAK VEL: 1.25 M/S
DOP CALC LVOT STROKE VOLUME: 87.74 CM3
DOP CALC MV VTI: 43.04 CM
DOP CALC RVOT PEAK VEL: 0.81 M/S
DOP CALC RVOT VTI: 20.71 CM
DOP CALCLVOT PEAK VEL VTI: 30.96 CM
E WAVE DECELERATION TIME: 209.26 MSEC
E/A RATIO: 0.8
E/E' RATIO: 25 M/S
ECHO LV POSTERIOR WALL: 0.67 CM (ref 0.6–1.1)
EJECTION FRACTION: 65 %
FRACTIONAL SHORTENING: 30 % (ref 28–44)
INTERVENTRICULAR SEPTUM: 0.7 CM (ref 0.6–1.1)
IVRT: 105.61 MSEC
LA MAJOR: 4.15 CM
LA MINOR: 4.33 CM
LA WIDTH: 3.12 CM
LEFT ATRIUM SIZE: 3.3 CM
LEFT ATRIUM VOLUME INDEX MOD: 15.4 ML/M2
LEFT ATRIUM VOLUME INDEX: 21.8 ML/M2
LEFT ATRIUM VOLUME MOD: 26.26 CM3
LEFT ATRIUM VOLUME: 37.09 CM3
LEFT INTERNAL DIMENSION IN SYSTOLE: 2.57 CM (ref 2.1–4)
LEFT VENTRICLE DIASTOLIC VOLUME INDEX: 33.86 ML/M2
LEFT VENTRICLE DIASTOLIC VOLUME: 57.56 ML
LEFT VENTRICLE MASS INDEX: 39 G/M2
LEFT VENTRICLE SYSTOLIC VOLUME INDEX: 14 ML/M2
LEFT VENTRICLE SYSTOLIC VOLUME: 23.83 ML
LEFT VENTRICULAR INTERNAL DIMENSION IN DIASTOLE: 3.68 CM (ref 3.5–6)
LEFT VENTRICULAR MASS: 66.29 G
LV LATERAL E/E' RATIO: 25 M/S
LV SEPTAL E/E' RATIO: 25 M/S
MV A" WAVE DURATION": 14.56 MSEC
MV MEAN GRADIENT: 1 MMHG
MV PEAK A VEL: 0.94 M/S
MV PEAK E VEL: 0.75 M/S
MV PEAK GRADIENT: 7 MMHG
MV STENOSIS PRESSURE HALF TIME: 60.68 MS
MV VALVE AREA BY CONTINUITY EQUATION: 2.04 CM2
MV VALVE AREA P 1/2 METHOD: 3.63 CM2
PISA TR MAX VEL: 2.11 M/S
PULM VEIN S/D RATIO: 1.82
PV MEAN GRADIENT: 1 MMHG
PV PEAK D VEL: 0.33 M/S
PV PEAK S VEL: 0.6 M/S
PV PEAK VELOCITY: 1.06 CM/S
RA MAJOR: 4.38 CM
RA PRESSURE: 3 MMHG
RA WIDTH: 3.07 CM
RIGHT VENTRICULAR END-DIASTOLIC DIMENSION: 2.44 CM
SINUS: 2.45 CM
STJ: 2.2 CM
TDI LATERAL: 0.03 M/S
TDI SEPTAL: 0.03 M/S
TDI: 0.03 M/S
TR MAX PG: 18 MMHG
TRICUSPID ANNULAR PLANE SYSTOLIC EXCURSION: 2.35 CM
TV REST PULMONARY ARTERY PRESSURE: 21 MMHG

## 2021-10-19 PROCEDURE — 93306 TTE W/DOPPLER COMPLETE: CPT | Mod: 26,,, | Performed by: INTERNAL MEDICINE

## 2021-10-19 PROCEDURE — 93306 ECHO (CUPID ONLY): ICD-10-PCS | Mod: 26,,, | Performed by: INTERNAL MEDICINE

## 2021-10-19 PROCEDURE — 93306 TTE W/DOPPLER COMPLETE: CPT

## 2021-10-31 ENCOUNTER — EXTERNAL CHRONIC CARE MANAGEMENT (OUTPATIENT)
Dept: PRIMARY CARE CLINIC | Facility: CLINIC | Age: 81
End: 2021-10-31
Payer: MEDICARE

## 2021-10-31 PROCEDURE — 99490 CHRNC CARE MGMT STAFF 1ST 20: CPT | Mod: PBBFAC | Performed by: INTERNAL MEDICINE

## 2021-10-31 PROCEDURE — 99490 PR CHRONIC CARE MGMT, 1ST 20 MIN: ICD-10-PCS | Mod: S$PBB,,, | Performed by: INTERNAL MEDICINE

## 2021-10-31 PROCEDURE — 99490 CHRNC CARE MGMT STAFF 1ST 20: CPT | Mod: S$PBB,,, | Performed by: INTERNAL MEDICINE

## 2021-11-30 ENCOUNTER — EXTERNAL CHRONIC CARE MANAGEMENT (OUTPATIENT)
Dept: PRIMARY CARE CLINIC | Facility: CLINIC | Age: 81
End: 2021-11-30
Payer: MEDICARE

## 2021-11-30 PROCEDURE — 99490 CHRNC CARE MGMT STAFF 1ST 20: CPT | Mod: PBBFAC | Performed by: INTERNAL MEDICINE

## 2021-11-30 PROCEDURE — 99490 PR CHRONIC CARE MGMT, 1ST 20 MIN: ICD-10-PCS | Mod: S$PBB,,, | Performed by: INTERNAL MEDICINE

## 2021-11-30 PROCEDURE — 99490 CHRNC CARE MGMT STAFF 1ST 20: CPT | Mod: S$PBB,,, | Performed by: INTERNAL MEDICINE

## 2021-12-13 ENCOUNTER — PATIENT MESSAGE (OUTPATIENT)
Dept: INTERNAL MEDICINE | Facility: CLINIC | Age: 81
End: 2021-12-13
Payer: MEDICARE

## 2021-12-13 RX ORDER — SULFAMETHOXAZOLE AND TRIMETHOPRIM 800; 160 MG/1; MG/1
1 TABLET ORAL 2 TIMES DAILY
Qty: 10 TABLET | Refills: 0 | Status: SHIPPED | OUTPATIENT
Start: 2021-12-13 | End: 2021-12-13

## 2021-12-13 RX ORDER — SULFAMETHOXAZOLE AND TRIMETHOPRIM 800; 160 MG/1; MG/1
1 TABLET ORAL 2 TIMES DAILY
Qty: 10 TABLET | Refills: 0 | Status: SHIPPED | OUTPATIENT
Start: 2021-12-13 | End: 2021-12-18

## 2021-12-14 ENCOUNTER — PATIENT MESSAGE (OUTPATIENT)
Dept: INTERNAL MEDICINE | Facility: CLINIC | Age: 81
End: 2021-12-14
Payer: MEDICARE

## 2021-12-31 ENCOUNTER — EXTERNAL CHRONIC CARE MANAGEMENT (OUTPATIENT)
Dept: PRIMARY CARE CLINIC | Facility: CLINIC | Age: 81
End: 2021-12-31
Payer: MEDICARE

## 2021-12-31 PROCEDURE — 99490 CHRNC CARE MGMT STAFF 1ST 20: CPT | Mod: S$PBB,,, | Performed by: INTERNAL MEDICINE

## 2021-12-31 PROCEDURE — 99490 CHRNC CARE MGMT STAFF 1ST 20: CPT | Mod: PBBFAC | Performed by: INTERNAL MEDICINE

## 2021-12-31 PROCEDURE — 99490 PR CHRONIC CARE MGMT, 1ST 20 MIN: ICD-10-PCS | Mod: S$PBB,,, | Performed by: INTERNAL MEDICINE

## 2022-02-28 ENCOUNTER — EXTERNAL CHRONIC CARE MANAGEMENT (OUTPATIENT)
Dept: PRIMARY CARE CLINIC | Facility: CLINIC | Age: 82
End: 2022-02-28
Payer: MEDICARE

## 2022-02-28 PROCEDURE — 99490 CHRNC CARE MGMT STAFF 1ST 20: CPT | Mod: PBBFAC | Performed by: INTERNAL MEDICINE

## 2022-02-28 PROCEDURE — 99490 CHRNC CARE MGMT STAFF 1ST 20: CPT | Mod: S$PBB,,, | Performed by: INTERNAL MEDICINE

## 2022-02-28 PROCEDURE — 99490 PR CHRONIC CARE MGMT, 1ST 20 MIN: ICD-10-PCS | Mod: S$PBB,,, | Performed by: INTERNAL MEDICINE

## 2022-03-14 ENCOUNTER — OFFICE VISIT (OUTPATIENT)
Dept: INTERNAL MEDICINE | Facility: CLINIC | Age: 82
End: 2022-03-14
Payer: MEDICARE

## 2022-03-14 ENCOUNTER — LAB VISIT (OUTPATIENT)
Dept: LAB | Facility: HOSPITAL | Age: 82
End: 2022-03-14
Attending: INTERNAL MEDICINE
Payer: MEDICARE

## 2022-03-14 VITALS
HEIGHT: 61 IN | WEIGHT: 148.56 LBS | SYSTOLIC BLOOD PRESSURE: 120 MMHG | OXYGEN SATURATION: 99 % | DIASTOLIC BLOOD PRESSURE: 76 MMHG | HEART RATE: 68 BPM | BODY MASS INDEX: 28.05 KG/M2

## 2022-03-14 DIAGNOSIS — F33.41 RECURRENT MAJOR DEPRESSIVE DISORDER, IN PARTIAL REMISSION: ICD-10-CM

## 2022-03-14 DIAGNOSIS — R26.81 GAIT INSTABILITY: ICD-10-CM

## 2022-03-14 DIAGNOSIS — I10 PRIMARY HYPERTENSION: ICD-10-CM

## 2022-03-14 DIAGNOSIS — R73.09 ELEVATED GLUCOSE LEVEL: ICD-10-CM

## 2022-03-14 DIAGNOSIS — I70.0 AORTIC ATHEROSCLEROSIS: ICD-10-CM

## 2022-03-14 DIAGNOSIS — R42 DIZZINESS AND GIDDINESS: ICD-10-CM

## 2022-03-14 DIAGNOSIS — D75.89 MACROCYTOSIS WITHOUT ANEMIA: ICD-10-CM

## 2022-03-14 DIAGNOSIS — I10 PRIMARY HYPERTENSION: Primary | ICD-10-CM

## 2022-03-14 DIAGNOSIS — R29.898 WEAKNESS OF BOTH LOWER EXTREMITIES: ICD-10-CM

## 2022-03-14 DIAGNOSIS — E78.5 HYPERLIPIDEMIA, UNSPECIFIED HYPERLIPIDEMIA TYPE: ICD-10-CM

## 2022-03-14 DIAGNOSIS — H53.123 TRANSIENT VISUAL LOSS, BILATERAL: ICD-10-CM

## 2022-03-14 LAB
ALBUMIN SERPL BCP-MCNC: 4 G/DL (ref 3.5–5.2)
ALP SERPL-CCNC: 120 U/L (ref 55–135)
ALT SERPL W/O P-5'-P-CCNC: 20 U/L (ref 10–44)
ANION GAP SERPL CALC-SCNC: 10 MMOL/L (ref 8–16)
AST SERPL-CCNC: 19 U/L (ref 10–40)
BASOPHILS # BLD AUTO: 0.07 K/UL (ref 0–0.2)
BASOPHILS NFR BLD: 1.2 % (ref 0–1.9)
BILIRUB SERPL-MCNC: 0.6 MG/DL (ref 0.1–1)
BUN SERPL-MCNC: 16 MG/DL (ref 8–23)
CALCIUM SERPL-MCNC: 10.4 MG/DL (ref 8.7–10.5)
CHLORIDE SERPL-SCNC: 104 MMOL/L (ref 95–110)
CO2 SERPL-SCNC: 28 MMOL/L (ref 23–29)
CREAT SERPL-MCNC: 0.8 MG/DL (ref 0.5–1.4)
DIFFERENTIAL METHOD: ABNORMAL
EOSINOPHIL # BLD AUTO: 0.3 K/UL (ref 0–0.5)
EOSINOPHIL NFR BLD: 5.3 % (ref 0–8)
ERYTHROCYTE [DISTWIDTH] IN BLOOD BY AUTOMATED COUNT: 12.1 % (ref 11.5–14.5)
ERYTHROCYTE [SEDIMENTATION RATE] IN BLOOD BY WESTERGREN METHOD: 30 MM/HR (ref 0–36)
EST. GFR  (AFRICAN AMERICAN): >60 ML/MIN/1.73 M^2
EST. GFR  (NON AFRICAN AMERICAN): >60 ML/MIN/1.73 M^2
ESTIMATED AVG GLUCOSE: 105 MG/DL (ref 68–131)
GLUCOSE SERPL-MCNC: 120 MG/DL (ref 70–110)
HBA1C MFR BLD: 5.3 % (ref 4–5.6)
HCT VFR BLD AUTO: 45.2 % (ref 37–48.5)
HGB BLD-MCNC: 14.3 G/DL (ref 12–16)
IMM GRANULOCYTES # BLD AUTO: 0.02 K/UL (ref 0–0.04)
IMM GRANULOCYTES NFR BLD AUTO: 0.4 % (ref 0–0.5)
LYMPHOCYTES # BLD AUTO: 1.3 K/UL (ref 1–4.8)
LYMPHOCYTES NFR BLD: 22.3 % (ref 18–48)
MCH RBC QN AUTO: 32.4 PG (ref 27–31)
MCHC RBC AUTO-ENTMCNC: 31.6 G/DL (ref 32–36)
MCV RBC AUTO: 103 FL (ref 82–98)
MONOCYTES # BLD AUTO: 0.4 K/UL (ref 0.3–1)
MONOCYTES NFR BLD: 7.4 % (ref 4–15)
NEUTROPHILS # BLD AUTO: 3.6 K/UL (ref 1.8–7.7)
NEUTROPHILS NFR BLD: 63.4 % (ref 38–73)
NRBC BLD-RTO: 0 /100 WBC
PLATELET # BLD AUTO: 236 K/UL (ref 150–450)
PMV BLD AUTO: 10.1 FL (ref 9.2–12.9)
POTASSIUM SERPL-SCNC: 4.2 MMOL/L (ref 3.5–5.1)
PROT SERPL-MCNC: 8.1 G/DL (ref 6–8.4)
RBC # BLD AUTO: 4.41 M/UL (ref 4–5.4)
SODIUM SERPL-SCNC: 142 MMOL/L (ref 136–145)
VIT B12 SERPL-MCNC: 298 PG/ML (ref 210–950)
WBC # BLD AUTO: 5.65 K/UL (ref 3.9–12.7)

## 2022-03-14 PROCEDURE — 85652 RBC SED RATE AUTOMATED: CPT | Performed by: INTERNAL MEDICINE

## 2022-03-14 PROCEDURE — 99214 OFFICE O/P EST MOD 30 MIN: CPT | Mod: S$PBB,,, | Performed by: INTERNAL MEDICINE

## 2022-03-14 PROCEDURE — 83036 HEMOGLOBIN GLYCOSYLATED A1C: CPT | Performed by: INTERNAL MEDICINE

## 2022-03-14 PROCEDURE — 99214 PR OFFICE/OUTPT VISIT, EST, LEVL IV, 30-39 MIN: ICD-10-PCS | Mod: S$PBB,,, | Performed by: INTERNAL MEDICINE

## 2022-03-14 PROCEDURE — 82607 VITAMIN B-12: CPT | Performed by: INTERNAL MEDICINE

## 2022-03-14 PROCEDURE — 80053 COMPREHEN METABOLIC PANEL: CPT | Performed by: INTERNAL MEDICINE

## 2022-03-14 PROCEDURE — 36415 COLL VENOUS BLD VENIPUNCTURE: CPT | Performed by: INTERNAL MEDICINE

## 2022-03-14 PROCEDURE — 99214 OFFICE O/P EST MOD 30 MIN: CPT | Mod: PBBFAC | Performed by: INTERNAL MEDICINE

## 2022-03-14 PROCEDURE — 99999 PR PBB SHADOW E&M-EST. PATIENT-LVL IV: CPT | Mod: PBBFAC,,, | Performed by: INTERNAL MEDICINE

## 2022-03-14 PROCEDURE — 85025 COMPLETE CBC W/AUTO DIFF WBC: CPT | Performed by: INTERNAL MEDICINE

## 2022-03-14 PROCEDURE — 99999 PR PBB SHADOW E&M-EST. PATIENT-LVL IV: ICD-10-PCS | Mod: PBBFAC,,, | Performed by: INTERNAL MEDICINE

## 2022-03-14 RX ORDER — BUPROPION HYDROCHLORIDE 300 MG/1
300 TABLET ORAL DAILY
Qty: 90 TABLET | Refills: 4 | Status: SHIPPED | OUTPATIENT
Start: 2022-03-14 | End: 2022-09-23 | Stop reason: SDUPTHER

## 2022-03-14 NOTE — PROGRESS NOTES
Subjective:       Patient ID: Luz Amaro is a 81 y.o. female.    Chief Complaint: Gait Problem    HPI: Pt comes in for a few problems .  Patient states at times she feels unsteady, loses balance and occasional issues when she stands that she sees black spots in her vision.  That goes away when she sits down or lies down.  She does not have visual disturbances other than when she gets up quickly.  Of note is that the patient had an echo which showed moderate aortic stenosis done through Cardiology but I do not see a follow-up.  She thought she was supposed to follow-up this spring so we will assist her with that but I would like to do some other testing done.  She previously had a mildly elevated sed rate and some other lab issues which we will follow-up.  She said she feels drained unsteady and at times even out of breath if she gets up quickly.  She has not been exercising as much because she feels unsteady.  We talked about a cane or walker but she did not want to do that at this time    Hypertension  This is a chronic problem. The current episode started more than 1 year ago. The problem has been waxing and waning since onset. The problem is controlled. Associated symptoms include anxiety, malaise/fatigue, neck pain, palpitations and shortness of breath. Pertinent negatives include no blurred vision, chest pain, headaches, orthopnea, peripheral edema, PND or sweats. There are no associated agents to hypertension. Risk factors for coronary artery disease include dyslipidemia, family history, obesity and stress. The current treatment provides moderate improvement. There are no compliance problems.      Review of Systems   Constitutional: Positive for fatigue and malaise/fatigue. Negative for appetite change, chills and fever.   HENT: Negative for nosebleeds and sore throat.    Eyes: Negative for blurred vision, pain and visual disturbance.   Respiratory: Positive for shortness of breath. Negative for  cough and wheezing.    Cardiovascular: Positive for palpitations. Negative for chest pain, orthopnea, leg swelling and PND.   Gastrointestinal: Negative for abdominal pain, constipation and diarrhea.   Endocrine: Negative for polyuria.   Genitourinary: Negative for difficulty urinating, hematuria and vaginal bleeding.   Musculoskeletal: Positive for arthralgias, gait problem and neck pain. Negative for back pain.   Integumentary:  Negative for pallor and rash.   Neurological: Negative for tremors, seizures and headaches.   Hematological: Does not bruise/bleed easily.   Psychiatric/Behavioral: Negative for dysphoric mood. The patient is not nervous/anxious.            Past Medical History:   Diagnosis Date    Amblyopia     Angio-edema     Basal cell carcinoma 2011    left cheek     BCC (basal cell carcinoma) 2010    left neck    Cataract     Hyperlipidemia     Hypertension     Strabismus      Past Surgical History:   Procedure Laterality Date    APPENDECTOMY      CHOLECYSTECTOMY      COLONOSCOPY N/A 12/14/2020    Procedure: COLONOSCOPY;  Surgeon: Giovanny Chao MD;  Location: [address];  Service: Endoscopy;  Laterality: N/A;  COVID test on 12/11/20 at Primary Care -     ECTOPIC PREGNANCY SURGERY      OOPHORECTOMY      SKIN BIOPSY        Patient Active Problem List   Diagnosis    Hypertension    Hyperlipidemia    Mixed incontinence    Nuclear sclerosis    Amblyopia, strabismic    Fear of flying    Chronic sialoadenitis    Osteopenia    Angioedema    Personal history of skin cancer    Nonrheumatic aortic valve stenosis    Diastolic dysfunction    History of colon polyps    Aortic atherosclerosis    Recurrent major depressive disorder, in partial remission        Objective:      Physical Exam  Constitutional:       General: She is not in acute distress.     Appearance: She is well-developed.   HENT:      Head: Normocephalic and atraumatic.      Right Ear: Tympanic membrane,  ear canal and external ear normal.      Left Ear: Tympanic membrane, ear canal and external ear normal.      Mouth/Throat:      Pharynx: No oropharyngeal exudate or posterior oropharyngeal erythema.   Eyes:      General: No scleral icterus.     Conjunctiva/sclera: Conjunctivae normal.      Pupils: Pupils are equal, round, and reactive to light.   Neck:      Thyroid: No thyromegaly.   Cardiovascular:      Rate and Rhythm: Normal rate and regular rhythm.      Pulses: Normal pulses.      Heart sounds: Murmur heard.   Pulmonary:      Effort: Pulmonary effort is normal.      Breath sounds: Normal breath sounds. No wheezing.   Abdominal:      General: Bowel sounds are normal. There is no distension.      Palpations: Abdomen is soft.      Tenderness: There is no abdominal tenderness.   Musculoskeletal:         General: No tenderness.      Cervical back: Normal range of motion and neck supple.      Right lower leg: No edema.      Left lower leg: No edema.   Lymphadenopathy:      Cervical: No cervical adenopathy.   Skin:     Coloration: Skin is not jaundiced.      Findings: No rash.   Neurological:      General: No focal deficit present.      Mental Status: She is alert and oriented to person, place, and time.      Motor: Weakness present.   Psychiatric:         Mood and Affect: Mood normal.         Behavior: Behavior normal.         Assessment:       Problem List Items Addressed This Visit        Psychiatric    Recurrent major depressive disorder, in partial remission       Cardiac/Vascular    Hypertension - Primary    Relevant Orders    Sedimentation rate    Comprehensive Metabolic Panel    CBC Auto Differential    Hemoglobin A1C    Vitamin B12    CT Head Without Contrast    US Carotid Bilateral    Hyperlipidemia    Relevant Orders    Sedimentation rate    Comprehensive Metabolic Panel    CBC Auto Differential    Hemoglobin A1C    Vitamin B12    CT Head Without Contrast    US Carotid Bilateral    Aortic atherosclerosis       Other Visit Diagnoses     Macrocytosis without anemia        Relevant Orders    Sedimentation rate    Comprehensive Metabolic Panel    CBC Auto Differential    Hemoglobin A1C    Vitamin B12    CT Head Without Contrast    US Carotid Bilateral    Elevated glucose level        Relevant Orders    Sedimentation rate    Comprehensive Metabolic Panel    CBC Auto Differential    Hemoglobin A1C    Vitamin B12    CT Head Without Contrast    US Carotid Bilateral    Weakness of both lower extremities        Relevant Orders    Sedimentation rate    Comprehensive Metabolic Panel    CBC Auto Differential    Hemoglobin A1C    Vitamin B12    CT Head Without Contrast    US Carotid Bilateral    Gait instability        Relevant Orders    Sedimentation rate    Comprehensive Metabolic Panel    CBC Auto Differential    Hemoglobin A1C    Vitamin B12    CT Head Without Contrast    US Carotid Bilateral    Dizziness and giddiness        Relevant Orders    CT Head Without Contrast    US Carotid Bilateral    Transient visual loss, bilateral         Relevant Orders    US Carotid Bilateral          Plan:         Luz was seen today for gait problem.    Diagnoses and all orders for this visit:    Primary hypertension  -     Sedimentation rate; Future  -     Comprehensive Metabolic Panel; Future  -     CBC Auto Differential; Future  -     Hemoglobin A1C; Future  -     Vitamin B12; Future  -     CT Head Without Contrast; Future  -     US Carotid Bilateral; Future    Hyperlipidemia, unspecified hyperlipidemia type  -     Sedimentation rate; Future  -     Comprehensive Metabolic Panel; Future  -     CBC Auto Differential; Future  -     Hemoglobin A1C; Future  -     Vitamin B12; Future  -     CT Head Without Contrast; Future  -     US Carotid Bilateral; Future    Macrocytosis without anemia  -     Sedimentation rate; Future  -     Comprehensive Metabolic Panel; Future  -     CBC Auto Differential; Future  -     Hemoglobin A1C; Future  -      Vitamin B12; Future  -     CT Head Without Contrast; Future  -     US Carotid Bilateral; Future    Elevated glucose level  -     Sedimentation rate; Future  -     Comprehensive Metabolic Panel; Future  -     CBC Auto Differential; Future  -     Hemoglobin A1C; Future  -     Vitamin B12; Future  -     CT Head Without Contrast; Future  -     US Carotid Bilateral; Future    Weakness of both lower extremities  -     Sedimentation rate; Future  -     Comprehensive Metabolic Panel; Future  -     CBC Auto Differential; Future  -     Hemoglobin A1C; Future  -     Vitamin B12; Future  -     CT Head Without Contrast; Future  -     US Carotid Bilateral; Future    Gait instability  -     Sedimentation rate; Future  -     Comprehensive Metabolic Panel; Future  -     CBC Auto Differential; Future  -     Hemoglobin A1C; Future  -     Vitamin B12; Future  -     CT Head Without Contrast; Future  -     US Carotid Bilateral; Future    Dizziness and giddiness  -     CT Head Without Contrast; Future  -     US Carotid Bilateral; Future    Transient visual loss, bilateral   -     US Carotid Bilateral; Future    Recurrent major depressive disorder, in partial remission  Comments:  Refill medication. Continue to monitor    Aortic atherosclerosis  Comments:  FOllowed by Cardiology. Continue to see them. Echo reviewed.     Other orders  -     buPROPion (WELLBUTRIN XL) 300 MG 24 hr tablet; Take 1 tablet (300 mg total) by mouth once daily.           follow-up with cardiology.  Follow-up after studies

## 2022-03-15 ENCOUNTER — HOSPITAL ENCOUNTER (OUTPATIENT)
Dept: RADIOLOGY | Facility: HOSPITAL | Age: 82
Discharge: HOME | End: 2022-03-15
Attending: INTERNAL MEDICINE
Payer: MEDICARE

## 2022-03-15 DIAGNOSIS — R29.898 WEAKNESS OF BOTH LOWER EXTREMITIES: ICD-10-CM

## 2022-03-15 DIAGNOSIS — H53.123 TRANSIENT VISUAL LOSS, BILATERAL: ICD-10-CM

## 2022-03-15 DIAGNOSIS — R73.09 ELEVATED GLUCOSE LEVEL: ICD-10-CM

## 2022-03-15 DIAGNOSIS — R26.81 GAIT INSTABILITY: ICD-10-CM

## 2022-03-15 DIAGNOSIS — E78.5 HYPERLIPIDEMIA, UNSPECIFIED HYPERLIPIDEMIA TYPE: ICD-10-CM

## 2022-03-15 DIAGNOSIS — R42 DIZZINESS AND GIDDINESS: ICD-10-CM

## 2022-03-15 DIAGNOSIS — I10 PRIMARY HYPERTENSION: ICD-10-CM

## 2022-03-15 DIAGNOSIS — D75.89 MACROCYTOSIS WITHOUT ANEMIA: ICD-10-CM

## 2022-03-15 PROCEDURE — 93880 US CAROTID BILATERAL: ICD-10-PCS | Mod: 26,,, | Performed by: RADIOLOGY

## 2022-03-15 PROCEDURE — 70450 CT HEAD/BRAIN W/O DYE: CPT | Mod: TC

## 2022-03-15 PROCEDURE — 70450 CT HEAD WITHOUT CONTRAST: ICD-10-PCS | Mod: 26,,, | Performed by: RADIOLOGY

## 2022-03-15 PROCEDURE — 93880 EXTRACRANIAL BILAT STUDY: CPT | Mod: 26,,, | Performed by: RADIOLOGY

## 2022-03-15 PROCEDURE — 93880 EXTRACRANIAL BILAT STUDY: CPT | Mod: TC

## 2022-03-15 PROCEDURE — 70450 CT HEAD/BRAIN W/O DYE: CPT | Mod: 26,,, | Performed by: RADIOLOGY

## 2022-03-21 ENCOUNTER — PATIENT MESSAGE (OUTPATIENT)
Dept: INTERNAL MEDICINE | Facility: CLINIC | Age: 82
End: 2022-03-21
Payer: MEDICARE

## 2022-03-21 DIAGNOSIS — I65.22 STENOSIS OF LEFT CAROTID ARTERY: Primary | ICD-10-CM

## 2022-03-22 ENCOUNTER — TELEPHONE (OUTPATIENT)
Dept: INTERNAL MEDICINE | Facility: CLINIC | Age: 82
End: 2022-03-22
Payer: MEDICARE

## 2022-03-22 NOTE — TELEPHONE ENCOUNTER
Please see if the patient read my Portal message and assist her with the vascular surgery consult. She was also supposed to follow up with her Cardiologist and if she has not had a Eye exam in over a year, she should make that too.     Let me know of any trouble or questions.

## 2022-03-22 NOTE — TELEPHONE ENCOUNTER
Spoke with patient to read portal message from PCP, patient stated she read the portal message, verbalized an understanding, patient declined to schedule vascular surgery appointment at this time, patient stated she would call back at a later date to schedule

## 2022-03-31 ENCOUNTER — EXTERNAL CHRONIC CARE MANAGEMENT (OUTPATIENT)
Dept: PRIMARY CARE CLINIC | Facility: CLINIC | Age: 82
End: 2022-03-31
Payer: MEDICARE

## 2022-03-31 PROCEDURE — 99490 PR CHRONIC CARE MGMT, 1ST 20 MIN: ICD-10-PCS | Mod: S$PBB,,, | Performed by: INTERNAL MEDICINE

## 2022-03-31 PROCEDURE — 99490 CHRNC CARE MGMT STAFF 1ST 20: CPT | Mod: S$PBB,,, | Performed by: INTERNAL MEDICINE

## 2022-03-31 PROCEDURE — 99439 CHRNC CARE MGMT STAF EA ADDL: CPT | Mod: S$PBB,,, | Performed by: INTERNAL MEDICINE

## 2022-03-31 PROCEDURE — 99439 PR CHRONIC CARE MGMT, EA ADDTL 20 MIN: ICD-10-PCS | Mod: S$PBB,,, | Performed by: INTERNAL MEDICINE

## 2022-03-31 PROCEDURE — 99490 CHRNC CARE MGMT STAFF 1ST 20: CPT | Mod: PBBFAC | Performed by: INTERNAL MEDICINE

## 2022-03-31 PROCEDURE — 99439 CHRNC CARE MGMT STAF EA ADDL: CPT | Mod: PBBFAC,27 | Performed by: INTERNAL MEDICINE

## 2022-04-30 ENCOUNTER — EXTERNAL CHRONIC CARE MANAGEMENT (OUTPATIENT)
Dept: PRIMARY CARE CLINIC | Facility: CLINIC | Age: 82
End: 2022-04-30
Payer: MEDICARE

## 2022-04-30 PROCEDURE — 99490 CHRNC CARE MGMT STAFF 1ST 20: CPT | Mod: S$PBB,,, | Performed by: INTERNAL MEDICINE

## 2022-04-30 PROCEDURE — 99490 CHRNC CARE MGMT STAFF 1ST 20: CPT | Mod: PBBFAC | Performed by: INTERNAL MEDICINE

## 2022-04-30 PROCEDURE — 99490 PR CHRONIC CARE MGMT, 1ST 20 MIN: ICD-10-PCS | Mod: S$PBB,,, | Performed by: INTERNAL MEDICINE

## 2022-05-11 ENCOUNTER — PATIENT OUTREACH (OUTPATIENT)
Dept: ADMINISTRATIVE | Facility: OTHER | Age: 82
End: 2022-05-11
Payer: MEDICARE

## 2022-05-11 ENCOUNTER — HOSPITAL ENCOUNTER (EMERGENCY)
Facility: HOSPITAL | Age: 82
Discharge: HOME | End: 2022-05-11
Attending: EMERGENCY MEDICINE
Payer: MEDICARE

## 2022-05-11 VITALS
TEMPERATURE: 98 F | OXYGEN SATURATION: 96 % | RESPIRATION RATE: 17 BRPM | HEART RATE: 72 BPM | BODY MASS INDEX: 27.96 KG/M2 | WEIGHT: 148 LBS | SYSTOLIC BLOOD PRESSURE: 147 MMHG | DIASTOLIC BLOOD PRESSURE: 69 MMHG

## 2022-05-11 DIAGNOSIS — R07.89 ATYPICAL CHEST PAIN: Primary | ICD-10-CM

## 2022-05-11 DIAGNOSIS — I35.0 AORTIC VALVE STENOSIS, ETIOLOGY OF CARDIAC VALVE DISEASE UNSPECIFIED: ICD-10-CM

## 2022-05-11 LAB
ALBUMIN SERPL BCP-MCNC: 3.9 G/DL (ref 3.5–5.2)
ALP SERPL-CCNC: 121 U/L (ref 55–135)
ALT SERPL W/O P-5'-P-CCNC: 10 U/L (ref 10–44)
ANION GAP SERPL CALC-SCNC: 13 MMOL/L (ref 8–16)
AST SERPL-CCNC: 14 U/L (ref 10–40)
BASOPHILS # BLD AUTO: 0.06 K/UL (ref 0–0.2)
BASOPHILS NFR BLD: 1.2 % (ref 0–1.9)
BILIRUB SERPL-MCNC: 0.4 MG/DL (ref 0.1–1)
BNP SERPL-MCNC: 89 PG/ML (ref 0–99)
BUN SERPL-MCNC: 20 MG/DL (ref 8–23)
CALCIUM SERPL-MCNC: 10 MG/DL (ref 8.7–10.5)
CHLORIDE SERPL-SCNC: 105 MMOL/L (ref 95–110)
CO2 SERPL-SCNC: 23 MMOL/L (ref 23–29)
CREAT SERPL-MCNC: 0.8 MG/DL (ref 0.5–1.4)
DIFFERENTIAL METHOD: ABNORMAL
EOSINOPHIL # BLD AUTO: 0.2 K/UL (ref 0–0.5)
EOSINOPHIL NFR BLD: 4.1 % (ref 0–8)
ERYTHROCYTE [DISTWIDTH] IN BLOOD BY AUTOMATED COUNT: 13.3 % (ref 11.5–14.5)
EST. GFR  (AFRICAN AMERICAN): >60 ML/MIN/1.73 M^2
EST. GFR  (NON AFRICAN AMERICAN): >60 ML/MIN/1.73 M^2
GLUCOSE SERPL-MCNC: 85 MG/DL (ref 70–110)
HCT VFR BLD AUTO: 40.7 % (ref 37–48.5)
HGB BLD-MCNC: 13.5 G/DL (ref 12–16)
IMM GRANULOCYTES # BLD AUTO: 0.02 K/UL (ref 0–0.04)
IMM GRANULOCYTES NFR BLD AUTO: 0.4 % (ref 0–0.5)
LYMPHOCYTES # BLD AUTO: 1.5 K/UL (ref 1–4.8)
LYMPHOCYTES NFR BLD: 29.4 % (ref 18–48)
MCH RBC QN AUTO: 32.4 PG (ref 27–31)
MCHC RBC AUTO-ENTMCNC: 33.2 G/DL (ref 32–36)
MCV RBC AUTO: 98 FL (ref 82–98)
MONOCYTES # BLD AUTO: 0.5 K/UL (ref 0.3–1)
MONOCYTES NFR BLD: 9.3 % (ref 4–15)
NEUTROPHILS # BLD AUTO: 2.7 K/UL (ref 1.8–7.7)
NEUTROPHILS NFR BLD: 55.6 % (ref 38–73)
NRBC BLD-RTO: 0 /100 WBC
PLATELET # BLD AUTO: 201 K/UL (ref 150–450)
PMV BLD AUTO: 9.9 FL (ref 9.2–12.9)
POTASSIUM SERPL-SCNC: 3.7 MMOL/L (ref 3.5–5.1)
PROT SERPL-MCNC: 7.8 G/DL (ref 6–8.4)
RBC # BLD AUTO: 4.17 M/UL (ref 4–5.4)
SODIUM SERPL-SCNC: 141 MMOL/L (ref 136–145)
TROPONIN I SERPL DL<=0.01 NG/ML-MCNC: <0.006 NG/ML (ref 0–0.03)
TROPONIN I SERPL DL<=0.01 NG/ML-MCNC: <0.006 NG/ML (ref 0–0.03)
WBC # BLD AUTO: 4.93 K/UL (ref 3.9–12.7)

## 2022-05-11 PROCEDURE — 93010 ELECTROCARDIOGRAM REPORT: CPT | Mod: ,,, | Performed by: INTERNAL MEDICINE

## 2022-05-11 PROCEDURE — 83880 ASSAY OF NATRIURETIC PEPTIDE: CPT | Performed by: PHYSICIAN ASSISTANT

## 2022-05-11 PROCEDURE — 99284 EMERGENCY DEPT VISIT MOD MDM: CPT | Mod: ,,, | Performed by: PHYSICIAN ASSISTANT

## 2022-05-11 PROCEDURE — 85025 COMPLETE CBC W/AUTO DIFF WBC: CPT | Performed by: PHYSICIAN ASSISTANT

## 2022-05-11 PROCEDURE — 25000003 PHARM REV CODE 250: Performed by: PHYSICIAN ASSISTANT

## 2022-05-11 PROCEDURE — 80053 COMPREHEN METABOLIC PANEL: CPT | Performed by: PHYSICIAN ASSISTANT

## 2022-05-11 PROCEDURE — 84484 ASSAY OF TROPONIN QUANT: CPT | Mod: 91 | Performed by: PHYSICIAN ASSISTANT

## 2022-05-11 PROCEDURE — 99285 EMERGENCY DEPT VISIT HI MDM: CPT | Mod: 25

## 2022-05-11 PROCEDURE — 93005 ELECTROCARDIOGRAM TRACING: CPT

## 2022-05-11 PROCEDURE — 99284 PR EMERGENCY DEPT VISIT,LEVEL IV: ICD-10-PCS | Mod: ,,, | Performed by: PHYSICIAN ASSISTANT

## 2022-05-11 PROCEDURE — 93010 EKG 12-LEAD: ICD-10-PCS | Mod: ,,, | Performed by: INTERNAL MEDICINE

## 2022-05-11 RX ORDER — NAPROXEN SODIUM 220 MG/1
243 TABLET, FILM COATED ORAL
Status: COMPLETED | OUTPATIENT
Start: 2022-05-11 | End: 2022-05-11

## 2022-05-11 RX ORDER — MAG HYDROX/ALUMINUM HYD/SIMETH 200-200-20
5 SUSPENSION, ORAL (FINAL DOSE FORM) ORAL
Status: COMPLETED | OUTPATIENT
Start: 2022-05-11 | End: 2022-05-11

## 2022-05-11 RX ADMIN — ASPIRIN 81 MG CHEWABLE TABLET 243 MG: 81 TABLET CHEWABLE at 06:05

## 2022-05-11 RX ADMIN — ALUMINUM HYDROXIDE, MAGNESIUM HYDROXIDE, AND SIMETHICONE 5 ML: 200; 200; 20 SUSPENSION ORAL at 07:05

## 2022-05-11 NOTE — ED NOTES
Pt presents to [facility] c/o midsternal CP that started td. Pt describes the pain as non-radiating/throbbing, rates pain 5/10 at this time. Pt denies any SOB, N/V/D, LOC, lightheadedness, dizziness, weakness, vision changes, or any other complaints at this time. PMHx includes HTN, HLD, aortic stenosis, BCC, angioedema, strabismus, and amblyopia. Pt AAOx4, VSS, NADN. Pt free from fall/injury, side rails up x2, bed in lowest/locked position, call bell next to pt. Pt instructed to call for assistance, verbalizes understanding. Will continue to monitor pt while in the ER.

## 2022-05-11 NOTE — ED PROVIDER NOTES
"Encounter Date: 5/11/2022       History     Chief Complaint   Patient presents with    Chest Pain     Started around 12:30 today; denies previous cardiac hx     The history is provided by the patient and medical records. No  was used.      Luz Amaro is a 81 y.o. female with medical history of HTN, HLD, Aortic Stenosis presenting to the ED with the chief complaint of chest pain.     Patient developed lower chest/epigastric localized "sharp" and "throbbing" sensation today at 12:30PM while sitting down at her computer. Reports pain has persistently been a 4-5/10 since onset. Reports having an egg salad sandwich about 30 minutes prior to onset. States pain feels different than acid reflux. Reports taking 81mg ASA with her daily morning medications. She feels a little SOB with exertion, but was able to walk across the ED parking lot without difficulty. Denies fever, neck pain, arthralgias, nausea, vomiting, urinary or bowel movement changes. Denies personal history of CAD.     Review of patient's allergies indicates:   Allergen Reactions    Ace inhibitors     Arb-angiotensin receptor antagonist     Amlodipine Swelling     Tongue swells     Past Medical History:   Diagnosis Date    Amblyopia     Angio-edema     Basal cell carcinoma 2011    left cheek     BCC (basal cell carcinoma) 2010    left neck    Cataract     Hyperlipidemia     Hypertension     Strabismus      Past Surgical History:   Procedure Laterality Date    APPENDECTOMY      CHOLECYSTECTOMY      COLONOSCOPY N/A 12/14/2020    Procedure: COLONOSCOPY;  Surgeon: Giovanny Chao MD;  Location: [address]);  Service: Endoscopy;  Laterality: N/A;  COVID test on 12/11/20 at Primary Care -     ECTOPIC PREGNANCY SURGERY      OOPHORECTOMY      SKIN BIOPSY       Family History   Problem Relation Age of Onset    Hypertension Mother     Stroke Mother     Cancer Father         prostate cancer    Asthma " Sister     Amblyopia Neg Hx     Blindness Neg Hx     Cataracts Neg Hx     Diabetes Neg Hx     Glaucoma Neg Hx     Macular degeneration Neg Hx     Retinal detachment Neg Hx     Strabismus Neg Hx     Thyroid disease Neg Hx     Melanoma Neg Hx      Social History     Tobacco Use    Smoking status: Former Smoker     Types: Cigarettes     Quit date: 2/10/1983     Years since quittin.2    Smokeless tobacco: Never Used   Substance Use Topics    Alcohol use: Yes     Alcohol/week: 7.0 standard drinks     Types: 7 Glasses of wine per week     Comment: 1-2 glasses of wine daily    Drug use: No     Review of Systems   Constitutional: Negative for fever.   HENT: Negative for sore throat.    Respiratory: Positive for shortness of breath.    Cardiovascular: Positive for chest pain.   Gastrointestinal: Negative for nausea.   Genitourinary: Negative for dysuria.   Musculoskeletal: Negative for back pain.   Skin: Negative for rash.   Neurological: Negative for weakness.   Hematological: Does not bruise/bleed easily.       Physical Exam     Initial Vitals [22 1606]   BP Pulse Resp Temp SpO2   (!) 155/71 78 18 97.9 °F (36.6 °C) 98 %      MAP       --         Physical Exam    Constitutional: She appears well-developed and well-nourished. She is not diaphoretic. No distress.   HENT:   Head: Normocephalic and atraumatic.   Mouth/Throat: Oropharynx is clear and moist. No oropharyngeal exudate.   Eyes: Conjunctivae and EOM are normal. Pupils are equal, round, and reactive to light. No scleral icterus.   Neck: Neck supple.   Normal range of motion.  Cardiovascular: Normal rate and regular rhythm.   Murmur heard.  +2 DP and radial pulses BL   Pulmonary/Chest: Breath sounds normal. No respiratory distress. She has no wheezes.   Abdominal: Abdomen is soft. She exhibits no distension. There is no abdominal tenderness. There is no rebound.   Musculoskeletal:         General: No tenderness or edema. Normal range of motion.       Cervical back: Normal range of motion and neck supple.     Neurological: She is alert and oriented to person, place, and time. She has normal strength. No sensory deficit.   Skin: Skin is warm and dry. No rash noted. No erythema.   Psychiatric: She has a normal mood and affect.       ED Course   Procedures  Labs Reviewed   CBC W/ AUTO DIFFERENTIAL - Abnormal; Notable for the following components:       Result Value    MCH 32.4 (*)     All other components within normal limits   COMPREHENSIVE METABOLIC PANEL   B-TYPE NATRIURETIC PEPTIDE   TROPONIN I   TROPONIN I    Narrative:     2nd trop at 9pm          Imaging Results          X-Ray Chest PA And Lateral (Final result)  Result time 05/11/22 18:02:32    Final result by Anant Sapp MD (05/11/22 18:02:32)                 Impression:      1. Grossly stable chronic appearing interstitial findings, correlation with any history of COPD/emphysema.      Electronically signed by: Anant Sapp MD  Date:    05/11/2022  Time:    18:02             Narrative:    EXAMINATION:  XR CHEST PA AND LATERAL    CLINICAL HISTORY:  Chest pain, unspecified    TECHNIQUE:  PA and lateral views of the chest were performed.    COMPARISON:  03/07/2017    FINDINGS:  The cardiomediastinal silhouette is prominent, similar to the previous examination noting calcification of the aorta..  There is no pleural effusion.  The trachea is midline.  The lungs are symmetrically expanded bilaterally with mildly coarse central hilar interstitial attenuation, also grossly stable..  No large focal consolidation seen.  There is no pneumothorax.  The osseous structures are remarkable for degenerative changes noting osteopenia..                                 Medications   aspirin chewable tablet 243 mg (243 mg Oral Given 5/11/22 1812)   aluminum-magnesium hydroxide-simethicone 200-200-20 mg/5 mL suspension 5 mL (5 mLs Oral Given 5/11/22 1928)     Medical Decision Making:   History:   Old Medical  Records: I decided to obtain old medical records.  Old Records Summarized: records from clinic visits.  Independently Interpreted Test(s):   I have ordered and independently interpreted EKG Reading(s) - see summary below       <> Summary of EKG Reading(s): NSR 75 bpm. No STEMI  Clinical Tests:   Lab Tests: Ordered and Reviewed  Radiological Study: Ordered and Reviewed  Medical Tests: Ordered and Reviewed       APC / Resident Notes:   81 y.o. female with medical history of HTN, HLD, Aortic Stenosis presenting to the ED c/o lower chest pain beginning at 12:30 PM today while sitting at her computer. DDx includes but not limited to ACS, cardiac arrhythmia, GERD, myocarditis, pericarditis, dyspepsia. No tachycardia or pleuritic component to chest pain and less concerned for pulmonary embolism. No ripping or tearing sensation, radiation to back, neurological deficits and lower suspicion for aortic dissection.     Laboratory work reviewed. Trop negative x2. CXR showing chronic interstitial findings. Reports improvement after Maalox in the ED. Resting comfortably on reassessment. Vitals unremarkable. Okay for outpatient follow-up with PCP and Cardiology. Patient expresses understanding and agreeable to the plan. Return to ED precautions given for new, worsening, or concerning symptoms. I have discussed the care of this patient with my supervising physician.           ED Course as of 05/11/22 2156   Wed May 11, 2022   2128 Troponin I: <0.006 [GM]      ED Course User Index  [GM] Chuyita Coppola MD             Clinical Impression:   Final diagnoses:  [R07.89] Atypical chest pain (Primary)  [I35.0] Aortic valve stenosis, etiology of cardiac valve disease unspecified          ED Disposition Condition    Discharge Stable        ED Prescriptions     None        Follow-up Information     Follow up With Specialties Details Why Contact Info Additional Information    [facility] - 3rd Fl Cardiology   [address]  [facility]  [facility] [id number]  [phone] Cardiology Services Clinics - 3rd floor           Giancarlo Aceves PA-C  05/11/22 [id number]

## 2022-05-12 ENCOUNTER — OFFICE VISIT (OUTPATIENT)
Dept: DERMATOLOGY | Facility: CLINIC | Age: 82
End: 2022-05-12
Payer: MEDICARE

## 2022-05-12 DIAGNOSIS — L43.9 LICHEN PLANUS: Primary | ICD-10-CM

## 2022-05-12 PROCEDURE — 99214 OFFICE O/P EST MOD 30 MIN: CPT | Mod: S$PBB,,, | Performed by: DERMATOLOGY

## 2022-05-12 PROCEDURE — 99214 PR OFFICE/OUTPT VISIT, EST, LEVL IV, 30-39 MIN: ICD-10-PCS | Mod: S$PBB,,, | Performed by: DERMATOLOGY

## 2022-05-12 PROCEDURE — 99999 PR PBB SHADOW E&M-EST. PATIENT-LVL III: CPT | Mod: PBBFAC,,, | Performed by: DERMATOLOGY

## 2022-05-12 PROCEDURE — 99213 OFFICE O/P EST LOW 20 MIN: CPT | Mod: PBBFAC,PO | Performed by: DERMATOLOGY

## 2022-05-12 PROCEDURE — 99999 PR PBB SHADOW E&M-EST. PATIENT-LVL III: ICD-10-PCS | Mod: PBBFAC,,, | Performed by: DERMATOLOGY

## 2022-05-12 RX ORDER — MOMETASONE FUROATE 1 MG/ML
SOLUTION TOPICAL
Qty: 60 ML | Refills: 3 | Status: SHIPPED | OUTPATIENT
Start: 2022-05-12

## 2022-05-12 NOTE — DISCHARGE INSTRUCTIONS
Follow-up with your primary care provider for further evaluation of your chest pain and cardiac risk stratification. Return to the emergency room for new, worsening, or concerning symptoms.     Your ED work-up today showed:  Labs Reviewed   CBC W/ AUTO DIFFERENTIAL - Abnormal; Notable for the following components:       Result Value    MCH 32.4 (*)     All other components within normal limits   COMPREHENSIVE METABOLIC PANEL   B-TYPE NATRIURETIC PEPTIDE   TROPONIN I   TROPONIN I    Narrative:     2nd trop at 9pm     X-Ray Chest PA And Lateral   Final Result      1. Grossly stable chronic appearing interstitial findings, correlation with any history of COPD/emphysema.         Electronically signed by: Anant Sapp MD   Date:    05/11/2022   Time:    18:02

## 2022-05-12 NOTE — PATIENT INSTRUCTIONS
Natural sunlight to affected areas if possible for 10 minutes before 10 am or after 4 pm   Use tacrolimus in the am   Cerave/mometasone mixture 1-2 x per day

## 2022-05-12 NOTE — PROGRESS NOTES
Subjective:       Patient ID:  Luz Amaro is a 81 y.o. female who presents for   Chief Complaint   Patient presents with    Lichen Planus     Pt here today for a follow up on Lichen planus. Tx with Elocon/cerave and protopic. Tx helps.  Pt hsa a recent flare on her lower legs. This is first flare since 8/2021.  Pt did start Vit B12 a few weeks ago bc her PC said she should supplement.       Review of Systems   Skin: Positive for itching and rash.        Objective:    Physical Exam   Skin:   Areas Examined (abnormalities noted in diagram):   RLE Inspected  LLE Inspection Performed              Diagram Legend     Erythematous scaling macule/papule c/w actinic keratosis       Vascular papule c/w angioma      Pigmented verrucoid papule/plaque c/w seborrheic keratosis      Yellow umbilicated papule c/w sebaceous hyperplasia      Irregularly shaped tan macule c/w lentigo     1-2 mm smooth white papules consistent with Milia      Movable subcutaneous cyst with punctum c/w epidermal inclusion cyst      Subcutaneous movable cyst c/w pilar cyst      Firm pink to brown papule c/w dermatofibroma      Pedunculated fleshy papule(s) c/w skin tag(s)      Evenly pigmented macule c/w junctional nevus     Mildly variegated pigmented, slightly irregular-bordered macule c/w mildly atypical nevus      Flesh colored to evenly pigmented papule c/w intradermal nevus       Pink pearly papule/plaque c/w basal cell carcinoma      Erythematous hyperkeratotic cursted plaque c/w SCC      Surgical scar with no sign of skin cancer recurrence      Open and closed comedones      Inflammatory papules and pustules      Verrucoid papule consistent consistent with wart     Erythematous eczematous patches and plaques     Dystrophic onycholytic nail with subungual debris c/w onychomycosis     Umbilicated papule    Erythematous-base heme-crusted tan verrucoid plaque consistent with inflamed seborrheic keratosis     Erythematous Silvery  Scaling Plaque c/w Psoriasis     See annotation      Assessment / Plan:        Lichen planus- flaring , UC  -     mometasone (ELOCON) 0.1 % solution; Mix entire bottle in jar of cerave cream and aaa qd- bid prn itching  Dispense: 60 mL; Refill: 3  Natural sunlight to affected areas if possible for 10 minutes before 10 am or after 4 pm   Use tacrolimus in the am   Cerave/mometasone mixture 1-2 x per day                Follow up in about 3 months (around 8/12/2022) for TBSE.

## 2022-05-31 ENCOUNTER — EXTERNAL CHRONIC CARE MANAGEMENT (OUTPATIENT)
Dept: PRIMARY CARE CLINIC | Facility: CLINIC | Age: 82
End: 2022-05-31
Payer: MEDICARE

## 2022-05-31 PROCEDURE — 99490 CHRNC CARE MGMT STAFF 1ST 20: CPT | Mod: PBBFAC | Performed by: INTERNAL MEDICINE

## 2022-05-31 PROCEDURE — 99490 CHRNC CARE MGMT STAFF 1ST 20: CPT | Mod: S$PBB,,, | Performed by: INTERNAL MEDICINE

## 2022-05-31 PROCEDURE — 99490 PR CHRONIC CARE MGMT, 1ST 20 MIN: ICD-10-PCS | Mod: S$PBB,,, | Performed by: INTERNAL MEDICINE

## 2022-06-08 ENCOUNTER — PES CALL (OUTPATIENT)
Dept: ADMINISTRATIVE | Facility: CLINIC | Age: 82
End: 2022-06-08
Payer: MEDICARE

## 2022-06-11 ENCOUNTER — PES CALL (OUTPATIENT)
Dept: ADMINISTRATIVE | Facility: CLINIC | Age: 82
End: 2022-06-11
Payer: MEDICARE

## 2022-06-13 ENCOUNTER — PES CALL (OUTPATIENT)
Dept: ADMINISTRATIVE | Facility: CLINIC | Age: 82
End: 2022-06-13
Payer: MEDICARE

## 2022-06-17 ENCOUNTER — OFFICE VISIT (OUTPATIENT)
Dept: CARDIOLOGY | Facility: CLINIC | Age: 82
End: 2022-06-17
Payer: MEDICARE

## 2022-06-17 ENCOUNTER — OFFICE VISIT (OUTPATIENT)
Dept: INTERNAL MEDICINE | Facility: CLINIC | Age: 82
End: 2022-06-17
Payer: MEDICARE

## 2022-06-17 VITALS
DIASTOLIC BLOOD PRESSURE: 80 MMHG | SYSTOLIC BLOOD PRESSURE: 112 MMHG | HEART RATE: 60 BPM | RESPIRATION RATE: 20 BRPM | BODY MASS INDEX: 27.94 KG/M2 | WEIGHT: 148 LBS | HEIGHT: 61 IN

## 2022-06-17 VITALS
BODY MASS INDEX: 28.1 KG/M2 | WEIGHT: 148.81 LBS | HEART RATE: 60 BPM | SYSTOLIC BLOOD PRESSURE: 112 MMHG | HEIGHT: 61 IN | DIASTOLIC BLOOD PRESSURE: 80 MMHG | OXYGEN SATURATION: 98 %

## 2022-06-17 DIAGNOSIS — I35.0 NONRHEUMATIC AORTIC VALVE STENOSIS: ICD-10-CM

## 2022-06-17 DIAGNOSIS — I10 PRIMARY HYPERTENSION: ICD-10-CM

## 2022-06-17 DIAGNOSIS — I70.0 AORTIC ATHEROSCLEROSIS: ICD-10-CM

## 2022-06-17 DIAGNOSIS — R20.2 NUMBNESS AND TINGLING OF LEFT UPPER EXTREMITY: ICD-10-CM

## 2022-06-17 DIAGNOSIS — M85.80 OSTEOPENIA, UNSPECIFIED LOCATION: ICD-10-CM

## 2022-06-17 DIAGNOSIS — Z13.31 POSITIVE DEPRESSION SCREENING: ICD-10-CM

## 2022-06-17 DIAGNOSIS — E78.5 HYPERLIPIDEMIA, UNSPECIFIED HYPERLIPIDEMIA TYPE: ICD-10-CM

## 2022-06-17 DIAGNOSIS — Z66 DNR (DO NOT RESUSCITATE): ICD-10-CM

## 2022-06-17 DIAGNOSIS — I51.89 DIASTOLIC DYSFUNCTION: ICD-10-CM

## 2022-06-17 DIAGNOSIS — F33.41 RECURRENT MAJOR DEPRESSIVE DISORDER, IN PARTIAL REMISSION: ICD-10-CM

## 2022-06-17 DIAGNOSIS — I70.0 AORTIC ATHEROSCLEROSIS: Primary | ICD-10-CM

## 2022-06-17 DIAGNOSIS — R20.0 NUMBNESS AND TINGLING OF LEFT UPPER EXTREMITY: ICD-10-CM

## 2022-06-17 DIAGNOSIS — Z00.00 ENCOUNTER FOR PREVENTIVE HEALTH EXAMINATION: Primary | ICD-10-CM

## 2022-06-17 PROCEDURE — 99999 PR PBB SHADOW E&M-EST. PATIENT-LVL V: ICD-10-PCS | Mod: PBBFAC,,, | Performed by: NURSE PRACTITIONER

## 2022-06-17 PROCEDURE — 99214 PR OFFICE/OUTPT VISIT, EST, LEVL IV, 30-39 MIN: ICD-10-PCS | Mod: S$PBB,,, | Performed by: INTERNAL MEDICINE

## 2022-06-17 PROCEDURE — 99999 PR PBB SHADOW E&M-EST. PATIENT-LVL III: CPT | Mod: PBBFAC,,, | Performed by: INTERNAL MEDICINE

## 2022-06-17 PROCEDURE — 99215 OFFICE O/P EST HI 40 MIN: CPT | Mod: PBBFAC | Performed by: NURSE PRACTITIONER

## 2022-06-17 PROCEDURE — 99999 PR PBB SHADOW E&M-EST. PATIENT-LVL V: CPT | Mod: PBBFAC,,, | Performed by: NURSE PRACTITIONER

## 2022-06-17 PROCEDURE — 99213 OFFICE O/P EST LOW 20 MIN: CPT | Mod: PBBFAC,25,27,PO | Performed by: INTERNAL MEDICINE

## 2022-06-17 PROCEDURE — G0439 PR MEDICARE ANNUAL WELLNESS SUBSEQUENT VISIT: ICD-10-PCS | Mod: ,,, | Performed by: NURSE PRACTITIONER

## 2022-06-17 PROCEDURE — G0439 PPPS, SUBSEQ VISIT: HCPCS | Mod: ,,, | Performed by: NURSE PRACTITIONER

## 2022-06-17 PROCEDURE — 99999 PR PBB SHADOW E&M-EST. PATIENT-LVL III: ICD-10-PCS | Mod: PBBFAC,,, | Performed by: INTERNAL MEDICINE

## 2022-06-17 PROCEDURE — 99214 OFFICE O/P EST MOD 30 MIN: CPT | Mod: S$PBB,,, | Performed by: INTERNAL MEDICINE

## 2022-06-17 RX ORDER — ATORVASTATIN CALCIUM 20 MG/1
20 TABLET, FILM COATED ORAL DAILY
Qty: 90 TABLET | Refills: 3 | Status: SHIPPED | OUTPATIENT
Start: 2022-06-17 | End: 2023-08-29 | Stop reason: SDUPTHER

## 2022-06-17 RX ORDER — LANOLIN ALCOHOL/MO/W.PET/CERES
100 CREAM (GRAM) TOPICAL DAILY
COMMUNITY

## 2022-06-17 RX ORDER — SULFAMETHOXAZOLE AND TRIMETHOPRIM 800; 160 MG/1; MG/1
TABLET ORAL
COMMUNITY
Start: 2021-12-13 | End: 2023-01-12

## 2022-06-17 NOTE — ASSESSMENT & PLAN NOTE
Unclear cause. Fleeting symptoms. No weakness, edema, or arterial insufficiency on exam. Low suspicion for CVA or CV event, Expectant management for now. On asa/statin regardless.

## 2022-06-17 NOTE — PATIENT INSTRUCTIONS
1. Follow up with Dr. Suman Moss MD as instructed.    2. Recommend following up with cardiology.    3. Can bella shingles vaccines (shingrix) through insurance if interested.    4. Consider additional pneumonia vaccine ([id number]) for additional pneumonia protection. Check with [facility] for records.    5. Listen out for call from behavioral health program about therapy.    Counseling and Referral of Other Preventative  (Italic type indicates deductible and co-insurance are waived)    Patient Name: Luz Amaro  Today's Date: 6/17/2022    Health Maintenance         Date Due Completion Date    Shingles Vaccine (1 of 2) Never done ---    Pneumococcal Vaccines (Age 65+) (2 - PPSV23 or PCV20) 10/14/2016 10/14/2015    COVID-19 Vaccine (5 - Booster for Pfizer series) 07/16/2022 3/16/2022    DEXA Scan 08/17/2023 8/17/2021    Lipid Panel 08/23/2026 8/23/2021    TETANUS VACCINE 09/27/2026 9/27/2016          Orders Placed This Encounter   Procedures    Ambulatory referral/consult to Primary Care Behavioral Health (Non-Opioids)     The following information is provided to all patients.  This information is to help you find resources for any of the problems found today that may be affecting your health:                Living healthy guide: www.[facility].[place].gov      Understanding Diabetes: www.diabetes.org      Eating healthy: www.cdc.gov/healthyweight      CDC home safety checklist: www.cdc.gov/steadi/patient.html      Agency on Aging: www.goea.[place].[facility]      Alcoholics anonymous (AA): www.aa.org      Physical Activity: www.tayler.nih.gov/jj9kzzy      Tobacco use: www.quitwithusla.org

## 2022-06-17 NOTE — PROGRESS NOTES
"Luz Amaro presented for a  Medicare AWV and comprehensive Health Risk Assessment today. The following components were reviewed and updated:    · Medical history  · Family History  · Social history  · Allergies and Current Medications  · Health Risk Assessment  · Health Maintenance  · Care Team         ** See Completed Assessments for Annual Wellness Visit within the encounter summary.**         The following assessments were completed:  · Living Situation  · CAGE  · Depression Screening - PHQ9 score of 9 indicating mild depression, referred to  for services. Patient denies SI/HI.  · Timed Get Up and Go  · Whisper Test  · Cognitive Function Screening    · Nutrition Screening  · ADL Screening  · PAQ Screening        Vitals:    06/17/22 0854   BP: 112/80   BP Location: Right arm   Patient Position: Sitting   Pulse: 60   SpO2: 98%   Weight: 67.5 kg (148 lb 13 oz)   Height: 5' 1" (1.549 m)     Body mass index is 28.12 kg/m².     Physical Exam  Vitals reviewed.   Constitutional:       Appearance: Normal appearance.   HENT:      Head: Normocephalic.   Cardiovascular:      Rate and Rhythm: Normal rate.      Heart sounds: Murmur heard.   Pulmonary:      Effort: Pulmonary effort is normal.   Abdominal:      General: Bowel sounds are normal.   Musculoskeletal:         General: Normal range of motion.      Cervical back: Normal range of motion.      Right lower leg: No edema.      Left lower leg: No edema.   Skin:     General: Skin is warm and dry.      Capillary Refill: Capillary refill takes less than 2 seconds.   Neurological:      Mental Status: She is alert and oriented to person, place, and time.   Psychiatric:         Behavior: Behavior normal.         Thought Content: Thought content normal.         Judgment: Judgment normal.               Diagnoses and health risks identified today and associated recommendations/orders:    1. Encounter for preventive health examination  Assessments completed.   recommendations " reviewed. Discussed shingles and pneumonia vaccines, believes she may have received them when she lived in [place], will request records from [facility]. Agreed to schedule f/u with cardiology. +depression screening, denies SI/HI, referred to  for services.  F/u with PCP as instructed.    2. Aortic atherosclerosis  Chronic, stable on current regimen. Followed by cardiology. On ASA.    3. Recurrent major depressive disorder, in partial remission  Chronic, stable. PHQ9 score of 9 today indicating mild depression. Denies SI/HI. Referred to  for services. Followed by PCP.  - Ambulatory referral/consult to Primary Care Behavioral Health (Non-Opioids); Future    4. Positive depression screening  Chronic, stable. PHQ9 score of 9 today indicating mild depression. Denies SI/HI. Referred to  for services. Followed by PCP. I have used clinical judgement based on duration and functional status to consider definite necessity for treatment. See plan above.    5. Nonrheumatic aortic valve stenosis  Chronic, stable on current regimen. Followed by cardiology. Agreed to schedule f/u.    6. Diastolic dysfunction  Chronic, stable on current regimen. Followed by cardiology.    7. Primary hypertension  Chronic, stable on current regimen. Followed by cardiology.    8. Hyperlipidemia, unspecified hyperlipidemia type  Chronic, stable on current regimen. Followed by cardiology.    9. Osteopenia, unspecified location  Chronic, stable on current regimen. Followed by PCP. On fosamax.      Provided Luz with a 5-10 year written screening schedule and personal prevention plan. Recommendations were developed using the USPSTF age appropriate recommendations. Education, counseling, and referrals were provided as needed. After Visit Summary printed and given to patient which includes a list of additional screenings\tests needed.    Follow up in about 1 year (around 6/17/2023) for Medicare AWV and with PCP as instructed.       Madalyn Galan NP      I offered to discuss advanced care planning, including how to pick a person who would make decisions for you if you were unable to make them for yourself, called a health care power of , and what kind of decisions you might make such as use of life sustaining treatments such as ventilators and tube feeding when faced with a life limiting illness recorded on a living will that they will need to know. (How you want to be cared for as you near the end of your natural life)     X Patient is interested in learning more about how to make advanced directives.  I provided them paperwork and offered to discuss this with them.

## 2022-06-17 NOTE — PROGRESS NOTES
HPI:  Ms. Vega is an 81-year-old female with a history of moderate aortic stenosis, hypertension, and hyperlipidemia presenting for follow-up evaluation.      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 She denies any issues with chest pain, shortness of breath, dyspnea on exertion, lower extremity claudication, lightheadedness or dizziness or edema.  She is very active for her age walking 1 mi 4 times a week at a light to moderate pace without any limitation.    She did visit the ER in May 2022 for a chest discomfort that responded to a GI cocktail and has not recurred since. She does note some intermittent LUE tingling this week that lasts seconds at a time and maybe associated with difficult controlling it. No swelling, pain, or weakness. 4 episodes in total.     She denies any history of myocardial infarction, stroke, or peripheral arterial disease.  She does not check her blood pressure at home, but is compliant with her carvedilol and nifedipine. She reports blood pressures in the range of 130s/70s at home. We previously increased her atorvastatin from 20 to 40. She had resultant lower extremity cramps in the morning. Her atorvastatin was stopped and symptoms resolved. She tolerates aspirin and denies any bleeding issues.    PHYSICAL EXAM:  Vitals:    06/17/22 1116   BP: 112/80   Pulse: 60   Resp: 20       Physical Exam  Constitutional:       Appearance: Normal appearance.   Neck:      Vascular: No carotid bruit or JVD.   Cardiovascular:      Rate and Rhythm: Normal rate and regular rhythm.      Pulses:           Carotid pulses are 2+ on the right side and 2+ on the left side.       Radial pulses are 2+ on the right side and 2+ on the left side.        Dorsalis pedis pulses are 2+ on the right side and 2+ on the left side.      Heart sounds: S1 normal and S2 normal. Murmur (3/6 crescendo decrescendo murmur noted.) heard.     No S3 sounds.   Pulmonary:      Effort:  Pulmonary effort is normal.      Breath sounds: Normal breath sounds. No rales.   Feet:      Right foot:      Skin integrity: Skin integrity normal.      Left foot:      Skin integrity: Skin integrity normal.   Skin:     General: Skin is warm and dry.   Neurological:      Mental Status: She is alert and oriented to person, place, and time.      Motor: Motor function is intact.      Gait: Gait is intact.         LABS/CARDIAC TESTS:  May 2022 CBC and CMP are unremarkable.  2021 LDL 72 and HDL 69.  TSH is normal and her hemoglobin A1c is 5.5.  EKG May 2022 revealing for normal sinus rhythm no Q-waves or ST changes.    TTE September 2021 reveals normal LV size and ejection fraction.  Moderate aortic stenosis noted with a peak velocity of 3.41 and a mean gradient of 29. May 2020 TTE with a peak velocity of 3 and a mean gradient 20.   Carotid March 2022 shows bilateral atherosclerosis.  Right ICA reported as 50-69%, but peak systolic velocity and end-diastolic velocity unimpressive and likely represents less than 50% disease.  Left ICA less than 50%.  Renal duplex 2020 no evidence of renal artery stenosis.    ASSESSMENT & PLAN:    Hypertension  Blood pressures are in an acceptable range on home checks on carvedilol 25x2 and nifedipine 30x1.    Hyperlipidemia  Okay to restart atorvastatin at 20x1.     Nonrheumatic aortic valve stenosis  Moderate AS on last TTE. Can recheck in 2023.     Numbness and tingling of left upper extremity  Unclear cause. Fleeting symptoms. No weakness, edema, or arterial insufficiency on exam. Low suspicion for CVA or CV event, Expectant management for now. On asa/statin regardless.     DNR (do not resuscitate)  Patient has a DNR/DNI order that she wants signed. She is clear in her desires. This is part of advanced directive planning. No impending medical issue.       Aortic atherosclerosis    Primary hypertension    Hyperlipidemia, unspecified hyperlipidemia type  -     atorvastatin (LIPITOR) 20  MG tablet; Take 1 tablet (20 mg total) by mouth once daily.  Dispense: 90 tablet; Refill: 3    Nonrheumatic aortic valve stenosis    Numbness and tingling of left upper extremity    DNR (do not resuscitate)        Julieta Hairston MD

## 2022-06-17 NOTE — ASSESSMENT & PLAN NOTE
Patient has a DNR/DNI order that she wants signed. She is clear in her desires. This is part of advanced directive planning. No impending medical issue.

## 2022-06-17 NOTE — Clinical Note
Medicare AWV completed. Discussed shingles and pneumonia vaccines, believes she may have received them when she lived in [place], will request records from [facility]. Agreed to schedule f/u with cardiology. +depression screening, denies SI/HI, referred to  for services.  --Madalyn

## 2022-06-28 ENCOUNTER — TELEPHONE (OUTPATIENT)
Dept: BEHAVIORAL HEALTH | Facility: CLINIC | Age: 82
End: 2022-06-28
Payer: MEDICARE

## 2022-06-30 ENCOUNTER — EXTERNAL CHRONIC CARE MANAGEMENT (OUTPATIENT)
Dept: PRIMARY CARE CLINIC | Facility: CLINIC | Age: 82
End: 2022-06-30
Payer: MEDICARE

## 2022-06-30 PROCEDURE — 99490 CHRNC CARE MGMT STAFF 1ST 20: CPT | Mod: S$PBB,,, | Performed by: INTERNAL MEDICINE

## 2022-06-30 PROCEDURE — 99490 PR CHRONIC CARE MGMT, 1ST 20 MIN: ICD-10-PCS | Mod: S$PBB,,, | Performed by: INTERNAL MEDICINE

## 2022-06-30 PROCEDURE — 99490 CHRNC CARE MGMT STAFF 1ST 20: CPT | Mod: PBBFAC | Performed by: INTERNAL MEDICINE

## 2022-07-11 ENCOUNTER — TELEPHONE (OUTPATIENT)
Dept: BEHAVIORAL HEALTH | Facility: CLINIC | Age: 82
End: 2022-07-11
Payer: MEDICARE

## 2022-07-11 NOTE — PROGRESS NOTES
Behavioral Health Community Health Worker  Initial Assessment  Completed by:  Neena Baxter    Date:  7/11/2022    Patient Enrollment in Behavioral Health Program:  · Patient verbalized understanding of Behavioral Health Integration services to include:  · Patient understands that CHW, LCSW, PharmD and consulting Psychiatrist are members of the care team working collaboratively with his/her primary care provider: Yes  · Patient understands that activation of their [facility] patient portal account is required for accessing the full scope of team services: Yes  · Patient understands that some counseling sessions may occur via video: Yes  · Clinic visits with the psychiatrist may be subject to a co-pay based on your insurance: Yes  · Patient consents to enroll in BHI program: Yes    Assessments     Single Item Health Literacy Scale:  · How often do you need to have someone help you read instructions, pamphlets or other written material from your doctor or pharmacy?: Sometimes    Promis 10:  · Promis 10 Responses  · In general, would you say your health is: Good  · In general, would you say your quality of life is: Good  · In general, how would you rate your physical health?: Good  · In general, how would you rate your mental health, including your mood and your ability to think?: Fair  · In general, how would you rate your satisfaction with your social activities and relationships?: Fair  · In general, please rate how well you carry out your usual social activities and roles. (This includes activities at home, at work and in your community, and responsibilities as a parent, child, spouse, employee, friend, etc.): Fair  · To what extent are you able to carry out your everyday physical activities such as walking, climbing stairs, carrying groceries, or moving a chair? : Mostly  · How often have you been bothered by emotional problems such as feeling anxious, depressed or irritable?: Often  · In the past 7 days, how  would you rate your fatigue on average?: Moderate  · In the past 7 days, on a scale of 0 to 10 (where 0 is no pain and 10 is the worst pain imaginable) how would you rate your pain on average?: 3  · Global Physical Health: 13  · Global Mental health Score: 11    Depression PHQ:  PHQ9 2022   Total Score 9         Generalized Anxiety Disorder 7-Item Scale:  GAD7 2022   1. Feeling nervous, anxious, or on edge? 1   2. Not being able to stop or control worrying? 1   3. Worrying too much about different things? 1   4. Trouble relaxing? 1   5. Being so restless that it is hard to sit still? 0   6. Becoming easily annoyed or irritable? 1   7. Feeling afraid as if something awful might happen? 2   8. If you checked off any problems, how difficult have these problems made it for you to do your work, take care of things at home, or get along with other people? 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     Social Determinants of Health     Financial Resource Strain: Low Risk     Difficulty of Paying Living Expenses: Not hard at all   Food Insecurity: No Food Insecurity    Worried About Running Out of Food in the Last Year: Never true    Ran Out of Food in the Last Year: Never true   Transportation Needs: No Transportation Needs    Lack of Transportation (Medical): No    Lack of Transportation (Non-Medical): No   Physical Activity: Inactive    Days of Exercise per Week: 0 days    Minutes of Exercise per Session: 0 min   Stress: No Stress Concern Present    Feeling of Stress :  "Not at all   Social Connections: Socially Isolated    Frequency of Communication with Friends and Family: More than three times a week    Frequency of Social Gatherings with Friends and Family: Twice a week    Attends Islam Services: Never    Active Member of Clubs or Organizations: No    Attends Club or Organization Meetings: Never    Marital Status:    Housing Stability: Low Risk     Unable to Pay for Housing in the Last Year: No    Number of Places Lived in the Last Year: 2    Unstable Housing in the Last Year: No       Call Summary     Patient was referred to the BHI (Non-opioid) program by Primary Care Provider, Dr. Moss CHW contacted Luz Amaro    Patient scored "9" on the PHQ9 and "7" on the BARRON 7. Based on these scores patient is eligible for the Behavioral health Integration (Non-opioid) Program. CHW completed the intake and scheduled an appointment for patient with Cally Chawla LCSW, on 8/1/22      "

## 2022-07-27 ENCOUNTER — PATIENT MESSAGE (OUTPATIENT)
Dept: ADMINISTRATIVE | Facility: OTHER | Age: 82
End: 2022-07-27
Payer: MEDICARE

## 2022-07-31 ENCOUNTER — EXTERNAL CHRONIC CARE MANAGEMENT (OUTPATIENT)
Dept: PRIMARY CARE CLINIC | Facility: CLINIC | Age: 82
End: 2022-07-31
Payer: MEDICARE

## 2022-07-31 PROCEDURE — 99490 PR CHRONIC CARE MGMT, 1ST 20 MIN: ICD-10-PCS | Mod: S$PBB,,, | Performed by: INTERNAL MEDICINE

## 2022-07-31 PROCEDURE — 99490 CHRNC CARE MGMT STAFF 1ST 20: CPT | Mod: PBBFAC | Performed by: INTERNAL MEDICINE

## 2022-07-31 PROCEDURE — 99490 CHRNC CARE MGMT STAFF 1ST 20: CPT | Mod: S$PBB,,, | Performed by: INTERNAL MEDICINE

## 2022-08-01 ENCOUNTER — CLINICAL SUPPORT (OUTPATIENT)
Dept: BEHAVIORAL HEALTH | Facility: CLINIC | Age: 82
End: 2022-08-01
Payer: MEDICARE

## 2022-08-01 DIAGNOSIS — F41.1 GENERALIZED ANXIETY DISORDER: Primary | ICD-10-CM

## 2022-08-01 DIAGNOSIS — F33.0 MILD EPISODE OF RECURRENT MAJOR DEPRESSIVE DISORDER: ICD-10-CM

## 2022-08-01 PROCEDURE — 99999 PR PBB SHADOW E&M-EST. PATIENT-LVL I: CPT | Mod: PBBFAC,,, | Performed by: SOCIAL WORKER

## 2022-08-01 PROCEDURE — 99999 PR PBB SHADOW E&M-EST. PATIENT-LVL I: ICD-10-PCS | Mod: PBBFAC,,, | Performed by: SOCIAL WORKER

## 2022-08-01 PROCEDURE — 90832 PSYTX W PT 30 MINUTES: CPT | Mod: ,,, | Performed by: SOCIAL WORKER

## 2022-08-01 PROCEDURE — 90832 PR PSYCHOTHERAPY W/PATIENT, 30 MIN: ICD-10-PCS | Mod: ,,, | Performed by: SOCIAL WORKER

## 2022-08-01 PROCEDURE — 99211 OFF/OP EST MAY X REQ PHY/QHP: CPT | Mod: PBBFAC | Performed by: SOCIAL WORKER

## 2022-08-01 NOTE — Clinical Note
Please see my addendum from today's I meeting.  If you agree with this plan, I will pend medication order in a separate refill encounter.  Please reach out to me with any questions.  Vickie Sanchez
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
## 2022-08-08 PROBLEM — F41.1 GENERALIZED ANXIETY DISORDER: Status: ACTIVE | Noted: 2022-08-08

## 2022-08-09 ENCOUNTER — PATIENT MESSAGE (OUTPATIENT)
Dept: BEHAVIORAL HEALTH | Facility: CLINIC | Age: 82
End: 2022-08-09
Payer: MEDICARE

## 2022-08-09 DIAGNOSIS — F41.1 GENERALIZED ANXIETY DISORDER: ICD-10-CM

## 2022-08-09 DIAGNOSIS — F33.41 RECURRENT MAJOR DEPRESSIVE DISORDER, IN PARTIAL REMISSION: Primary | ICD-10-CM

## 2022-08-09 RX ORDER — FLUOXETINE HYDROCHLORIDE 20 MG/1
20 CAPSULE ORAL DAILY
Qty: 30 CAPSULE | Refills: 2 | Status: SHIPPED | OUTPATIENT
Start: 2022-08-09 | End: 2022-12-14

## 2022-08-09 NOTE — PROGRESS NOTES
Patient discussed during [facility] meeting today.  Patient with continued symptoms of depression on Wellbutrin 300mg daily.  She is unsure if Wellbutrin has been helpful.  She is prescribed Ativan PRN but rarely takes.  In the past she has tried Celexa, Zoloft, and Prozac - she remembers Prozac being helpful.  I would recommend adding Prozac 20mg daily to Wellbutrin 300mg daily for now.  May want to consider coming off of Wellbutrin in the future if this is not helpful.  I will pend order for PCP if he agrees with plan.      Time: 8 minutes

## 2022-08-09 NOTE — TELEPHONE ENCOUNTER
Medication order pended here if you agree with plan.  Please see addendum from today's meeting in CC'ed charts.

## 2022-08-16 ENCOUNTER — TELEPHONE (OUTPATIENT)
Dept: BEHAVIORAL HEALTH | Facility: CLINIC | Age: 82
End: 2022-08-16
Payer: MEDICARE

## 2022-08-16 NOTE — TELEPHONE ENCOUNTER
LCSW attempted to contact pt about medication as the message in her portal had not been read.  SW left a v/m for pt to return call.

## 2022-08-25 ENCOUNTER — TELEPHONE (OUTPATIENT)
Dept: BEHAVIORAL HEALTH | Facility: CLINIC | Age: 82
End: 2022-08-25
Payer: MEDICARE

## 2022-08-26 ENCOUNTER — PATIENT MESSAGE (OUTPATIENT)
Dept: BEHAVIORAL HEALTH | Facility: CLINIC | Age: 82
End: 2022-08-26
Payer: MEDICARE

## 2022-08-29 ENCOUNTER — OFFICE VISIT (OUTPATIENT)
Dept: BEHAVIORAL HEALTH | Facility: CLINIC | Age: 82
End: 2022-08-29
Payer: MEDICARE

## 2022-08-29 DIAGNOSIS — F41.1 GENERALIZED ANXIETY DISORDER: ICD-10-CM

## 2022-08-29 DIAGNOSIS — F33.41 RECURRENT MAJOR DEPRESSIVE DISORDER, IN PARTIAL REMISSION: Primary | ICD-10-CM

## 2022-08-29 PROCEDURE — 90832 PR PSYCHOTHERAPY W/PATIENT, 30 MIN: ICD-10-PCS | Mod: ,,, | Performed by: SOCIAL WORKER

## 2022-08-29 PROCEDURE — 90832 PSYTX W PT 30 MINUTES: CPT | Mod: ,,, | Performed by: SOCIAL WORKER

## 2022-08-29 NOTE — PROGRESS NOTES
Individual Psychotherapy (LCSW/PhD)  Luz Amaro,  8/29/2022    Site:  [facility]         Therapeutic Intervention: Met with patient for individual psychotherapy.    Chief complaint/reason for encounter: depression and anxiety     Interval history and content of current session: Pt had a friend in town from [place]. They did several activities together. Pt has started the fluoxetine.  She feels it's helping.  She feels a little brighter. She shares about stress of son Mir who is still grieving.  Discussed coping with her current living situation.    Treatment plan:  Target symptoms: depression, anxiety   Why chosen therapy is appropriate versus another modality: relevant to diagnosis, patient responds to this modality  Outcome monitoring methods: self-report, observation  Therapeutic intervention type: behavior modifying psychotherapy, supportive psychotherapy    Risk parameters:  Patient reports no suicidal ideation  Patient reports no homicidal ideation  Patient reports no self-injurious behavior  Patient reports no violent behavior    Verbal deficits: None    Patient's response to intervention:  The patient's response to intervention is accepting.    Progress toward goals and other mental status changes:  The patient's progress toward goals is fair .    Diagnosis:     ICD-10-CM ICD-9-CM   1. Recurrent major depressive disorder, in partial remission  F33.41 296.35   2. Generalized anxiety disorder  F41.1 300.02       Plan: Pt plans to continue individual psychotherapy    Return to clinic: 3 weeks    Length of Service (minutes): 30

## 2022-08-31 ENCOUNTER — EXTERNAL CHRONIC CARE MANAGEMENT (OUTPATIENT)
Dept: PRIMARY CARE CLINIC | Facility: CLINIC | Age: 82
End: 2022-08-31
Payer: MEDICARE

## 2022-08-31 ENCOUNTER — TELEPHONE (OUTPATIENT)
Dept: BEHAVIORAL HEALTH | Facility: CLINIC | Age: 82
End: 2022-08-31
Payer: MEDICARE

## 2022-08-31 PROCEDURE — 99490 PR CHRONIC CARE MGMT, 1ST 20 MIN: ICD-10-PCS | Mod: S$PBB,,, | Performed by: INTERNAL MEDICINE

## 2022-08-31 PROCEDURE — 99490 CHRNC CARE MGMT STAFF 1ST 20: CPT | Mod: S$PBB,,, | Performed by: INTERNAL MEDICINE

## 2022-08-31 PROCEDURE — 99490 CHRNC CARE MGMT STAFF 1ST 20: CPT | Mod: PBBFAC | Performed by: INTERNAL MEDICINE

## 2022-08-31 NOTE — PROGRESS NOTES
Behavioral Health Community Health Worker  Follow-Up  Completed by:  Milad Worthington    Date:  8/31/2022    Patient Enrollment in Behavioral Health Program:  Luz Amaro was enrolled in the Behavioral Health Program on 7/11/22    Assessments     Promis 10:  PROMIS-10 Questionnaire Scores 7/11/2022   Global Physical Health 13   Global Mental health Score 11       Depression PHQ:  PHQ9 8/31/2022   Total Score 4       Generalized Anxiety Disorder 7-Item Scale:  GAD7 8/31/2022   1. Feeling nervous, anxious, or on edge? 1   2. Not being able to stop or control worrying? 3   3. Worrying too much about different things? 3   4. Trouble relaxing? 2   5. Being so restless that it is hard to sit still? 1   6. Becoming easily annoyed or irritable? 2   7. Feeling afraid as if something awful might happen? 1   8. If you checked off any problems, how difficult have these problems made it for you to do your work, take care of things at home, or get along with other people? -   BARRON-7 Score 13       Patients' Global Impression of Change (PGIC) Scale:  Since beginning treatment at this clinic, how would you describe the change (if any) in ACTIVITY LIMITATIONS, SYMPTOMS, EMOTIONS, and OVERALL QUALITY OF LIFE, related to your painful condition?  No Value exists for the : OHS#26151      In a similar way, please check the number below that matches your degree of change since beginning care at this clinic (Much better (0) - Much Worse (10)): No Value exists for the : OHS#88265        Much Better                                     No Change                                    Much Worse                        -----------------------------------------------------------------------------                        0       1       2       3       4       5       6       7      8       9      10                     Call Summary     Assessment updated

## 2022-09-19 ENCOUNTER — OFFICE VISIT (OUTPATIENT)
Dept: BEHAVIORAL HEALTH | Facility: CLINIC | Age: 82
End: 2022-09-19
Payer: MEDICARE

## 2022-09-19 ENCOUNTER — IMMUNIZATION (OUTPATIENT)
Dept: INTERNAL MEDICINE | Facility: CLINIC | Age: 82
End: 2022-09-19
Payer: MEDICARE

## 2022-09-19 DIAGNOSIS — Z23 NEED FOR VACCINATION: Primary | ICD-10-CM

## 2022-09-19 DIAGNOSIS — F33.41 RECURRENT MAJOR DEPRESSIVE DISORDER, IN PARTIAL REMISSION: Primary | ICD-10-CM

## 2022-09-19 DIAGNOSIS — F41.1 GENERALIZED ANXIETY DISORDER: ICD-10-CM

## 2022-09-19 PROCEDURE — G0008 ADMIN INFLUENZA VIRUS VAC: HCPCS | Mod: PBBFAC

## 2022-09-19 PROCEDURE — 91312 COVID-19, MRNA, LNP-S, BIVALENT BOOSTER, PF, 30 MCG/0.3 ML DOSE: CPT | Mod: PBBFAC

## 2022-09-19 PROCEDURE — 90834 PR PSYCHOTHERAPY W/PATIENT, 45 MIN: ICD-10-PCS | Mod: ,,, | Performed by: SOCIAL WORKER

## 2022-09-19 PROCEDURE — 90834 PSYTX W PT 45 MINUTES: CPT | Mod: ,,, | Performed by: SOCIAL WORKER

## 2022-09-19 PROCEDURE — 0124A COVID-19, MRNA, LNP-S, BIVALENT BOOSTER, PF, 30 MCG/0.3 ML DOSE: CPT | Mod: PBBFAC,CV19

## 2022-09-19 NOTE — PROGRESS NOTES
Individual Psychotherapy (LCSW/PhD)  Luz Amaro,  9/19/2022    Site:  [facility]         Therapeutic Intervention: Met with patient for individual psychotherapy.    Chief complaint/reason for encounter: depression and anxiety     Interval history and content of current session: Pt states she doesn't feel good like she wants to feel.  One day last week she felt dizzy and light-headed all day. Encouraged pt to discuss with PCP. She reports she is frustrated when she can't think of the right word. She feels her mood is better with the prozac.  She continues to take the buproprion.  She is interested in coming off of it because she is not sure if it helps.  Will discuss with Dr. Sanchez.   Their lease is up in November but pt is the only one who wants to move. She says son feels she is trying to push him away when she talks about moving out on his own.  She doesn't want to move out if it will hurt her son.  She tries to not take his feelings personally.  She shared how she enjoyed living on her own for a while. Currently she cooks 4 days a week and she does the laundry. She pays all the bills. She is thinking of asking them to take on more of the bills. She doesn't enjoy cooking anymore. We discussed cutting back how often she cooks.     Treatment plan:  Target symptoms: depression, anxiety   Why chosen therapy is appropriate versus another modality: relevant to diagnosis, patient responds to this modality  Outcome monitoring methods: self-report, observation  Therapeutic intervention type: behavior modifying psychotherapy, supportive psychotherapy    Risk parameters:  Patient reports no suicidal ideation  Patient reports no homicidal ideation  Patient reports no self-injurious behavior  Patient reports no violent behavior    Verbal deficits: None    Patient's response to intervention:  The patient's response to intervention is accepting.    Progress toward goals and other mental status changes:  The  patient's progress toward goals is good.    Diagnosis:     ICD-10-CM ICD-9-CM   1. Recurrent major depressive disorder, in partial remission  F33.41 296.35   2. Generalized anxiety disorder  F41.1 300.02       Plan: Pt plans to continue individual psychotherapy    Return to clinic: 3 weeks    Length of Service (minutes): 30

## 2022-09-20 ENCOUNTER — OFFICE VISIT (OUTPATIENT)
Dept: URGENT CARE | Facility: CLINIC | Age: 82
End: 2022-09-20
Payer: MEDICARE

## 2022-09-20 ENCOUNTER — NURSE TRIAGE (OUTPATIENT)
Dept: ADMINISTRATIVE | Facility: CLINIC | Age: 82
End: 2022-09-20
Payer: MEDICARE

## 2022-09-20 VITALS
BODY MASS INDEX: 28.32 KG/M2 | HEIGHT: 61 IN | HEART RATE: 63 BPM | TEMPERATURE: 98 F | WEIGHT: 150 LBS | DIASTOLIC BLOOD PRESSURE: 61 MMHG | OXYGEN SATURATION: 95 % | SYSTOLIC BLOOD PRESSURE: 97 MMHG | RESPIRATION RATE: 18 BRPM

## 2022-09-20 DIAGNOSIS — T14.8XXA BRUISING: Primary | ICD-10-CM

## 2022-09-20 PROCEDURE — 99203 PR OFFICE/OUTPT VISIT, NEW, LEVL III, 30-44 MIN: ICD-10-PCS | Mod: S$GLB,,, | Performed by: NURSE PRACTITIONER

## 2022-09-20 PROCEDURE — 99203 OFFICE O/P NEW LOW 30 MIN: CPT | Mod: S$GLB,,, | Performed by: NURSE PRACTITIONER

## 2022-09-20 NOTE — TELEPHONE ENCOUNTER
Luz calling c/o right wrist & hand pain worse with moving thumb & swelling since yesterday evening. Also has itching and redness to abdominal area. Denies hives and Received Flu & Covid vaccine yesterday at 1130. Hand pain rated a 6/10. Advised per triage protocol to see physician today. No available appts with PCP. Virtual visit scheduled for today per pt request. Triager explained and walked pt thru virtual visit pt instructions. Unsure if pt will be able to complete virtual visit as she states she is having trouble completing e-pre-check on shine. Advised pt to go to nearest UC/ED today for physician eval. V/u. Instructed pt to call back if further questions and/or concerns. V/u    Reason for Disposition   Weakness (i.e., loss of strength) of new-onset in hand or fingers (Exception: not truly weak, hand feels weak because of pain)   Patient wants to be seen    Additional Information   Negative: Similar pain previously and it was from 'heart attack'   Negative: Similar pain previously from 'angina' and not relieved by nitroglycerin   Negative: Sounds like a life-threatening emergency to the triager   Negative: Followed a hand or wrist injury   Negative: Fever and red area (or area very tender to touch)   Negative: Swollen joint and fever   Negative: Patient sounds very sick or weak to the triager   Negative: SEVERE pain (e.g., excruciating, unable to use hand at all)   Negative: Red area or streak and large (> 2 in or 5 cm)   Negative: Difficulty with breathing or swallowing starts within 2 hours after injection   Negative: Difficult to awaken or acting confused (e.g., disoriented, slurred speech)   Negative: Unresponsive, passed out, or very weak   Negative: Sounds like a life-threatening emergency to the triager   Negative: Sounds like a severe, unusual reaction to the triager   Negative: Fever > 103 F (39.4 C)   Negative: Fever > 100.5 F (38.1 C) and over 60 years of age   Negative: Fever > 100.5 F (38.1 C)  and has diabetes mellitus or a weak immune system (e.g., HIV positive, cancer chemotherapy, organ transplant, splenectomy, chronic steroids)   Negative: Fever > 100.5 F (38.1 C) and bedridden (e.g., nursing home patient, stroke, chronic illness, recovering from surgery)   Negative: Measles vaccine and purple/blood-colored rash (onset day 6-12)   Negative: Redness or red streak around the injection site begins > 48 hours after shot   Negative: Fever present > 3 days (72 hours)   Negative: Smallpox vaccine and eye pain, eye redness, or rash on eyelids   Negative: Deep lump follows (in 2 to 8 weeks) Td or TDaP shot, and becomes tender to the touch    Protocols used: Hand and Wrist Pain-A-OH, Immunization Ndlitmiah-N-JK

## 2022-09-20 NOTE — PROGRESS NOTES
"Subjective:       Patient ID: Luz Amaro is a 82 y.o. female.    Vitals:  height is 5' 1" (1.549 m) and weight is 68 kg (150 lb). Her temperature is 97.8 °F (36.6 °C). Her blood pressure is 97/61 and her pulse is 63. Her respiration is 18 and oxygen saturation is 95%.     Chief Complaint: Hand Pain    This is a 82 y.o. female who presents today with a chief complaint of right wrist pain that began yesterday. She reports that she received her covid booster shot yesterday, and soon after noticed swelling to her wrist area-anterior side-radial side. She describes the pain as aching. She denies trauma. She has been taking tylenol to help relieve her symptoms. Denies swelling to injection site. Has mild pain to injection site. Denies respiratory problems. Denies rash and hives.     Hand Pain   The incident occurred 12 to 24 hours ago. There was no injury mechanism. The pain is present in the right hand. The pain does not radiate. The pain is at a severity of 5/10. The pain is moderate. The pain has been Constant since the incident. Pertinent negatives include no chest pain, muscle weakness, numbness or tingling. The symptoms are aggravated by movement. Treatments tried: tylenol. The treatment provided no relief.   Cardiovascular:  Negative for chest pain.   Musculoskeletal:  Positive for pain. Negative for trauma.   Neurological:  Negative for numbness.     Objective:      Physical Exam   Constitutional: She is oriented to person, place, and time.  Non-toxic appearance. She does not appear ill. No distress.   HENT:   Head: Normocephalic.   Nose: Nose normal.   Mouth/Throat: Mucous membranes are moist.   Eyes: Right eye exhibits no discharge. Left eye exhibits no discharge.   Neck: Neck supple. No neck rigidity present.   Cardiovascular: Normal rate, regular rhythm, normal heart sounds and normal pulses.   Pulmonary/Chest: Effort normal and breath sounds normal.   Abdominal: Normal appearance. "   Musculoskeletal: Normal range of motion.         General: Swelling and tenderness present. No deformity or signs of injury. Normal range of motion.   Neurological: She is alert and oriented to person, place, and time.   Skin: Skin is warm, dry and not diaphoretic. Capillary refill takes less than 2 seconds. bruising         Comments: Right anterior wrist, over radial side has broken blood vessel(s) with bruising. Very mild edema noted. Does not appear to be related to covid vaccine. N/V appears intact. Radial pulse 2+. Cap refill <2 seconds. No obvious deformity. Mild tenderness to palpation.    Psychiatric: Her behavior is normal. Mood normal.   Nursing note and vitals reviewed.      Assessment:       1. Bruising          Plan:       --a[[ears to be broken veins at radial wrist. Likely from twisting (pt walked dogs), or other awkward movement or impact.     Bruising       Patient Instructions   Rest extremity  Ice packs  Compression  Elevate extremity  Tylenol as needed for pain

## 2022-09-23 DIAGNOSIS — F33.41 RECURRENT MAJOR DEPRESSIVE DISORDER, IN PARTIAL REMISSION: Primary | ICD-10-CM

## 2022-09-23 RX ORDER — BUPROPION HYDROCHLORIDE 150 MG/1
150 TABLET ORAL DAILY
Qty: 90 TABLET | Refills: 0 | Status: SHIPPED | OUTPATIENT
Start: 2022-09-23 | End: 2023-01-12

## 2022-09-23 NOTE — PROGRESS NOTES
Patient discussed during I meeting today.  Patient with improvement in symptoms on Prozac 20mg daily.  She still does not feel Wellbutrin is helping her.  I would recommend reducing Wellbutrin to 150mg daily x 1 month.  If depression has not worsened after 1 month on lower dose would then stop it completely.  LCSW will discuss plan with patient, and I will pend order for PCP.    Time: 10 minutes

## 2022-09-26 ENCOUNTER — PATIENT MESSAGE (OUTPATIENT)
Dept: BEHAVIORAL HEALTH | Facility: CLINIC | Age: 82
End: 2022-09-26
Payer: MEDICARE

## 2022-09-30 ENCOUNTER — EXTERNAL CHRONIC CARE MANAGEMENT (OUTPATIENT)
Dept: PRIMARY CARE CLINIC | Facility: CLINIC | Age: 82
End: 2022-09-30
Payer: MEDICARE

## 2022-09-30 PROCEDURE — 99490 CHRNC CARE MGMT STAFF 1ST 20: CPT | Mod: S$PBB,,, | Performed by: INTERNAL MEDICINE

## 2022-09-30 PROCEDURE — 99490 PR CHRONIC CARE MGMT, 1ST 20 MIN: ICD-10-PCS | Mod: S$PBB,,, | Performed by: INTERNAL MEDICINE

## 2022-09-30 PROCEDURE — 99490 CHRNC CARE MGMT STAFF 1ST 20: CPT | Mod: PBBFAC | Performed by: INTERNAL MEDICINE

## 2022-10-04 ENCOUNTER — TELEPHONE (OUTPATIENT)
Dept: BEHAVIORAL HEALTH | Facility: CLINIC | Age: 82
End: 2022-10-04
Payer: MEDICARE

## 2022-10-04 ENCOUNTER — PATIENT MESSAGE (OUTPATIENT)
Dept: BEHAVIORAL HEALTH | Facility: CLINIC | Age: 82
End: 2022-10-04
Payer: MEDICARE

## 2022-10-10 ENCOUNTER — OFFICE VISIT (OUTPATIENT)
Dept: OPTOMETRY | Facility: CLINIC | Age: 82
End: 2022-10-10
Payer: MEDICARE

## 2022-10-10 DIAGNOSIS — H53.032 AMBLYOPIA, STRABISMIC, LEFT: ICD-10-CM

## 2022-10-10 DIAGNOSIS — H35.363 DEGENERATIVE RETINAL DRUSEN OF BOTH EYES: ICD-10-CM

## 2022-10-10 DIAGNOSIS — H52.201 HYPEROPIA WITH ASTIGMATISM AND PRESBYOPIA, RIGHT: ICD-10-CM

## 2022-10-10 DIAGNOSIS — H52.202 MYOPIA WITH ASTIGMATISM AND PRESBYOPIA, LEFT: ICD-10-CM

## 2022-10-10 DIAGNOSIS — H04.123 BILATERAL DRY EYES: ICD-10-CM

## 2022-10-10 DIAGNOSIS — H52.12 MYOPIA WITH ASTIGMATISM AND PRESBYOPIA, LEFT: ICD-10-CM

## 2022-10-10 DIAGNOSIS — H52.4 MYOPIA WITH ASTIGMATISM AND PRESBYOPIA, LEFT: ICD-10-CM

## 2022-10-10 DIAGNOSIS — G43.109 OCULAR MIGRAINE: ICD-10-CM

## 2022-10-10 DIAGNOSIS — H50.012 ESOTROPIA OF LEFT EYE: ICD-10-CM

## 2022-10-10 DIAGNOSIS — H25.13 NUCLEAR SCLEROSIS, BILATERAL: Primary | ICD-10-CM

## 2022-10-10 DIAGNOSIS — H52.4 HYPEROPIA WITH ASTIGMATISM AND PRESBYOPIA, RIGHT: ICD-10-CM

## 2022-10-10 DIAGNOSIS — H52.01 HYPEROPIA WITH ASTIGMATISM AND PRESBYOPIA, RIGHT: ICD-10-CM

## 2022-10-10 PROCEDURE — 92015 PR REFRACTION: ICD-10-PCS | Mod: ,,, | Performed by: OPTOMETRIST

## 2022-10-10 PROCEDURE — 99212 OFFICE O/P EST SF 10 MIN: CPT | Mod: PBBFAC,PO | Performed by: OPTOMETRIST

## 2022-10-10 PROCEDURE — 92014 COMPRE OPH EXAM EST PT 1/>: CPT | Mod: S$PBB,,, | Performed by: OPTOMETRIST

## 2022-10-10 PROCEDURE — 99999 PR PBB SHADOW E&M-EST. PATIENT-LVL II: ICD-10-PCS | Mod: PBBFAC,,, | Performed by: OPTOMETRIST

## 2022-10-10 PROCEDURE — 99999 PR PBB SHADOW E&M-EST. PATIENT-LVL II: CPT | Mod: PBBFAC,,, | Performed by: OPTOMETRIST

## 2022-10-10 PROCEDURE — 92015 DETERMINE REFRACTIVE STATE: CPT | Mod: ,,, | Performed by: OPTOMETRIST

## 2022-10-10 PROCEDURE — 92014 PR EYE EXAM, EST PATIENT,COMPREHESV: ICD-10-PCS | Mod: S$PBB,,, | Performed by: OPTOMETRIST

## 2022-10-10 NOTE — PROGRESS NOTES
HPI    MARILEE: 10/2021  Chief complaint (CC): blur w/o RX, pt gets halo in vision in both eyes   from time to time.   Glasses? yes  Contacts? no  H/o eye surgery, injections or laser: no  H/o eye injury: no  Known eye conditions? Amblyopia OS  Family h/o eye conditions? no  Eye gtts? Systane Prn      (-) Flashes (+)  Floaters (-) Mucous   (-)  Tearing (-) Itching (-) Burning   (-) Headaches (--) Eye Pain/discomfort (-) Irritation   (-)  Redness (-) Double vision (-) Blurry vision    Diabetic? no  A1c? N/a      Last edited by Govind Lentz, OD on 10/10/2022  9:54 AM.            Assessment /Plan     For exam results, see Encounter Report.      Nuclear sclerosis, bilateral  Nuclear sclerotic cataract - not visually significant. Observe.    Esotropia of left eye  Amblyopia, strabismic, left  Longterm. Monitor.     Degenerative retinal drusen of both eyes  Stable. Monitor.     Bilateral dry eyes  Cont ATs.     Hyperopia with astigmatism and presbyopia, right  Myopia with astigmatism and presbyopia, left  SRx released to patient. Patient educated on lens options. Normal ocular health. RTC 1 year for routine exam.     Ocular migraine  Educated pt. Monitor.

## 2022-10-13 ENCOUNTER — TELEPHONE (OUTPATIENT)
Dept: BEHAVIORAL HEALTH | Facility: CLINIC | Age: 82
End: 2022-10-13
Payer: MEDICARE

## 2022-10-13 NOTE — PROGRESS NOTES
Behavioral Health Community Health Worker  Follow-Up  Completed by:  Milad Worthington    Date:  10/13/2022    Patient Enrollment in Behavioral Health Program:  Luz Amaro was enrolled in the Behavioral Health Program on 7/11/22    Assessments     Promis 10:  PROMIS-10 Questionnaire Scores 7/11/2022   Global Physical Health 13   Global Mental health Score 11       Depression PHQ:  PHQ9 10/13/2022   Total Score 4       Generalized Anxiety Disorder 7-Item Scale:  GAD7 10/6/2022   1. Feeling nervous, anxious, or on edge? 1   2. Not being able to stop or control worrying? 1   3. Worrying too much about different things? 1   4. Trouble relaxing? 1   5. Being so restless that it is hard to sit still? 0   6. Becoming easily annoyed or irritable? 1   7. Feeling afraid as if something awful might happen? 1   8. If you checked off any problems, how difficult have these problems made it for you to do your work, take care of things at home, or get along with other people? -   BARRON-7 Score 6       Patients' Global Impression of Change (PGIC) Scale:  Since beginning treatment at this clinic, how would you describe the change (if any) in ACTIVITY LIMITATIONS, SYMPTOMS, EMOTIONS, and OVERALL QUALITY OF LIFE, related to your painful condition?  No Value exists for the : OHS#58673      In a similar way, please check the number below that matches your degree of change since beginning care at this clinic (Much better (0) - Much Worse (10)): No Value exists for the : OHS#33279        Much Better                                     No Change                                    Much Worse                        -----------------------------------------------------------------------------                        0       1       2       3       4       5       6       7      8       9      10                     Call Summary     Assessment updated

## 2022-10-31 ENCOUNTER — EXTERNAL CHRONIC CARE MANAGEMENT (OUTPATIENT)
Dept: PRIMARY CARE CLINIC | Facility: CLINIC | Age: 82
End: 2022-10-31
Payer: MEDICARE

## 2022-10-31 PROCEDURE — 99490 PR CHRONIC CARE MGMT, 1ST 20 MIN: ICD-10-PCS | Mod: S$PBB,,, | Performed by: INTERNAL MEDICINE

## 2022-10-31 PROCEDURE — 99490 CHRNC CARE MGMT STAFF 1ST 20: CPT | Mod: PBBFAC | Performed by: INTERNAL MEDICINE

## 2022-10-31 PROCEDURE — 99490 CHRNC CARE MGMT STAFF 1ST 20: CPT | Mod: S$PBB,,, | Performed by: INTERNAL MEDICINE

## 2022-11-07 ENCOUNTER — PATIENT MESSAGE (OUTPATIENT)
Dept: BEHAVIORAL HEALTH | Facility: CLINIC | Age: 82
End: 2022-11-07
Payer: MEDICARE

## 2022-11-15 ENCOUNTER — PATIENT MESSAGE (OUTPATIENT)
Dept: BEHAVIORAL HEALTH | Facility: CLINIC | Age: 82
End: 2022-11-15
Payer: MEDICARE

## 2022-11-15 ENCOUNTER — TELEPHONE (OUTPATIENT)
Dept: BEHAVIORAL HEALTH | Facility: CLINIC | Age: 82
End: 2022-11-15
Payer: MEDICARE

## 2022-11-15 ENCOUNTER — OFFICE VISIT (OUTPATIENT)
Dept: BEHAVIORAL HEALTH | Facility: CLINIC | Age: 82
End: 2022-11-15
Payer: MEDICARE

## 2022-11-15 DIAGNOSIS — F41.1 GENERALIZED ANXIETY DISORDER: ICD-10-CM

## 2022-11-15 DIAGNOSIS — F33.41 RECURRENT MAJOR DEPRESSIVE DISORDER, IN PARTIAL REMISSION: Primary | ICD-10-CM

## 2022-11-15 PROCEDURE — 90834 PSYTX W PT 45 MINUTES: CPT | Mod: ,,, | Performed by: SOCIAL WORKER

## 2022-11-15 PROCEDURE — 90834 PR PSYCHOTHERAPY W/PATIENT, 45 MIN: ICD-10-PCS | Mod: ,,, | Performed by: SOCIAL WORKER

## 2022-11-15 NOTE — PROGRESS NOTES
Behavioral Health Community Health Worker  Follow-Up  Completed by:  Milad Worthington    Date:  11/15/2022    Patient Enrollment in Behavioral Health Program:  Luz Amaro was enrolled in the Behavioral Health Program on 7/11/22  Assessments     Promis 10:  PROMIS-10 Questionnaire Scores 7/11/2022   Global Physical Health 13   Global Mental health Score 11       Depression PHQ:  PHQ9 11/15/2022   Total Score 4       Generalized Anxiety Disorder 7-Item Scale:  GAD7 11/14/2022   1. Feeling nervous, anxious, or on edge? 1   2. Not being able to stop or control worrying? 2   3. Worrying too much about different things? 2   4. Trouble relaxing? 1   5. Being so restless that it is hard to sit still? 1   6. Becoming easily annoyed or irritable? 1   7. Feeling afraid as if something awful might happen? 1   8. If you checked off any problems, how difficult have these problems made it for you to do your work, take care of things at home, or get along with other people? -   BARRON-7 Score 9       Patients' Global Impression of Change (PGIC) Scale:  Since beginning treatment at this clinic, how would you describe the change (if any) in ACTIVITY LIMITATIONS, SYMPTOMS, EMOTIONS, and OVERALL QUALITY OF LIFE, related to your painful condition?  No Value exists for the : OHS#77785      In a similar way, please check the number below that matches your degree of change since beginning care at this clinic (Much better (0) - Much Worse (10)): No Value exists for the : OHS#50325        Much Better                                     No Change                                    Much Worse                        -----------------------------------------------------------------------------                        0       1       2       3       4       5       6       7      8       9      10                     Call Summary     Assessment updated

## 2022-11-15 NOTE — PROGRESS NOTES
"Individual Psychotherapy (LCSW/PhD)  Luz Amaro,  11/15/2022    Site:  [facility]         Therapeutic Intervention: Met with patient for individual psychotherapy.    Chief complaint/reason for encounter: depression and anxiety     Interval history and content of current session: PT shares she doing is well.  She feels depression is well under control.  Pt is interested in getting family therapy especially for her younger son and her older son.  Her younger son feels no one likes him.  She shares her mom was depressed.  She shares she knows it runs in the family.  She feels her younger son may be jealous of her older son.  Pt shares she feels her depression has stabilized.  She finds she works at her depression by talking to herself.  "I tell myself you can't keep thinking that way." Then she will try to think of something else that keeps her mind active.  Her older son moved back from [place].  PT has totally tapered off her Wellbutrin.  She doesn't find the depression has gotten worse.  She has found better ways to cope with it.  She has realized her living situation will be permanent.  Pt put more responsibility on the men she lives with.  They are now vacuuming and mopping.  She is thinking she may have them do the grocery shopping.  Pt would like to terminate.  Praised pt on gains made.  Encouraged pt to reach out to PCP if she would like to return to therapy.  Gave pt info for psychologytoday and number for psych department for family therapy.      Treatment plan:  Target symptoms: depression, anxiety   Why chosen therapy is appropriate versus another modality: relevant to diagnosis, patient responds to this modality  Outcome monitoring methods: self-report, observation  Therapeutic intervention type: behavior modifying psychotherapy, supportive psychotherapy    Risk parameters:  Patient reports no suicidal ideation  Patient reports no homicidal ideation  Patient reports no self-injurious " behavior  Patient reports no violent behavior    Verbal deficits: None    Patient's response to intervention:  The patient's response to intervention is accepting.    Progress toward goals and other mental status changes:  The patient's progress toward goals is good.    Diagnosis:     ICD-10-CM ICD-9-CM   1. Recurrent major depressive disorder, in partial remission  F33.41 296.35   2. Generalized anxiety disorder  F41.1 300.02       Plan: Pt plans to terminate.      Return to clinic:  Pt to reach out to PCP if she would like to return to [facility]    Length of Service (minutes): 45

## 2022-11-28 ENCOUNTER — PATIENT MESSAGE (OUTPATIENT)
Dept: DERMATOLOGY | Facility: CLINIC | Age: 82
End: 2022-11-28
Payer: MEDICARE

## 2022-11-30 ENCOUNTER — EXTERNAL CHRONIC CARE MANAGEMENT (OUTPATIENT)
Dept: PRIMARY CARE CLINIC | Facility: CLINIC | Age: 82
End: 2022-11-30
Payer: MEDICARE

## 2022-11-30 PROCEDURE — 99490 CHRNC CARE MGMT STAFF 1ST 20: CPT | Mod: PBBFAC | Performed by: INTERNAL MEDICINE

## 2022-11-30 PROCEDURE — 99490 CHRNC CARE MGMT STAFF 1ST 20: CPT | Mod: S$PBB,,, | Performed by: INTERNAL MEDICINE

## 2022-11-30 PROCEDURE — 99490 PR CHRONIC CARE MGMT, 1ST 20 MIN: ICD-10-PCS | Mod: S$PBB,,, | Performed by: INTERNAL MEDICINE

## 2022-12-26 ENCOUNTER — PATIENT MESSAGE (OUTPATIENT)
Dept: INTERNAL MEDICINE | Facility: CLINIC | Age: 82
End: 2022-12-26
Payer: MEDICARE

## 2022-12-27 ENCOUNTER — OFFICE VISIT (OUTPATIENT)
Dept: INTERNAL MEDICINE | Facility: CLINIC | Age: 82
End: 2022-12-27
Payer: MEDICARE

## 2022-12-27 DIAGNOSIS — M54.31 SCIATICA OF RIGHT SIDE: Primary | ICD-10-CM

## 2022-12-27 DIAGNOSIS — M54.41 ACUTE MIDLINE LOW BACK PAIN WITH RIGHT-SIDED SCIATICA: ICD-10-CM

## 2022-12-27 DIAGNOSIS — I10 PRIMARY HYPERTENSION: ICD-10-CM

## 2022-12-27 PROCEDURE — 99213 PR OFFICE/OUTPT VISIT, EST, LEVL III, 20-29 MIN: ICD-10-PCS | Mod: 95,,, | Performed by: INTERNAL MEDICINE

## 2022-12-27 PROCEDURE — 99213 OFFICE O/P EST LOW 20 MIN: CPT | Mod: 95,,, | Performed by: INTERNAL MEDICINE

## 2022-12-27 RX ORDER — CYCLOBENZAPRINE HCL 5 MG
5 TABLET ORAL 3 TIMES DAILY PRN
Qty: 20 TABLET | Refills: 0 | Status: SHIPPED | OUTPATIENT
Start: 2022-12-27 | End: 2023-01-30 | Stop reason: SDUPTHER

## 2022-12-27 NOTE — PROGRESS NOTES
Subjective:       Patient ID: Luz Amaro is a 82 y.o. female.    Chief Complaint: Low-back Pain (Right leg sciatica)      The patient location is: [place]  The chief complaint leading to consultation is: Back pain, Sciatica    Visit type: audiovisual    Face to Face time with patient: 8 minutes  12 minutes of total time spent on the encounter, which includes face to face time and non-face to face time preparing to see the patient (eg, review of tests), Obtaining and/or reviewing separately obtained history, Documenting clinical information in the electronic or other health record, Independently interpreting results (not separately reported) and communicating results to the patient/family/caregiver, or Care coordination (not separately reported).         Each patient to whom he or she provides medical services by telemedicine is:  (1) informed of the relationship between the physician and patient and the respective role of any other health care provider with respect to management of the patient; and (2) notified that he or she may decline to receive medical services by telemedicine and may withdraw from such care at any time.    Notes:     Patient seen via virtual visit.  Believes she injured her back, caused sciatica.  She thinks she may have had this many years ago but is not certain if it was the right side.  She was doing some sweeping and later that day felt some soreness in the back and a day or 2 later pain going down her leg.  She is tried ice and heat.  She avoids Advil because of a history of angioedema but can take aspirin.    She denies any chest pains or shortness of breath.  No cough or wheeze.  No incontinence associated with this back problem.  No shingles rash.  No weakness but she is walking with a slight limp    Back Pain  This is a new problem. The current episode started in the past 7 days. The problem occurs constantly. The problem has been gradually worsening since onset. The pain is  present in the gluteal. The quality of the pain is described as shooting. The pain radiates to the right foot. The pain is at a severity of 10/10. The pain is severe. The pain is Worse during the day. The symptoms are aggravated by position and sitting. Stiffness is present All day. Associated symptoms include leg pain. Pertinent negatives include no abdominal pain, bladder incontinence, bowel incontinence, chest pain, dysuria, fever, headaches, numbness, paresis, paresthesias, pelvic pain, perianal numbness, tingling or weight loss. The treatment provided no relief.   Review of Systems   Constitutional:  Negative for fever and weight loss.   Cardiovascular:  Negative for chest pain.   Gastrointestinal:  Negative for abdominal pain and bowel incontinence.   Genitourinary:  Negative for bladder incontinence, dysuria, hematuria and pelvic pain.   Musculoskeletal:  Positive for back pain and leg pain.   Neurological:  Negative for tingling, numbness, headaches and paresthesias.       Objective:      Physical Exam  Pulmonary:      Effort: No respiratory distress.   Musculoskeletal:         General: Tenderness (low back into the right leg as demonstrated by the patient) present.   Neurological:      Mental Status: She is alert.   Psychiatric:         Mood and Affect: Mood normal.       Assessment:       Problem List Items Addressed This Visit          Cardiac/Vascular    Hypertension     Other Visit Diagnoses       Sciatica of right side    -  Primary    Acute midline low back pain with right-sided sciatica                Plan:       Luz was seen today for low-back pain.    Diagnoses and all orders for this visit:    Sciatica of right side    Primary hypertension    Acute midline low back pain with right-sided sciatica    Other orders  -     cyclobenzaprine (FLEXERIL) 5 MG tablet; Take 1 tablet (5 mg total) by mouth 3 (three) times daily as needed for Muscle spasms.           Slight improvement with current therapy.   "Trial of muscle relaxer.  Continue heat and stretching.  Update me in 48 hours if not improving or if any worsening or rash.        Portions of this note may have been created with voice recognition software. Occasional "wrong-word" or "sound-a-like" substitutions may have occurred due to the inherent limitations of voice recognition software. Please, read the note carefully and recognize, using context, where substitutions have occurred.Answers submitted by the patient for this visit:  Back Pain Questionnaire (Submitted on 12/26/2022)  Chief Complaint: Back pain  genital pain: No    "

## 2022-12-31 ENCOUNTER — EXTERNAL CHRONIC CARE MANAGEMENT (OUTPATIENT)
Dept: PRIMARY CARE CLINIC | Facility: CLINIC | Age: 82
End: 2022-12-31
Payer: MEDICARE

## 2022-12-31 PROCEDURE — 99490 CHRNC CARE MGMT STAFF 1ST 20: CPT | Mod: PBBFAC | Performed by: INTERNAL MEDICINE

## 2022-12-31 PROCEDURE — 99490 CHRNC CARE MGMT STAFF 1ST 20: CPT | Mod: S$PBB,,, | Performed by: INTERNAL MEDICINE

## 2022-12-31 PROCEDURE — 99490 PR CHRONIC CARE MGMT, 1ST 20 MIN: ICD-10-PCS | Mod: S$PBB,,, | Performed by: INTERNAL MEDICINE

## 2023-01-05 ENCOUNTER — PATIENT MESSAGE (OUTPATIENT)
Dept: DERMATOLOGY | Facility: CLINIC | Age: 83
End: 2023-01-05
Payer: MEDICARE

## 2023-01-08 ENCOUNTER — OFFICE VISIT (OUTPATIENT)
Dept: URGENT CARE | Facility: CLINIC | Age: 83
End: 2023-01-08
Payer: MEDICARE

## 2023-01-08 VITALS
TEMPERATURE: 98 F | HEIGHT: 61 IN | DIASTOLIC BLOOD PRESSURE: 63 MMHG | SYSTOLIC BLOOD PRESSURE: 107 MMHG | RESPIRATION RATE: 16 BRPM | BODY MASS INDEX: 28.31 KG/M2 | WEIGHT: 149.94 LBS | OXYGEN SATURATION: 95 % | HEART RATE: 60 BPM

## 2023-01-08 DIAGNOSIS — R05.2 SUBACUTE COUGH: Primary | ICD-10-CM

## 2023-01-08 PROCEDURE — 71046 XR CHEST PA AND LATERAL: ICD-10-PCS | Mod: FY,S$GLB,, | Performed by: RADIOLOGY

## 2023-01-08 PROCEDURE — 71046 X-RAY EXAM CHEST 2 VIEWS: CPT | Mod: FY,S$GLB,, | Performed by: RADIOLOGY

## 2023-01-08 PROCEDURE — 99213 OFFICE O/P EST LOW 20 MIN: CPT | Mod: S$GLB,,, | Performed by: NURSE PRACTITIONER

## 2023-01-08 PROCEDURE — 99213 PR OFFICE/OUTPT VISIT, EST, LEVL III, 20-29 MIN: ICD-10-PCS | Mod: S$GLB,,, | Performed by: NURSE PRACTITIONER

## 2023-01-08 RX ORDER — PROMETHAZINE HYDROCHLORIDE AND DEXTROMETHORPHAN HYDROBROMIDE 6.25; 15 MG/5ML; MG/5ML
5 SYRUP ORAL EVERY 4 HOURS PRN
Qty: 180 ML | Refills: 0 | Status: SHIPPED | OUTPATIENT
Start: 2023-01-08 | End: 2023-01-18

## 2023-01-08 RX ORDER — ALBUTEROL SULFATE 90 UG/1
2 AEROSOL, METERED RESPIRATORY (INHALATION) EVERY 6 HOURS PRN
Qty: 8 G | Refills: 0 | Status: SHIPPED | OUTPATIENT
Start: 2023-01-08 | End: 2023-03-02

## 2023-01-08 RX ORDER — FLUTICASONE PROPIONATE 50 MCG
2 SPRAY, SUSPENSION (ML) NASAL DAILY
Qty: 15.8 ML | Refills: 0 | Status: SHIPPED | OUTPATIENT
Start: 2023-01-08 | End: 2023-01-15

## 2023-01-08 NOTE — PROGRESS NOTES
"Subjective:       Patient ID: Luz Amaro is a 82 y.o. female.    Vitals:  height is 5' 1" (1.549 m) and weight is 68 kg (149 lb 14.6 oz). Her oral temperature is 97.6 °F (36.4 °C). Her blood pressure is 107/63 and her pulse is 60. Her respiration is 16 and oxygen saturation is 95%.     Chief Complaint: URI    Patient presents today with a cough starting before Thanksgiving. Cough is worse in the context that it has become more frequent.  + post nasal drip. Family member states she has also become more foggy due to lack of restful sleep, since onset of cough.  Patient denies fever, chills, SOB even with exertion, pedal edema, orthopnea. No report of fever, chills, headache, malaise, fatigue, chest pain, palpitations, loss of taste or smell, purulent discharge, abdominal pain, N/V/D. Patient tried taking tessalon pearls and mucinex otc with no improvement.      URI   This is a new problem. The current episode started more than 1 month ago. The problem has been unchanged. There has been no fever. Associated symptoms include coughing. Pertinent negatives include no abdominal pain, chest pain, congestion, diarrhea, ear pain, headaches, nausea, neck pain, rash, sinus pain, sneezing, sore throat, vomiting or wheezing.     Constitution: Negative for chills, sweating, fatigue and fever.   HENT:  Positive for postnasal drip. Negative for ear pain, ear discharge, tinnitus, hearing loss, congestion, sinus pain, sinus pressure, sore throat, trouble swallowing and voice change.    Neck: Negative for neck pain and painful lymph nodes.   Cardiovascular:  Negative for chest pain, palpitations and sob on exertion.   Respiratory:  Positive for cough and sputum production. Negative for shortness of breath and wheezing.    Gastrointestinal:  Negative for abdominal pain, nausea, vomiting and diarrhea.   Musculoskeletal:  Negative for muscle ache.   Skin:  Negative for color change, pale and rash.   Allergic/Immunologic: " Negative for sneezing.   Neurological:  Negative for headaches, numbness and tingling.   Hematologic/Lymphatic: Negative for swollen lymph nodes.     Objective:      Physical Exam   Constitutional: She is oriented to person, place, and time. She appears well-developed. She is cooperative.  Non-toxic appearance. She does not appear ill. No distress.   HENT:   Head: Normocephalic and atraumatic.   Ears:   Right Ear: Hearing, tympanic membrane, external ear and ear canal normal.   Left Ear: Hearing, tympanic membrane, external ear and ear canal normal.   Nose: Nose normal. No mucosal edema, rhinorrhea, nasal deformity or congestion. No epistaxis. Right sinus exhibits no maxillary sinus tenderness and no frontal sinus tenderness. Left sinus exhibits no maxillary sinus tenderness and no frontal sinus tenderness.   Mouth/Throat: Uvula is midline, oropharynx is clear and moist and mucous membranes are normal. No trismus in the jaw. Normal dentition. No uvula swelling. No oropharyngeal exudate, posterior oropharyngeal edema or posterior oropharyngeal erythema.   Eyes: Conjunctivae and lids are normal. No scleral icterus.   Neck: Trachea normal and phonation normal. Neck supple. No edema present. No erythema present. No neck rigidity present.   Cardiovascular: Normal rate and regular rhythm.   Murmur heard.  Pulmonary/Chest: Effort normal and breath sounds normal. No stridor. No respiratory distress. She has no decreased breath sounds. She has no wheezes. She has no rhonchi. She exhibits no tenderness.         Comments: Very faint rales to upper posterior lobes    Abdominal: Normal appearance.   Musculoskeletal: Normal range of motion.         General: No deformity. Normal range of motion.      Cervical back: She exhibits no tenderness.   Lymphadenopathy:     She has no cervical adenopathy.   Neurological: She is alert and oriented to person, place, and time. She exhibits normal muscle tone. Coordination normal.   Skin: Skin  is warm, dry, intact, not diaphoretic and not pale.   Psychiatric: Her speech is normal and behavior is normal. Judgment and thought content normal.   Nursing note and vitals reviewed.      XR CHEST PA AND LATERAL    Result Date: 1/8/2023  EXAMINATION: XR CHEST PA AND LATERAL CLINICAL HISTORY: Subacute cough TECHNIQUE: PA and lateral views of the chest were performed. COMPARISON: None FINDINGS: The lungs are clear, with normal appearance of pulmonary vasculature.No pleural effusion.No pneumothorax. The cardiac silhouette is normal in size. The hilar and mediastinal contours are unremarkable. Bones are intact.     No acute abnormality. Electronically signed by: Nik Barillas MD Date:    01/08/2023 Time:    13:20     Assessment:       1. Subacute cough          Plan:         Subacute cough  -     XR CHEST PA AND LATERAL; Future; Expected date: 01/08/2023  -     albuterol (PROVENTIL/VENTOLIN HFA) 90 mcg/actuation inhaler; Inhale 2 puffs into the lungs every 6 (six) hours as needed for Wheezing or Shortness of Breath. Rescue  Dispense: 8 g; Refill: 0  -     promethazine-dextromethorphan (PROMETHAZINE-DM) 6.25-15 mg/5 mL Syrp; Take 5 mLs by mouth every 4 (four) hours as needed (cough).  Dispense: 180 mL; Refill: 0  -     fluticasone propionate (FLONASE) 50 mcg/actuation nasal spray; 2 sprays (100 mcg total) by Each Nostril route once daily. for 7 days  Dispense: 15.8 mL; Refill: 0    -differentials include but not limited to viral bronchitis/bronchiolitis , allergic PND, post URI PND, PNA             -ER precautions discussed, follow up with PCP 1-2 weeks for no improvement     Patient Instructions   See additional patient Instructions provided    - Rest.    - Drink plenty of fluids.  - Bacterial infections can be treated with oral antibiotics, however, Viral upper respiratory infections will not respond to antibiotics but with simple symptomatic care, typically run their course in 10-14 days.     - Tylenol or  Ibuprofen as directed as needed for fever/pain. Avoid tylenol if you have a history of liver disease. Do not take ibuprofen if you have a history of GI bleeding, kidney disease, or if you take blood thinners.     - You can take over-the-counter claritin, zyrtec, allegra, or xyzal as directed. These are antihistamines that can help with runny nose, nasal congestion, sneezing, and helps to dry up post-nasal drip, which usually causes sore throat and cough.   - If you do NOT have high blood pressure, you may use a decongestant form (D)  of this medication (ie. Claritin- D, zyrtec-D, allegra-D) or if you do not take the D form, you can take sudafed (pseudoephedrine) over the counter, which is a decongestant. Do NOT take two decongestant (D) medications at the same time (such as mucinex-D and claritin-D or plain sudafed and claritin D)    - You can use Flonase (fluticasone) nasal spray as directed for sinus congestion and postnasal drip. This is a steroid nasal spray that works locally over time to decrease the inflammation in your nose/sinuses and help with allergic symptoms. This is not an quick- relief spray like afrin, but it works well if used daily.  Discontinue if you develop nose bleed  - use nasal saline prior to Flonase.  - Use Ocean Spray Nasal Saline 1-3 puffs each nostril every 2-3 hours then blow out onto tissue. This is to irrigate the nasal passage way to clear the sinus openings. Use until sinus problem resolved.    - you can take plain Mucinex (guaifenesin) 1200 mg twice a day to help loosen mucous.     -warm salt water gargles can help with sore throat    - warm tea with honey can help with cough. Honey is a natural cough suppressant.    - Dextromethorphan (DM) is a cough suppressant over the counter (ie. mucinex DM, robitussin, delsym; dayquil/nyquil has DM as well.)    - Follow up with your PCP or specialty clinic as directed in the next 1-2 weeks if not improved or as needed.  You can call [phone] to schedule an appointment with the appropriate provider.      - Go to the ER if you develop new or worsening symptoms.     - You must understand that you have received an Urgent Care treatment only and that you may be released before all of your medical problems are known or treated.   - You, the patient, will arrange for follow up care as instructed.   - If your condition worsens or fails to improve we recommend that you receive another evaluation at the ER immediately or contact your PCP to discuss your concerns or return here.     Patient Instructions   - You must understand that you have received an Urgent Care treatment only and that you may be released before all of your medical problems are known or treated.   - You, the patient, will arrange for follow up care as instructed.   - If your condition worsens or fails to improve we recommend that you receive another evaluation at the ER immediately or contact your PCP to discuss your concerns or return here.     Advised on return/follow-up precautions. Advised on ER precautions. Answered all patient questions. Patient verbalized understanding and voiced agreement with current treatment plan.

## 2023-01-08 NOTE — PATIENT INSTRUCTIONS
See additional patient Instructions provided    - Rest.    - Drink plenty of fluids.  - Bacterial infections can be treated with oral antibiotics, however, Viral upper respiratory infections will not respond to antibiotics but with simple symptomatic care, typically run their course in 10-14 days.     - Tylenol or Ibuprofen as directed as needed for fever/pain. Avoid tylenol if you have a history of liver disease. Do not take ibuprofen if you have a history of GI bleeding, kidney disease, or if you take blood thinners.     - You can take over-the-counter claritin, zyrtec, allegra, or xyzal as directed. These are antihistamines that can help with runny nose, nasal congestion, sneezing, and helps to dry up post-nasal drip, which usually causes sore throat and cough.   - If you do NOT have high blood pressure, you may use a decongestant form (D)  of this medication (ie. Claritin- D, zyrtec-D, allegra-D) or if you do not take the D form, you can take sudafed (pseudoephedrine) over the counter, which is a decongestant. Do NOT take two decongestant (D) medications at the same time (such as mucinex-D and claritin-D or plain sudafed and claritin D)    - You can use Flonase (fluticasone) nasal spray as directed for sinus congestion and postnasal drip. This is a steroid nasal spray that works locally over time to decrease the inflammation in your nose/sinuses and help with allergic symptoms. This is not an quick- relief spray like afrin, but it works well if used daily.  Discontinue if you develop nose bleed  - use nasal saline prior to Flonase.  - Use Ocean Spray Nasal Saline 1-3 puffs each nostril every 2-3 hours then blow out onto tissue. This is to irrigate the nasal passage way to clear the sinus openings. Use until sinus problem resolved.    - you can take plain Mucinex (guaifenesin) 1200 mg twice a day to help loosen mucous.     -warm salt water gargles can help with sore throat    - warm tea with honey can help with  cough. Honey is a natural cough suppressant.    - Dextromethorphan (DM) is a cough suppressant over the counter (ie. mucinex DM, robitussin, delsym; dayquil/nyquil has DM as well.)    - Follow up with your PCP or specialty clinic as directed in the next 1-2 weeks if not improved or as needed.  You can call [phone] to schedule an appointment with the appropriate provider.      - Go to the ER if you develop new or worsening symptoms.     - You must understand that you have received an Urgent Care treatment only and that you may be released before all of your medical problems are known or treated.   - You, the patient, will arrange for follow up care as instructed.   - If your condition worsens or fails to improve we recommend that you receive another evaluation at the ER immediately or contact your PCP to discuss your concerns or return here.     Patient Instructions   - You must understand that you have received an Urgent Care treatment only and that you may be released before all of your medical problems are known or treated.   - You, the patient, will arrange for follow up care as instructed.   - If your condition worsens or fails to improve we recommend that you receive another evaluation at the ER immediately or contact your PCP to discuss your concerns or return here.     Advised on return/follow-up precautions. Advised on ER precautions. Answered all patient questions. Patient verbalized understanding and voiced agreement with current treatment plan.

## 2023-01-09 ENCOUNTER — PATIENT MESSAGE (OUTPATIENT)
Dept: INTERNAL MEDICINE | Facility: CLINIC | Age: 83
End: 2023-01-09
Payer: MEDICARE

## 2023-01-12 ENCOUNTER — OFFICE VISIT (OUTPATIENT)
Dept: INTERNAL MEDICINE | Facility: CLINIC | Age: 83
End: 2023-01-12
Payer: MEDICARE

## 2023-01-12 ENCOUNTER — HOSPITAL ENCOUNTER (OUTPATIENT)
Dept: RADIOLOGY | Facility: HOSPITAL | Age: 83
Discharge: HOME | End: 2023-01-12
Attending: PHYSICIAN ASSISTANT
Payer: MEDICARE

## 2023-01-12 VITALS
BODY MASS INDEX: 28.51 KG/M2 | HEART RATE: 65 BPM | DIASTOLIC BLOOD PRESSURE: 60 MMHG | OXYGEN SATURATION: 97 % | WEIGHT: 151 LBS | SYSTOLIC BLOOD PRESSURE: 128 MMHG | HEIGHT: 61 IN

## 2023-01-12 DIAGNOSIS — M54.31 RIGHT SIDED SCIATICA: ICD-10-CM

## 2023-01-12 DIAGNOSIS — M54.31 RIGHT SIDED SCIATICA: Primary | ICD-10-CM

## 2023-01-12 PROCEDURE — 72100 X-RAY EXAM L-S SPINE 2/3 VWS: CPT | Mod: 26,,, | Performed by: RADIOLOGY

## 2023-01-12 PROCEDURE — 99999 PR PBB SHADOW E&M-EST. PATIENT-LVL V: CPT | Mod: PBBFAC,,, | Performed by: PHYSICIAN ASSISTANT

## 2023-01-12 PROCEDURE — 99215 OFFICE O/P EST HI 40 MIN: CPT | Mod: PBBFAC | Performed by: PHYSICIAN ASSISTANT

## 2023-01-12 PROCEDURE — 72100 X-RAY EXAM L-S SPINE 2/3 VWS: CPT | Mod: TC

## 2023-01-12 PROCEDURE — 72100 XR LUMBAR SPINE AP AND LATERAL: ICD-10-PCS | Mod: 26,,, | Performed by: RADIOLOGY

## 2023-01-12 PROCEDURE — 99214 OFFICE O/P EST MOD 30 MIN: CPT | Mod: S$PBB,,, | Performed by: PHYSICIAN ASSISTANT

## 2023-01-12 PROCEDURE — 99214 PR OFFICE/OUTPT VISIT, EST, LEVL IV, 30-39 MIN: ICD-10-PCS | Mod: S$PBB,,, | Performed by: PHYSICIAN ASSISTANT

## 2023-01-12 PROCEDURE — 99999 PR PBB SHADOW E&M-EST. PATIENT-LVL V: ICD-10-PCS | Mod: PBBFAC,,, | Performed by: PHYSICIAN ASSISTANT

## 2023-01-12 RX ORDER — METHYLPREDNISOLONE 4 MG/1
TABLET ORAL
Qty: 1 EACH | Refills: 0 | Status: SHIPPED | OUTPATIENT
Start: 2023-01-12 | End: 2023-01-30

## 2023-01-12 RX ORDER — GABAPENTIN 100 MG/1
100 CAPSULE ORAL NIGHTLY
Qty: 30 CAPSULE | Refills: 1 | Status: SHIPPED | OUTPATIENT
Start: 2023-01-12 | End: 2023-03-07

## 2023-01-12 NOTE — PROGRESS NOTES
"Subjective:       Patient ID: Luz Amaro is a 82 y.o. female.    Chief Complaint: Back Pain    HPI    Established pt of Suman Moss MD (new to me)    Same day/urgent care pt    Here with concerns of continued right sciatica.  Onset of pain about 2 weeks ago, after sweeping, constant shooting pain R buttock down back of R thigh. This has occurred in the past several years ago, prev saw a chiropractor. Seen virtually for this flare on , prescribed flexeril which helped decrease pain to 4 to 5. Also tried heat/ice and stretching. Pain worse with prolonged sitting, bending. No numbness, bowel/bladder incontinence, or saddle anesthesia       Reports she is unable to NSAIDs    Past Medical History:   Diagnosis Date    Amblyopia     Angio-edema     Basal cell carcinoma     left cheek     BCC (basal cell carcinoma) 2010    left neck    Cataract     Hyperlipidemia     Hypertension     Strabismus      Social History     Tobacco Use    Smoking status: Former     Types: Cigarettes     Quit date: 2/10/1983     Years since quittin.9    Smokeless tobacco: Never   Substance Use Topics    Alcohol use: Not Currently    Drug use: No     Review of patient's allergies indicates:   Allergen Reactions    Ace inhibitors     Arb-angiotensin receptor antagonist     Amlodipine Swelling     Tongue swells       Review of Systems   Constitutional:  Negative for chills, fever and unexpected weight change.   Respiratory:  Negative for cough, shortness of breath and wheezing.    Cardiovascular:  Negative for chest pain and leg swelling.   Gastrointestinal:  Negative for abdominal pain, nausea and vomiting.   Musculoskeletal:  Positive for back pain and leg pain.   Integumentary:  Negative for rash.   Neurological:  Negative for weakness, light-headedness and headaches.       Objective: /60 (BP Location: Right arm, Patient Position: Sitting, BP Method: Medium (Manual))   Pulse 65   Ht 5' 1" (1.549 m)   Wt " 68.5 kg (151 lb 0.2 oz)   SpO2 97%   BMI 28.53 kg/m²         Physical Exam  Vitals reviewed.   Constitutional:       General: She is not in acute distress.     Appearance: She is well-developed.   HENT:      Head: Normocephalic and atraumatic.   Cardiovascular:      Rate and Rhythm: Normal rate and regular rhythm.      Heart sounds: No murmur heard.  Pulmonary:      Effort: Pulmonary effort is normal.      Breath sounds: Normal breath sounds. No wheezing or rales.   Abdominal:      General: Bowel sounds are normal.      Palpations: Abdomen is soft.      Tenderness: There is no abdominal tenderness.   Musculoskeletal:      Lumbar back: Tenderness present. Normal range of motion. Positive right straight leg raise test.      Comments: 5/5 Strength to BLE  Ambulating, slight antalgic gait.    Skin:     General: Skin is warm and dry.      Findings: No rash.   Neurological:      Mental Status: She is alert and oriented to person, place, and time.       Assessment:       Problem List Items Addressed This Visit    None  Visit Diagnoses       Right sided sciatica    -  Primary    Relevant Medications    methylPREDNISolone (MEDROL, ABDIRASHID,) 4 mg tablet    gabapentin (NEURONTIN) 100 MG capsule    Other Relevant Orders    X-Ray Lumbar Spine Ap And Lateral (Completed)              Plan:       Luz was seen today for back pain.    Diagnoses and all orders for this visit:    Right sided sciatica  -     methylPREDNISolone (MEDROL, ABDIRASHID,) 4 mg tablet; use as directed  -     X-Ray Lumbar Spine Ap And Lateral; Future  -     gabapentin (NEURONTIN) 100 MG capsule; Take 1 capsule (100 mg total) by mouth every evening.    Trial of meds as above  Med profile of gabapentin discussed. May increase to 2-3 tabs if tolerable.   If no improvement we discussed MRI/Interventional pain clinic referral.     China Lopez PA-C

## 2023-01-13 ENCOUNTER — PATIENT MESSAGE (OUTPATIENT)
Dept: INTERNAL MEDICINE | Facility: CLINIC | Age: 83
End: 2023-01-13
Payer: MEDICARE

## 2023-01-16 ENCOUNTER — PATIENT MESSAGE (OUTPATIENT)
Dept: DERMATOLOGY | Facility: CLINIC | Age: 83
End: 2023-01-16
Payer: MEDICARE

## 2023-01-17 ENCOUNTER — OFFICE VISIT (OUTPATIENT)
Dept: DERMATOLOGY | Facility: CLINIC | Age: 83
End: 2023-01-17
Payer: MEDICARE

## 2023-01-17 DIAGNOSIS — Z85.828 PERSONAL HISTORY OF SKIN CANCER: ICD-10-CM

## 2023-01-17 DIAGNOSIS — L43.9 LICHEN PLANUS: ICD-10-CM

## 2023-01-17 DIAGNOSIS — L81.4 LENTIGO: ICD-10-CM

## 2023-01-17 DIAGNOSIS — L90.5 SCAR: ICD-10-CM

## 2023-01-17 DIAGNOSIS — D48.5 NEOPLASM OF UNCERTAIN BEHAVIOR OF SKIN: Primary | ICD-10-CM

## 2023-01-17 DIAGNOSIS — D22.9 NEVUS: ICD-10-CM

## 2023-01-17 DIAGNOSIS — D18.01 CHERRY ANGIOMA: ICD-10-CM

## 2023-01-17 DIAGNOSIS — L82.1 SK (SEBORRHEIC KERATOSIS): ICD-10-CM

## 2023-01-17 PROCEDURE — 88305 TISSUE EXAM BY PATHOLOGIST: ICD-10-PCS | Mod: 26,,, | Performed by: DERMATOLOGY

## 2023-01-17 PROCEDURE — 88305 TISSUE EXAM BY PATHOLOGIST: CPT | Performed by: DERMATOLOGY

## 2023-01-17 PROCEDURE — 99999 PR PBB SHADOW E&M-EST. PATIENT-LVL III: ICD-10-PCS | Mod: PBBFAC,,, | Performed by: DERMATOLOGY

## 2023-01-17 PROCEDURE — 11102 PR TANGENTIAL BIOPSY, SKIN, SINGLE LESION: ICD-10-PCS | Mod: S$PBB,,, | Performed by: DERMATOLOGY

## 2023-01-17 PROCEDURE — 99213 OFFICE O/P EST LOW 20 MIN: CPT | Mod: 25,S$PBB,, | Performed by: DERMATOLOGY

## 2023-01-17 PROCEDURE — 11102 TANGNTL BX SKIN SINGLE LES: CPT | Mod: PBBFAC,PO | Performed by: DERMATOLOGY

## 2023-01-17 PROCEDURE — 11102 TANGNTL BX SKIN SINGLE LES: CPT | Mod: S$PBB,,, | Performed by: DERMATOLOGY

## 2023-01-17 PROCEDURE — 99213 OFFICE O/P EST LOW 20 MIN: CPT | Mod: PBBFAC,PO,25 | Performed by: DERMATOLOGY

## 2023-01-17 PROCEDURE — 88305 TISSUE EXAM BY PATHOLOGIST: CPT | Mod: 26,,, | Performed by: DERMATOLOGY

## 2023-01-17 PROCEDURE — 99999 PR PBB SHADOW E&M-EST. PATIENT-LVL III: CPT | Mod: PBBFAC,,, | Performed by: DERMATOLOGY

## 2023-01-17 PROCEDURE — 99213 PR OFFICE/OUTPT VISIT, EST, LEVL III, 20-29 MIN: ICD-10-PCS | Mod: 25,S$PBB,, | Performed by: DERMATOLOGY

## 2023-01-17 NOTE — PATIENT INSTRUCTIONS
Shave Biopsy Wound Care    Your doctor has performed a shave biopsy today.  A band aid and vaseline ointment has been placed over the site.  This should remain in place for NO LONGER THAN 48 hours.  It is fine to remove the bandaid after 24 hours, if the area is no longer bleeding. It is recommended that you keep the area dry (do not wet)) for the first 24 hours.  After 24 hours, wash the area with warm soap and water and apply Vaseline jelly.  Many patients prefer to use Neosporin or Bacitracin ointment.  This is acceptable; however, know that you can develop an allergy to this medication even if you have used it safely for years.  It is important to keep the area moist.  Letting it dry out and get air slows healing time, and will worsen the scar.        If you notice increasing redness, tenderness, pain, or yellow drainage at the biopsy site, please notify your doctor.  These are signs of an infection.    If your biopsy site is bleeding, apply firm pressure for 15 minutes straight.  Repeat for another 15 minutes, if it is still bleeding.   If the surgical site continues to bleed, then please contact your doctor.      For [facility] users:   You will receive your biopsy results in [facility] as soon as they are available. Please be assured that your physician/provider will review your results and will then determine what further treatment, evaluation, or planning is required. You should be contacted by your physician's/provider's office within 5 business days of receiving your results; If not, please reach out to directly. This is one more way [facility] is putting you first.     [facility] [place] [address]/ [phone] FAX/ [email or web address]      Shave Biopsy Wound Care    Your doctor has performed a shave biopsy today.  A band aid and vaseline ointment has been placed over the site.  This should remain in place for NO LONGER THAN 48 hours.  It is fine to remove the bandaid after 24  hours, if the area is no longer bleeding. It is recommended that you keep the area dry (do not wet)) for the first 24 hours.  After 24 hours, wash the area with warm soap and water and apply Vaseline jelly.  Many patients prefer to use Neosporin or Bacitracin ointment.  This is acceptable; however, know that you can develop an allergy to this medication even if you have used it safely for years.  It is important to keep the area moist.  Letting it dry out and get air slows healing time, and will worsen the scar.        If you notice increasing redness, tenderness, pain, or yellow drainage at the biopsy site, please notify your doctor.  These are signs of an infection.    If your biopsy site is bleeding, apply firm pressure for 15 minutes straight.  Repeat for another 15 minutes, if it is still bleeding.   If the surgical site continues to bleed, then please contact your doctor.      For [facility] users:   You will receive your biopsy results in [facility] as soon as they are available. Please be assured that your physician/provider will review your results and will then determine what further treatment, evaluation, or planning is required. You should be contacted by your physician's/provider's office within 5 business days of receiving your results; If not, please reach out to directly. This is one more way [facility] is putting you first.     [facility] [place] [address]/ [phone] FAX/ [email or web address]

## 2023-01-17 NOTE — PROGRESS NOTES
Subjective:       Patient ID:  Luz Amaro is a 82 y.o. female who presents for   Chief Complaint   Patient presents with    Skin Check     TBSE    Lesion     Right cheek      Patient is here today for a TBSE.    Pt has a history of no sun exposure in the past.   Pt recalls several blistering sunburns in the past- none  Pt has history of tanning bed use- none  Pt has  had moles removed in the past- 1 removed left wrist, x 30yrs ago,  Pt has history of melanoma in first degree relatives-  None    Patient with new complaint of lesion(s)  Location: Right cheek  Duration: > 1 year ; since prev visit in 5/2022  Symptoms: itchy, bleeds, dry  Relieving factors/Previous treatments: none    This is a high risk patient here to check for the development of new lesions.  H/o x 2 BCC (2010 and 2011), SCC in situ r cheek 2021        Review of Systems   Skin:  Positive for activity-related sunscreen use. Negative for daily sunscreen use and recent sunburn.   Hematologic/Lymphatic: Bruises/bleeds easily (aspirin).      Objective:    Physical Exam   Constitutional: She appears well-developed and well-nourished. No distress.   Neurological: She is alert and oriented to person, place, and time. She is not disoriented.   Psychiatric: She has a normal mood and affect.   Skin:   Areas Examined (abnormalities noted in diagram):   Head / Face Inspection Performed  Neck Inspection Performed  Chest / Axilla Inspection Performed  Abdomen Inspection Performed  Genitals / Buttocks / Groin Inspection Performed  Back Inspection Performed  RUE Inspected  LUE Inspection Performed  RLE Inspected  LLE Inspection Performed  Nails and Digits Inspection Performed                       Diagram Legend     Erythematous scaling macule/papule c/w actinic keratosis       Vascular papule c/w angioma      Pigmented verrucoid papule/plaque c/w seborrheic keratosis      Yellow umbilicated papule c/w sebaceous hyperplasia      Irregularly shaped tan  macule c/w lentigo     1-2 mm smooth white papules consistent with Milia      Movable subcutaneous cyst with punctum c/w epidermal inclusion cyst      Subcutaneous movable cyst c/w pilar cyst      Firm pink to brown papule c/w dermatofibroma      Pedunculated fleshy papule(s) c/w skin tag(s)      Evenly pigmented macule c/w junctional nevus     Mildly variegated pigmented, slightly irregular-bordered macule c/w mildly atypical nevus      Flesh colored to evenly pigmented papule c/w intradermal nevus       Pink pearly papule/plaque c/w basal cell carcinoma      Erythematous hyperkeratotic cursted plaque c/w SCC      Surgical scar with no sign of skin cancer recurrence      Open and closed comedones      Inflammatory papules and pustules      Verrucoid papule consistent consistent with wart     Erythematous eczematous patches and plaques     Dystrophic onycholytic nail with subungual debris c/w onychomycosis     Umbilicated papule    Erythematous-base heme-crusted tan verrucoid plaque consistent with inflamed seborrheic keratosis     Erythematous Silvery Scaling Plaque c/w Psoriasis     See annotation      Assessment / Plan:      Pathology Orders:       Normal Orders This Visit    Specimen to Pathology, Dermatology     Questions:    Procedure Type: Dermatology and skin neoplasms    Number of Specimens: 1    ------------------------: -------------------------    Spec 1 Procedure: Biopsy    Spec 1 Clinical Impression: r/o inflamed sk v other    Spec 1 Source: right mid cheek    Release to patient:           Neoplasm of uncertain behavior of skin  Shave biopsy procedure note:    Shave biopsy performed after verbal consent including risk of infection, scar, recurrence, need for additional treatment of site. Area prepped with alcohol, anesthetized with approximately 1.0cc of 1% lidocaine with epinephrine. Lesional tissue shaved with razor blade. Hemostasis achieved with application of aluminum chloride followed by  hyfrecation. No complications. Dressing applied. Wound care explained.  If biopsy positive for malignancy, refer to Dr. Wyatt for Mohs surgery consultation.    -     Specimen to Pathology, Dermatology    SK (seborrheic keratosis)  These are benign inherited growths without a malignant potential. Reassurance given to patient. No treatment is necessary.     Cherry angioma  These are benign vascular lesions that are inherited.  Treatment is not necessary.    Lentigo  This is a benign hyperpigmented sun induced lesion. Recommend daily sun protection/avoidance and use of at least SPF 30, broad spectrum sunscreen (OTC drug) will reduce the number of new lesions. Treatment of these benign lesions are considered cosmetic.  The nature of sun-induced photo-aging and skin cancers is discussed.  Sun avoidance, protective clothing, and the use of 30-SPF sunscreens is advised. Observe closely for skin damage/changes, and call if such occurs.    Nevus  Discussed ABCDE's of nevi.  Monitor for new mole or moles that are becoming bigger, darker, irritated, or developing irregular borders. Brochure provided. Instructed patient to observe lesion(s) for changes and follow up in clinic if changes are noted. Patient to monitor skin at home for new or changing lesions.     Lichen planus  Responds well mometasone/cerave cream well   Pt in remission currently .     HISTORY OF NMSC  SCAR  Patient with a history of non melanoma skin cancer.   Total body skin examination performed today including at least 12 points as noted in physical examination. Suspicious lesions noted.           Follow up in 1 year (on 1/17/2024) for prn bx report.

## 2023-01-24 LAB
FINAL PATHOLOGIC DIAGNOSIS: NORMAL
GROSS: NORMAL
Lab: NORMAL
MICROSCOPIC EXAM: NORMAL

## 2023-01-25 ENCOUNTER — TELEPHONE (OUTPATIENT)
Dept: INTERNAL MEDICINE | Facility: CLINIC | Age: 83
End: 2023-01-25
Payer: MEDICARE

## 2023-01-25 NOTE — TELEPHONE ENCOUNTER
Pt stated that she is having pain in right lower back going down her leg. Pt has an appt with lissa 02/06 to be seen for this. Pt stated that its worse in the morning but if she stays moving the pain goes away.     She wanted to know what you would recommend she do for the pain

## 2023-01-25 NOTE — PROGRESS NOTES
Please call patient to schedule a Mohs consultation.     Skin,  right mid cheek, shave biopsy:   - BOWEN'S DISEASE (SQUAMOUS CELL CARCINOMA IN-SITU), EXTENDING TO THE   PERIPHERAL MARGINS

## 2023-01-25 NOTE — TELEPHONE ENCOUNTER
She tried tylenol and I was able to find something sooner when I looked this time. Made sooner appt

## 2023-01-25 NOTE — TELEPHONE ENCOUNTER
----- Message from Aye Squires sent at 1/25/2023  2:37 PM CST -----  Contact: [phone] or Yenny [phone] ext [phone]  Pt would like a call back about lower back pain

## 2023-01-28 ENCOUNTER — PATIENT MESSAGE (OUTPATIENT)
Dept: ADMINISTRATIVE | Facility: OTHER | Age: 83
End: 2023-01-28
Payer: MEDICARE

## 2023-01-30 ENCOUNTER — OFFICE VISIT (OUTPATIENT)
Dept: INTERNAL MEDICINE | Facility: CLINIC | Age: 83
End: 2023-01-30
Payer: MEDICARE

## 2023-01-30 VITALS
WEIGHT: 152 LBS | BODY MASS INDEX: 28.7 KG/M2 | HEIGHT: 61 IN | SYSTOLIC BLOOD PRESSURE: 120 MMHG | DIASTOLIC BLOOD PRESSURE: 70 MMHG

## 2023-01-30 DIAGNOSIS — M54.30 SCIATICA, UNSPECIFIED LATERALITY: Primary | ICD-10-CM

## 2023-01-30 PROCEDURE — 99215 OFFICE O/P EST HI 40 MIN: CPT | Mod: PBBFAC | Performed by: INTERNAL MEDICINE

## 2023-01-30 PROCEDURE — 99214 OFFICE O/P EST MOD 30 MIN: CPT | Mod: S$PBB,,, | Performed by: INTERNAL MEDICINE

## 2023-01-30 PROCEDURE — 99999 PR PBB SHADOW E&M-EST. PATIENT-LVL V: ICD-10-PCS | Mod: PBBFAC,,, | Performed by: INTERNAL MEDICINE

## 2023-01-30 PROCEDURE — 99999 PR PBB SHADOW E&M-EST. PATIENT-LVL V: CPT | Mod: PBBFAC,,, | Performed by: INTERNAL MEDICINE

## 2023-01-30 PROCEDURE — 99214 PR OFFICE/OUTPT VISIT, EST, LEVL IV, 30-39 MIN: ICD-10-PCS | Mod: S$PBB,,, | Performed by: INTERNAL MEDICINE

## 2023-01-30 RX ORDER — CYCLOBENZAPRINE HCL 5 MG
5 TABLET ORAL 3 TIMES DAILY PRN
Qty: 42 TABLET | Refills: 0 | Status: SHIPPED | OUTPATIENT
Start: 2023-01-30 | End: 2023-02-24 | Stop reason: SDUPTHER

## 2023-01-30 NOTE — PROGRESS NOTES
" Patient ID: Luz Amaro is a 82 y.o. female.    Chief Complaint:  Back pain      Assessment:       1. Sciatica, unspecified laterality          Plan:         1. Sciatica, unspecified laterality  -     Ambulatory referral/consult to Physical/Occupational Therapy; Future; Expected date: 02/06/2023  -     CANE FOR HOME USE  -     Ambulatory referral/consult to [facility]; Future; Expected date: 02/06/2023    Other orders  -     cyclobenzaprine (FLEXERIL) 5 MG tablet; Take 1 tablet (5 mg total) by mouth 3 (three) times daily as needed for Muscle spasms.  Dispense: 42 tablet; Refill: 0     Continue with ice alternating with heat   Hygienic measures reviewed   She does not desire anything stronger for pain   Flexeril has been effective, cautions and side effects reviewed   Physical therapy   Alarm symptoms reviewed; natural history of back pain and sciatica discussed   Spine Clinic   Chart to PCP  Subjective:   [facility] appt    Dr Moss patient.  Daughter is with her.      Sciatica since end of December- had a virtual visit end of December then an in-person visit a few weeks ago.  She can not take anti-inflammatories due to getting angioedema when she takes them.  She has been taking Tylenol.  She was given Flexeril which seems to have helped and gabapentin which she is not sure has helped.  She was given Medrol Dosepak because of URI symptoms and of course that alleviated her symptoms but they have recurred.  She thinks this is related to doing some sweeping.  She has had this in the past, a couple of years ago also doing house work.  Those symptoms abated on their own.    No falls, fever, chills, sweats or weight loss.  No weakness of the leg but pain into R leg.    Xrays reviewed.        Recent xray this month:  "Lateral imaging demonstrates adequate alignment of the lumbar spine without significant vertebral body height loss.  There is disc space height loss at L3-L4, L4-L5, and L5-S1.  The facet " "joints are aligned noting lower lumbar facet arthropathy.  The sacral coccygeal segments are aligned.  AP spinal alignment is unremarkable.  The sacroiliac joints are intact noting degenerative change.  There is calcification of the aorta.     Impression:     1. No acute displaced fracture or dislocation of the lumbar spine noting degenerative changes as above."    Seen January 12th at which time she was given a Medrol Dosepak and gabapentin.    Gabapentin no help but states flexeril helped a little.  Has used ice and this helped a little.  Always in the R buttock down the R leg.    Patient Active Problem List   Diagnosis    Hypertension    Hyperlipidemia    Mixed incontinence    Nuclear sclerosis    Amblyopia, strabismic    Fear of flying    Chronic sialoadenitis    Osteopenia    Angioedema    Personal history of skin cancer    Nonrheumatic aortic valve stenosis    Diastolic dysfunction    History of colon polyps    Aortic atherosclerosis    Recurrent major depressive disorder, in partial remission    Numbness and tingling of left upper extremity    DNR (do not resuscitate)    Generalized anxiety disorder           Review of Systems   Constitutional:  Negative for fatigue and fever.   Gastrointestinal:  Negative for abdominal pain.   Genitourinary:  Negative for difficulty urinating and hematuria.   Musculoskeletal:  Positive for back pain.   Skin:  Negative for rash.   Neurological:  Negative for weakness and numbness.       Objective:      Physical Exam  Constitutional:       Appearance: She is well-developed.   HENT:      Head: Normocephalic and atraumatic.   Cardiovascular:      Rate and Rhythm: Normal rate and regular rhythm.      Heart sounds: Murmur heard.   Pulmonary:      Effort: Pulmonary effort is normal. No respiratory distress.      Breath sounds: Normal breath sounds. No wheezing.   Abdominal:      General: There is no distension.      Palpations: Abdomen is soft.   Musculoskeletal: "      Cervical back: Normal range of motion and neck supple.      Comments: No CVA pain.  Negative SLR.  Strength UE and LE wnl.  No pain over spine   Skin:     General: Skin is warm and dry.   Neurological:      Mental Status: She is oriented to person, place, and time.      Cranial Nerves: No cranial nerve deficit.      Deep Tendon Reflexes: Reflexes normal.   Psychiatric:         Behavior: Behavior normal.           Health Maintenance Due   Topic Date Due    Shingles Vaccine (1 of 2) Never done    Pneumococcal Vaccines (Age 65+) (2 - PPSV23 if available, else PCV20) 10/14/2016

## 2023-01-31 ENCOUNTER — CLINICAL SUPPORT (OUTPATIENT)
Dept: REHABILITATION | Facility: HOSPITAL | Age: 83
End: 2023-01-31
Attending: INTERNAL MEDICINE
Payer: MEDICARE

## 2023-01-31 ENCOUNTER — EXTERNAL CHRONIC CARE MANAGEMENT (OUTPATIENT)
Dept: PRIMARY CARE CLINIC | Facility: CLINIC | Age: 83
End: 2023-01-31
Payer: MEDICARE

## 2023-01-31 DIAGNOSIS — M54.9 BACK PAIN, UNSPECIFIED BACK LOCATION, UNSPECIFIED BACK PAIN LATERALITY, UNSPECIFIED CHRONICITY: Primary | ICD-10-CM

## 2023-01-31 DIAGNOSIS — M54.30 SCIATICA, UNSPECIFIED LATERALITY: ICD-10-CM

## 2023-01-31 DIAGNOSIS — R26.2 DIFFICULTY WALKING: ICD-10-CM

## 2023-01-31 PROCEDURE — 97161 PT EVAL LOW COMPLEX 20 MIN: CPT | Mod: PO

## 2023-01-31 PROCEDURE — 97110 THERAPEUTIC EXERCISES: CPT | Mod: PO

## 2023-01-31 PROCEDURE — 99490 PR CHRONIC CARE MGMT, 1ST 20 MIN: ICD-10-PCS | Mod: S$PBB,,, | Performed by: INTERNAL MEDICINE

## 2023-01-31 PROCEDURE — 99490 CHRNC CARE MGMT STAFF 1ST 20: CPT | Mod: PBBFAC | Performed by: INTERNAL MEDICINE

## 2023-01-31 PROCEDURE — 99490 CHRNC CARE MGMT STAFF 1ST 20: CPT | Mod: S$PBB,,, | Performed by: INTERNAL MEDICINE

## 2023-02-01 PROBLEM — M54.9 BACK PAIN: Status: ACTIVE | Noted: 2023-02-01

## 2023-02-01 PROBLEM — M54.30 SCIATICA: Status: ACTIVE | Noted: 2023-02-01

## 2023-02-01 PROBLEM — R26.2 DIFFICULTY WALKING: Status: ACTIVE | Noted: 2023-02-01

## 2023-02-01 NOTE — PROGRESS NOTES
[facility]   Physical Therapy Initial Evaluation     Date: 1/31/2023   Name: Luz Jo [facility] [place] [facility]  Clinic Number: [id number]    Therapy Diagnosis:   Encounter Diagnoses   Name Primary?    Sciatica, unspecified laterality     Back pain, unspecified back location, unspecified back pain laterality, unspecified chronicity Yes    Difficulty walking      Physician: Zulema Quintero MD    Physician Orders: PT Eval and Treat   Medical Diagnosis from Referral: M54.30 (ICD-10-CM) - Sciatica, unspecified laterality  Evaluation Date: 1/31/2023  Authorization Period Expiration: 12/31/23  Plan of Care Expiration: 3/31/23  Progress Note Due: 3/1/23  Visit # / Visits authorized: 1/ 1   FOTO: 1/3    Precautions: Standard and Fall     Time In: 935  Time Out: 1015  Total Appointment Time (timed & untimed codes): 40 minutes      SUBJECTIVE     Date of onset: 1 month    History of current condition - Luz reports: low back/R buttock pain which started around mid-December. Patient denies TRACIE or fall preceding increase in pain. Patient denies red flags sxs and is agreeable to today's eval and treat.     Falls: none      Imaging, see chart:      Prior Therapy: none  Social History:  lives with their spouse  Occupation: retired  Prior Level of Function: indepdnent without pain  Current Level of Function: back pain with all prolonged postures and with all weight bearing activities     Pain:  Current 4/10, worst 6/10, best 0/10   Location: bilateral low back   Description: Aching and Tight  Aggravating Factors: Sitting, Standing, Laying, Bending, and Walking  Easing Factors: rest     Patients goals: reduce back pain and improve walking tolerance     Medical History:   Past Medical History:   Diagnosis Date    Amblyopia     Angio-edema     Basal cell carcinoma 2011    left cheek     BCC (basal cell carcinoma) 2010    left neck    Cataract     Hyperlipidemia     Hypertension     Strabismus        Surgical  History:   Luz Amaro  has a past surgical history that includes Appendectomy; Ectopic pregnancy surgery; Oophorectomy; Cholecystectomy; Skin biopsy; and Colonoscopy (N/A, 12/14/2020).    Medications:   Luz has a current medication list which includes the following prescription(s): albuterol, alendronate, aspirin, atorvastatin, calcium carbonate/vitamin d3, carvedilol, cetirizine, cholecalciferol (vitamin d3), cyanocobalamin, cyclobenzaprine, bisacodyl, fluoxetine, gabapentin, lorazepam, mometasone, nifedipine, and tacrolimus.    Allergies:   Review of patient's allergies indicates:   Allergen Reactions    Ace inhibitors     Arb-angiotensin receptor antagonist     Amlodipine Swelling     Tongue swells          OBJECTIVE     Posture:  Patient represents with kyphotic t/spin and reduced lordosis in lumbar spine.      Lumbar Range of Motion:      Limitations   Flexion 20%      Extension 70%*      Left Side Bending 10%   Right Side Bending 20*         Lower Extremity Strength  Right LE   Left LE     Quadriceps: 5/5 Quadriceps: 5/5   Hamstrings: 4/5 Hamstrings: 4/5   Iliospoas: 4+/5 Iliospoas: 4+/5   Hip extension:  4-/5 Hip extension: 4-/5   PGM: 3+/5 PGM: 3+/5   Hip ER:  4-/5 Hip ER: 4/5   Hip IR: 4+/5 Hip IR: 4+/5      Special Tests:    Observation/Result   Repeated Flexion Improved sxs in low back         Neural Tension Testing:   Slump:Negative  SLR: Negative     Joint Mobility: generalized hypomobility of the lumbar spine.      Flexibility:               Hamstrings: carmen tightness         Limitation/Restriction for FOTO Back Survey    Therapist reviewed FOTO scores for Luz Amaro on 1/31/2023.   FOTO documents entered into EPIC - see Media section.    Limitation Score: %         TREATMENT     Total Treatment time (time-based codes) separate from Evaluation: 10 minutes      Luz received the treatments listed below:      therapeutic exercises to develop strength, endurance, ROM, and  flexibility for 10 minutes including:  Knee to chest stretch  LTR  HS Stretch    PATIENT EDUCATION AND HOME EXERCISES     Education provided:   - Education provided:   - (+) HEP, sxs behavior, short term activity modification for pain reduction    Written Home Exercises Provided: yes. Exercises were reviewed and Luz was able to demonstrate them prior to the end of the session.  Luz demonstrated good  understanding of the education provided. See EMR under Patient Instructions for exercises provided during therapy sessions.    ASSESSMENT     Luz is a 82 y.o. female referred to outpatient Physical Therapy with a medical diagnosis of M54.30 (ICD-10-CM) - Sciatica, unspecified laterality.        Patient presents with nonspecific mechanical low back pain, patient presents with hypomobilie segmental mobility of the lumbar spine resutling in extension intolerance and displays carmen hip weakness. Patient displays excelllent prognosis to respond to conservative management to improve hip mobility and lumbar/hip stability.     Patient prognosis is Fair.   Patient will benefit from skilled outpatient Physical Therapy to address the deficits stated above and in the chart below, provide patient /family education, and to maximize patientt's level of independence.     Plan of care discussed with patient: Yes  Patient's spiritual, cultural and educational needs considered and patient is agreeable to the plan of care and goals as stated below:     Anticipated Barriers for therapy: co-morbidities    Medical Necessity is demonstrated by the following  History  Co-morbidities and personal factors that may impact the plan of care Co-morbidities:   Past Medical History:   Diagnosis Date    Amblyopia     Angio-edema     Basal cell carcinoma 2011    left cheek     BCC (basal cell carcinoma) 2010    left neck    Cataract     Hyperlipidemia     Hypertension     Strabismus         Personal Factors:   age       low   Examination  Body  Structures and Functions, activity limitations and participation restrictions that may impact the plan of care Body Regions:   lower extremities     Body Systems:    ROM  strength  balance     Participation Restrictions:   Decreased ability to walk with friend, Impaired balance     Activity limitations:   Learning and applying knowledge  no deficits     General Tasks and Commands  no deficits     Communication  no deficits     Mobility  no deficits     Self care  no deficits     Domestic Life  no deficits     Interactions/Relationships  no deficits     Life Areas  no deficits     Community and Social Life  no deficits             low   Clinical Presentation evolving clinical presentation with changing clinical characteristics low   Decision Making/ Complexity Score: low     GOALS: Short Term Goals:  4 weeks  1.Report decreased low back pain  < / =  2 /10  to increase tolerance for all weight bearing ADL's  2. Pt to increase generalized lumbar mobility in order to improve posture and gait.   3. Increase lumbar ROM to 10% loss in order to improve functional mobility.    4. Increase strength by 1/3 MMT grade in B LE in order to improve tolerance for ADLs.  5. Pt to tolerate HEP to improve ROM and independence with ADL's        Long Term Goals: 8 weeks  1.Report decreased    low back pain  <   / =  1  /10  to increase tolerance for all weight bearing ADL's  2.Increase strength to >/= 4/5 MMT grade for  carmen hips  to increase tolerance for ADL and work activities.  3. Pt to demonstrate negative Bridge Test in order to show improved core strength for lumbar stabilization.   4. Patient's goal: reduce pain and improve gernalized strength/endurance    PLAN   Plan of care Certification: 1/31/2023 to 3/31/23.    Outpatient Physical Therapy 2 times weekly for 8 weeks to include the following interventions: Manual Therapy, Moist Heat/ Ice, Neuromuscular Re-ed, Therapeutic Activities, and Therapeutic Exercise.     Leon Alexander,  PT      I CERTIFY THE NEED FOR THESE SERVICES FURNISHED UNDER THIS PLAN OF TREATMENT AND WHILE UNDER MY CARE   Physician's comments:     Physician's Signature: ___________________________________________________

## 2023-02-02 ENCOUNTER — TELEPHONE (OUTPATIENT)
Dept: INTERNAL MEDICINE | Facility: CLINIC | Age: 83
End: 2023-02-02
Payer: MEDICARE

## 2023-02-02 NOTE — TELEPHONE ENCOUNTER
----- Message from Aysha Calero sent at 2/2/2023  3:15 PM CST -----  Contact: Sable with [facility]  fax # [phone]  Alise called in to [facility] referral faxed over please advise

## 2023-02-28 ENCOUNTER — EXTERNAL CHRONIC CARE MANAGEMENT (OUTPATIENT)
Dept: PRIMARY CARE CLINIC | Facility: CLINIC | Age: 83
End: 2023-02-28
Payer: MEDICARE

## 2023-02-28 PROCEDURE — 99490 CHRNC CARE MGMT STAFF 1ST 20: CPT | Mod: S$PBB,,, | Performed by: INTERNAL MEDICINE

## 2023-02-28 PROCEDURE — 99490 CHRNC CARE MGMT STAFF 1ST 20: CPT | Mod: PBBFAC | Performed by: INTERNAL MEDICINE

## 2023-02-28 PROCEDURE — 99490 PR CHRONIC CARE MGMT, 1ST 20 MIN: ICD-10-PCS | Mod: S$PBB,,, | Performed by: INTERNAL MEDICINE

## 2023-03-02 ENCOUNTER — OFFICE VISIT (OUTPATIENT)
Dept: CARDIOLOGY | Facility: CLINIC | Age: 83
End: 2023-03-02
Payer: MEDICARE

## 2023-03-02 VITALS
SYSTOLIC BLOOD PRESSURE: 94 MMHG | HEIGHT: 61 IN | HEART RATE: 58 BPM | DIASTOLIC BLOOD PRESSURE: 58 MMHG | WEIGHT: 155.63 LBS | BODY MASS INDEX: 29.38 KG/M2

## 2023-03-02 DIAGNOSIS — E78.5 HYPERLIPIDEMIA, UNSPECIFIED HYPERLIPIDEMIA TYPE: ICD-10-CM

## 2023-03-02 DIAGNOSIS — I35.0 NONRHEUMATIC AORTIC VALVE STENOSIS: Primary | ICD-10-CM

## 2023-03-02 DIAGNOSIS — I10 PRIMARY HYPERTENSION: ICD-10-CM

## 2023-03-02 DIAGNOSIS — I70.0 AORTIC ATHEROSCLEROSIS: ICD-10-CM

## 2023-03-02 PROCEDURE — 99999 PR PBB SHADOW E&M-EST. PATIENT-LVL IV: CPT | Mod: PBBFAC,,, | Performed by: INTERNAL MEDICINE

## 2023-03-02 PROCEDURE — 99214 OFFICE O/P EST MOD 30 MIN: CPT | Mod: S$PBB,,, | Performed by: INTERNAL MEDICINE

## 2023-03-02 PROCEDURE — 99214 OFFICE O/P EST MOD 30 MIN: CPT | Mod: PBBFAC | Performed by: INTERNAL MEDICINE

## 2023-03-02 PROCEDURE — 99214 PR OFFICE/OUTPT VISIT, EST, LEVL IV, 30-39 MIN: ICD-10-PCS | Mod: S$PBB,,, | Performed by: INTERNAL MEDICINE

## 2023-03-02 PROCEDURE — 99999 PR PBB SHADOW E&M-EST. PATIENT-LVL IV: ICD-10-PCS | Mod: PBBFAC,,, | Performed by: INTERNAL MEDICINE

## 2023-03-02 NOTE — PROGRESS NOTES
HISTORY:    82-year-old female with a history of moderate aortic stenosis, hypertension, hyperlipidemia, and back pain presenting for follow-up evaluation.       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 She denies any issues with chest pain, shortness of breath, dyspnea on exertion, lower extremity claudication, lightheadedness or dizziness or edema.      She was very active for her age walking 1 mi 4 times a week at a light to moderate pace without any limitation. But has been dealing with symptomatic back pain over the last 3 months. Participating in PT.       She denies any history of myocardial infarction, stroke, or peripheral arterial disease.    Bps sometimes acceptable and sometimes above goal on digital logs. Tolerates asa 81x1, carvedilol 25x2, nifedipine 30x1, and atorvastatin 20x1.    We previously increased her atorvastatin from 20 to 40. She had resultant lower extremity cramps in the morning. Her atorvastatin was stopped and symptoms resolved. She tolerates aspirin and denies any bleeding issues.    PHYSICAL EXAM:    Vitals:    03/02/23 0825   BP: (!) 94/58   Pulse: (!) 58       NAD, A+Ox3.  No jvd, no bruit.  RRR nml s1,s2. 3/6 AS murmur.  CTA B no wheezes or crackles.  No edema.    LABS/STUDIES (imaging reviewed during clinic visit):    May 2022 CBC and CMP are unremarkable.  2021 LDL 72 and HDL 69.  TSH is normal and her hemoglobin A1c is 5.5.  EKG May 2022 revealing for normal sinus rhythm no Q-waves or ST changes.    TTE September 2021 reveals normal LV size and ejection fraction.  Moderate aortic stenosis noted with a peak velocity of 3.41 and a mean gradient of 29. May 2020 TTE with a peak velocity of 3 and a mean gradient 20.   Carotid March 2022 shows bilateral atherosclerosis.  Right ICA reported as 50-69%, but peak systolic velocity and end-diastolic velocity unimpressive and likely represents less than 50% disease.  Left ICA less than 50%.  Renal  duplex 2020 no evidence of renal artery stenosis.    ASSESSMENT & PLAN:    1. Nonrheumatic aortic valve stenosis    2. Primary hypertension    3. Hyperlipidemia, unspecified hyperlipidemia type    4. Aortic atherosclerosis        Orders Placed This Encounter    Echo        Moderate AS on last TTE '21. Repeat TTE.     Blood pressures low today likely 2/2 viral infection. Cont carvedilol 25x2 and nifedipine 30x1.     Tolerating atorvastatin at 20x1. Recheck lipid panel w next labs.     Patient is DNR/DNI. This is part of advanced directive planning with no impending medical issue. Can rescind for elective procedures.     No CV contraindications to proceeding with any low risk back procedures. Okay to hold asa as needed.      Follow up in about 6 months (around 9/2/2023).      Julieta Hairston MD

## 2023-03-07 ENCOUNTER — HOSPITAL ENCOUNTER (OUTPATIENT)
Dept: RADIOLOGY | Facility: HOSPITAL | Age: 83
Discharge: HOME | End: 2023-03-07
Attending: PHYSICIAN ASSISTANT
Payer: MEDICARE

## 2023-03-07 ENCOUNTER — OFFICE VISIT (OUTPATIENT)
Dept: ORTHOPEDICS | Facility: CLINIC | Age: 83
End: 2023-03-07
Payer: MEDICARE

## 2023-03-07 VITALS — WEIGHT: 155 LBS | HEIGHT: 61 IN | BODY MASS INDEX: 29.27 KG/M2

## 2023-03-07 DIAGNOSIS — M54.16 LUMBAR RADICULOPATHY, CHRONIC: Primary | ICD-10-CM

## 2023-03-07 DIAGNOSIS — M54.16 LUMBAR RADICULOPATHY, CHRONIC: ICD-10-CM

## 2023-03-07 DIAGNOSIS — M54.30 SCIATICA, UNSPECIFIED LATERALITY: ICD-10-CM

## 2023-03-07 PROCEDURE — 99999 PR PBB SHADOW E&M-EST. PATIENT-LVL IV: CPT | Mod: PBBFAC,,, | Performed by: PHYSICIAN ASSISTANT

## 2023-03-07 PROCEDURE — 72148 MRI LUMBAR SPINE W/O DYE: CPT | Mod: 26,,, | Performed by: RADIOLOGY

## 2023-03-07 PROCEDURE — 99214 OFFICE O/P EST MOD 30 MIN: CPT | Mod: PBBFAC,25 | Performed by: PHYSICIAN ASSISTANT

## 2023-03-07 PROCEDURE — 99214 OFFICE O/P EST MOD 30 MIN: CPT | Mod: S$PBB,,, | Performed by: PHYSICIAN ASSISTANT

## 2023-03-07 PROCEDURE — 99999 PR PBB SHADOW E&M-EST. PATIENT-LVL IV: ICD-10-PCS | Mod: PBBFAC,,, | Performed by: PHYSICIAN ASSISTANT

## 2023-03-07 PROCEDURE — 99214 PR OFFICE/OUTPT VISIT, EST, LEVL IV, 30-39 MIN: ICD-10-PCS | Mod: S$PBB,,, | Performed by: PHYSICIAN ASSISTANT

## 2023-03-07 PROCEDURE — 72148 MRI LUMBAR SPINE W/O DYE: CPT | Mod: TC

## 2023-03-07 PROCEDURE — 72148 MRI LUMBAR SPINE WITHOUT CONTRAST: ICD-10-PCS | Mod: 26,,, | Performed by: RADIOLOGY

## 2023-03-07 RX ORDER — LORAZEPAM 0.5 MG/1
0.5 TABLET ORAL
Qty: 2 TABLET | Refills: 0 | Status: SHIPPED | OUTPATIENT
Start: 2023-03-07 | End: 2023-04-06

## 2023-03-07 RX ORDER — PREGABALIN 75 MG/1
75 CAPSULE ORAL 2 TIMES DAILY
Qty: 60 CAPSULE | Refills: 6 | Status: SHIPPED | OUTPATIENT
Start: 2023-03-07 | End: 2023-11-06

## 2023-03-07 NOTE — PROGRESS NOTES
DATE: 3/7/2023  PATIENT: Luz Amaro    Supervising Physician: Tonio Hunter M.D.    CHIEF COMPLAINT: R leg pain    HISTORY:  Luz Amaro is a 82 y.o. female here for initial evaluation of low back and R leg pain (Back - 10, Leg - 10).  The pain in the R leg is what bothers her most.  The pain has been present for 4mo. The patient describes the pain as an intense shooting pain from the middle of her buttocks down the back of her thigh to the back of knee and calf. Pain does not reach her toes. Pt says the pain occurs throughout the majority of the day but sometimes can be alleviated by repositioning in a certain chair. Pt says pain sometimes wakes her up at night. Pt denies any numbness or tingling. She says she's felt this pain before about 3 years ago which also occurred after she spent time sweeping/mopping in preparation for the holidays. Last time this occurred, she saw a chiropractor which she says significantly helped her pain relief. So far for this episode she has tried Flexeril, gabapentin, advil, cbd oil, and has been attending PT X4wks with no full relief of symptoms.  The pain is improved by stretching.      The patient denies myelopathic symptoms such as handwriting changes or difficulty with buttons/coins/keys. Denies perineal paresthesias, bowel/bladder dysfunction.    Pt's daughter was present today for visit. She says today was the first time her mother has used a wheelchair due to the pain from walking. Pt has been using a cane to help with ambulation.    PAST MEDICAL/SURGICAL HISTORY:  Past Medical History:   Diagnosis Date    Amblyopia     Angio-edema     Basal cell carcinoma 2011    left cheek     BCC (basal cell carcinoma) 2010    left neck    Cataract     Hyperlipidemia     Hypertension     Strabismus      Past Surgical History:   Procedure Laterality Date    APPENDECTOMY      CHOLECYSTECTOMY      COLONOSCOPY N/A 12/14/2020    Procedure: COLONOSCOPY;  Surgeon:  Giovanny Chao MD;  Location: [facility] ([address] [place] [address]);  Service: Endoscopy;  Laterality: N/A;  COVID test on 12/11/20 at [facility] -     ECTOPIC PREGNANCY SURGERY      OOPHORECTOMY      SKIN BIOPSY         Medications:   Current Outpatient Medications on File Prior to Visit   Medication Sig Dispense Refill    aspirin (ECOTRIN) 81 MG EC tablet Take 81 mg by mouth once daily.      atorvastatin (LIPITOR) 20 MG tablet Take 1 tablet (20 mg total) by mouth once daily. 90 tablet 3    calcium carbonate/vitamin D3 (VITAMIN D-3 ORAL) Take by mouth.      carvediloL (COREG) 25 MG tablet Take 1 tablet (25 mg total) by mouth 2 (two) times daily with meals. 180 tablet 3    cetirizine (ZYRTEC) 10 MG tablet Take 10 mg by mouth once daily.      cholecalciferol, vitamin D3, 125 mcg (5,000 unit) Tab Take 5,000 Units by mouth once daily.      cyanocobalamin (VITAMIN B-12) 1000 MCG tablet Take 100 mcg by mouth once daily.      cyclobenzaprine (FLEXERIL) 5 MG tablet Take 1 tablet (5 mg total) by mouth 3 (three) times daily as needed for Muscle spasms. 42 tablet 0    DULCOLAX, BISACODYL, ORAL Take 1 tablet by mouth every evening.      FLUoxetine 20 MG capsule TAKE 1 CAPSULE(20 MG) BY MOUTH EVERY DAY 30 capsule 2    mometasone (ELOCON) 0.1 % solution Mix entire bottle in jar of cerave cream and aaa qd- bid prn itching 60 mL 3    NIFEdipine (PROCARDIA-XL) 30 MG (OSM) 24 hr tablet TAKE 1 TABLET BY MOUTH EVERY DAY 90 tablet 3    tacrolimus (PROTOPIC) 0.1 % ointment AAA bid to leg 60 g 2    [DISCONTINUED] gabapentin (NEURONTIN) 100 MG capsule Take 1 capsule (100 mg total) by mouth every evening. 30 capsule 1    alendronate (FOSAMAX) 70 MG tablet Take 1 tablet (70 mg total) by mouth every 7 days. 4 tablet 11    [DISCONTINUED] LORazepam (ATIVAN) 0.5 MG tablet Take 1 tablet (0.5 mg total) by mouth daily as needed for Anxiety. 30 tablet 0     No current facility-administered medications on file prior to visit.       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     Social Determinants of Health     Financial Resource Strain: Low Risk     Difficulty of Paying Living Expenses: Not hard at all   Food Insecurity: No Food Insecurity    Worried About Running Out of Food in the Last Year: Never true    Ran Out of Food in the Last Year: Never true   Transportation Needs: No Transportation Needs    Lack of Transportation (Medical): No    Lack of Transportation (Non-Medical): No   Physical Activity: Insufficiently Active    Days of Exercise per Week: 3 days    Minutes of Exercise per Session: 30 min   Stress: Stress Concern Present    Feeling of Stress : Rather much   Social Connections: Socially Isolated    Frequency of Communication with Friends and Family: More than three times a week    Frequency of Social Gatherings with Friends and Family: Once a week    Attends Restorationism Services: Never    Active Member of Clubs or Organizations: No    Attends Club or Organization Meetings: Never    Marital Status:    Housing Stability: Low Risk     Unable to Pay for Housing in the Last Year: No    Number of Places Lived in the Last Year: 1    Unstable Housing in the Last Year: No       REVIEW OF SYSTEMS:  Constitution: Negative. Negative for chills, fever and night sweats.   Cardiovascular: Negative for chest pain and syncope.   Respiratory: Negative for cough and shortness of breath.   Gastrointestinal: See HPI. Negative for nausea/vomiting. Negative for abdominal pain.  Genitourinary: See HPI. Negative for discoloration or dysuria.  Skin: Negative for dry skin, itching  "and rash.   Hematologic/Lymphatic: Negative for bleeding problem. Does not bruise/bleed easily.   Musculoskeletal: Negative for falls and muscle weakness.   Neurological: See HPI. No seizures.   Endocrine: Negative for polydipsia, polyphagia and polyuria.   Allergic/Immunologic: Negative for hives and persistent infections.     EXAM:  Ht 5' 1" (1.549 m)   Wt 70.3 kg (155 lb)   BMI 29.29 kg/m²     General: The patient is a  82 y.o. female in no apparent distress, the patient is oriented to person, place and time.  Psych: Normal mood and affect  HEENT: Vision grossly intact, hearing intact to the spoken word.  Lungs: Respirations unlabored.  Gait: Normal station and gait, no difficulty with toe or heel walk.   Skin: Dorsal lumbar skin negative for rashes, lesions, hairy patches and surgical scars. There is mild lumbar tenderness to palpation.  Range of motion: Lumbar range of motion is acceptable.  Spinal Balance: Global saggital and coronal spinal balance acceptable, not significant for scoliosis and kyphosis.  Musculoskeletal: Moderate pain with flexion, adduction, IR of R hip. Moderate pain with ERROL of R hip. No pain upon FADIR/ERROL of L hip. No trochanteric tenderness to palpation.  Vascular: Bilateral lower extremities warm and well perfused, dorsalis pedis pulses 2+ bilaterally.  Neurological: Normal strength and tone in all major motor groups in the bilateral lower extremities. Normal sensation to light touch in the L2-S1 dermatomes bilaterally.  Deep tendon reflexes symmetric 2+ in the bilateral lower extremities.  Negative Babinski bilaterally. Straight leg raise negative bilaterally.    IMAGING:      Today I personally reviewed AP and Lat upright L-spine films that demonstrate multilevel degenerative changes with anterolisthesis at L4/5.         Body mass index is 29.29 kg/m².    Hemoglobin A1C   Date Value Ref Range Status   03/14/2022 5.3 4.0 - 5.6 % Final     Comment:     ADA Screening " Guidelines:  5.7-6.4%  Consistent with prediabetes  >or=6.5%  Consistent with diabetes    High levels of fetal hemoglobin interfere with the HbA1C  assay. Heterozygous hemoglobin variants (HbS, HgC, etc)do  not significantly interfere with this assay.   However, presence of multiple variants may affect accuracy.     08/23/2021 5.5 4.0 - 5.6 % Final     Comment:     ADA Screening Guidelines:  5.7-6.4%  Consistent with prediabetes  >or=6.5%  Consistent with diabetes    High levels of fetal hemoglobin interfere with the HbA1C  assay. Heterozygous hemoglobin variants (HbS, HgC, etc)do  not significantly interfere with this assay.   However, presence of multiple variants may affect accuracy.     04/26/2021 5.7 (H) 4.0 - 5.6 % Final     Comment:     ADA Screening Guidelines:  5.7-6.4%  Consistent with prediabetes  >or=6.5%  Consistent with diabetes    High levels of fetal hemoglobin interfere with the HbA1C  assay. Heterozygous hemoglobin variants (HbS, HgC, etc)do  not significantly interfere with this assay.   However, presence of multiple variants may affect accuracy.             ASSESSMENT/PLAN:    Luz was seen today for low-back pain.    Diagnoses and all orders for this visit:    Lumbar radiculopathy, chronic  -     MRI Lumbar Spine Without Contrast; Future    Sciatica, unspecified laterality  -     Ambulatory referral[facility]consult to [facility]    Other orders  -     pregabalin (LYRICA) 75 MG capsule; Take 1 capsule (75 mg total) by mouth 2 (two) times daily.  -     LORazepam (ATIVAN) 0.5 MG tablet; Take 1 tablet (0.5 mg total) by mouth On call Procedure for Anxiety. Take 1 tab 30 min before MRI. May take 2nd if needed    Lumbar radiculopathy, chronic  -     MRI Lumbar Spine Without Contrast; Future; Expected date: 03/07/2023    Sciatica, unspecified laterality  -     Ambulatory referral/consult to [facility]    Other orders  -     pregabalin (LYRICA) 75 MG capsule; Take 1 capsule (75 mg total) by  mouth 2 (two) times daily.  Dispense: 60 capsule; Refill: 6  -     LORazepam (ATIVAN) 0.5 MG tablet; Take 1 tablet (0.5 mg total) by mouth On call Procedure for Anxiety. Take 1 tab 30 min before MRI. May take 2nd if needed  Dispense: 2 tablet; Refill: 0    Today we discussed at length all of the different treatment options including anti-inflammatories, acetaminophen, rest, ice, heat, physical therapy including strengthening and stretching exercises, home exercises, ROM, aerobic conditioning, aqua therapy, other modalities including ultrasound, massage, and dry needling, epidural steroid injections and finally surgical intervention.      We will get an MRI and I will call with results and discuss injections.

## 2023-03-08 ENCOUNTER — TELEPHONE (OUTPATIENT)
Dept: PAIN MEDICINE | Facility: OTHER | Age: 83
End: 2023-03-08
Payer: MEDICARE

## 2023-03-08 ENCOUNTER — OFFICE VISIT (OUTPATIENT)
Dept: ORTHOPEDICS | Facility: CLINIC | Age: 83
End: 2023-03-08
Payer: MEDICARE

## 2023-03-08 ENCOUNTER — PATIENT MESSAGE (OUTPATIENT)
Dept: PAIN MEDICINE | Facility: OTHER | Age: 83
End: 2023-03-08
Payer: MEDICARE

## 2023-03-08 ENCOUNTER — TELEPHONE (OUTPATIENT)
Dept: ADMINISTRATIVE | Facility: OTHER | Age: 83
End: 2023-03-08
Payer: MEDICARE

## 2023-03-08 VITALS — BODY MASS INDEX: 29.27 KG/M2 | WEIGHT: 155 LBS | HEIGHT: 61 IN

## 2023-03-08 DIAGNOSIS — M48.062 SPINAL STENOSIS OF LUMBAR REGION WITH NEUROGENIC CLAUDICATION: ICD-10-CM

## 2023-03-08 DIAGNOSIS — M54.16 LUMBAR RADICULOPATHY, CHRONIC: Primary | ICD-10-CM

## 2023-03-08 PROCEDURE — 99441 PR PHYSICIAN TELEPHONE EVALUATION 5-10 MIN: ICD-10-PCS | Mod: 95,,, | Performed by: PHYSICIAN ASSISTANT

## 2023-03-08 PROCEDURE — 99441 PR PHYSICIAN TELEPHONE EVALUATION 5-10 MIN: CPT | Mod: 95,,, | Performed by: PHYSICIAN ASSISTANT

## 2023-03-08 NOTE — TELEPHONE ENCOUNTER
----- Message from Elizabeth Morales sent at 3/8/2023  4:22 PM CST -----  Pt. Scheduled 3/8/23 with Dr. Tinsley arrival @ 2;00pm, procedure Information in portal &  procedure nurse spoke with pt.  ----- Message -----  From: Elizabeth Morales  Sent: 3/8/2023   3:11 PM CST  To: Isi Lnetz RN, #      Left message asking pt to call to schedule URGENT case with pain provider, pt. Of [facility]

## 2023-03-08 NOTE — H&P (VIEW-ONLY)
Established Patient - Audio Only Telehealth Visit     The patient location is: home  The chief complaint leading to consultation is: MRI results  Visit type: Virtual visit with audio only (telephone)  Total time spent with patient: 10 minutes       The reason for the audio only service rather than synchronous audio and video virtual visit was related to technical difficulties or patient preference/necessity.     Each patient to whom I provide medical services by telemedicine is:  (1) informed of the relationship between the physician and patient and the respective role of any other health care provider with respect to management of the patient; and (2) notified that they may decline to receive medical services by telemedicine and may withdraw from such care at any time. Patient verbally consented to receive this service via voice-only telephone call.       HPI: The patient presents via audio telehealth visit for MRI results.  I saw her in clinic yesterday.  She has having low back and right leg pain.     AP and Lat upright L-spine films that demonstrate multilevel degenerative changes with anterolisthesis at L4/5.    MRI lumbar spine demonstrates a right paracentral disc protrusion at L4/5 resulting in moderate stenosis and impingement on the right descending L5 nerve root.      Assessment and plan:      Today we discussed at length all of the different treatment options including anti-inflammatories, acetaminophen, rest, ice, heat, physical therapy including strengthening and stretching exercises, home exercises, ROM, aerobic conditioning, aqua therapy, other modalities including ultrasound, massage, and dry needling, epidural steroid injections and finally surgical intervention.         Plan for TFESI.  Follow up 2 weeks after injection if symptoms persist.                    This service was not originating from a related E/M service provided within the previous 7 days nor will  to an E/M service or  procedure within the next 24 hours or my soonest available appointment.  Prevailing standard of care was able to be met in this audio-only visit.

## 2023-03-09 ENCOUNTER — HOSPITAL ENCOUNTER (OUTPATIENT)
Facility: OTHER | Age: 83
Discharge: HOME | End: 2023-03-09
Attending: ANESTHESIOLOGY | Admitting: ANESTHESIOLOGY
Payer: MEDICARE

## 2023-03-09 VITALS
BODY MASS INDEX: 29.27 KG/M2 | SYSTOLIC BLOOD PRESSURE: 141 MMHG | OXYGEN SATURATION: 96 % | DIASTOLIC BLOOD PRESSURE: 63 MMHG | TEMPERATURE: 98 F | RESPIRATION RATE: 16 BRPM | WEIGHT: 155 LBS | HEIGHT: 61 IN | HEART RATE: 66 BPM

## 2023-03-09 DIAGNOSIS — M54.16 LUMBAR RADICULOPATHY: Primary | ICD-10-CM

## 2023-03-09 DIAGNOSIS — G89.29 CHRONIC PAIN: ICD-10-CM

## 2023-03-09 DIAGNOSIS — G89.29 CHRONIC LOW BACK PAIN, UNSPECIFIED BACK PAIN LATERALITY, UNSPECIFIED WHETHER SCIATICA PRESENT: ICD-10-CM

## 2023-03-09 DIAGNOSIS — M54.50 CHRONIC LOW BACK PAIN, UNSPECIFIED BACK PAIN LATERALITY, UNSPECIFIED WHETHER SCIATICA PRESENT: ICD-10-CM

## 2023-03-09 PROCEDURE — 64483 NJX AA&/STRD TFRM EPI L/S 1: CPT | Mod: RT | Performed by: ANESTHESIOLOGY

## 2023-03-09 PROCEDURE — 25500020 PHARM REV CODE 255: Performed by: ANESTHESIOLOGY

## 2023-03-09 PROCEDURE — 25000003 PHARM REV CODE 250: Performed by: ANESTHESIOLOGY

## 2023-03-09 PROCEDURE — 63600175 PHARM REV CODE 636 W HCPCS: Performed by: ANESTHESIOLOGY

## 2023-03-09 PROCEDURE — 64483 PR EPIDURAL INJ, ANES/STEROID, TRANSFORAMINAL, LUMB/SACR, SNGL LEVL: ICD-10-PCS | Mod: RT,,, | Performed by: ANESTHESIOLOGY

## 2023-03-09 PROCEDURE — 64483 NJX AA&/STRD TFRM EPI L/S 1: CPT | Mod: RT,,, | Performed by: ANESTHESIOLOGY

## 2023-03-09 RX ORDER — ALPRAZOLAM 0.5 MG/1
1 TABLET ORAL ONCE
Status: DISCONTINUED | OUTPATIENT
Start: 2023-03-09 | End: 2023-03-09

## 2023-03-09 RX ORDER — DEXAMETHASONE SODIUM PHOSPHATE 10 MG/ML
INJECTION INTRAMUSCULAR; INTRAVENOUS
Status: DISCONTINUED | OUTPATIENT
Start: 2023-03-09 | End: 2023-03-09 | Stop reason: HOSPADM

## 2023-03-09 RX ORDER — SODIUM CHLORIDE 9 MG/ML
500 INJECTION, SOLUTION INTRAVENOUS CONTINUOUS
Status: DISCONTINUED | OUTPATIENT
Start: 2023-03-09 | End: 2023-03-09 | Stop reason: HOSPADM

## 2023-03-09 RX ORDER — LIDOCAINE HYDROCHLORIDE 10 MG/ML
INJECTION, SOLUTION EPIDURAL; INFILTRATION; INTRACAUDAL; PERINEURAL
Status: DISCONTINUED | OUTPATIENT
Start: 2023-03-09 | End: 2023-03-09 | Stop reason: HOSPADM

## 2023-03-09 RX ORDER — MIDAZOLAM HYDROCHLORIDE 1 MG/ML
INJECTION INTRAMUSCULAR; INTRAVENOUS
Status: DISCONTINUED | OUTPATIENT
Start: 2023-03-09 | End: 2023-03-09 | Stop reason: HOSPADM

## 2023-03-09 RX ORDER — LIDOCAINE HYDROCHLORIDE 20 MG/ML
INJECTION, SOLUTION INFILTRATION; PERINEURAL
Status: DISCONTINUED | OUTPATIENT
Start: 2023-03-09 | End: 2023-03-09 | Stop reason: HOSPADM

## 2023-03-09 RX ORDER — FENTANYL CITRATE 50 UG/ML
INJECTION, SOLUTION INTRAMUSCULAR; INTRAVENOUS
Status: DISCONTINUED | OUTPATIENT
Start: 2023-03-09 | End: 2023-03-09 | Stop reason: HOSPADM

## 2023-03-09 NOTE — DISCHARGE SUMMARY
Discharge Note  Short Stay      SUMMARY     Admit Date: 3/9/2023    Attending Physician: Palmer Tinsley      Discharge Physician: Palmer Tinsley      Discharge Date: 3/9/2023 3:07 PM    Procedure(s) (LRB):  INJECTION, STEROID, EPIDURAL, TRANSFORAMINAL APPROACH, RIGHT L4/L5  URGENT (Right)    Final Diagnosis: Lumbar radiculopathy [M54.16]    Disposition: Home or self care    Patient Instructions:   Current Discharge Medication List        CONTINUE these medications which have NOT CHANGED    Details   aspirin (ECOTRIN) 81 MG EC tablet Take 81 mg by mouth once daily.      atorvastatin (LIPITOR) 20 MG tablet Take 1 tablet (20 mg total) by mouth once daily.  Qty: 90 tablet, Refills: 3    Associated Diagnoses: Hyperlipidemia, unspecified hyperlipidemia type      calcium carbonate/vitamin D3 (VITAMIN D-3 ORAL) Take by mouth.      carvediloL (COREG) 25 MG tablet Take 1 tablet (25 mg total) by mouth 2 (two) times daily with meals.  Qty: 180 tablet, Refills: 3    Comments: **Patient requests 90 days supply**  Associated Diagnoses: Hypertension, unspecified type      cetirizine (ZYRTEC) 10 MG tablet Take 10 mg by mouth once daily.      cholecalciferol, vitamin D3, 125 mcg (5,000 unit) Tab Take 5,000 Units by mouth once daily.      cyanocobalamin (VITAMIN B-12) 1000 MCG tablet Take 100 mcg by mouth once daily.      cyclobenzaprine (FLEXERIL) 5 MG tablet Take 1 tablet (5 mg total) by mouth 3 (three) times daily as needed for Muscle spasms.  Qty: 42 tablet, Refills: 0      FLUoxetine 20 MG capsule TAKE 1 CAPSULE(20 MG) BY MOUTH EVERY DAY  Qty: 30 capsule, Refills: 2    Associated Diagnoses: Recurrent major depressive disorder, in partial remission; Generalized anxiety disorder      NIFEdipine (PROCARDIA-XL) 30 MG (OSM) 24 hr tablet TAKE 1 TABLET BY MOUTH EVERY DAY  Qty: 90 tablet, Refills: 3    Associated Diagnoses: Hypertension, unspecified type      pregabalin (LYRICA) 75 MG capsule Take 1 capsule (75 mg total) by mouth 2  (two) times daily.  Qty: 60 capsule, Refills: 6      alendronate (FOSAMAX) 70 MG tablet Take 1 tablet (70 mg total) by mouth every 7 days.  Qty: 4 tablet, Refills: 11      DULCOLAX, BISACODYL, ORAL Take 1 tablet by mouth every evening.      LORazepam (ATIVAN) 0.5 MG tablet Take 1 tablet (0.5 mg total) by mouth On call Procedure for Anxiety. Take 1 tab 30 min before MRI. May take 2nd if needed  Qty: 2 tablet, Refills: 0      mometasone (ELOCON) 0.1 % solution Mix entire bottle in jar of cerave cream and aaa qd- bid prn itching  Qty: 60 mL, Refills: 3    Associated Diagnoses: Lichen planus      tacrolimus (PROTOPIC) 0.1 % ointment AAA bid to leg  Qty: 60 g, Refills: 2    Associated Diagnoses: Lichen planus                 Discharge Diagnosis: Lumbar radiculopathy [M54.16]  Condition on Discharge: Stable with no complications to procedure   Diet on Discharge: Same as before.  Activity: as per instruction sheet.  Discharge to: Home with a responsible adult.  Follow up: 2 weeks

## 2023-03-09 NOTE — DISCHARGE INSTRUCTIONS

## 2023-03-09 NOTE — OP NOTE
Lumbar Transforaminal Epidural Steroid Injection under Fluoroscopic Guidance    The procedure, risks, benefits, and options were discussed with the patient. There are no contraindications to the procedure. The patent expressed understanding and agreed to the procedure. Informed written consent was obtained prior to the start of the procedure and can be found in the patient's chart.    PATIENT NAME: Luz Amaro   MRN: [id number]     DATE OF PROCEDURE: 03/09/2023    PROCEDURE:  Right  L4/5 Lumbar Transforaminal Epidural Steroid Injection under Fluoroscopic Guidance    PRE-OP DIAGNOSIS: Lumbar radiculopathy [M54.16] Lumbar radiculopathy [M54.16]    POST-OP DIAGNOSIS: Same    PHYSICIAN: Palmer Tinsley MD    ASSISTANTS: Dr. Gomes     MEDICATIONS INJECTED: Preservative-free Decadron 10mg with 2cc of Lidocaine 1% MPF     LOCAL ANESTHETIC INJECTED: Xylocaine 2%     SEDATION: Versed 2mg and Fentanyl 50mcg                                                                                                                                                                                     Conscious sedation ordered by M.D. Patient re-evaluation prior to administration of conscious sedation. No changes noted in patient's status from initial evaluation. The patient's vital signs were monitored by RN and patient remained hemodynamically stable throughout the procedure.    Event Time In   Sedation Start 1503       ESTIMATED BLOOD LOSS: None    COMPLICATIONS: None    TECHNIQUE: Time-out was performed to identify the patient and procedure to be performed. With the patient laying in a prone position, the surgical area was prepped and draped in the usual sterile fashion using ChloraPrep and a fenestrated drape.The levels were determined under fluoroscopy guidance. Skin anesthesia was achieved by injecting Lidocaine 2% over the injection sites. The transforaminal spaces were then approached with a 25 gauge, 3.5 inch spinal quinke needle  that was introduced under fluoroscopic guidance in the AP and Lateral views. Once the needle tip was in the area of the transforaminal space, and there was no blood, CSF or paraesthesias, contrast dye Omnipaque (300mg/mL) was injected to confirm placement and there was no vascular runoff. Fluoroscopic imaging in the AP and lateral views revealed a clear outline of the spinal nerve with proximal spread of agent through the neural foramen into the epidural space. 3 mL of the medication mixture listed above was injected slowly at each site. Displacement of the radio opaque contrast after injection of the medication confirmed that the medication went into the area of the transforaminal spaces. The needles were removed and bleeding was nil. A sterile dressing was applied. No specimens collected. The patient tolerated the procedure well.       The patient was monitored after the procedure in the recovery area. They were given post-procedure and discharge instructions to follow at home. The patient was discharged in a stable condition.        Palmer Tinsley MD

## 2023-03-16 ENCOUNTER — PROCEDURE VISIT (OUTPATIENT)
Dept: DERMATOLOGY | Facility: CLINIC | Age: 83
End: 2023-03-16
Payer: MEDICARE

## 2023-03-16 VITALS
WEIGHT: 155 LBS | SYSTOLIC BLOOD PRESSURE: 138 MMHG | HEIGHT: 61 IN | HEART RATE: 68 BPM | BODY MASS INDEX: 29.27 KG/M2 | DIASTOLIC BLOOD PRESSURE: 69 MMHG

## 2023-03-16 DIAGNOSIS — D04.39 SQUAMOUS CELL CARCINOMA IN SITU OF SKIN OF RIGHT CHEEK: Primary | ICD-10-CM

## 2023-03-16 PROCEDURE — 13132 CMPLX RPR F/C/C/M/N/AX/G/H/F: CPT | Mod: PBBFAC,51 | Performed by: DERMATOLOGY

## 2023-03-16 PROCEDURE — 13132 PR RECMPL WND HEAD,FAC,HAND 2.6-7.5 CM: ICD-10-PCS | Mod: S$PBB,51,, | Performed by: DERMATOLOGY

## 2023-03-16 PROCEDURE — 99499 UNLISTED E&M SERVICE: CPT | Mod: S$PBB,,, | Performed by: DERMATOLOGY

## 2023-03-16 PROCEDURE — 17311 MOHS 1 STAGE H/N/HF/G: CPT | Mod: PBBFAC | Performed by: DERMATOLOGY

## 2023-03-16 PROCEDURE — 17311: ICD-10-PCS | Mod: S$PBB,,, | Performed by: DERMATOLOGY

## 2023-03-16 PROCEDURE — 99499 NO LOS: ICD-10-PCS | Mod: S$PBB,,, | Performed by: DERMATOLOGY

## 2023-03-16 PROCEDURE — 17312 MOHS ADDL STAGE: CPT | Mod: PBBFAC | Performed by: DERMATOLOGY

## 2023-03-16 PROCEDURE — 13132 CMPLX RPR F/C/C/M/N/AX/G/H/F: CPT | Mod: S$PBB,51,, | Performed by: DERMATOLOGY

## 2023-03-16 PROCEDURE — 17312 MOHS ADDL STAGE: CPT | Mod: S$PBB,,, | Performed by: DERMATOLOGY

## 2023-03-16 PROCEDURE — 17311 MOHS 1 STAGE H/N/HF/G: CPT | Mod: S$PBB,,, | Performed by: DERMATOLOGY

## 2023-03-16 PROCEDURE — 17312: ICD-10-PCS | Mod: S$PBB,,, | Performed by: DERMATOLOGY

## 2023-03-16 NOTE — PROGRESS NOTES
PROCEDURE: Mohs' Micrographic Surgery    INDICATION: Location in non-mask areas of face where maximum conservation of tumor-free tissue is needed. Size > 1.0 cm on face. Biopsy-proven skin cancer of cosmetically and functionally important areas, including head, neck, genital, hand, foot, or areas known for having difficulty in healing, such as the lower anterior legs. Tumors with aggressive clinical behavior (rapidly growing, greater than 1 cm in diameter). Tumor with ill-defined borders.    REFERRING PROVIDER: Cecile Gibson M.D.    CASE NUMBER:     ANESTHETIC: 5 cc 0.5% Bupivacaine with Epi 1:200,000 mixed 1:1 with plain 1% Lidocaine    SURGICAL PREP: Hibiclens    SURGEON: Nitesh Wyatt MD    ASSISTANTS: Maddy Buck PA-C, Nancy Mei, Surg Tech, and Cynthia Nation, Surg Tech    PREOPERATIVE DIAGNOSIS: squamous cell carcinoma in situ    POSTOPERATIVE DIAGNOSIS: squamous cell carcinoma in situ    PATHOLOGIC DIAGNOSIS: squamous cell carcinoma in situ    HISTOLOGY OF SPECIMENS IN FIRST STAGE:   Debulking tumor confirms squamous cell carcinoma in situ.  Tumor Type: Tumor seen. Squamous cell carcinoma in situ: Epidermis with full-thickness atypia and variable epidermal maturation.   Depth of Invasion: epidermis  Perineural Invasion: No    HISTOLOGY OF SPECIMENS IN SUBSEQUENT STAGES:  Tumor Type:  No tumor seen.    STAGES OF MOHS' SURGERY PERFORMED: 2    TUMOR-FREE PLANE ACHIEVED: Yes    HEMOSTASIS: electrocoagulation     SPECIMENS: 4 (3 in stage A and 1 in stage B)    LOCATION: right mid cheek. Location verified with Dr. Gibson's clinical photograph. Patient also verified location with hand held mirror.    INITIAL LESION SIZE: 1.0 x 1.3 cm    FINAL DEFECT SIZE: 1.5 x 1.8 cm    WOUND REPAIR/DISPOSITION: The patient tolerated Mohs' Micrographic Surgery for a squamous cell carcinoma in situ very well. When the tumor was completely removed, a repair of the surgical defect was undertaken.    PROCEDURE: Complex Linear  "Repair    INDICATION: Status post Mohs' Micrographic Surgery for squamous cell carcinoma in situ.    CASE NUMBER:     SURGEON: Nitesh Wyatt MD    ASSISTANTS: Dominique Gómez    ANESTHETIC: 2 cc 1% Lidocaine with Epinephrine 1:100,000    SURGICAL PREP: Hibiclens, prepped by Dominique Gómez    LOCATION: right mid cheek    DEFECT SIZE: 1.5 x 1.8 cm    WOUND REPAIR/DISPOSITION:  After the patient's carcinoma had been completely removed with Mohs' Micrographic Surgery, a repair of the surgical defect was undertaken. The patient was returned to the operating suite where the area of right mid cheek was prepped, draped, and anesthetized in the usual sterile fashion. The wound was widely undermined in all directions. The wound was undermined to a distance at least the maximum width of the defect as measured perpendicular to the closure line along at least one entire edge of the defect, in this case 2 cm. Then, electrocoagulation was used to obtain meticulous hemostasis. 5-0 Vicryl buried vertical mattress sutures were placed into the subcutaneous and dermal plane to close the wound and jerardo the cutaneous wound edge. Bilateral dog ears were identified and were removed by a standard Burow's triangle technique. The cutaneous wound edges were closed using running 5-0 Prolene suture.    The patient tolerated the procedure well.    The area was cleaned and dressed appropriately and the patient was given wound care instructions, as well as appointment for follow-up evaluation and suture removal in 7 days.    LENGTH OF REPAIR: 3.6 cm    Vitals:    03/16/23 1221 03/16/23 1611   BP: 106/61 138/69   BP Location: Right arm Left arm   Patient Position: Sitting Sitting   BP Method: Medium (Automatic) Large (Automatic)   Pulse: (!) 58 68   Weight: 70.3 kg (155 lb)    Height: 5' 1" (1.549 m)          "

## 2023-03-23 ENCOUNTER — OFFICE VISIT (OUTPATIENT)
Dept: DERMATOLOGY | Facility: CLINIC | Age: 83
End: 2023-03-23
Payer: MEDICARE

## 2023-03-23 DIAGNOSIS — Z09 POSTOP CHECK: Primary | ICD-10-CM

## 2023-03-23 PROCEDURE — 99024 POSTOP FOLLOW-UP VISIT: CPT | Mod: POP,,, | Performed by: DERMATOLOGY

## 2023-03-23 PROCEDURE — 99024 PR POST-OP FOLLOW-UP VISIT: ICD-10-PCS | Mod: POP,,, | Performed by: DERMATOLOGY

## 2023-03-23 PROCEDURE — 99213 OFFICE O/P EST LOW 20 MIN: CPT | Mod: PBBFAC | Performed by: DERMATOLOGY

## 2023-03-23 PROCEDURE — 99999 PR PBB SHADOW E&M-EST. PATIENT-LVL III: ICD-10-PCS | Mod: PBBFAC,,, | Performed by: DERMATOLOGY

## 2023-03-23 PROCEDURE — 99999 PR PBB SHADOW E&M-EST. PATIENT-LVL III: CPT | Mod: PBBFAC,,, | Performed by: DERMATOLOGY

## 2023-03-23 NOTE — PROGRESS NOTES
82 y.o. female patient is here for suture removal following Mohs' surgery.    Patient reports no problems.    WOUND PE:  The R mid cheek sutures intact. Wound healing well. Good skin edges. No signs or symptoms of infection.    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    PLAN:  Sutures removed today by  Jaimie Kim MA .   Steri-strips applied.  Keep moist with Aquaphor.    RTC:  In 3-6 months with Cecile Gibson M.D. for skin check or sooner if new concern arises.

## 2023-03-31 ENCOUNTER — EXTERNAL CHRONIC CARE MANAGEMENT (OUTPATIENT)
Dept: PRIMARY CARE CLINIC | Facility: CLINIC | Age: 83
End: 2023-03-31
Payer: MEDICARE

## 2023-03-31 PROCEDURE — 99490 CHRNC CARE MGMT STAFF 1ST 20: CPT | Mod: S$PBB,,, | Performed by: INTERNAL MEDICINE

## 2023-03-31 PROCEDURE — 99490 PR CHRONIC CARE MGMT, 1ST 20 MIN: ICD-10-PCS | Mod: S$PBB,,, | Performed by: INTERNAL MEDICINE

## 2023-03-31 PROCEDURE — 99490 CHRNC CARE MGMT STAFF 1ST 20: CPT | Mod: PBBFAC | Performed by: INTERNAL MEDICINE

## 2023-04-27 ENCOUNTER — TELEPHONE (OUTPATIENT)
Dept: CARDIOLOGY | Facility: CLINIC | Age: 83
End: 2023-04-27
Payer: MEDICARE

## 2023-04-27 ENCOUNTER — HOSPITAL ENCOUNTER (EMERGENCY)
Facility: HOSPITAL | Age: 83
Discharge: HOME | End: 2023-04-27
Attending: EMERGENCY MEDICINE
Payer: MEDICARE

## 2023-04-27 ENCOUNTER — DOCUMENTATION ONLY (OUTPATIENT)
Dept: CARDIOLOGY | Facility: CLINIC | Age: 83
End: 2023-04-27
Payer: MEDICARE

## 2023-04-27 VITALS
SYSTOLIC BLOOD PRESSURE: 118 MMHG | DIASTOLIC BLOOD PRESSURE: 60 MMHG | RESPIRATION RATE: 20 BRPM | OXYGEN SATURATION: 94 % | TEMPERATURE: 98 F | HEART RATE: 62 BPM | WEIGHT: 150 LBS | BODY MASS INDEX: 28.32 KG/M2 | HEIGHT: 61 IN

## 2023-04-27 DIAGNOSIS — R06.02 SOB (SHORTNESS OF BREATH): ICD-10-CM

## 2023-04-27 DIAGNOSIS — R06.02 SHORTNESS OF BREATH: ICD-10-CM

## 2023-04-27 DIAGNOSIS — R06.02 SOB (SHORTNESS OF BREATH): Primary | ICD-10-CM

## 2023-04-27 DIAGNOSIS — I50.9 ACUTE CONGESTIVE HEART FAILURE, UNSPECIFIED HEART FAILURE TYPE: Primary | ICD-10-CM

## 2023-04-27 DIAGNOSIS — I10 HYPERTENSION, UNSPECIFIED TYPE: ICD-10-CM

## 2023-04-27 LAB
ALBUMIN SERPL BCP-MCNC: 3.8 G/DL (ref 3.5–5.2)
ALP SERPL-CCNC: 193 U/L (ref 55–135)
ALT SERPL W/O P-5'-P-CCNC: 31 U/L (ref 10–44)
ANION GAP SERPL CALC-SCNC: 8 MMOL/L (ref 8–16)
AST SERPL-CCNC: 30 U/L (ref 10–40)
BASOPHILS # BLD AUTO: 0.04 K/UL (ref 0–0.2)
BASOPHILS NFR BLD: 0.8 % (ref 0–1.9)
BILIRUB SERPL-MCNC: 0.7 MG/DL (ref 0.1–1)
BNP SERPL-MCNC: 382 PG/ML (ref 0–99)
BUN SERPL-MCNC: 20 MG/DL (ref 8–23)
CALCIUM SERPL-MCNC: 9 MG/DL (ref 8.7–10.5)
CHLORIDE SERPL-SCNC: 109 MMOL/L (ref 95–110)
CO2 SERPL-SCNC: 24 MMOL/L (ref 23–29)
CREAT SERPL-MCNC: 0.7 MG/DL (ref 0.5–1.4)
D DIMER PPP IA.FEU-MCNC: 0.59 MG/L FEU
DIFFERENTIAL METHOD: ABNORMAL
EOSINOPHIL # BLD AUTO: 0.1 K/UL (ref 0–0.5)
EOSINOPHIL NFR BLD: 1.1 % (ref 0–8)
ERYTHROCYTE [DISTWIDTH] IN BLOOD BY AUTOMATED COUNT: 13.7 % (ref 11.5–14.5)
EST. GFR  (NO RACE VARIABLE): >60 ML/MIN/1.73 M^2
GLUCOSE SERPL-MCNC: 144 MG/DL (ref 70–110)
HCT VFR BLD AUTO: 36.8 % (ref 37–48.5)
HCV AB SERPL QL IA: NORMAL
HGB BLD-MCNC: 11.8 G/DL (ref 12–16)
HIV 1+2 AB+HIV1 P24 AG SERPL QL IA: NORMAL
IMM GRANULOCYTES # BLD AUTO: 0.01 K/UL (ref 0–0.04)
IMM GRANULOCYTES NFR BLD AUTO: 0.2 % (ref 0–0.5)
LYMPHOCYTES # BLD AUTO: 0.7 K/UL (ref 1–4.8)
LYMPHOCYTES NFR BLD: 13.5 % (ref 18–48)
MCH RBC QN AUTO: 31.9 PG (ref 27–31)
MCHC RBC AUTO-ENTMCNC: 32.1 G/DL (ref 32–36)
MCV RBC AUTO: 100 FL (ref 82–98)
MONOCYTES # BLD AUTO: 0.4 K/UL (ref 0.3–1)
MONOCYTES NFR BLD: 6.6 % (ref 4–15)
NEUTROPHILS # BLD AUTO: 4.1 K/UL (ref 1.8–7.7)
NEUTROPHILS NFR BLD: 77.8 % (ref 38–73)
NRBC BLD-RTO: 0 /100 WBC
PLATELET # BLD AUTO: 199 K/UL (ref 150–450)
PMV BLD AUTO: 10 FL (ref 9.2–12.9)
POTASSIUM SERPL-SCNC: 3.9 MMOL/L (ref 3.5–5.1)
PROT SERPL-MCNC: 7.4 G/DL (ref 6–8.4)
RBC # BLD AUTO: 3.7 M/UL (ref 4–5.4)
SODIUM SERPL-SCNC: 141 MMOL/L (ref 136–145)
TROPONIN I SERPL DL<=0.01 NG/ML-MCNC: <0.006 NG/ML (ref 0–0.03)
WBC # BLD AUTO: 5.32 K/UL (ref 3.9–12.7)

## 2023-04-27 PROCEDURE — 99285 EMERGENCY DEPT VISIT HI MDM: CPT | Mod: 25

## 2023-04-27 PROCEDURE — 85379 FIBRIN DEGRADATION QUANT: CPT | Performed by: EMERGENCY MEDICINE

## 2023-04-27 PROCEDURE — 93005 ELECTROCARDIOGRAM TRACING: CPT

## 2023-04-27 PROCEDURE — 96374 THER/PROPH/DIAG INJ IV PUSH: CPT

## 2023-04-27 PROCEDURE — 93010 EKG 12-LEAD: ICD-10-PCS | Mod: ,,, | Performed by: INTERNAL MEDICINE

## 2023-04-27 PROCEDURE — 84484 ASSAY OF TROPONIN QUANT: CPT | Performed by: EMERGENCY MEDICINE

## 2023-04-27 PROCEDURE — 93010 ELECTROCARDIOGRAM REPORT: CPT | Mod: ,,, | Performed by: INTERNAL MEDICINE

## 2023-04-27 PROCEDURE — 80053 COMPREHEN METABOLIC PANEL: CPT | Performed by: EMERGENCY MEDICINE

## 2023-04-27 PROCEDURE — 86803 HEPATITIS C AB TEST: CPT | Performed by: PHYSICIAN ASSISTANT

## 2023-04-27 PROCEDURE — 83880 ASSAY OF NATRIURETIC PEPTIDE: CPT | Performed by: EMERGENCY MEDICINE

## 2023-04-27 PROCEDURE — 99285 EMERGENCY DEPT VISIT HI MDM: CPT | Mod: ,,, | Performed by: EMERGENCY MEDICINE

## 2023-04-27 PROCEDURE — 99285 PR EMERGENCY DEPT VISIT,LEVEL V: ICD-10-PCS | Mod: ,,, | Performed by: EMERGENCY MEDICINE

## 2023-04-27 PROCEDURE — 87389 HIV-1 AG W/HIV-1&-2 AB AG IA: CPT | Performed by: PHYSICIAN ASSISTANT

## 2023-04-27 PROCEDURE — 63600175 PHARM REV CODE 636 W HCPCS: Performed by: EMERGENCY MEDICINE

## 2023-04-27 PROCEDURE — 85025 COMPLETE CBC W/AUTO DIFF WBC: CPT | Performed by: EMERGENCY MEDICINE

## 2023-04-27 RX ORDER — FUROSEMIDE 40 MG/1
40 TABLET ORAL DAILY
Qty: 30 TABLET | Refills: 0 | Status: SHIPPED | OUTPATIENT
Start: 2023-04-27 | End: 2023-05-17

## 2023-04-27 RX ORDER — FUROSEMIDE 10 MG/ML
40 INJECTION INTRAMUSCULAR; INTRAVENOUS
Status: COMPLETED | OUTPATIENT
Start: 2023-04-27 | End: 2023-04-27

## 2023-04-27 RX ORDER — CARVEDILOL 25 MG/1
12.5 TABLET ORAL 2 TIMES DAILY WITH MEALS
Qty: 30 TABLET | Refills: 0 | Status: SHIPPED | OUTPATIENT
Start: 2023-04-27 | End: 2023-05-03

## 2023-04-27 RX ADMIN — FUROSEMIDE 40 MG: 10 INJECTION, SOLUTION INTRAMUSCULAR; INTRAVENOUS at 02:04

## 2023-04-27 NOTE — DISCHARGE INSTRUCTIONS
Eat a low salt diet.    Our goal in the emergency department is to always give you outstanding care and exceptional service. You may receive a survey by mail or e-mail in the next week regarding your experience in our ED. We would greatly appreciate your completing and returning the survey. Your feedback provides us with a way to recognize our staff who give very good care and it helps us learn how to improve when your experience was below our aspiration of excellence.

## 2023-04-27 NOTE — ED TRIAGE NOTES
Pt. Is a 82 yr old female p[resenting to the ED for SOB since the AM.  Hx:Cardiac valve issues and swelling to the right foot.

## 2023-04-27 NOTE — ED PROVIDER NOTES
Encounter Date: 4/27/2023       History     Chief Complaint   Patient presents with    Shortness of Breath    Cough     82-year-old female with a history of heart murmur and CHF presents with shortness of breath.  Symptoms started this morning.  Worse with exertion.  No previous symptoms.  She has associated right leg swelling.  No history of blood clot, estrogen, cancer, or hemoptysis.  Patient denies nausea, vomiting, diarrhea, fever, chest pain, abdominal pain, or dysuria.  Associated cough.  The patients available PMH, PSH, Social History, medications, allergies, and triage vital signs were reviewed just prior to their medical evaluation.          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    R mid cheek    Strabismus      Past Surgical History:   Procedure Laterality Date    APPENDECTOMY      CHOLECYSTECTOMY      COLONOSCOPY N/A 12/14/2020    Procedure: COLONOSCOPY;  Surgeon: Giovanny Chao MD;  [facility]  Service: Endoscopy;  Laterality: N/A;  COVID test on 12/11/20 at Primary Care -     ECTOPIC PREGNANCY SURGERY      OOPHORECTOMY      SKIN BIOPSY      TRANSFORAMINAL EPIDURAL INJECTION OF STEROID Right 3/9/2023    Procedure: INJECTION, STEROID, EPIDURAL, TRANSFORAMINAL APPROACH, RIGHT L4/L5  URGENT;  Surgeon: Palmer Tinsley MD;  Location: [facility] PAIN MGT;  Service: Pain Management;  Laterality: Right;     Family History   Problem Relation Age of Onset    Hypertension Mother     Stroke Mother     Cancer Father         prostate cancer    Asthma Sister     Amblyopia Neg Hx      Blindness Neg Hx     Cataracts Neg Hx     Diabetes Neg Hx     Glaucoma Neg Hx     Macular degeneration Neg Hx     Retinal detachment Neg Hx     Strabismus Neg Hx     Thyroid disease Neg Hx     Melanoma Neg Hx      Social History     Tobacco Use    Smoking status: Former     Types: Cigarettes     Quit date: 2/10/1983     Years since quittin.2    Smokeless tobacco: Never   Substance Use Topics    Alcohol use: Not Currently    Drug use: No     Review of Systems   Constitutional:  Negative for fever.   Respiratory:  Positive for cough and shortness of breath.    Cardiovascular:  Positive for leg swelling. Negative for chest pain.   Gastrointestinal:  Negative for abdominal pain, diarrhea, nausea and vomiting.   Genitourinary:  Negative for dysuria.     Physical Exam     Initial Vitals [23 1029]   BP Pulse Resp Temp SpO2   (!) 123/57 61 (!) 24 98.2 °F (36.8 °C) (!) 94 %      MAP       --         Physical Exam    Nursing note and vitals reviewed.  Constitutional: She appears well-developed and well-nourished. She is not diaphoretic. No distress.   HENT:   Head: Normocephalic and atraumatic.   Nose: Nose normal.   Eyes: Conjunctivae are normal. Right eye exhibits no discharge. Left eye exhibits no discharge.   Neck: Neck supple.   Normal range of motion.  Cardiovascular:  Normal rate and regular rhythm.     Exam reveals no gallop and no friction rub.       Murmur heard.  3/6 systolic murmur   Pulmonary/Chest: Breath sounds normal. No respiratory distress. She has no wheezes. She has no rhonchi. She has no rales.   Abdominal: Abdomen is soft. She exhibits no distension. There is no abdominal tenderness. There is no rebound and no guarding.   Musculoskeletal:         General: No tenderness or edema. Normal range of motion.      Cervical back: Normal range of motion and neck supple.      Comments: No edema appreciate to BLE     Neurological: She is alert and oriented to person, place, and time. She has normal  strength. GCS score is 15. GCS eye subscore is 4. GCS verbal subscore is 5. GCS motor subscore is 6.   Skin: Skin is warm and dry. No rash noted. No erythema.   Psychiatric: She has a normal mood and affect. Her behavior is normal. Judgment and thought content normal.       ED Course   Procedures  Labs Reviewed   CBC W/ AUTO DIFFERENTIAL - Abnormal; Notable for the following components:       Result Value    RBC 3.70 (*)     Hemoglobin 11.8 (*)     Hematocrit 36.8 (*)      (*)     MCH 31.9 (*)     Lymph # 0.7 (*)     Gran % 77.8 (*)     Lymph % 13.5 (*)     All other components within normal limits   COMPREHENSIVE METABOLIC PANEL - Abnormal; Notable for the following components:    Glucose 144 (*)     Alkaline Phosphatase 193 (*)     All other components within normal limits   D DIMER, QUANTITATIVE - Abnormal; Notable for the following components:    D-Dimer 0.59 (*)     All other components within normal limits   B-TYPE NATRIURETIC PEPTIDE - Abnormal; Notable for the following components:     (*)     All other components within normal limits   HIV 1 / 2 ANTIBODY    Narrative:     Release to patient->Immediate   HEPATITIS C ANTIBODY    Narrative:     Release to patient->Immediate   TROPONIN I     EKG Readings: (Independently Interpreted)   Initial Reading: No STEMI. Rhythm: Sinus Bradycardia. Heart Rate: 58. Ectopy: No Ectopy. Conduction: Normal. ST Segments: Normal ST Segments. T Waves: Normal. Clinical Impression: Normal Sinus Rhythm   ECG Results              EKG 12-lead (Final result)  Result time 04/27/23 12:21:10      Final result by Interface, Lab In [facility] (04/27/23 12:21:10)                   Narrative:    Test Reason : R06.02,    Vent. Rate : 058 BPM     Atrial Rate : 058 BPM     P-R Int : 178 ms          QRS Dur : 092 ms      QT Int : 460 ms       P-R-T Axes : 043 047 059 degrees     QTc Int : 451 ms    Sinus bradycardia  Otherwise normal ECG  When compared with ECG of 11-MAY-2022  18:03,  No significant change was found  Confirmed by Anita Silva MD ([id number]) on 4/27/2023 12:20:49 PM    Referred By: [email or web address]   SELF           Confirmed By:Anita Silva MD                                  Imaging Results               X-Ray Chest AP Portable (Final result)  Result time 04/27/23 12:59:17      Final result by El Bedolla MD (04/27/23 12:59:17)                   Impression:      Cardiomegaly with pulmonary vascular congestion and interstitial accentuation with possible perihilar and lower lobe alveolar filling.  Findings suggest interstitial and alveolar edema.  There may be a small amount of pleural fluid bilaterally.    This report was flagged in Epic as abnormal.      Electronically signed by: El Bedolla MD  Date:    04/27/2023  Time:    12:59               Narrative:    EXAMINATION:  XR CHEST AP PORTABLE    CLINICAL HISTORY:  Shortness of breath    TECHNIQUE:  Single frontal view of the chest was performed.    COMPARISON:  01/08/2023    FINDINGS:  Somewhat poor inspiration and portable AP technique accentuate the cardiac silhouette.  The heart appears mildly enlarged.  Tortuous thoracic aorta and brachiocephalic vessels with atherosclerotic calcification in the wall of the aortic arch.  Pulmonary vascular pattern appears mildly congested.  Lungs are satisfactorily expanded.  Interval development of interstitial accentuation with possibly mild alveolar filling in the perihilar regions and lower lung zones.  There may be minimal blunting of both costophrenic angles which could represent a small amount of pleural fluid on each side.  No pneumothorax.  Generalized osteopenia.                                    X-Rays:   Independently Interpreted Readings:   Other Readings:  Bilateral pulmonary edema  Medications   furosemide injection 40 mg (40 mg Intravenous Given 4/27/23 1408)     Medical Decision Making:   History:   I obtained history from: someone other than patient.       <> Summary of  History: Daughter assists HPI  Old Medical Records: I decided to obtain old medical records.  Old Records Summarized: records from clinic visits.       <> Summary of Records: Previous labs  Independently Interpreted Test(s):   I have ordered and independently interpreted X-rays - see prior notes.  I have ordered and independently interpreted EKG Reading(s) - see prior notes  Clinical Tests:   Lab Tests: Ordered and Reviewed  Radiological Study: Ordered and Reviewed  Medical Tests: Ordered and Reviewed  ED Management:  82-year-old female presents with shortness of breath.  Vitals with tachypnea and hypoxia.  Physical exam as above.  EKG without evidence of acute ischemia.  Labs with elevated BNP.  D-dimer is normal for age.  Chest x-ray with pulmonary edema.  Her presentation is consistent with acute CHF exacerbation.  Treated with Lasix IV x1.  We had an extensive conversation at bedside.  She was offered admission for urgent echo and further diuresis.  She declined and prefers to be discharged home.  Will discharge with new prescription for Lasix.  Decreased her carvedilol to avoid hypotension.  Placed ambulatory referral for heart failure transitional care clinic follow-up.  They will call today to arrange for tomorrow or Monday.  Patient already has an echo scheduled for next week.  Daughter is a nurse and will stay with patient for the next few days.  Gave prescription for bedside commode.  Patient will return to ED for worsening symptoms, inability to eat/drink, fever greater than 100.4, or any other concerns. Did bedside teaching with return precautions.  All questions answered.  The patient acknowledges understanding.  Gave verbal discharge instructions.                         Clinical Impression:   Final diagnoses:  [R06.02] SOB (shortness of breath)  [R06.02] Shortness of breath  [I50.9] Acute congestive heart failure, unspecified heart failure type (Primary)        ED Disposition Condition    Discharge  Stable          ED Prescriptions       Medication Sig Dispense Start Date End Date Auth. Provider    furosemide (LASIX) 40 MG tablet Take 1 tablet (40 mg total) by mouth once daily. 30 tablet 4/27/2023 5/27/2023 Isma Tapia MD    carvediloL (COREG) 25 MG tablet Take 0.5 tablets (12.5 mg total) by mouth 2 (two) times daily with meals. 30 tablet 4/27/2023 5/27/2023 Isma Tapia MD          Follow-up Information       Follow up With Specialties Details Why Contact Info Additional Information    [id number]   [address] [facility] [address] [phone] Cardiology Services Clinics - Main Building, Atrium 3rd Floor  Please park in [facility] and use Atrium elevator    [facility] - Emergency Dept Emergency Medicine  Return to ED for worsening symptoms, inability to eat/drink, fever greater than 100.4, or any other concerns. [address] [facility] [address] [phone]           Level of Complexity:  High  1 or more chronic illness with severe exacerbation, progression, or side effect OR 1 acute or chronic illness or injury posing a threat to life or bodily function.      Isma Tapia MD  04/27/23 1429

## 2023-04-27 NOTE — TELEPHONE ENCOUNTER
Received call from Pt's daughter Sharita Samson requesting an asap appt. as per instructions from ED MD.   Pt has ECHO scheduled for 5-2-23. Daughter declined Hospital admit to get Echo done in House. Echo from 9- shows 65% EF with Grade 2 LVDD. Ms. Camacho to review Echo before enrolling. Reminder set.

## 2023-04-30 ENCOUNTER — EXTERNAL CHRONIC CARE MANAGEMENT (OUTPATIENT)
Dept: PRIMARY CARE CLINIC | Facility: CLINIC | Age: 83
End: 2023-04-30
Payer: MEDICARE

## 2023-04-30 PROCEDURE — 99490 CHRNC CARE MGMT STAFF 1ST 20: CPT | Mod: S$PBB,,, | Performed by: INTERNAL MEDICINE

## 2023-04-30 PROCEDURE — 99490 PR CHRONIC CARE MGMT, 1ST 20 MIN: ICD-10-PCS | Mod: S$PBB,,, | Performed by: INTERNAL MEDICINE

## 2023-04-30 PROCEDURE — 99490 CHRNC CARE MGMT STAFF 1ST 20: CPT | Mod: PBBFAC | Performed by: INTERNAL MEDICINE

## 2023-05-01 NOTE — PROGRESS NOTES
HF TCC Provider Note (Initial Clinic) Consult Note    Age: 82 y.o.  Gender: female  Ethnicity: White  Number of admissions for CHF within the preceding year: 0   Type of Congestive Heart Failure: Diastolic   Etiology: unspecified    Enrolled in Infusion suite: no    Diagnostic Labs:   EKG - 04/27/2023  CXR - 04/27/2023  ECHO - 10/19/2021  Stress test -   Stress echo -   Pharmacologic stress -   Cardiac catheterization -    Cardiac MRI -     Lab Results   Component Value Date     04/27/2023     05/11/2022    K 3.9 04/27/2023    K 3.7 05/11/2022     04/27/2023     05/11/2022    CO2 24 04/27/2023    CO2 23 05/11/2022     (H) 04/27/2023    GLU 85 05/11/2022    BUN 20 04/27/2023    BUN 20 05/11/2022    CREATININE 0.7 04/27/2023    CREATININE 0.8 05/11/2022    CALCIUM 9.0 04/27/2023    CALCIUM 10.0 05/11/2022    PROT 7.4 04/27/2023    PROT 7.8 05/11/2022    ALBUMIN 3.8 04/27/2023    ALBUMIN 3.9 05/11/2022    BILITOT 0.7 04/27/2023    BILITOT 0.4 05/11/2022    ALKPHOS 193 (H) 04/27/2023    ALKPHOS 121 05/11/2022    AST 30 04/27/2023    AST 14 05/11/2022    ALT 31 04/27/2023    ALT 10 05/11/2022    ANIONGAP 8 04/27/2023    ANIONGAP 13 05/11/2022    ESTGFRAFRICA >60.0 05/11/2022    ESTGFRAFRICA >60.0 03/14/2022    EGFRNONAA >60.0 05/11/2022    EGFRNONAA >60.0 03/14/2022       Lab Results   Component Value Date    WBC 5.32 04/27/2023    WBC 4.93 05/11/2022    RBC 3.70 (L) 04/27/2023    RBC 4.17 05/11/2022    HGB 11.8 (L) 04/27/2023    HGB 13.5 05/11/2022    HCT 36.8 (L) 04/27/2023    HCT 40.7 05/11/2022     (H) 04/27/2023    MCV 98 05/11/2022    MCH 31.9 (H) 04/27/2023    MCH 32.4 (H) 05/11/2022    MCHC 32.1 04/27/2023    MCHC 33.2 05/11/2022    RDW 13.7 04/27/2023    RDW 13.3 05/11/2022     04/27/2023     05/11/2022    MPV 10.0 04/27/2023    MPV 9.9 05/11/2022    IMMGR 0.2 04/27/2023    IMMGR 0.4 05/11/2022    IGABS 0.01 04/27/2023    IGABS 0.02 05/11/2022    LYMPH 0.7 (L)  04/27/2023    LYMPH 13.5 (L) 04/27/2023    MONO 0.4 04/27/2023    MONO 6.6 04/27/2023    EOS 0.1 04/27/2023    EOS 0.2 05/11/2022    BASO 0.04 04/27/2023    BASO 0.06 05/11/2022    NRBC 0 04/27/2023    NRBC 0 05/11/2022    GRAN 4.1 04/27/2023    GRAN 77.8 (H) 04/27/2023    EOSINOPHIL 1.1 04/27/2023    EOSINOPHIL 4.1 05/11/2022    BASOPHIL 0.8 04/27/2023    BASOPHIL 1.2 05/11/2022       Lab Results   Component Value Date     (H) 04/27/2023    BNP 89 05/11/2022    TROPONINI <0.006 04/27/2023    TROPONINI <0.006 05/11/2022    HGBA1C 5.3 03/14/2022    HGBA1C 5.5 08/23/2021    TSH 0.770 08/23/2021    TSH 1.414 04/26/2021    FREET4 1.15 10/12/2016       Lab Results   Component Value Date    CHOL 166 08/23/2021    TRIG 124 08/23/2021    HDL 69 08/23/2021    LDLCALC 72.2 08/23/2021    CHOLHDL 41.6 08/23/2021    TOTALCHOLEST 2.4 08/23/2021    NONHDLCHOL 97 08/23/2021    COLORU clear yellow 02/10/2014    PHUR 5 02/10/2014    SPECGRAV 1.010 02/10/2014    KETONESU neg 02/10/2014    NITRITE neg 02/10/2014       No implanted cardiac devices    Current Outpatient Medications on File Prior to Visit   Medication Sig Dispense Refill    alendronate (FOSAMAX) 70 MG tablet Take 1 tablet (70 mg total) by mouth every 7 days. 4 tablet 11    aspirin (ECOTRIN) 81 MG EC tablet Take 81 mg by mouth once daily.      atorvastatin (LIPITOR) 20 MG tablet Take 1 tablet (20 mg total) by mouth once daily. 90 tablet 3    calcium carbonate/vitamin D3 (VITAMIN D-3 ORAL) Take by mouth.      carvediloL (COREG) 25 MG tablet Take 0.5 tablets (12.5 mg total) by mouth 2 (two) times daily with meals. 30 tablet 0    cetirizine (ZYRTEC) 10 MG tablet Take 10 mg by mouth once daily.      cholecalciferol, vitamin D3, 125 mcg (5,000 unit) Tab Take 5,000 Units by mouth once daily.      cyanocobalamin (VITAMIN B-12) 1000 MCG tablet Take 100 mcg by mouth once daily.      cyclobenzaprine (FLEXERIL) 5 MG tablet Take 1 tablet (5 mg total) by mouth 3 (three) times  daily as needed for Muscle spasms. 42 tablet 0    DULCOLAX, BISACODYL, ORAL Take 1 tablet by mouth every evening.      FLUoxetine 20 MG capsule TAKE 1 CAPSULE(20 MG) BY MOUTH EVERY DAY 30 capsule 2    furosemide (LASIX) 40 MG tablet Take 1 tablet (40 mg total) by mouth once daily. 30 tablet 0    LORazepam (ATIVAN) 0.5 MG tablet Take 1 tablet (0.5 mg total) by mouth On call Procedure for Anxiety. Take 1 tab 30 min before MRI. May take 2nd if needed 2 tablet 0    mometasone (ELOCON) 0.1 % solution Mix entire bottle in jar of cerave cream and aaa qd- bid prn itching 60 mL 3    NIFEdipine (PROCARDIA-XL) 30 MG (OSM) 24 hr tablet TAKE 1 TABLET BY MOUTH EVERY DAY 90 tablet 3    pregabalin (LYRICA) 75 MG capsule Take 1 capsule (75 mg total) by mouth 2 (two) times daily. 60 capsule 6    tacrolimus (PROTOPIC) 0.1 % ointment AAA bid to leg 60 g 2     No current facility-administered medications on file prior to visit.         HPI:  Patient can walk from parking garage to clinic today and pace normal without appreciable SOB. Denies appreciable SOB with ADL   Patient sleeps on 1 number of pillows   Patient wakes up SOB, has to get out of bed, associated cough- denies   Palpitations- intermittent palpitations described as heart skipping a beat    Dizzy, light-headed, pre-syncope or syncope- denies   Since discharge frequency of performing weights, home weight and weight change- not performing daily weights. Provided home scale   Other information felt pertinent to HPI: Ms. Luz Amaro is a 81 yo female with a PMHx of moderate AS, HFpEF, HTN, HLD who presents to first [facility] visit from ED referral after presenting on 4/27 with c/o SOB. In ED, vitals significant for tachypnea and hypoxia. BNP elevated at 382. D-dimer is normal for age. EKG without evidence of acute ischemia. Chest x-ray with pulmonary edema. She was offered admission for urgent echo and further diuresis. She declined as would prefer outpatient management. Received  40mg IVP lasix in ED and discharged home with [facility] referral and prescriptions for po lasix 40mg daily and decreased coreg dose 12.5mg BID.      PHYSICAL: There were no vitals filed for this visit.   Wt Readings from Last 3 Encounters:   05/03/23 69.9 kg (154 lb 1.6 oz)   05/02/23 68 kg (150 lb)   04/27/23 68 kg (150 lb)       JVD: no   Heart rhythm: regular  Cardiac murmur: No    S3: no  S4: no  Lungs: clear  Hepatojugular reflux: no  Edema: no      Echo 10/19/21:  The left ventricle is normal in size with normal systolic function.  The estimated ejection fraction is 65%.  Grade II left ventricular diastolic dysfunction.  Normal right ventricular size with normal right ventricular systolic function.  The aortic valve is calcified with a fused and NCC/LCC. There is moderate aortic stenosis with a peak velocity of 3.36 m/sec and mild to moderate aortic insufficiency noted.    Echo 5/2/23:  The left ventricle is normal in size with normal systolic function.  The estimated ejection fraction is 65%.  Grade I left ventricular diastolic dysfunction.  Normal right ventricular size with normal right ventricular systolic function.  The aortic valve is calcified with a fused and NCC/LCC. There is moderate aortic stenosis with a peak velocity of 3.68 m/sec and mild to moderate aortic insufficiency noted.  Mild mitral regurgitation.  Intermediate central venous pressure (8 mmHg).  The estimated PA systolic pressure is 32 mmHg.    ASSESSMENT: Chronic diastolic HF    PLAN:      Patient Instructions:   Instruct the patient to notify this clinic if HH, a physician or an advanced care provider wants to change medication one of their HF medications   Activity and Diet restrictions:   Recommend 2-3 gram sodium restriction and 1500cc- 2000cc fluid restriction.  Encourage physical activity with graded exercise program.  Requested patient to weigh themselves daily, and to notify us if their weight increases by more than 3 lbs in 1 day or  5 lbs in 3 days.    Assigned dry weight on home scale: unsure. Provided home scale during visit  Medication changes (include current dose and changed dose): NYHA Class I symptoms. Well compensated on exam. Endorses associated fatigue with episodes of bradycardia. Reduce coreg from 12.5mg BID to 6.25mg BID with close monitoring of HR. BNP downtrending (382->190), but still mildly elevated from baseline. CVP 8 on TTE yesterday. Instructed to take an additional 40mg lasix in the afternoon today and tomorrow, then resume 40mg once daily on Friday.    Upcoming labs and date anticipated: RTC in 2 weeks for repeat labs and close follow up    Carol Camacho PA-C

## 2023-05-02 ENCOUNTER — HOSPITAL ENCOUNTER (OUTPATIENT)
Dept: CARDIOLOGY | Facility: HOSPITAL | Age: 83
Discharge: HOME | End: 2023-05-02
Attending: INTERNAL MEDICINE
Payer: MEDICARE

## 2023-05-02 ENCOUNTER — TELEPHONE (OUTPATIENT)
Dept: CARDIOLOGY | Facility: CLINIC | Age: 83
End: 2023-05-02
Payer: MEDICARE

## 2023-05-02 VITALS
DIASTOLIC BLOOD PRESSURE: 80 MMHG | HEIGHT: 61 IN | SYSTOLIC BLOOD PRESSURE: 119 MMHG | HEART RATE: 50 BPM | BODY MASS INDEX: 28.32 KG/M2 | WEIGHT: 150 LBS

## 2023-05-02 DIAGNOSIS — I35.0 NONRHEUMATIC AORTIC VALVE STENOSIS: ICD-10-CM

## 2023-05-02 LAB
ASCENDING AORTA: 2.94 CM
AV MEAN GRADIENT: 33 MMHG
AV PEAK GRADIENT: 54 MMHG
BSA FOR ECHO PROCEDURE: 1.71 M2
CV ECHO LV RWT: 0.29 CM
DOP CALC AO PEAK VEL: 3.68 M/S
DOP CALC AO VTI: 108.23 CM
DOP CALC LVOT AREA: 2.7 CM2
DOP CALC LVOT DIAMETER: 1.85 CM
DOP CALC MV VTI: 52.95 CM
E WAVE DECELERATION TIME: 228.87 MSEC
E/A RATIO: 1.18
E/E' RATIO: 22.36 M/S
ECHO LV POSTERIOR WALL: 0.76 CM (ref 0.6–1.1)
EJECTION FRACTION: 65 %
FRACTIONAL SHORTENING: 40 % (ref 28–44)
INTERVENTRICULAR SEPTUM: 0.76 CM (ref 0.6–1.1)
LA MAJOR: 5.11 CM
LA MINOR: 4.55 CM
LA WIDTH: 3.34 CM
LEFT ATRIUM SIZE: 3.94 CM
LEFT ATRIUM VOLUME INDEX MOD: 29 ML/M2
LEFT ATRIUM VOLUME INDEX: 32.2 ML/M2
LEFT ATRIUM VOLUME MOD: 48.51 CM3
LEFT ATRIUM VOLUME: 53.85 CM3
LEFT INTERNAL DIMENSION IN SYSTOLE: 3.13 CM (ref 2.1–4)
LEFT VENTRICLE DIASTOLIC VOLUME INDEX: 77.86 ML/M2
LEFT VENTRICLE DIASTOLIC VOLUME: 130.03 ML
LEFT VENTRICLE MASS INDEX: 82 G/M2
LEFT VENTRICLE SYSTOLIC VOLUME INDEX: 23.2 ML/M2
LEFT VENTRICLE SYSTOLIC VOLUME: 38.67 ML
LEFT VENTRICULAR INTERNAL DIMENSION IN DIASTOLE: 5.21 CM (ref 3.5–6)
LEFT VENTRICULAR MASS: 136.55 G
LV LATERAL E/E' RATIO: 20.5 M/S
LV SEPTAL E/E' RATIO: 24.6 M/S
MV A" WAVE DURATION": 13.7 MSEC
MV MEAN GRADIENT: 2 MMHG
MV PEAK A VEL: 1.04 M/S
MV PEAK E VEL: 1.23 M/S
MV PEAK GRADIENT: 6 MMHG
PISA TR MAX VEL: 2.46 M/S
PULM VEIN S/D RATIO: 0.88
PV PEAK D VEL: 0.83 M/S
PV PEAK S VEL: 0.73 M/S
RA MAJOR: 5.02 CM
RA PRESSURE: 8 MMHG
RA WIDTH: 3.09 CM
RIGHT VENTRICULAR END-DIASTOLIC DIMENSION: 2.41 CM
SINUS: 2.44 CM
STJ: 2.59 CM
TDI LATERAL: 0.06 M/S
TDI SEPTAL: 0.05 M/S
TDI: 0.06 M/S
TR MAX PG: 24 MMHG
TRICUSPID ANNULAR PLANE SYSTOLIC EXCURSION: 2.24 CM
TV REST PULMONARY ARTERY PRESSURE: 32 MMHG

## 2023-05-02 PROCEDURE — 93306 TTE W/DOPPLER COMPLETE: CPT | Mod: 26,,, | Performed by: INTERNAL MEDICINE

## 2023-05-02 PROCEDURE — 93306 ECHO (CUPID ONLY): ICD-10-PCS | Mod: 26,,, | Performed by: INTERNAL MEDICINE

## 2023-05-02 PROCEDURE — 93306 TTE W/DOPPLER COMPLETE: CPT

## 2023-05-03 ENCOUNTER — SOCIAL WORK (OUTPATIENT)
Dept: CARDIOLOGY | Facility: CLINIC | Age: 83
End: 2023-05-03
Payer: MEDICARE

## 2023-05-03 ENCOUNTER — LAB VISIT (OUTPATIENT)
Dept: LAB | Facility: HOSPITAL | Age: 83
End: 2023-05-03
Payer: MEDICARE

## 2023-05-03 ENCOUNTER — CLINICAL SUPPORT (OUTPATIENT)
Dept: CARDIOLOGY | Facility: CLINIC | Age: 83
End: 2023-05-03
Payer: MEDICARE

## 2023-05-03 ENCOUNTER — TELEPHONE (OUTPATIENT)
Dept: INTERNAL MEDICINE | Facility: CLINIC | Age: 83
End: 2023-05-03
Payer: MEDICARE

## 2023-05-03 ENCOUNTER — OFFICE VISIT (OUTPATIENT)
Dept: CARDIOLOGY | Facility: CLINIC | Age: 83
End: 2023-05-03
Payer: MEDICARE

## 2023-05-03 VITALS
DIASTOLIC BLOOD PRESSURE: 61 MMHG | WEIGHT: 154.13 LBS | OXYGEN SATURATION: 93 % | HEIGHT: 61 IN | HEART RATE: 51 BPM | BODY MASS INDEX: 29.1 KG/M2 | SYSTOLIC BLOOD PRESSURE: 125 MMHG

## 2023-05-03 DIAGNOSIS — I50.32 CHRONIC HEART FAILURE WITH PRESERVED EJECTION FRACTION: Primary | ICD-10-CM

## 2023-05-03 DIAGNOSIS — M54.41 ACUTE MIDLINE LOW BACK PAIN WITH RIGHT-SIDED SCIATICA: ICD-10-CM

## 2023-05-03 DIAGNOSIS — R06.02 SOB (SHORTNESS OF BREATH): ICD-10-CM

## 2023-05-03 DIAGNOSIS — E78.5 HYPERLIPIDEMIA, UNSPECIFIED HYPERLIPIDEMIA TYPE: ICD-10-CM

## 2023-05-03 DIAGNOSIS — I51.89 DIASTOLIC DYSFUNCTION: ICD-10-CM

## 2023-05-03 DIAGNOSIS — I10 PRIMARY HYPERTENSION: ICD-10-CM

## 2023-05-03 DIAGNOSIS — M54.31 RIGHT SIDED SCIATICA: Primary | ICD-10-CM

## 2023-05-03 DIAGNOSIS — I35.0 NONRHEUMATIC AORTIC VALVE STENOSIS: ICD-10-CM

## 2023-05-03 LAB
ALBUMIN SERPL BCP-MCNC: 3.7 G/DL (ref 3.5–5.2)
ALP SERPL-CCNC: 178 U/L (ref 55–135)
ALT SERPL W/O P-5'-P-CCNC: 23 U/L (ref 10–44)
ANION GAP SERPL CALC-SCNC: 6 MMOL/L (ref 8–16)
AST SERPL-CCNC: 24 U/L (ref 10–40)
BASOPHILS # BLD AUTO: 0.06 K/UL (ref 0–0.2)
BASOPHILS NFR BLD: 1.3 % (ref 0–1.9)
BILIRUB SERPL-MCNC: 0.7 MG/DL (ref 0.1–1)
BNP SERPL-MCNC: 190 PG/ML (ref 0–99)
BUN SERPL-MCNC: 12 MG/DL (ref 8–23)
CALCIUM SERPL-MCNC: 9.2 MG/DL (ref 8.7–10.5)
CHLORIDE SERPL-SCNC: 105 MMOL/L (ref 95–110)
CO2 SERPL-SCNC: 28 MMOL/L (ref 23–29)
CREAT SERPL-MCNC: 0.7 MG/DL (ref 0.5–1.4)
DIFFERENTIAL METHOD: ABNORMAL
EOSINOPHIL # BLD AUTO: 0.3 K/UL (ref 0–0.5)
EOSINOPHIL NFR BLD: 6.4 % (ref 0–8)
ERYTHROCYTE [DISTWIDTH] IN BLOOD BY AUTOMATED COUNT: 13.6 % (ref 11.5–14.5)
EST. GFR  (NO RACE VARIABLE): >60 ML/MIN/1.73 M^2
GLUCOSE SERPL-MCNC: 102 MG/DL (ref 70–110)
HCT VFR BLD AUTO: 38.7 % (ref 37–48.5)
HGB BLD-MCNC: 11.9 G/DL (ref 12–16)
IMM GRANULOCYTES # BLD AUTO: 0.01 K/UL (ref 0–0.04)
IMM GRANULOCYTES NFR BLD AUTO: 0.2 % (ref 0–0.5)
LYMPHOCYTES # BLD AUTO: 1 K/UL (ref 1–4.8)
LYMPHOCYTES NFR BLD: 22 % (ref 18–48)
MAGNESIUM SERPL-MCNC: 2.1 MG/DL (ref 1.6–2.6)
MCH RBC QN AUTO: 31.2 PG (ref 27–31)
MCHC RBC AUTO-ENTMCNC: 30.7 G/DL (ref 32–36)
MCV RBC AUTO: 101 FL (ref 82–98)
MONOCYTES # BLD AUTO: 0.5 K/UL (ref 0.3–1)
MONOCYTES NFR BLD: 11.1 % (ref 4–15)
NEUTROPHILS # BLD AUTO: 2.8 K/UL (ref 1.8–7.7)
NEUTROPHILS NFR BLD: 59 % (ref 38–73)
NRBC BLD-RTO: 0 /100 WBC
PHOSPHATE SERPL-MCNC: 3.4 MG/DL (ref 2.7–4.5)
PLATELET # BLD AUTO: 220 K/UL (ref 150–450)
PMV BLD AUTO: 9.7 FL (ref 9.2–12.9)
POTASSIUM SERPL-SCNC: 4.4 MMOL/L (ref 3.5–5.1)
PROT SERPL-MCNC: 7.1 G/DL (ref 6–8.4)
RBC # BLD AUTO: 3.82 M/UL (ref 4–5.4)
SODIUM SERPL-SCNC: 139 MMOL/L (ref 136–145)
WBC # BLD AUTO: 4.69 K/UL (ref 3.9–12.7)

## 2023-05-03 PROCEDURE — 99999 PR PBB SHADOW E&M-EST. PATIENT-LVL V: ICD-10-PCS | Mod: PBBFAC,,,

## 2023-05-03 PROCEDURE — 99214 OFFICE O/P EST MOD 30 MIN: CPT | Mod: S$PBB,,,

## 2023-05-03 PROCEDURE — 85025 COMPLETE CBC W/AUTO DIFF WBC: CPT

## 2023-05-03 PROCEDURE — 99215 OFFICE O/P EST HI 40 MIN: CPT | Mod: PBBFAC

## 2023-05-03 PROCEDURE — 83880 ASSAY OF NATRIURETIC PEPTIDE: CPT

## 2023-05-03 PROCEDURE — 83735 ASSAY OF MAGNESIUM: CPT

## 2023-05-03 PROCEDURE — 99999 PR PBB SHADOW E&M-EST. PATIENT-LVL V: CPT | Mod: PBBFAC,,,

## 2023-05-03 PROCEDURE — 84100 ASSAY OF PHOSPHORUS: CPT

## 2023-05-03 PROCEDURE — 36415 COLL VENOUS BLD VENIPUNCTURE: CPT

## 2023-05-03 PROCEDURE — 99214 PR OFFICE/OUTPT VISIT, EST, LEVL IV, 30-39 MIN: ICD-10-PCS | Mod: S$PBB,,,

## 2023-05-03 PROCEDURE — 80053 COMPREHEN METABOLIC PANEL: CPT

## 2023-05-03 RX ORDER — CARVEDILOL 6.25 MG/1
6.25 TABLET ORAL 2 TIMES DAILY WITH MEALS
Qty: 60 TABLET | Refills: 11 | Status: SHIPPED | OUTPATIENT
Start: 2023-05-03 | End: 2023-07-26 | Stop reason: SDUPTHER

## 2023-05-03 NOTE — PROGRESS NOTES
LMSW met with pt in [facility] to discuss barriers towards treatment. Pt reports she has access to transportation, medication, insurance benefits, food, and housing. Pt would benefit from commode, and walker. Pt daughter reports that this was supposed to be ordered in the ED but does not know if it was. LMSW sent a message to pt's PCP to help her order DME. I also explained that pt may be able to get incontinence pads cheaper if she calls her insurance company and orders it through them. Pt asked about handicap license plate/tag and LMSW provided pt with the paperwork that needs to be filled out by her PCP and information about what the [facility] needs to provide one. Pt discussed feeling overwhelmed sometimes with her life changes. LMSW provided pt with heart support resources. Opt reports no need for HH, MH or TERRANCE resources.

## 2023-05-03 NOTE — PROGRESS NOTES
Ms. Vega here with her Daughter for her first [facility] visit , I give her a scale and a large Home Care Guide(HCG) and a smaller HCG for her daughter. I Teach what a dry weight is, upon waking empty your bladder shed your clothing and before eating/drinking anything step on your scale and record it in the HCG. Teach: Check your BP and Pulse daily as well and look at the weight don't just write the number down, 3-4 lbs gain overnight is fluid gain as well as a 5 lb gain in 3 days. If you don't report this to [facility] you will start to see the other S/S: edema to ankles, calves, thighs or belly, SOB at rest or with activity, sleeping on more pillows. I explain that the daily weights and BP/P are used to monitor the changes that Ms. Camacho makes in the medications so they are important. I go over the Salt restriction of 1.5 grams and the major offenders are; Deli meats/cheeses & Sausages and hot dogs as these are cured with salt. Fast food/Restaurant food and anything that is in a bottle a box or a can. Packaged Seasonings like Slap Ya Mama, [employer], & Zaterain's unless they are the NO SALT version. We go over the Fluid restriction of 1.5 liters and I explain Salt is the Devil because it makes you hold onto the fluid. If you eliminate the Salt you will not hold so much fluid. I explain that we are not on the [facility] switchboard so don't lose the booklet with our # and they can put our # in their cell. Please call us if you think you have gained fluid or have any of the S/S of fluid overload that we discussed. Call us, call us, call us, that's what we are here for. When you are done with the 6 weeks you will call your cardiologists office with this assessment data and he will help you stay out of the ED. I teach the after hours # in the booklet and to tell the HTS Doc on call that they have too much fluid and our Clinic is closed. I ask if they have any questions now and they say no, I tell them if they do call us!  Pt leaves with a scale and her Booklets.

## 2023-05-03 NOTE — PATIENT INSTRUCTIONS
Decrease carvedilol to 6.25mg twice daily.    Take an additional 40mg lasix in the afternoon today and tomorrow.    Then resume 40mg lasix once daily.    Notify us (#[phone]) if you have worsening shortness of breath, swelling, or 3lb weight gain on home scale.

## 2023-05-03 NOTE — TELEPHONE ENCOUNTER
----- Message from Lara Lebron LMSW sent at 5/3/2023 11:20 AM CDT -----  Regarding: DME  Hello,    This patient is looking for help to get a walker and bedside commode. In order to get these, she may need dr approval. Can you help support this patient?    Thank you

## 2023-05-10 ENCOUNTER — TELEPHONE (OUTPATIENT)
Dept: CARDIOLOGY | Facility: CLINIC | Age: 83
End: 2023-05-10
Payer: MEDICARE

## 2023-05-10 DIAGNOSIS — F33.41 RECURRENT MAJOR DEPRESSIVE DISORDER, IN PARTIAL REMISSION: ICD-10-CM

## 2023-05-10 DIAGNOSIS — F41.1 GENERALIZED ANXIETY DISORDER: ICD-10-CM

## 2023-05-10 RX ORDER — FLUOXETINE HYDROCHLORIDE 20 MG/1
CAPSULE ORAL
Qty: 30 CAPSULE | Refills: 2 | Status: SHIPPED | OUTPATIENT
Start: 2023-05-10 | End: 2023-07-14 | Stop reason: SDUPTHER

## 2023-05-10 NOTE — TELEPHONE ENCOUNTER
"[facility]) weekly phone follow up / triage call completed.     TCC RN Navigator spoke with pt, Ms Escobar.    Current Patient reported weight: 151.9 lbs    Recent Patient reported blood pressure and heart rate: 122/60    Pt reports the following:  []  Shortness of Breath with Activity  []  Shortness of Breath at rest   []  Fatigue  []  Edema   [] Chest pain or tightness  [] Weight Increase since discharge  [x] None of the above    Pt reports using "Daily weight and symptom tracker".    Pt reports being in the RED  Zone. If in yellow/red, reminded that they should be calling HFTCC today or now.     Medications:   Medication compliance reviewed with pt.  Pt reports having medication list available and has all medications at home for use per list.   Pt denies needs for medication at this time.     Education:   Confirmed pt still has "[facility] Home Care Guide"  .     Reminded of key points as listed below.     Recommend 2 -3 gram sodium restriction and 1500 cc-2000 cc fluid restriction.  Encourage physical activity with graded exercise program.  Requested patient to weigh themselves daily, and to notify us if their weight increases by more than 3 lbs in 1 day or 5 lbs in 3 days.   Reminded to use "Daily weight and symptom tracker".  Even if pt does not have a scale, to use symptom tracker.       Watch for these Signs and Symptoms: If any of these occur, contact [facility] immediately:   Increase in shortness of breath with movement   Increase in swelling in your legs and ankles   Weight gain of more than 3 pounds in a day or 5 pounds in 3 days.   Difficulty breathing when you are lying down   Worsening fatigue or tiredness   Stomach bloating, a full feeling or a loss of appetite   Increased coughing--especially when you are lying down      Pt was able to verbalize back to RN in their own words correct diet/fluid restrictions, necessity for exercise, warning " signs and symptoms, when and how to contact their HFT[facility] team.      Pt reminded of upcoming appointment.  Pt reports they will attend. Pt appt on May 17, 2023 at 1030 AM      Pt reminded of how and when to contact [facility]:  [phone] (Mon-Fri, 8a-5p) & for urgent issues on the weekend to page the Heart Transplant MD on call.  Pt also encouraged utilize [facility] messaging as well.      Pt verbalized understanding and in agreement of plan.       Will follow up with pt at next clinic visit and RN navigator available for pt questions, issues or concerns.

## 2023-05-15 NOTE — PROGRESS NOTES
HF TCC Provider Note (Follow-up) Consult Note      HPI:     Patient can walk from parking garage to clinic today and pace normal without appreciable SOB. Denies appreciable SOB with ADL              Patient sleeps on 1 number of pillows              Patient wakes up SOB, has to get out of bed, associated cough- denies              Palpitations- intermittent palpitations described as heart skipping a beat               Dizzy, light-headed, pre-syncope or syncope- denies              Since discharge frequency of performing weights, home weight and weight change- performing daily weights. Reports weight uptrending 152->154 over the past 2 weeks              Other information felt pertinent to HPI: Ms. Luz Amaro is a 81 yo female with a PMHx of moderate AS, HFpEF, HTN, HLD who presents to second [facility] visit from ED referral after presenting on 4/27 with c/o SOB. In ED, vitals significant for tachypnea and hypoxia. BNP elevated at 382. D-dimer is normal for age. EKG without evidence of acute ischemia. Chest x-ray with pulmonary edema. She was offered admission for urgent echo and further diuresis. She declined as would prefer outpatient management. Received 40mg IVP lasix in ED and discharged home with HFTCC referral and prescriptions for po lasix 40mg daily and decreased coreg dose 12.5mg BID.     5/17/23: Last visit we reduced coreg to 6.25mg BID 2/2 bradycardia and instructed to take afternoon prn lasix 40mg x 2 days, then resume 40mg once daily. Today she has no acute complaints. Denies worsening SOB. Minimal BLE edema. Reports intermittent right leg cramping in the morning for the past 3 days.      PHYSICAL:   Vitals:    05/17/23 1006   BP: (!) 149/66   Pulse: 67      Wt Readings from Last 3 Encounters:   05/17/23 70.8 kg (156 lb)   05/03/23 69.9 kg (154 lb 1.6 oz)   05/02/23 68 kg (150 lb)     JVD: no   Heart rhythm: regular  Cardiac murmur: yes  S3: no  S4: no  Lungs: clear  Hepatojugular reflux: no  Edema:  trace BLE edema    Lab Results   Component Value Date     05/17/2023    K 4.4 05/17/2023    MG 1.9 05/17/2023     05/17/2023    CO2 29 05/17/2023    BUN 12 05/17/2023    CREATININE 0.7 05/17/2023     05/17/2023    CALCIUM 9.3 05/17/2023    AST 27 05/17/2023    ALT 23 05/17/2023    ALBUMIN 3.7 05/17/2023    PROT 7.5 05/17/2023    BILITOT 0.5 05/17/2023     Lab Results   Component Value Date     (H) 05/17/2023     (H) 05/03/2023     (H) 04/27/2023       ASSESSMENT: Chronic diastolic HF    PLAN:      Patient Instructions:   Instruct the patient to notify this clinic if HH, a physician or an advanced care provider wants to change medication one of their HF medications   Activity and Diet restrictions:   Recommend 2-3 gram sodium restriction and 1500cc- 2000cc fluid restriction.  Encourage physical activity with graded exercise program.  Requested patient to weigh themselves daily, and to notify us if their weight increases by more than 3 lbs in 1 day or 5 lbs in 3 days.    Assigned dry weight on home scale: 152-154lbs  Medication changes (include current dose and changed dose): NYHA Class I symptoms. Well compensated on exam. BNP downtrending, labs stable. Start jardiance 10mg daily to further optimize HFpEF GDMT. Reduce lasix to 20mg daily to avoid overdiuresis with starting jardiance.    RD education provided during visit.     Upcoming labs and date anticipated: repeat labs in 2 weeks with starting SGLT2i with in person visit prn     Carol Camacho PA-C

## 2023-05-17 ENCOUNTER — TELEPHONE (OUTPATIENT)
Dept: CARDIOLOGY | Facility: CLINIC | Age: 83
End: 2023-05-17

## 2023-05-17 ENCOUNTER — OFFICE VISIT (OUTPATIENT)
Dept: CARDIOLOGY | Facility: CLINIC | Age: 83
End: 2023-05-17
Payer: MEDICARE

## 2023-05-17 ENCOUNTER — LAB VISIT (OUTPATIENT)
Dept: LAB | Facility: HOSPITAL | Age: 83
End: 2023-05-17
Payer: MEDICARE

## 2023-05-17 VITALS
DIASTOLIC BLOOD PRESSURE: 66 MMHG | HEART RATE: 67 BPM | HEIGHT: 61 IN | OXYGEN SATURATION: 95 % | SYSTOLIC BLOOD PRESSURE: 149 MMHG | BODY MASS INDEX: 29.45 KG/M2 | WEIGHT: 156 LBS

## 2023-05-17 DIAGNOSIS — I35.0 NONRHEUMATIC AORTIC VALVE STENOSIS: ICD-10-CM

## 2023-05-17 DIAGNOSIS — I51.89 DIASTOLIC DYSFUNCTION: ICD-10-CM

## 2023-05-17 DIAGNOSIS — R06.02 SOB (SHORTNESS OF BREATH): ICD-10-CM

## 2023-05-17 DIAGNOSIS — I50.32 CHRONIC HEART FAILURE WITH PRESERVED EJECTION FRACTION: Primary | ICD-10-CM

## 2023-05-17 DIAGNOSIS — E78.5 HYPERLIPIDEMIA, UNSPECIFIED HYPERLIPIDEMIA TYPE: ICD-10-CM

## 2023-05-17 DIAGNOSIS — I10 PRIMARY HYPERTENSION: ICD-10-CM

## 2023-05-17 LAB
ALBUMIN SERPL BCP-MCNC: 3.7 G/DL (ref 3.5–5.2)
ALP SERPL-CCNC: 189 U/L (ref 55–135)
ALT SERPL W/O P-5'-P-CCNC: 23 U/L (ref 10–44)
ANION GAP SERPL CALC-SCNC: 6 MMOL/L (ref 8–16)
AST SERPL-CCNC: 27 U/L (ref 10–40)
BILIRUB SERPL-MCNC: 0.5 MG/DL (ref 0.1–1)
BNP SERPL-MCNC: 124 PG/ML (ref 0–99)
BUN SERPL-MCNC: 12 MG/DL (ref 8–23)
CALCIUM SERPL-MCNC: 9.3 MG/DL (ref 8.7–10.5)
CHLORIDE SERPL-SCNC: 105 MMOL/L (ref 95–110)
CO2 SERPL-SCNC: 29 MMOL/L (ref 23–29)
CREAT SERPL-MCNC: 0.7 MG/DL (ref 0.5–1.4)
EST. GFR  (NO RACE VARIABLE): >60 ML/MIN/1.73 M^2
GLUCOSE SERPL-MCNC: 102 MG/DL (ref 70–110)
MAGNESIUM SERPL-MCNC: 1.9 MG/DL (ref 1.6–2.6)
PHOSPHATE SERPL-MCNC: 3.7 MG/DL (ref 2.7–4.5)
POTASSIUM SERPL-SCNC: 4.4 MMOL/L (ref 3.5–5.1)
PROT SERPL-MCNC: 7.5 G/DL (ref 6–8.4)
SODIUM SERPL-SCNC: 140 MMOL/L (ref 136–145)

## 2023-05-17 PROCEDURE — 80053 COMPREHEN METABOLIC PANEL: CPT

## 2023-05-17 PROCEDURE — 99215 OFFICE O/P EST HI 40 MIN: CPT | Mod: PBBFAC

## 2023-05-17 PROCEDURE — 83880 ASSAY OF NATRIURETIC PEPTIDE: CPT

## 2023-05-17 PROCEDURE — 83735 ASSAY OF MAGNESIUM: CPT

## 2023-05-17 PROCEDURE — 99214 OFFICE O/P EST MOD 30 MIN: CPT | Mod: S$PBB,,,

## 2023-05-17 PROCEDURE — 99999 PR PBB SHADOW E&M-EST. PATIENT-LVL V: CPT | Mod: PBBFAC,,,

## 2023-05-17 PROCEDURE — 99214 PR OFFICE/OUTPT VISIT, EST, LEVL IV, 30-39 MIN: ICD-10-PCS | Mod: S$PBB,,,

## 2023-05-17 PROCEDURE — 99999 PR PBB SHADOW E&M-EST. PATIENT-LVL V: ICD-10-PCS | Mod: PBBFAC,,,

## 2023-05-17 PROCEDURE — 84100 ASSAY OF PHOSPHORUS: CPT

## 2023-05-17 RX ORDER — FUROSEMIDE 20 MG/1
20 TABLET ORAL DAILY
Qty: 30 TABLET | Refills: 11 | Status: SHIPPED | OUTPATIENT
Start: 2023-05-17 | End: 2024-03-05

## 2023-05-17 NOTE — TELEPHONE ENCOUNTER
Rn provided pt with update r/t labs and medication changes. Per PA-C recommendations pt will start Jardiance and decrease Lasix to 20 mg once daily. RN reviewed red flag sxs of HF exacerbation with pt.  Pt will have repeat labs drawn in 2 weeks with RTC PRN. Pt scheduled for repeat labs on 5/31/2023 at 10 AM. Pt verbalized understanding and agreed to plan.

## 2023-05-19 ENCOUNTER — EDUCATION (OUTPATIENT)
Dept: TRANSPLANT | Facility: CLINIC | Age: 83
End: 2023-05-19
Payer: MEDICARE

## 2023-05-19 NOTE — PROGRESS NOTES
Met with pt to discuss 2g Na diet and 1500 ml fluid restriction      Pt states good shine, no n/v/d/c. No prob chew/swallow.     Pt has 2 meals per day that are egg salad or tuna salad sandwich, dinner is a cooked meal such as lasagna, red beans, chicken and veggies. Pt drinks 14oz of fluid per day    Pt was educated on how to read nutrition labels to understand food has natural sodium present. Recc'd of 600mg Na per meal. Pt recc drink no more than 1500ml / 50oz. Pt given nutrition handout and contact info if needs arise. Will follow up in H[facility] as needed

## 2023-05-24 ENCOUNTER — TELEPHONE (OUTPATIENT)
Dept: CARDIOLOGY | Facility: CLINIC | Age: 83
End: 2023-05-24
Payer: MEDICARE

## 2023-05-24 NOTE — TELEPHONE ENCOUNTER
"[facility]) weekly phone follow up / triage call completed.     TCC RN Navigator spoke with pt, Ms Escobar    Current Patient reported weight: 154.9 lbs    Patient Goal Weight: (152-154 lbs)  Recent Patient reported blood pressure and heart rate: BP: 138/60 P: 71    Pt reports the following:  []  Shortness of Breath with Activity  []  Shortness of Breath at rest   []  Fatigue  []  Edema   [] Chest pain or tightness  [] Weight Increase since discharge  [x] None of the above    Pt reports using "Daily weight and symptom tracker".    Pt reports being in the GREEN Zone. If in yellow/red, reminded that they should be calling HFT[facility] today or now.     Medications:   Medication compliance reviewed with pt.  Pt reports having medication list available and has all medications at home for use per list.     Pt denies needs for Rx medications.    Education:   Confirmed pt still has "[facility] Home Care Guide"  .     Reminded of key points as listed below.     Recommend 2 -3 gram sodium restriction and 1500 cc-2000 cc fluid restriction.  Encourage physical activity with graded exercise program.  Requested patient to weigh themselves daily, and to notify us if their weight increases by more than 3 lbs in 1 day or 5 lbs in 3 days.   Reminded to use "Daily weight and symptom tracker".  Even if pt does not have a scale, to use symptom tracker.       Watch for these Signs and Symptoms: If any of these occur, contact [facility]FT[facility] immediately:   Increase in shortness of breath with movement   Increase in swelling in your legs and ankles   Weight gain of more than 3 pounds in a day or 5 pounds in 3 days.   Difficulty breathing when you are lying down   Worsening fatigue or tiredness   Stomach bloating, a full feeling or a loss of appetite   Increased coughing--especially when you are lying down      Pt was able to verbalize back to RN in their own words correct diet/fluid " restrictions, necessity for exercise, warning signs and symptoms, when and how to contact their HFT[facility] team.      Pt reminded of upcoming appointment.  PT reports they will attend. Pt has f/u labs s/p starting Jardiance on May 31, 2023. Pt aware of f/u labs and purpose.       Pt reminded of how and when to contact [facility]:  [phone] (Mon-Fri, 8a-5p) & for urgent issues on the weekend to page the Heart Transplant MD on call.  Pt also encouraged utilize [facility] messaging as well.      Pt verbalized understanding and in agreement of plan.       Will follow up with pt at next clinic visit and RN navigator available for pt questions, issues or concerns.

## 2023-05-29 ENCOUNTER — PES CALL (OUTPATIENT)
Dept: ADMINISTRATIVE | Facility: CLINIC | Age: 83
End: 2023-05-29
Payer: MEDICARE

## 2023-05-31 ENCOUNTER — LAB VISIT (OUTPATIENT)
Dept: LAB | Facility: HOSPITAL | Age: 83
End: 2023-05-31
Payer: MEDICARE

## 2023-05-31 ENCOUNTER — EXTERNAL CHRONIC CARE MANAGEMENT (OUTPATIENT)
Dept: PRIMARY CARE CLINIC | Facility: CLINIC | Age: 83
End: 2023-05-31
Payer: MEDICARE

## 2023-05-31 ENCOUNTER — PES CALL (OUTPATIENT)
Dept: ADMINISTRATIVE | Facility: CLINIC | Age: 83
End: 2023-05-31
Payer: MEDICARE

## 2023-05-31 ENCOUNTER — TELEPHONE (OUTPATIENT)
Dept: CARDIOLOGY | Facility: CLINIC | Age: 83
End: 2023-05-31
Payer: MEDICARE

## 2023-05-31 DIAGNOSIS — R06.02 SOB (SHORTNESS OF BREATH): ICD-10-CM

## 2023-05-31 LAB
ALBUMIN SERPL BCP-MCNC: 3.9 G/DL (ref 3.5–5.2)
ALP SERPL-CCNC: 173 U/L (ref 55–135)
ALT SERPL W/O P-5'-P-CCNC: 26 U/L (ref 10–44)
ANION GAP SERPL CALC-SCNC: 10 MMOL/L (ref 8–16)
AST SERPL-CCNC: 28 U/L (ref 10–40)
BILIRUB SERPL-MCNC: 0.6 MG/DL (ref 0.1–1)
BNP SERPL-MCNC: 45 PG/ML (ref 0–99)
BUN SERPL-MCNC: 20 MG/DL (ref 8–23)
CALCIUM SERPL-MCNC: 9.5 MG/DL (ref 8.7–10.5)
CHLORIDE SERPL-SCNC: 106 MMOL/L (ref 95–110)
CO2 SERPL-SCNC: 26 MMOL/L (ref 23–29)
CREAT SERPL-MCNC: 0.8 MG/DL (ref 0.5–1.4)
EST. GFR  (NO RACE VARIABLE): >60 ML/MIN/1.73 M^2
GLUCOSE SERPL-MCNC: 108 MG/DL (ref 70–110)
MAGNESIUM SERPL-MCNC: 2.3 MG/DL (ref 1.6–2.6)
PHOSPHATE SERPL-MCNC: 3.3 MG/DL (ref 2.7–4.5)
POTASSIUM SERPL-SCNC: 4 MMOL/L (ref 3.5–5.1)
PROT SERPL-MCNC: 7.9 G/DL (ref 6–8.4)
SODIUM SERPL-SCNC: 142 MMOL/L (ref 136–145)

## 2023-05-31 PROCEDURE — 83880 ASSAY OF NATRIURETIC PEPTIDE: CPT

## 2023-05-31 PROCEDURE — 83735 ASSAY OF MAGNESIUM: CPT

## 2023-05-31 PROCEDURE — 84100 ASSAY OF PHOSPHORUS: CPT

## 2023-05-31 PROCEDURE — 99490 PR CHRONIC CARE MGMT, 1ST 20 MIN: ICD-10-PCS | Mod: S$PBB,,, | Performed by: INTERNAL MEDICINE

## 2023-05-31 PROCEDURE — 99490 CHRNC CARE MGMT STAFF 1ST 20: CPT | Mod: PBBFAC | Performed by: INTERNAL MEDICINE

## 2023-05-31 PROCEDURE — 80053 COMPREHEN METABOLIC PANEL: CPT

## 2023-05-31 PROCEDURE — 99490 CHRNC CARE MGMT STAFF 1ST 20: CPT | Mod: S$PBB,,, | Performed by: INTERNAL MEDICINE

## 2023-05-31 NOTE — TELEPHONE ENCOUNTER
"[facility]) DISCHARGE VISIT - PHONE     Called and spoke to Pt    Most Recent Hospital Discharge Date: 4-27-23  Last admission Diagnosis/chief complaint: SOB    Pt discharge completed by phone related to Pt Convenience      Pt reports the following:  []  Shortness of Breath with activity  []  Shortness of Breath at rest   []  Fatigue  []  Edema   [] Chest pain or tightness  [] Weight Increase since discharge  [x] None of the above    Medications:   Medication reconciliation completed today per RN.  Pt reports having all medications available and understands how to take them appropriately. Reminded pt to call prior to making any changes to medications. Pt has no refill needs    Education:   [x] Confirmed pt still has  "[facility] Home Care Guide" .   Reviewed key points as listed below.     Recommend 2 gram sodium restriction and 1500cc fluid restriction.  Encourage physical activity with graded exercise program.  Requested patient to weigh themselves daily, and to notify us if their weight increases by more than 3 lbs in 1 day or 5 lbs in 3 days.     [x] Reviewed completed "Daily Weight and Symptom Tracker".  Reviewed with patient when and how to call HFTCC according to "Yellow Zone" and "Red Zone".       Watch for these Signs and Symptoms: If any of these occur, contact HFT[facility] immediately:   Increase in shortness of breath with movement   Increase in swelling in your legs and ankles   Weight gain of more than 3 pounds in a night or 5 pounds in 3 days.   Difficulty breathing when you are lying down   Worsening fatigue or tiredness   Stomach bloating, a full feeling or a loss of appetite   Increased coughing--especially when you are lying down    MyChart and Care Companion:   Patient active on myChart? Yes, patient uses regularly.    HF TCC Program Plan:  Pt has successfully completed HFTCC program.  Pt care to be transferred to Cardiology for long term " care.     Pt educated on how to call their offices and how to call [facility] On call in the event of an after hour issue.    PT reminded to continue to follow recommendations made during the HFT[facility] program to include monitoring daily weights, taking medications according to list, following up to appointments per provider recommendations, stop smoking/ start exercising and following a heart friendly low salt, low fluid diet.      Pt was able to verbalize back to RN in their own words correct diet/fluid restrictions, necessity for exercise, warning signs and symptoms, when and how to contact their  Long term care team .      Plan:     Pt to be set up with a General Cardiologist if  she doesn't have one    [x]  Discussed upcoming appointments and/or plan for follow-up care with his/her PCP/Cardiology Instructed to report S/S fluid overload to them after Completion of our Clinic    Electronic hand off completed : To Cardiology by routing epic note per Carol Camacho PA-C.     Please refer to provider note for additional details and assessment.    Certificate of Completion to be sent.

## 2023-06-14 ENCOUNTER — PATIENT MESSAGE (OUTPATIENT)
Dept: ADMINISTRATIVE | Facility: OTHER | Age: 83
End: 2023-06-14
Payer: MEDICARE

## 2023-06-28 ENCOUNTER — TELEPHONE (OUTPATIENT)
Dept: DERMATOLOGY | Facility: CLINIC | Age: 83
End: 2023-06-28
Payer: MEDICARE

## 2023-06-28 NOTE — TELEPHONE ENCOUNTER
Spoke with pt; informed next available is in November - this schedule opens on July 1st. Pt voiced understanding and will call back on July 1st. Pt thanked me for calling    ----- Message -----  From: Nathaniel Garcia  Sent: 6/27/2023   2:10 PM CDT  To: [facility] Staff  Subject: appt                                             Pt is calling to schedule an appt with the provider. Pt has established care with estuardo Gibson and would like to switch providers. Pt would like to be seen at providers earliest. Please call

## 2023-06-30 ENCOUNTER — EXTERNAL CHRONIC CARE MANAGEMENT (OUTPATIENT)
Dept: PRIMARY CARE CLINIC | Facility: CLINIC | Age: 83
End: 2023-06-30
Payer: MEDICARE

## 2023-06-30 PROCEDURE — 99439 CHRNC CARE MGMT STAF EA ADDL: CPT | Mod: PBBFAC | Performed by: INTERNAL MEDICINE

## 2023-06-30 PROCEDURE — 99490 CHRNC CARE MGMT STAFF 1ST 20: CPT | Mod: S$PBB,,, | Performed by: INTERNAL MEDICINE

## 2023-06-30 PROCEDURE — 99439 CHRNC CARE MGMT STAF EA ADDL: CPT | Mod: S$PBB,,, | Performed by: INTERNAL MEDICINE

## 2023-06-30 PROCEDURE — 99490 PR CHRONIC CARE MGMT, 1ST 20 MIN: ICD-10-PCS | Mod: S$PBB,,, | Performed by: INTERNAL MEDICINE

## 2023-06-30 PROCEDURE — 99439 PR CHRONIC CARE MGMT, EA ADDTL 20 MIN: ICD-10-PCS | Mod: S$PBB,,, | Performed by: INTERNAL MEDICINE

## 2023-06-30 PROCEDURE — 99490 CHRNC CARE MGMT STAFF 1ST 20: CPT | Mod: PBBFAC,25 | Performed by: INTERNAL MEDICINE

## 2023-07-14 ENCOUNTER — OFFICE VISIT (OUTPATIENT)
Dept: INTERNAL MEDICINE | Facility: CLINIC | Age: 83
End: 2023-07-14
Payer: MEDICARE

## 2023-07-14 VITALS
DIASTOLIC BLOOD PRESSURE: 62 MMHG | HEART RATE: 70 BPM | TEMPERATURE: 98 F | BODY MASS INDEX: 28.39 KG/M2 | WEIGHT: 150.38 LBS | OXYGEN SATURATION: 96 % | HEIGHT: 61 IN | RESPIRATION RATE: 18 BRPM | SYSTOLIC BLOOD PRESSURE: 136 MMHG

## 2023-07-14 DIAGNOSIS — M54.31 SCIATICA OF RIGHT SIDE: ICD-10-CM

## 2023-07-14 DIAGNOSIS — H25.13 NUCLEAR SCLEROSIS OF BOTH EYES: ICD-10-CM

## 2023-07-14 DIAGNOSIS — N39.45 CONTINUOUS LEAKAGE OF URINE: ICD-10-CM

## 2023-07-14 DIAGNOSIS — K11.23 CHRONIC SIALOADENITIS: ICD-10-CM

## 2023-07-14 DIAGNOSIS — I51.89 DIASTOLIC DYSFUNCTION: ICD-10-CM

## 2023-07-14 DIAGNOSIS — Z00.00 ENCOUNTER FOR PREVENTIVE HEALTH EXAMINATION: Primary | ICD-10-CM

## 2023-07-14 DIAGNOSIS — F33.41 RECURRENT MAJOR DEPRESSIVE DISORDER, IN PARTIAL REMISSION: ICD-10-CM

## 2023-07-14 DIAGNOSIS — Z74.09 IMPAIRED FUNCTIONAL MOBILITY, BALANCE, GAIT, AND ENDURANCE: ICD-10-CM

## 2023-07-14 DIAGNOSIS — I70.0 AORTIC ATHEROSCLEROSIS: ICD-10-CM

## 2023-07-14 DIAGNOSIS — E78.5 HYPERLIPIDEMIA, UNSPECIFIED HYPERLIPIDEMIA TYPE: ICD-10-CM

## 2023-07-14 DIAGNOSIS — Z23 NEED FOR VACCINATION: ICD-10-CM

## 2023-07-14 DIAGNOSIS — I35.0 NONRHEUMATIC AORTIC VALVE STENOSIS: ICD-10-CM

## 2023-07-14 DIAGNOSIS — M85.89 OSTEOPENIA OF MULTIPLE SITES: ICD-10-CM

## 2023-07-14 DIAGNOSIS — I10 PRIMARY HYPERTENSION: ICD-10-CM

## 2023-07-14 DIAGNOSIS — F41.1 GENERALIZED ANXIETY DISORDER: ICD-10-CM

## 2023-07-14 DIAGNOSIS — H53.032 STRABISMIC AMBLYOPIA OF LEFT EYE: ICD-10-CM

## 2023-07-14 DIAGNOSIS — M43.06 LUMBAR SPONDYLOLYSIS: ICD-10-CM

## 2023-07-14 PROCEDURE — 90677 PCV20 VACCINE IM: CPT | Mod: PBBFAC,PO

## 2023-07-14 PROCEDURE — 99999 PR PBB SHADOW E&M-EST. PATIENT-LVL V: ICD-10-PCS | Mod: PBBFAC,,, | Performed by: NURSE PRACTITIONER

## 2023-07-14 PROCEDURE — 99215 OFFICE O/P EST HI 40 MIN: CPT | Mod: PBBFAC,PO,25 | Performed by: NURSE PRACTITIONER

## 2023-07-14 PROCEDURE — G0439 PPPS, SUBSEQ VISIT: HCPCS | Mod: ,,, | Performed by: NURSE PRACTITIONER

## 2023-07-14 PROCEDURE — G0439 PR MEDICARE ANNUAL WELLNESS SUBSEQUENT VISIT: ICD-10-PCS | Mod: ,,, | Performed by: NURSE PRACTITIONER

## 2023-07-14 PROCEDURE — 99999 PR PBB SHADOW E&M-EST. PATIENT-LVL V: CPT | Mod: PBBFAC,,, | Performed by: NURSE PRACTITIONER

## 2023-07-14 RX ORDER — FLUOXETINE HYDROCHLORIDE 40 MG/1
40 CAPSULE ORAL DAILY
Qty: 30 CAPSULE | Refills: 11 | Status: SHIPPED | OUTPATIENT
Start: 2023-07-14

## 2023-07-14 NOTE — PROGRESS NOTES
"  Luz Amaro presented for a follow-up Medicare AWV today. The following components were reviewed and updated:    Medical history  Family History  Social history  Allergies and Current Medications  Health Risk Assessment  Health Maintenance  Care Team    **See Completed Assessments for Annual Wellness visit with in the encounter summary    The following assessments were completed:  Depression Screening  Cognitive function Screening  Timed Get Up Test  Whisper Test    Health Maintenance    MM2018  DXA: 2021  Colonoscopy: 2020  Flu Vaccine: 2022  Pneumonia 20 Vaccine: 2023   Pneumonia 13 Vaccine: 10/14/2015  Tetanus/Tdap: 2016  Hepatitis C Screen: 2023  HIV Screen: 2023    Vitals:    23 0753   BP: 136/62   BP Location: Right arm   Patient Position: Sitting   BP Method: Large (Manual)   Pulse: 70   Resp: 18   Temp: 98.1 °F (36.7 °C)   TempSrc: Temporal   SpO2: 96%   Weight: 68.2 kg (150 lb 5.7 oz)   Height: 5' 1" (1.549 m)     BP Readings from Last 3 Encounters:   23 136/62   23 (!) 149/66   23 125/61     Body mass index is 28.41 kg/m².  Wt Readings from Last 3 Encounters:   23 0753 68.2 kg (150 lb 5.7 oz)   23 1006 70.8 kg (156 lb)   23 1050 69.9 kg (154 lb 1.6 oz)           Review for Opioid Screening: Patient does not have a prescription for Opioids.   Review of Substance Use Disorders: Patient does not use substances.         Diagnoses and health risks identified today and associated recommendations/orders:  1. Encounter for preventive health examination    2. Continuous leakage of urine    Chronic, stable. Follow up with PCP.     - Ambulatory referral/consult to Physical/Occupational Therapy; Future    3. Recurrent major depressive disorder, in partial remission  4. Generalized anxiety disorder    Chronic, stable - but not at goal for patient, increase Prozac, follow up with PCP     - FLUoxetine 40 MG capsule; Take 1 " capsule (40 mg total) by mouth once daily.  Dispense: 30 capsule; Refill: 11    5. Nuclear sclerosis of both eyes  6. Strabismic amblyopia of left eye    Chronic, stable, follow up with Ophthalmology      7. Chronic sialoadenitis    Chronic, stable, follow up with PCP     8. Primary hypertension    Chronic, stable, continue current medications, follow up with PCP     9. Nonrheumatic aortic valve stenosis    Chronic, stable, follow up with PCP     10. Diastolic dysfunction    Chronic, stable - daily weight typically 148 lbs, continue current medications, follow up with PCP     11. Aortic atherosclerosis    Chronic, stable, follow up with PCP     12. Hyperlipidemia, unspecified hyperlipidemia type    Chronic, stable, continue current medications, follow up with PCP     13. Osteopenia of multiple sites    Chronic, stable, follow up with PCP     14. Sciatica of right side  15. Lumbar spondylolysis    Chronic, stable, continue current medication, follow up with PCP     16. Impaired functional mobility, balance, gait, and endurance    Chronic, stable, has cane but does not use it, follow up with PCP     17. Need for vaccination    - (In Office Administered) Pneumococcal Conjugate Vaccine (20 Valent) (IM)      Provided Luz with a 5-10 year written screening schedule and personal prevention plan. Recommendations were developed using the USPSTF age appropriate recommendations. Education, counseling, and referrals were provided as needed.  After Visit Summary printed and given to patient which includes a list of additional screenings\tests needed.    Follow up in about 1 year (around 7/14/2024) for Medicare Annual Wellness Visit.      Diamond Rosas NP    I offered to discuss advanced care planning, including how to pick a person who would make decisions for you if you were unable to make them for yourself, called a health care power of , and what kind of decisions you might make such as use of life sustaining  treatments such as ventilators and tube feeding when faced with a life limiting illness recorded on a living will that they will need to know. (How you want to be cared for as you near the end of your natural life)     X  Patient has advanced directives written and agrees to provide copies to the institution.

## 2023-07-14 NOTE — PATIENT INSTRUCTIONS
Counseling and Referral of Other Preventative  (Italic type indicates deductible and co-insurance are waived)    Patient Name: Luz Amaro  Today's Date: 7/14/2023    Health Maintenance         Date Due Completion Date    Shingles Vaccine (1 of 2) Never done ---    Pneumococcal Vaccines (Age 65+) (2 - PPSV23 if available, else PCV20) 12/09/2015 10/14/2015    DEXA Scan 08/17/2023 8/17/2021    Influenza Vaccine (1) 09/01/2023 9/19/2022    Lipid Panel 08/23/2026 8/23/2021    TETANUS VACCINE 09/27/2026 9/27/2016          Orders Placed This Encounter   Procedures    (In Office Administered) Pneumococcal Conjugate Vaccine (20 Valent) (IM)    Ambulatory referral/consult to Physical/Occupational Therapy     The following information is provided to all patients.  This information is to help you find resources for any of the problems found today that may be affecting your health:                Living healthy guide: www.[facility].[place].[facility]      Understanding Diabetes: www.diabetes.org      Eating healthy: www.cdc.gov/healthyweight      CDC home safety checklist: www.cdc.gov/steadi/patient.html      Agency on Aging: www.goea.[place].[facility]      Alcoholics anonymous (AA): www.aa.org      Physical Activity: www.tayler.nih.gov/ky9jbih      Tobacco use: www.quitwithusla.org     
[de-identified] : 75-year-old female comes in the office today for evaluation because of both hands patient has a history of rheumatoid arthritis and diabetes she has had chronic basal joint pain and discomfort.  Gotten cortisone injections into the basal joint which has not relieved her symptoms but now she is complaining about locking pain discomfort in the left ring finger less so in the left middle finger but also in the right middle finger the reason she is here today is more for the fingers than the thumb.

## 2023-07-25 NOTE — PROGRESS NOTES
HISTORY:    82-year-old female with a history of HFpEF, moderate aortic stenosis, hypertension, hyperlipidemia, and back pain presenting for follow-up evaluation.      Patient presented to ED with acute onset SOB in April '23. Elevated BNP with pulm edema on CXR. Diuresed and dc'd. Followed in the HF clinic and optimized. Never had symptoms other than the day of presentation.     Today, she reports feeling well. No CP, SOB, or CAGLE. No light headedness, edema, or orthopnea.    She is trying to remain active. Limited by back pain. Doing water aerobics 2-3x/week.     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     Tolerates asa 81x1, carvedilol 12.5x2, furosemide 20x1,  empagliflozin 10x1, nifedipine 30x1, and atorvastatin 20x1. Bps 130/60s at home. Did not tolerate higher doses of atorvastatin.     PHYSICAL EXAM:    Vitals:    07/26/23 0838   BP: (!) 144/76   Pulse: 65   Resp: 20         NAD, A+Ox3.  No jvd, no bruit.  RRR nml s1,s2. 3/6 AS murmur.  CTA B no wheezes or crackles.  No edema.    LABS/STUDIES (imaging reviewed during clinic visit):    May 2023 hb 11.9. Cr 0.8 and BUN 20.  Alb 3.9. 2021 LDL 72 and HDL 69.  April  May BNP 45.  EKG April 2023 revealing for normal sinus rhythm no Q-waves or ST changes.    TTE May 2023 reveals normal LV size and ejection fraction.  Moderate aortic stenosis noted with a peak velocity of 3.68.  CVP 8.    Carotid March 2022 shows bilateral atherosclerosis.  Right ICA reported as 50-69%, but peak systolic velocity and end-diastolic velocity unimpressive and likely represents less than 50% disease.  Left ICA less than 50%.  Renal duplex 2020 no evidence of renal artery stenosis.    ASSESSMENT & PLAN:    1. Chronic heart failure with preserved ejection fraction    2. Aortic atherosclerosis    3. Hyperlipidemia, unspecified hyperlipidemia type    4. Primary hypertension    5. Nonrheumatic aortic valve stenosis          Orders Placed  This Encounter    Basic Metabolic Panel    carvediloL (COREG) 12.5 MG tablet    spironolactone (ALDACTONE) 25 MG tablet        New diagnosis of HFpEF. Transient episode with unclear trigger. Asymptomatic since with nml BNP and euvolemic exam. On furosemide 20x1. Empagliflozin 10x1 is too expensive for her. Okay to hold. Start spironolactone 25x1. Repeat BMP in 2 weeks.     Moderate AS, stable on May '23 TTE. Monitor.    Blood pressures reasonable on carvedilol 12.5x2 and nifedipine 30x1. Addition of spironolactone will better optimize.      Tolerating atorvastatin at 20x1. Did not tolerate higher doses.     Patient is DNR/DNI. This is part of advanced directive planning with no impending medical issue. Can rescind for elective procedures.     Follow up in about 4 months (around 11/26/2023).      Julieta Hairston MD

## 2023-07-26 ENCOUNTER — OFFICE VISIT (OUTPATIENT)
Dept: CARDIOLOGY | Facility: CLINIC | Age: 83
End: 2023-07-26
Payer: MEDICARE

## 2023-07-26 VITALS
DIASTOLIC BLOOD PRESSURE: 76 MMHG | HEART RATE: 65 BPM | BODY MASS INDEX: 28.13 KG/M2 | SYSTOLIC BLOOD PRESSURE: 144 MMHG | RESPIRATION RATE: 20 BRPM | WEIGHT: 149 LBS | HEIGHT: 61 IN

## 2023-07-26 DIAGNOSIS — I10 PRIMARY HYPERTENSION: ICD-10-CM

## 2023-07-26 DIAGNOSIS — E78.5 HYPERLIPIDEMIA, UNSPECIFIED HYPERLIPIDEMIA TYPE: ICD-10-CM

## 2023-07-26 DIAGNOSIS — I70.0 AORTIC ATHEROSCLEROSIS: ICD-10-CM

## 2023-07-26 DIAGNOSIS — I50.32 CHRONIC HEART FAILURE WITH PRESERVED EJECTION FRACTION: Primary | ICD-10-CM

## 2023-07-26 DIAGNOSIS — I35.0 NONRHEUMATIC AORTIC VALVE STENOSIS: ICD-10-CM

## 2023-07-26 PROCEDURE — 99215 PR OFFICE/OUTPT VISIT, EST, LEVL V, 40-54 MIN: ICD-10-PCS | Mod: S$PBB,,, | Performed by: INTERNAL MEDICINE

## 2023-07-26 PROCEDURE — 99999 PR PBB SHADOW E&M-EST. PATIENT-LVL IV: CPT | Mod: PBBFAC,,, | Performed by: INTERNAL MEDICINE

## 2023-07-26 PROCEDURE — 99215 OFFICE O/P EST HI 40 MIN: CPT | Mod: S$PBB,,, | Performed by: INTERNAL MEDICINE

## 2023-07-26 PROCEDURE — 99999 PR PBB SHADOW E&M-EST. PATIENT-LVL IV: ICD-10-PCS | Mod: PBBFAC,,, | Performed by: INTERNAL MEDICINE

## 2023-07-26 PROCEDURE — 99214 OFFICE O/P EST MOD 30 MIN: CPT | Mod: PBBFAC | Performed by: INTERNAL MEDICINE

## 2023-07-26 RX ORDER — CARVEDILOL 12.5 MG/1
12.5 TABLET ORAL 2 TIMES DAILY WITH MEALS
Qty: 60 TABLET | Refills: 11 | Status: SHIPPED | OUTPATIENT
Start: 2023-07-26 | End: 2024-03-05 | Stop reason: SDUPTHER

## 2023-07-26 RX ORDER — SPIRONOLACTONE 25 MG/1
25 TABLET ORAL DAILY
Qty: 90 TABLET | Refills: 3 | Status: SHIPPED | OUTPATIENT
Start: 2023-07-26 | End: 2023-11-06

## 2023-07-28 ENCOUNTER — PATIENT MESSAGE (OUTPATIENT)
Dept: CARDIOLOGY | Facility: CLINIC | Age: 83
End: 2023-07-28
Payer: MEDICARE

## 2023-07-31 ENCOUNTER — EXTERNAL CHRONIC CARE MANAGEMENT (OUTPATIENT)
Dept: PRIMARY CARE CLINIC | Facility: CLINIC | Age: 83
End: 2023-07-31
Payer: MEDICARE

## 2023-07-31 PROCEDURE — 99490 PR CHRONIC CARE MGMT, 1ST 20 MIN: ICD-10-PCS | Mod: S$PBB,,, | Performed by: INTERNAL MEDICINE

## 2023-07-31 PROCEDURE — 99490 CHRNC CARE MGMT STAFF 1ST 20: CPT | Mod: S$PBB,,, | Performed by: INTERNAL MEDICINE

## 2023-07-31 PROCEDURE — 99490 CHRNC CARE MGMT STAFF 1ST 20: CPT | Mod: PBBFAC | Performed by: INTERNAL MEDICINE

## 2023-08-01 DIAGNOSIS — M25.561 RIGHT KNEE PAIN: Primary | ICD-10-CM

## 2023-08-01 NOTE — PROGRESS NOTES
"Spoke with pt regarding appt for knee/calf pain. Pt states "it feels different" from her typical sciatica. States she feels it comes from the knee but does radiate to the calf and make it hard to stand. Advised that we will work up and evaluate the knee and determine any need for referral if Dr. Poole does feel it's coming from her lumbar spine. Conf xr time, date, location. Pt states understanding and appreciation.     Cori Wei, MS, OTC  Clinical Assistant to Dr. Zhanna Poole      "

## 2023-08-03 ENCOUNTER — HOSPITAL ENCOUNTER (OUTPATIENT)
Dept: CARDIOLOGY | Facility: HOSPITAL | Age: 83
Discharge: HOME | End: 2023-08-03
Attending: NEUROMUSCULOSKELETAL MEDICINE & OMM
Payer: MEDICARE

## 2023-08-03 ENCOUNTER — OFFICE VISIT (OUTPATIENT)
Dept: SPORTS MEDICINE | Facility: CLINIC | Age: 83
End: 2023-08-03
Payer: MEDICARE

## 2023-08-03 ENCOUNTER — HOSPITAL ENCOUNTER (OUTPATIENT)
Dept: RADIOLOGY | Facility: HOSPITAL | Age: 83
Discharge: HOME | End: 2023-08-03
Attending: NEUROMUSCULOSKELETAL MEDICINE & OMM
Payer: MEDICARE

## 2023-08-03 VITALS
BODY MASS INDEX: 28.13 KG/M2 | HEART RATE: 56 BPM | SYSTOLIC BLOOD PRESSURE: 115 MMHG | HEIGHT: 61 IN | WEIGHT: 149 LBS | DIASTOLIC BLOOD PRESSURE: 56 MMHG

## 2023-08-03 DIAGNOSIS — M99.03 SOMATIC DYSFUNCTION OF LUMBAR REGION: ICD-10-CM

## 2023-08-03 DIAGNOSIS — M25.561 RIGHT KNEE PAIN: ICD-10-CM

## 2023-08-03 DIAGNOSIS — M99.04 SACRAL REGION SOMATIC DYSFUNCTION: ICD-10-CM

## 2023-08-03 DIAGNOSIS — M76.51 PATELLAR TENDONITIS OF RIGHT KNEE: ICD-10-CM

## 2023-08-03 DIAGNOSIS — M25.461 EFFUSION OF RIGHT KNEE: ICD-10-CM

## 2023-08-03 DIAGNOSIS — M79.10 MYALGIA: ICD-10-CM

## 2023-08-03 DIAGNOSIS — M99.05 SOMATIC DYSFUNCTION OF PELVIC REGION: ICD-10-CM

## 2023-08-03 DIAGNOSIS — M79.661 RIGHT CALF PAIN: ICD-10-CM

## 2023-08-03 DIAGNOSIS — M17.11 PRIMARY OSTEOARTHRITIS OF RIGHT KNEE: Primary | ICD-10-CM

## 2023-08-03 DIAGNOSIS — I70.203 ATHEROSCLEROSIS OF ARTERY OF BOTH LOWER EXTREMITIES: ICD-10-CM

## 2023-08-03 PROCEDURE — 98926 OSTEOPATH MANJ 3-4 REGIONS: CPT | Mod: PBBFAC,PO | Performed by: NEUROMUSCULOSKELETAL MEDICINE & OMM

## 2023-08-03 PROCEDURE — 93971 EXTREMITY STUDY: CPT | Mod: RT

## 2023-08-03 PROCEDURE — 73564 XR KNEE ORTHO BILAT WITH FLEXION: ICD-10-PCS | Mod: 26,50,, | Performed by: RADIOLOGY

## 2023-08-03 PROCEDURE — 73564 X-RAY EXAM KNEE 4 OR MORE: CPT | Mod: 26,50,, | Performed by: RADIOLOGY

## 2023-08-03 PROCEDURE — 99215 OFFICE O/P EST HI 40 MIN: CPT | Mod: 25,S$PBB,, | Performed by: NEUROMUSCULOSKELETAL MEDICINE & OMM

## 2023-08-03 PROCEDURE — 98926 OSTEOPATH MANJ 3-4 REGIONS: CPT | Mod: S$PBB,,, | Performed by: NEUROMUSCULOSKELETAL MEDICINE & OMM

## 2023-08-03 PROCEDURE — 99215 PR OFFICE/OUTPT VISIT, EST, LEVL V, 40-54 MIN: ICD-10-PCS | Mod: 25,S$PBB,, | Performed by: NEUROMUSCULOSKELETAL MEDICINE & OMM

## 2023-08-03 PROCEDURE — 99214 OFFICE O/P EST MOD 30 MIN: CPT | Mod: PBBFAC,PO,25 | Performed by: NEUROMUSCULOSKELETAL MEDICINE & OMM

## 2023-08-03 PROCEDURE — 98926 PR OSTEOPATHIC MANIP,3-4 BODY REGN: ICD-10-PCS | Mod: S$PBB,,, | Performed by: NEUROMUSCULOSKELETAL MEDICINE & OMM

## 2023-08-03 PROCEDURE — 93971 EXTREMITY STUDY: CPT | Mod: 26,RT,, | Performed by: INTERNAL MEDICINE

## 2023-08-03 PROCEDURE — 93971 CV US DOPPLER VENOUS LEG RIGHT (CUPID ONLY): ICD-10-PCS | Mod: 26,RT,, | Performed by: INTERNAL MEDICINE

## 2023-08-03 PROCEDURE — 73564 X-RAY EXAM KNEE 4 OR MORE: CPT | Mod: TC,50,PO

## 2023-08-03 PROCEDURE — 99999 PR PBB SHADOW E&M-EST. PATIENT-LVL IV: CPT | Mod: PBBFAC,,, | Performed by: NEUROMUSCULOSKELETAL MEDICINE & OMM

## 2023-08-03 PROCEDURE — 99999 PR PBB SHADOW E&M-EST. PATIENT-LVL IV: ICD-10-PCS | Mod: PBBFAC,,, | Performed by: NEUROMUSCULOSKELETAL MEDICINE & OMM

## 2023-08-03 RX ORDER — CELECOXIB 200 MG/1
200 CAPSULE ORAL DAILY
Qty: 30 CAPSULE | Refills: 0 | Status: SHIPPED | OUTPATIENT
Start: 2023-08-03 | End: 2023-08-03

## 2023-08-03 RX ORDER — CELECOXIB 200 MG/1
200 CAPSULE ORAL DAILY
Qty: 30 CAPSULE | Refills: 0 | Status: SHIPPED | OUTPATIENT
Start: 2023-08-03 | End: 2023-09-02

## 2023-08-03 NOTE — PROGRESS NOTES
"Subjective:     Luz Amaro     Chief Complaint   Patient presents with    Right Knee - Pain         HPI    Luz is a 82 y.o. female coming in today for right knee pain that began about 2 week(s) ago, referred by self. Pt. describes the pain as a 2/10 achy pain that does not radiate. Pt localizes the pain to the anterior knee/tibial tuberosity and reports occasional proximal calf . Occasional numbness in her right toes. There was not a fall/injury/ or trauma associated with the onset of symptoms. The pain is better with rest, ibuprofen and worse with activity, walking, night time. Pt reports it will feel like her leg will "give out on" her while walking. She is ambulating with a cane full time now due to this instability. Pt. Denies any other musculoskeletal complaints at this time. Reports chronic low back pain at baseline. Hx right sided sciatica, resolved with SABA 3/9/23.     Joint instability? Yes   Mechanical locking/clicking? no  Affecting ADL's? yes  Affecting sleep? Yes- "restless leg" at night    Occupation: retired    Review of Systems   Constitutional:  Negative for chills and fever.   Musculoskeletal:  Positive for back pain, joint pain and myalgias. Negative for falls and neck pain.   Neurological:  Negative for dizziness, tingling, focal weakness, weakness and headaches.     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    R mid cheek    Strabismus      PAST SURGICAL HISTORY:   Past Surgical History:   Procedure Laterality Date    APPENDECTOMY      CHOLECYSTECTOMY      COLONOSCOPY N/A 12/14/2020    Procedure: COLONOSCOPY;  Surgeon: Giovanny Chao MD;  Location: [facility]" (4TH FLR);  Service: Endoscopy;  Laterality: N/A;  COVID test on 20 at [facility]    ECTOPIC PREGNANCY SURGERY      OOPHORECTOMY      SKIN BIOPSY      TRANSFORAMINAL EPIDURAL INJECTION OF STEROID Right 3/9/2023    Procedure: INJECTION, STEROID, EPIDURAL, TRANSFORAMINAL APPROACH, RIGHT L4/L5  URGENT;  Surgeon: Palemr Tinsley MD;  Location: [facility];  Service: Pain Management;  Laterality: Right;     FAMILY HISTORY:   Family History   Problem Relation Age of Onset    Hypertension Mother     Stroke Mother     Cancer Father         prostate cancer    Asthma Sister     Amblyopia Neg Hx     Blindness Neg Hx     Cataracts Neg Hx     Diabetes Neg Hx     Glaucoma Neg Hx     Macular degeneration Neg Hx     Retinal detachment Neg Hx     Strabismus Neg Hx     Thyroid disease Neg Hx     Melanoma Neg Hx      SOCIAL HISTORY:   Social History     Socioeconomic History    Marital status:    Tobacco Use    Smoking status: Former     Current packs/day: 0.00     Types: Cigarettes     Quit date: 2/10/1983     Years since quittin.5    Smokeless tobacco: Never   Substance and Sexual Activity    Alcohol use: Not Currently    Drug use: No    Sexual activity: Not Currently     Partners: Male   Social History Narrative    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     Social Determinants of Health     Financial Resource Strain: Low Risk  (2023)    Overall Financial Resource Strain (CARDIA)     Difficulty of Paying Living Expenses: Not hard at all   Food Insecurity: No Food Insecurity (2023)    Hunger Vital Sign     Worried About Running Out of Food in the Last Year: Never true     Ran Out of Food in the Last Year: Never true   Transportation Needs: No Transportation Needs (2023)    PRAPARE - Transportation     Lack of Transportation (Medical): No     Lack of Transportation (Non-Medical): No   Physical Activity: Insufficiently Active (2023)    Exercise Vital Sign     Days of  Exercise per Week: 2 days     Minutes of Exercise per Session: 50 min   Stress: Stress Concern Present (7/31/2023)    [employer] - Occupational Stress Questionnaire     Feeling of Stress : To some extent   Social Connections: Unknown (7/31/2023)    Social Connection and Isolation Panel [NHANES]     Frequency of Communication with Friends and Family: More than three times a week     Frequency of Social Gatherings with Friends and Family: Once a week     Active Member of Clubs or Organizations: Yes     Attends Club or Organization Meetings: More than 4 times per year     Marital Status:    Housing Stability: Low Risk  (7/31/2023)    Housing Stability Vital Sign     Unable to Pay for Housing in the Last Year: No     Number of Places Lived in the Last Year: 1     Unstable Housing in the Last Year: No       MEDICATIONS:     Current Outpatient Medications:     alendronate (FOSAMAX) 70 MG tablet, Take 1 tablet (70 mg total) by mouth every 7 days., Disp: 4 tablet, Rfl: 11    aspirin (ECOTRIN) 81 MG EC tablet, Take 81 mg by mouth once daily., Disp: , Rfl:     atorvastatin (LIPITOR) 20 MG tablet, Take 1 tablet (20 mg total) by mouth once daily., Disp: 90 tablet, Rfl: 3    calcium carbonate/vitamin D3 (VITAMIN D-3 ORAL), Take by mouth., Disp: , Rfl:     carvediloL (COREG) 12.5 MG tablet, Take 1 tablet (12.5 mg total) by mouth 2 (two) times daily with meals., Disp: 60 tablet, Rfl: 11    celecoxib (CELEBREX) 200 MG capsule, Take 1 capsule (200 mg total) by mouth once daily. With food for 30 doses, Disp: 30 capsule, Rfl: 0    cetirizine (ZYRTEC) 10 MG tablet, Take 10 mg by mouth once daily., Disp: , Rfl:     cholecalciferol, vitamin D3, 125 mcg (5,000 unit) Tab, Take 5,000 Units by mouth once daily., Disp: , Rfl:     cyanocobalamin (VITAMIN B-12) 1000 MCG tablet, Take 100 mcg by mouth once daily., Disp: , Rfl:     DULCOLAX, BISACODYL, ORAL, Take 1 tablet by mouth every evening., Disp: , Rfl:  "    empagliflozin (JARDIANCE) 10 mg tablet, Take 1 tablet (10 mg total) by mouth once daily., Disp: 90 tablet, Rfl: 3    FLUoxetine 40 MG capsule, Take 1 capsule (40 mg total) by mouth once daily., Disp: 30 capsule, Rfl: 11    furosemide (LASIX) 20 MG tablet, Take 1 tablet (20 mg total) by mouth once daily., Disp: 30 tablet, Rfl: 11    LORazepam (ATIVAN) 0.5 MG tablet, Take 1 tablet (0.5 mg total) by mouth On call Procedure for Anxiety. Take 1 tab 30 min before MRI. May take 2nd if needed, Disp: 2 tablet, Rfl: 0    mometasone (ELOCON) 0.1 % solution, Mix entire bottle in jar of cerave cream and aaa qd- bid prn itching, Disp: 60 mL, Rfl: 3    NIFEdipine (PROCARDIA-XL) 30 MG (OSM) 24 hr tablet, TAKE 1 TABLET BY MOUTH EVERY DAY, Disp: 90 tablet, Rfl: 3    pregabalin (LYRICA) 75 MG capsule, Take 1 capsule (75 mg total) by mouth 2 (two) times daily., Disp: 60 capsule, Rfl: 6    spironolactone (ALDACTONE) 25 MG tablet, Take 1 tablet (25 mg total) by mouth once daily. (Patient not taking: Reported on 8/3/2023), Disp: 90 tablet, Rfl: 3    tacrolimus (PROTOPIC) 0.1 % ointment, AAA bid to leg, Disp: 60 g, Rfl: 2  ALLERGIES:   Review of patient's allergies indicates:   Allergen Reactions    Ace inhibitors     Arb-angiotensin receptor antagonist     Nsaids (non-steroidal anti-inflammatory drug) Swelling    Amlodipine Swelling     Tongue swells       Objective:     VITAL SIGNS: BP (!) 115/56   Pulse (!) 56   Ht 5' 1" (1.549 m)   Wt 67.6 kg (149 lb)   BMI 28.15 kg/m²    General    Vitals reviewed.  Constitutional: She is oriented to person, place, and time. She appears well-developed and well-nourished.   Neurological: She is alert and oriented to person, place, and time.   Psychiatric: She has a normal mood and affect. Her behavior is normal.                 MUSCULOSKELETAL EXAM    right KNEE EXAMINATION   Affected side is compared to contralateral knee     Observation:  + mild right knee effusion  No edema, erythema, or " ecchymosis noted.  No muscle atrophy of the thighs and calves noted.  No obvious bony deformities noted.   No Genu valgus/varum noted.  No recurvatum noted.    No tibial internal/external torsion.    + tibial tubercle prominence on right  Posture:  Posterior pelvis tilt with loss of lumbar lordosis  Gait: Right antalgic     Tenderness:  Patella - none    Lateral joint line - none  Quad tendon - none   Medial joint line - +  Patellar tendon - + at distal attachment       Medial plica - none  Tibial tubercle - +   Lateral plica - none  Pes anserine - none   MCL prox - none  Distal ITB - none   MCL distal - none  MFC - none    LCL prox - none  LFC - none    LCL distal - none  Tibia - none    Fibula - none    No obvious bursae, plicae, popliteal cysts, or tendon derangement palpated.          ROM (* = with pain):   Active extension to 0° on left without hyperextension, lag, crepitus, or patellar J sign.   Active extension to 0° on right without hyperextension, lag, crepitus, or patellar J sign.  Active flexion to 135° on left and 135° on right*    Strength(* = with pain):  Knee Flexion - 5/5 on left and 5/5 on right  Knee Extension - 5/5 on left and 5/5 on right  Hip Flexion - 5/5 on left and 5/5 on right  Hip Extension - 5/5 on left and 5/5 on right  Ankle dorsiflexion - 5/5 on left and 5/5 on right  Ankle Plantarflexion - 5/5 on left and 5/5 on right    Patellofemoral Exam:   Patellar ballottement - positive  Bulge sign - positive  Patellar grind - negative    No patellar laxity with medial and lateral translation   No apprehension with medial and lateral patellar translation.     Meniscus Testing:     No pain with terminal extension and flexion at joint lines.  Jefs test - negative   Thesaly test - negative  Bounce home test - negative    Ligament Testing:  Lachman's test - negative  No laxity with anterior drawer.  No laxity with posterior drawer.    No posterior sag sign.   No laxity with varus testing at 0  and 30 degrees.  No laxity with valgus testing at 0 and 30 degrees.    IT band testing:  Noble Compression test - negative    + Marianne's test on right    Neurovascular Examination:   Sensation intact to light touch in the obturator, lateral/intermediate/medial/posterior femoral cutaneous, saphenous, and common peroneal nerves bilaterally.  Motor Function:    Fully intact motor function at hip, knee, foot and ankle.  Negative seated straight leg raise bilaterally.    Pulses intact at the DP and PT arteries bilaterally.    Capillary refill intact <2 seconds in all toes bilaterally.    TART (Tissue texture abnormality, Asymmetry,  Restriction of motion and/or Tenderness) changes:     Lumbar Spine   L1 Neutral   L2 Neutral   L3 TTA RIGHT   L4 TTA RIGHT   L5 TTA RIGHT     Pelvis:  Innominate:Right anterior rotation  Pubic bone:Right inferior pubic shear    Sacrum:Left on Right sacral torsion    Key   F= Flexed   E = Extended   R = Rotated   S = Sidebent   TTA = tissue texture abnormality     IMAGIN. X-ray ordered due to right knee pain. (AP bilateral standing, PA bilateral standing in flexion, bilateral merchants, and  bilateral lateral views) taken today.   2. X-ray images were reviewed personally by me and then directly with patient.  3. FINDINGS: X-ray images obtained demonstrate no that the bones are demineralized.  Narrowing of the medial femoral tibial joints.  Vascular calcifications.  Hypertrophic new bone about the patella.  Right distal patellar tendon calcification and enthesopathy noted. No convincing effusions.  4. IMPRESSION: Kellgren-Tej grade 3 OA bilaterally, right worse than left, most prominent at the medial tibiofemoral joint spaces. Bilateral atherosclerosis. Right distal patellar tendon calcification and enthesopathy noted.       Assessment:      Encounter Diagnoses   Name Primary?    Primary osteoarthritis of right knee Yes    Effusion of right knee     Patellar tendonitis of right knee      Right calf pain     Atherosclerosis of artery of both lower extremities     Myalgia     Somatic dysfunction of lumbar region     Sacral region somatic dysfunction     Somatic dysfunction of pelvic region           Plan:   1. right knee pain secondary to DJD changes as noted on x-ray with associated effusion and right patellar tendon calcific tendonitis, complicated by underlying lower extremity vascular calcifications noted on x-ray as well.   - CV US of the right LE ordered to r/o DVT for unilateral calf tenderness noted on exam today  - Discussed conservative therapy of OA with compression brace wear with patellar stabilization (pre-wrap kenya strep with water aerobics), formal PT, possible future injection therapy (starting with a corticosteroid), Ice up to 20 minutes at a time, and NSAIDs for breakthrough pain   - Rx given for Celebrex 200 mg po qday x 14 days then prn for pain control. Pt. Advised to avoid all other NSAIDS while on this medication. Of note, pt. Has a NSAID allergy listed, but recent trial of naproxen and ibuprofen at home did not illicit any reaction.   - OMT performed today to address associated biomechanical restrictions  and HEP started.   - Referral to outpatient PT ([facility] location per pt. request) for pelvic stability, quad strengthening, patellar eccentric exercise program, and gait retraining  -  X-ray images of bilateral knee taken today (AP bilateral standing, PA bilateral standing in flexion, bilateral merchants, and bilateral lateral views) showed Kellgren-Tej grade 3 OA bilaterally, right worse than left, most prominent at the medial tibiofemoral joint spaces. Bilateral atherosclerosis. Right distal patellar tendon calcification and enthesopathy noted. Images were personally reviewed with patient.    2. OMT 3-4 regions. Oral consent obtained.  Reviewed benefits and potential side effects.   - OMT indicated today due to signs and symptoms as well as local and remote somatic  dysfunction findings and their related neurokinetic, lymphatic, fascial and/or arteriovenous body connections.   - OMT techniques used: Myofascial Release, Muscle Energy, and Balanced Ligamentous Tension   - Treatment was tolerated well. Improvement noted in segmental mobility post-treatment in dysfunctional regions. There were no adverse events and no complications immediately following treatment.     3. Pt. Given the following HEP:  A)  Pelvic clock exercises given to do from the 6-12 o'clock positions:10-15 reps, twice daily. Hand out of exercise also given.     The patient was taught a homegoing physical therapy regimen as described above. The patient demonstrated understanding of the exercises and proper technique of their execution.     4. Follow-up upon completion of PT if pain persist or deteriorates, sooner if needed    5. Patient agreeable to today's plan and all questions were answered    This note is dictated using the M*Modal Fluency Direct word recognition program. There are word recognition mistakes that are occasionally missed on review.    Total time spent face-to face with patient counseling or coordinating care including prognosis, differential diagnosis, risks and benefits of treatment, instructions, compliance risk reductions as well as non-face-to-face time personally spent reviewing medial record, medical documentation, and coordination of care.     EST MINUTES X   [id number] 10-19    [id number] 20-29    [id number] 30-39    [id number] 40-54 x   NEW     [id number] 15-29    [id number] 30-44    [id number] 45-59    [id number] 60-74    PHONE      5-10    [id number] 11-20    [id number] 21-30

## 2023-08-29 DIAGNOSIS — E78.5 HYPERLIPIDEMIA, UNSPECIFIED HYPERLIPIDEMIA TYPE: ICD-10-CM

## 2023-08-29 RX ORDER — ATORVASTATIN CALCIUM 20 MG/1
20 TABLET, FILM COATED ORAL DAILY
Qty: 90 TABLET | Refills: 3 | Status: SHIPPED | OUTPATIENT
Start: 2023-08-29

## 2023-08-31 ENCOUNTER — EXTERNAL CHRONIC CARE MANAGEMENT (OUTPATIENT)
Dept: PRIMARY CARE CLINIC | Facility: CLINIC | Age: 83
End: 2023-08-31
Payer: MEDICARE

## 2023-08-31 PROCEDURE — 99490 CHRNC CARE MGMT STAFF 1ST 20: CPT | Mod: PBBFAC | Performed by: INTERNAL MEDICINE

## 2023-08-31 PROCEDURE — 99490 CHRNC CARE MGMT STAFF 1ST 20: CPT | Mod: S$PBB,,, | Performed by: INTERNAL MEDICINE

## 2023-08-31 PROCEDURE — 99490 PR CHRONIC CARE MGMT, 1ST 20 MIN: ICD-10-PCS | Mod: S$PBB,,, | Performed by: INTERNAL MEDICINE

## 2023-09-14 ENCOUNTER — PATIENT MESSAGE (OUTPATIENT)
Dept: SPORTS MEDICINE | Facility: CLINIC | Age: 83
End: 2023-09-14
Payer: MEDICARE

## 2023-09-21 ENCOUNTER — PATIENT MESSAGE (OUTPATIENT)
Dept: ADMINISTRATIVE | Facility: HOSPITAL | Age: 83
End: 2023-09-21
Payer: MEDICARE

## 2023-09-21 ENCOUNTER — PATIENT OUTREACH (OUTPATIENT)
Dept: ADMINISTRATIVE | Facility: HOSPITAL | Age: 83
End: 2023-09-21
Payer: MEDICARE

## 2023-09-21 DIAGNOSIS — M85.89 OSTEOPENIA OF MULTIPLE SITES: Primary | ICD-10-CM

## 2023-09-30 ENCOUNTER — EXTERNAL CHRONIC CARE MANAGEMENT (OUTPATIENT)
Dept: PRIMARY CARE CLINIC | Facility: CLINIC | Age: 83
End: 2023-09-30
Payer: MEDICARE

## 2023-09-30 PROCEDURE — 99490 PR CHRONIC CARE MGMT, 1ST 20 MIN: ICD-10-PCS | Mod: S$PBB,,, | Performed by: INTERNAL MEDICINE

## 2023-09-30 PROCEDURE — 99490 CHRNC CARE MGMT STAFF 1ST 20: CPT | Mod: S$PBB,,, | Performed by: INTERNAL MEDICINE

## 2023-09-30 PROCEDURE — 99490 CHRNC CARE MGMT STAFF 1ST 20: CPT | Mod: PBBFAC | Performed by: INTERNAL MEDICINE

## 2023-10-31 ENCOUNTER — EXTERNAL CHRONIC CARE MANAGEMENT (OUTPATIENT)
Dept: PRIMARY CARE CLINIC | Facility: CLINIC | Age: 83
End: 2023-10-31
Payer: MEDICARE

## 2023-10-31 PROCEDURE — 99490 PR CHRONIC CARE MGMT, 1ST 20 MIN: ICD-10-PCS | Mod: S$PBB,,, | Performed by: INTERNAL MEDICINE

## 2023-10-31 PROCEDURE — 99490 CHRNC CARE MGMT STAFF 1ST 20: CPT | Mod: S$PBB,,, | Performed by: INTERNAL MEDICINE

## 2023-10-31 PROCEDURE — 99490 CHRNC CARE MGMT STAFF 1ST 20: CPT | Mod: PBBFAC | Performed by: INTERNAL MEDICINE

## 2023-11-06 ENCOUNTER — OFFICE VISIT (OUTPATIENT)
Dept: DERMATOLOGY | Facility: CLINIC | Age: 83
End: 2023-11-06
Payer: MEDICARE

## 2023-11-06 DIAGNOSIS — D18.01 CHERRY ANGIOMA: ICD-10-CM

## 2023-11-06 DIAGNOSIS — L82.1 SK (SEBORRHEIC KERATOSIS): ICD-10-CM

## 2023-11-06 DIAGNOSIS — Z12.83 SCREENING EXAM FOR SKIN CANCER: Primary | ICD-10-CM

## 2023-11-06 DIAGNOSIS — L81.4 LENTIGO: ICD-10-CM

## 2023-11-06 DIAGNOSIS — Z85.828 PERSONAL HISTORY OF SKIN CANCER: ICD-10-CM

## 2023-11-06 PROCEDURE — 99999 PR PBB SHADOW E&M-EST. PATIENT-LVL III: CPT | Mod: PBBFAC,,, | Performed by: DERMATOLOGY

## 2023-11-06 PROCEDURE — 99214 OFFICE O/P EST MOD 30 MIN: CPT | Mod: S$PBB,,, | Performed by: DERMATOLOGY

## 2023-11-06 PROCEDURE — 99999 PR PBB SHADOW E&M-EST. PATIENT-LVL III: ICD-10-PCS | Mod: PBBFAC,,, | Performed by: DERMATOLOGY

## 2023-11-06 PROCEDURE — 99213 OFFICE O/P EST LOW 20 MIN: CPT | Mod: PBBFAC | Performed by: DERMATOLOGY

## 2023-11-06 PROCEDURE — 99214 PR OFFICE/OUTPT VISIT, EST, LEVL IV, 30-39 MIN: ICD-10-PCS | Mod: S$PBB,,, | Performed by: DERMATOLOGY

## 2023-11-06 RX ORDER — KETOCONAZOLE 20 MG/ML
SHAMPOO, SUSPENSION TOPICAL
Qty: 120 ML | Refills: 2 | Status: SHIPPED | OUTPATIENT
Start: 2023-11-06

## 2023-11-06 NOTE — PROGRESS NOTES
Subjective:      Patient ID:  Luz Amaro is a 83 y.o. female who presents for   Chief Complaint   Patient presents with    Skin Check     TBSE     History of Present Illness: The patient presents for follow up of skin check.    The patient was last seen on: 1/17/2023 for bx to R mid cheek - SCCIS, tx w/ Mohs by SSW 3/16/2023  This is a high risk patient here to check for the development of new lesions.     Other skin complaints:   Patient with new complaint of lesion(s)  Location: L eyebrow  Duration: 3 months  Symptoms: raised, itchy  Relieving factors/Previous treatments: none        Hx of biopsy proven LP on legs; using topical steroid rx which makes them go away.  Does not need refill. Noticed new bump on right shoulder and left calf. Itchy.  Review of Systems   Skin:  Positive for activity-related sunscreen use. Negative for daily sunscreen use and recent sunburn.   Hematologic/Lymphatic: Bruises/bleeds easily (aspirin).       Objective:   Physical Exam   Constitutional: She appears well-developed and well-nourished. No distress.   Neurological: She is alert and oriented to person, place, and time. She is not disoriented.   Psychiatric: She has a normal mood and affect.   Skin:   Areas Examined (abnormalities noted in diagram):   Head / Face Inspection Performed  Neck Inspection Performed  Chest / Axilla Inspection Performed  Abdomen Inspection Performed  Genitals / Buttocks / Groin Inspection Performed  Back Inspection Performed  RUE Inspected  LUE Inspection Performed  RLE Inspected  LLE Inspection Performed  Nails and Digits Inspection Performed                     Diagram Legend     Erythematous scaling macule/papule c/w actinic keratosis       Vascular papule c/w angioma      Pigmented verrucoid papule/plaque c/w seborrheic keratosis      Yellow umbilicated papule c/w sebaceous hyperplasia      Irregularly shaped tan macule c/w lentigo     1-2 mm smooth white papules consistent with Milia       Movable subcutaneous cyst with punctum c/w epidermal inclusion cyst      Subcutaneous movable cyst c/w pilar cyst      Firm pink to brown papule c/w dermatofibroma      Pedunculated fleshy papule(s) c/w skin tag(s)      Evenly pigmented macule c/w junctional nevus     Mildly variegated pigmented, slightly irregular-bordered macule c/w mildly atypical nevus      Flesh colored to evenly pigmented papule c/w intradermal nevus       Pink pearly papule/plaque c/w basal cell carcinoma      Erythematous hyperkeratotic cursted plaque c/w SCC      Surgical scar with no sign of skin cancer recurrence      Open and closed comedones      Inflammatory papules and pustules      Verrucoid papule consistent consistent with wart     Erythematous eczematous patches and plaques     Dystrophic onycholytic nail with subungual debris c/w onychomycosis     Umbilicated papule    Erythematous-base heme-crusted tan verrucoid plaque consistent with inflamed seborrheic keratosis     Erythematous Silvery Scaling Plaque c/w Psoriasis     See annotation      Assessment / Plan:        Screening exam for skin cancer  Areas of previous NMSC examined.  No recurrence.  Total body skin examination performed today including at least 12 points as noted in physical examination. No lesions suspicious for malignancy noted.    Recommend daily sun protection/avoidance, use of at least SPF 30, broad spectrum sunscreen (OTC drug), skin self examinations, and routine physician surveillance to optimize early detection    SK (seborrheic keratosis)    Cherry angioma    Lentigo    Personal history of skin cancer    Seborrheic dermatitis scalp - afew scabs at vertex under bun - pt wears wig  Ketoconazole shampoo daily until scabs resolved, then twice weekly [facility]       LP use mometasone has at home on spots right shoulder and left leg if do not resolve RV    No follow-ups on file.  1 yr

## 2023-11-27 ENCOUNTER — OFFICE VISIT (OUTPATIENT)
Dept: CARDIOLOGY | Facility: CLINIC | Age: 83
End: 2023-11-27
Payer: MEDICARE

## 2023-11-27 VITALS
SYSTOLIC BLOOD PRESSURE: 107 MMHG | WEIGHT: 147 LBS | DIASTOLIC BLOOD PRESSURE: 67 MMHG | HEART RATE: 52 BPM | HEIGHT: 61 IN | BODY MASS INDEX: 27.75 KG/M2

## 2023-11-27 DIAGNOSIS — I35.0 NONRHEUMATIC AORTIC VALVE STENOSIS: Primary | ICD-10-CM

## 2023-11-27 DIAGNOSIS — I77.9 CAROTID ARTERY DISEASE, UNSPECIFIED LATERALITY, UNSPECIFIED TYPE: ICD-10-CM

## 2023-11-27 PROCEDURE — 99214 OFFICE O/P EST MOD 30 MIN: CPT | Mod: S$PBB,,, | Performed by: INTERNAL MEDICINE

## 2023-11-27 PROCEDURE — 99999 PR PBB SHADOW E&M-EST. PATIENT-LVL IV: CPT | Mod: PBBFAC,,, | Performed by: INTERNAL MEDICINE

## 2023-11-27 PROCEDURE — 99999 PR PBB SHADOW E&M-EST. PATIENT-LVL IV: ICD-10-PCS | Mod: PBBFAC,,, | Performed by: INTERNAL MEDICINE

## 2023-11-27 PROCEDURE — 99214 PR OFFICE/OUTPT VISIT, EST, LEVL IV, 30-39 MIN: ICD-10-PCS | Mod: S$PBB,,, | Performed by: INTERNAL MEDICINE

## 2023-11-27 PROCEDURE — 99214 OFFICE O/P EST MOD 30 MIN: CPT | Mod: PBBFAC | Performed by: INTERNAL MEDICINE

## 2023-11-27 NOTE — PROGRESS NOTES
HISTORY:    83-year-old female with a history of HFpEF, moderate aortic stenosis, hypertension, hyperlipidemia, and back pain presenting for follow-up evaluation.      Patient presented to ED with acute onset SOB in April '23. Elevated BNP with pulm edema on CXR. Jacky and shaggy. Followed in the HF clinic and optimized. Never had symptoms other than the day of presentation.     No issues since and reports feeling well. No CP, SOB, or CAGLE. No light headedness, edema, or orthopnea.    She is trying to remain active. Limited by back pain. Doing water aerobics 2-3x/week.     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     Tolerates asa 81x1, carvedilol 12.5x2, furosemide 20 3x/week, empagliflozin 10x1, nifedipine 30x1, and atorvastatin 20x1. Bps controlled at home. Did not tolerate higher doses of atorvastatin.     PHYSICAL EXAM:    Vitals:    11/27/23 1111   BP: 107/67   Pulse: (!) 52       NAD, A+Ox3.  No jvd, no bruit.  RRR nml s1,s2. 3/6 AS murmur.  CTA B no wheezes or crackles.  No edema.    LABS/STUDIES (imaging reviewed during clinic visit):    May 2023 hb 11.9. Cr 0.8 and BUN 20.  Alb 3.9. 2021 LDL 72 and HDL 69.  April  May BNP 45.  EKG April 2023 revealing for normal sinus rhythm no Q-waves or ST changes.    TTE May 2023 reveals normal LV size and ejection fraction.  Moderate aortic stenosis noted with a peak velocity of 3.68.  CVP 8.    Carotid March 2022 shows bilateral atherosclerosis.  Right ICA reported as 50-69%, but peak systolic velocity and end-diastolic velocity unimpressive and likely represents less than 50% disease.  Left ICA less than 50%.  Renal duplex 2020 no evidence of renal artery stenosis.  Venous duplex August 2023 no evidence of right lower extremity DVT.      ASSESSMENT & PLAN:    1. Nonrheumatic aortic valve stenosis    2. Carotid artery disease, unspecified laterality, unspecified type            Orders Placed This Encounter    CV  Ultrasound Bilateral Doppler Carotid    Echo    empagliflozin (JARDIANCE) 10 mg tablet          New diagnosis of HFpEF in early '23. Transient episode with unclear trigger. Asymptomatic since with nml BNP and euvolemic exam. On empagliflozin 10x1. Question about insurance covering empagliflozin. Have sent to [facility] specialty pharmacy. Never started spironolactone 25x1. Bps controlled on carvedilol 12.5x2 and nifedipine 30x1. Okay to do trial off of furosemide as she is only taking 3x/week.    Moderate AS, stable on May '23 TTE. Repeat at 6 month follow-up.     Blood pressures reasonable on carvedilol 12.5x2 and nifedipine 30x1. Addition of spironolactone will better optimize.      Tolerating atorvastatin at 20x1. Did not tolerate higher doses.     Repeat carotid.     Patient is DNR/DNI. This is part of advanced directive planning with no impending medical issue. Can rescind for elective procedures.     Follow up in about 6 months (around 5/27/2024).      Julieta Hairston MD

## 2023-11-30 ENCOUNTER — EXTERNAL CHRONIC CARE MANAGEMENT (OUTPATIENT)
Dept: PRIMARY CARE CLINIC | Facility: CLINIC | Age: 83
End: 2023-11-30
Payer: MEDICARE

## 2023-11-30 ENCOUNTER — OFFICE VISIT (OUTPATIENT)
Dept: OPTOMETRY | Facility: CLINIC | Age: 83
End: 2023-11-30
Payer: MEDICARE

## 2023-11-30 DIAGNOSIS — H04.123 BILATERAL DRY EYES: ICD-10-CM

## 2023-11-30 DIAGNOSIS — H52.4 BILATERAL PRESBYOPIA: ICD-10-CM

## 2023-11-30 DIAGNOSIS — H53.032 AMBLYOPIA, STRABISMIC, LEFT: ICD-10-CM

## 2023-11-30 DIAGNOSIS — H35.363 DEGENERATIVE RETINAL DRUSEN OF BOTH EYES: ICD-10-CM

## 2023-11-30 DIAGNOSIS — H25.13 NUCLEAR SCLEROSIS, BILATERAL: Primary | ICD-10-CM

## 2023-11-30 DIAGNOSIS — H50.012 ESOTROPIA OF LEFT EYE: ICD-10-CM

## 2023-11-30 PROCEDURE — 99999 PR PBB SHADOW E&M-EST. PATIENT-LVL III: ICD-10-PCS | Mod: PBBFAC,,, | Performed by: OPTOMETRIST

## 2023-11-30 PROCEDURE — 99490 CHRNC CARE MGMT STAFF 1ST 20: CPT | Mod: S$PBB,,, | Performed by: INTERNAL MEDICINE

## 2023-11-30 PROCEDURE — 92014 PR EYE EXAM, EST PATIENT,COMPREHESV: ICD-10-PCS | Mod: S$PBB,,, | Performed by: OPTOMETRIST

## 2023-11-30 PROCEDURE — 92014 COMPRE OPH EXAM EST PT 1/>: CPT | Mod: S$PBB,,, | Performed by: OPTOMETRIST

## 2023-11-30 PROCEDURE — 99213 OFFICE O/P EST LOW 20 MIN: CPT | Mod: PBBFAC,PO | Performed by: OPTOMETRIST

## 2023-11-30 PROCEDURE — 92015 PR REFRACTION: ICD-10-PCS | Mod: ,,, | Performed by: OPTOMETRIST

## 2023-11-30 PROCEDURE — 99999 PR PBB SHADOW E&M-EST. PATIENT-LVL III: CPT | Mod: PBBFAC,,, | Performed by: OPTOMETRIST

## 2023-11-30 PROCEDURE — 99490 PR CHRONIC CARE MGMT, 1ST 20 MIN: ICD-10-PCS | Mod: S$PBB,,, | Performed by: INTERNAL MEDICINE

## 2023-11-30 PROCEDURE — 99490 CHRNC CARE MGMT STAFF 1ST 20: CPT | Mod: PBBFAC,25 | Performed by: INTERNAL MEDICINE

## 2023-11-30 PROCEDURE — 92015 DETERMINE REFRACTIVE STATE: CPT | Mod: ,,, | Performed by: OPTOMETRIST

## 2023-11-30 NOTE — PROGRESS NOTES
LAURA    MARILEE: 10/22  Chief complaint (CC): Patient is here for annual eye exam today.  Patient   has noticed more trouble with distance vision. Near vision seems fine.    Patient has a history of ocular migraines and the last one was about 1   month ago.  Glasses? + 4 yrs. old  Contacts? -  H/o eye surgery, injections or laser: -  H/o eye injury: -  Known eye conditions? See above  Family h/o eye conditions? -  Eye gtts? Refresh prn for dry eyes      (-) Flashes (-)  Floaters (-) Mucous   (-)  Tearing (-) Itching (-) Burning   (-) Headaches (-) Eye Pain/discomfort (-) Irritation   (-)  Redness (-) Double vision (-) Blurry vision    Diabetic? -  A1c? -      Last edited by Chantal Dillard on 11/30/2023  3:40 PM.            Assessment /Plan     For exam results, see Encounter Report.    Nuclear sclerosis, bilateral  Nuclear sclerotic cataract - not visually significant. Observe.    Esotropia of left eye  Amblyopia, strabismic, left  Longterm. Monitor.     Degenerative retinal drusen of both eyes  Stable. Monitor.     Bilateral dry eyes  Cont ATs.     Bilateral presbyopia  SRx released to patient. Patient educated on lens options. Normal ocular health. RTC 1 year for routine exam.

## 2023-12-31 ENCOUNTER — EXTERNAL CHRONIC CARE MANAGEMENT (OUTPATIENT)
Dept: PRIMARY CARE CLINIC | Facility: CLINIC | Age: 83
End: 2023-12-31
Payer: MEDICARE

## 2023-12-31 PROCEDURE — 99490 CHRNC CARE MGMT STAFF 1ST 20: CPT | Mod: PBBFAC | Performed by: INTERNAL MEDICINE

## 2023-12-31 PROCEDURE — 99490 CHRNC CARE MGMT STAFF 1ST 20: CPT | Mod: S$PBB,,, | Performed by: INTERNAL MEDICINE

## 2024-01-22 ENCOUNTER — HOSPITAL ENCOUNTER (OUTPATIENT)
Dept: RADIOLOGY | Facility: HOSPITAL | Age: 84
Discharge: HOME | End: 2024-01-22
Attending: INTERNAL MEDICINE
Payer: MEDICARE

## 2024-01-22 DIAGNOSIS — M85.89 OSTEOPENIA OF MULTIPLE SITES: ICD-10-CM

## 2024-01-22 PROCEDURE — 77080 DXA BONE DENSITY AXIAL: CPT | Mod: 26,,, | Performed by: INTERNAL MEDICINE

## 2024-01-22 PROCEDURE — 77080 DXA BONE DENSITY AXIAL: CPT | Mod: TC

## 2024-01-30 DIAGNOSIS — I10 HYPERTENSION, UNSPECIFIED TYPE: ICD-10-CM

## 2024-01-30 RX ORDER — NIFEDIPINE 30 MG/1
30 TABLET, EXTENDED RELEASE ORAL DAILY
Qty: 90 TABLET | Refills: 3 | Status: SHIPPED | OUTPATIENT
Start: 2024-01-30

## 2024-01-31 ENCOUNTER — EXTERNAL CHRONIC CARE MANAGEMENT (OUTPATIENT)
Dept: PRIMARY CARE CLINIC | Facility: CLINIC | Age: 84
End: 2024-01-31
Payer: MEDICARE

## 2024-01-31 PROCEDURE — 99490 CHRNC CARE MGMT STAFF 1ST 20: CPT | Mod: PBBFAC | Performed by: INTERNAL MEDICINE

## 2024-01-31 PROCEDURE — 99490 CHRNC CARE MGMT STAFF 1ST 20: CPT | Mod: S$PBB,,, | Performed by: INTERNAL MEDICINE

## 2024-02-01 ENCOUNTER — PATIENT MESSAGE (OUTPATIENT)
Dept: INTERNAL MEDICINE | Facility: CLINIC | Age: 84
End: 2024-02-01
Payer: MEDICARE

## 2024-02-01 NOTE — TELEPHONE ENCOUNTER
She has had alendronate/fosamax on her med list since 2021. Would she know it by that name?   If she never took it, that is fine, but it has been on her med list for 2 years.

## 2024-02-01 NOTE — TELEPHONE ENCOUNTER
I reviewed the patient's bone density test.  It still shows osteoporosis and is actually a little worse.  Can we confirm whether the patient is still taking Fosamax.  Also I have not seen her in over a year.  I would really like to have her in for a visit so we can review this if needed and other general health issues.  Let me know of any thoughts or questions.

## 2024-02-19 ENCOUNTER — PATIENT MESSAGE (OUTPATIENT)
Dept: INTERNAL MEDICINE | Facility: CLINIC | Age: 84
End: 2024-02-19
Payer: MEDICARE

## 2024-02-29 ENCOUNTER — EXTERNAL CHRONIC CARE MANAGEMENT (OUTPATIENT)
Dept: PRIMARY CARE CLINIC | Facility: CLINIC | Age: 84
End: 2024-02-29
Payer: MEDICARE

## 2024-02-29 PROCEDURE — 99490 CHRNC CARE MGMT STAFF 1ST 20: CPT | Mod: S$PBB,,, | Performed by: INTERNAL MEDICINE

## 2024-02-29 PROCEDURE — 99490 CHRNC CARE MGMT STAFF 1ST 20: CPT | Mod: PBBFAC | Performed by: INTERNAL MEDICINE

## 2024-03-05 ENCOUNTER — OFFICE VISIT (OUTPATIENT)
Dept: INTERNAL MEDICINE | Facility: CLINIC | Age: 84
End: 2024-03-05
Payer: MEDICARE

## 2024-03-05 VITALS
SYSTOLIC BLOOD PRESSURE: 110 MMHG | RESPIRATION RATE: 18 BRPM | BODY MASS INDEX: 28.16 KG/M2 | WEIGHT: 149.06 LBS | HEART RATE: 54 BPM | DIASTOLIC BLOOD PRESSURE: 66 MMHG | OXYGEN SATURATION: 97 %

## 2024-03-05 DIAGNOSIS — K11.23 CHRONIC SIALOADENITIS: ICD-10-CM

## 2024-03-05 DIAGNOSIS — R00.1 BRADYCARDIA: ICD-10-CM

## 2024-03-05 DIAGNOSIS — F33.41 RECURRENT MAJOR DEPRESSIVE DISORDER, IN PARTIAL REMISSION: ICD-10-CM

## 2024-03-05 DIAGNOSIS — I10 PRIMARY HYPERTENSION: Primary | ICD-10-CM

## 2024-03-05 DIAGNOSIS — H93.233 HYPERACUSIS OF BOTH EARS: ICD-10-CM

## 2024-03-05 DIAGNOSIS — F41.1 GENERALIZED ANXIETY DISORDER: ICD-10-CM

## 2024-03-05 DIAGNOSIS — H90.0 CONDUCTIVE HEARING LOSS, BILATERAL: ICD-10-CM

## 2024-03-05 DIAGNOSIS — R73.09 ELEVATED GLUCOSE LEVEL: ICD-10-CM

## 2024-03-05 DIAGNOSIS — M43.06 LUMBAR SPONDYLOLYSIS: ICD-10-CM

## 2024-03-05 DIAGNOSIS — I77.9 CAROTID ARTERY DISEASE, UNSPECIFIED LATERALITY, UNSPECIFIED TYPE: ICD-10-CM

## 2024-03-05 DIAGNOSIS — N39.46 MIXED INCONTINENCE: ICD-10-CM

## 2024-03-05 DIAGNOSIS — I50.32 CHRONIC HEART FAILURE WITH PRESERVED EJECTION FRACTION: ICD-10-CM

## 2024-03-05 PROCEDURE — 99215 OFFICE O/P EST HI 40 MIN: CPT | Mod: PBBFAC | Performed by: INTERNAL MEDICINE

## 2024-03-05 PROCEDURE — 99999 PR PBB SHADOW E&M-EST. PATIENT-LVL V: CPT | Mod: PBBFAC,,, | Performed by: INTERNAL MEDICINE

## 2024-03-05 PROCEDURE — 99214 OFFICE O/P EST MOD 30 MIN: CPT | Mod: S$PBB,,, | Performed by: INTERNAL MEDICINE

## 2024-03-05 RX ORDER — ALENDRONATE SODIUM 70 MG/1
70 TABLET ORAL
Qty: 12 TABLET | Refills: 4 | Status: SHIPPED | OUTPATIENT
Start: 2024-03-05

## 2024-03-05 RX ORDER — CARVEDILOL 6.25 MG/1
6.25 TABLET ORAL 2 TIMES DAILY WITH MEALS
Qty: 60 TABLET | Refills: 11 | Status: SHIPPED | OUTPATIENT
Start: 2024-03-05 | End: 2025-03-05

## 2024-03-05 RX ORDER — CHLORHEXIDINE GLUCONATE ORAL RINSE 1.2 MG/ML
15 SOLUTION DENTAL DAILY PRN
Qty: 473 ML | Refills: 3 | Status: SHIPPED | OUTPATIENT
Start: 2024-03-05

## 2024-03-05 NOTE — PROGRESS NOTES
Subjective:       Patient ID: Luz Amaro is a 83 y.o. female.    Chief Complaint: Follow-up    HPI year old female comes in with her daughter for follow-up of medical problems.  They wanted to address 3 things including low heart rate potentially causing fatigue and dizziness, request for chlorhexidine mouthwash for halitosis, and discussion of bone density for Fosamax.  It appears patient had been prescribed Fosamax past but she does not believe she ever took it.  She would like to try it now to prevent bone loss and improve bones.    She is on long good blood pressure readings but pulse is in the low 50s and she thinks it is causing dizziness and lightheadedness and fatigue.  She would like to consider reducing that a little even if she needed to raise the nifedipine  Talked about hearing loss and she like to get a hearing test.  She is also due a few labs    Follow-up  Associated symptoms include arthralgias. Pertinent negatives include no abdominal pain, chest pain, coughing or fever.     Review of Systems   Constitutional:  Negative for appetite change and fever.   HENT:  Positive for hearing loss.    Respiratory:  Negative for cough.    Cardiovascular:  Negative for chest pain.   Gastrointestinal:  Negative for abdominal pain.   Musculoskeletal:  Positive for arthralgias and gait problem.         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 Wyatt on 3/16/2023    Strabismus      Past Surgical History:   Procedure Laterality Date    APPENDECTOMY      CHOLECYSTECTOMY      COLONOSCOPY N/A 12/14/2020    Procedure:  COLONOSCOPY;  Surgeon: Giovanny Chao MD;  Location: [facility] (4TH FLR);  Service: Endoscopy;  Laterality: N/A;  COVID test on 12/11/20 at [facility] -     ECTOPIC PREGNANCY SURGERY      OOPHORECTOMY      SKIN BIOPSY      TRANSFORAMINAL EPIDURAL INJECTION OF STEROID Right 3/9/2023    Procedure: INJECTION, STEROID, EPIDURAL, TRANSFORAMINAL APPROACH, RIGHT L4/L5  URGENT;  Surgeon: Palmer Tinsley MD;  Location: [facility];  Service: Pain Management;  Laterality: Right;      Patient Active Problem List   Diagnosis    Hypertension    Hyperlipidemia    Mixed incontinence    Nuclear sclerosis    Amblyopia, strabismic    Chronic sialoadenitis    Osteopenia    Personal history of skin cancer    Nonrheumatic aortic valve stenosis    Chronic heart failure with preserved ejection fraction    History of colon polyps    Aortic atherosclerosis    Recurrent major depressive disorder, in partial remission    Numbness and tingling of left upper extremity    DNR (do not resuscitate)    Generalized anxiety disorder    Sciatica    Lumbar spondylolysis    Impaired functional mobility, balance, gait, and endurance        Objective:      Physical Exam  Constitutional:       General: She is not in acute distress.     Appearance: She is well-developed.   HENT:      Head: Normocephalic and atraumatic.      Right Ear: Tympanic membrane, ear canal and external ear normal.      Left Ear: Tympanic membrane, ear canal and external ear normal.      Mouth/Throat:      Pharynx: No oropharyngeal exudate or posterior oropharyngeal erythema.   Eyes:      General: No scleral icterus.     Conjunctiva/sclera: Conjunctivae normal.      Pupils: Pupils are equal, round, and reactive to light.   Neck:      Thyroid: No thyromegaly.   Cardiovascular:      Rate and Rhythm: Normal rate and regular rhythm.      Pulses: Normal pulses.      Heart sounds: Murmur heard.   Pulmonary:      Effort: Pulmonary effort is normal.      Breath sounds: Normal  breath sounds. No wheezing.   Abdominal:      General: Bowel sounds are normal. There is no distension.      Palpations: Abdomen is soft.      Tenderness: There is no abdominal tenderness.   Musculoskeletal:         General: No tenderness.      Cervical back: Normal range of motion and neck supple.      Right lower leg: No edema.      Left lower leg: No edema.   Lymphadenopathy:      Cervical: No cervical adenopathy.   Skin:     Coloration: Skin is not jaundiced.      Findings: No rash.   Neurological:      General: No focal deficit present.      Mental Status: She is alert and oriented to person, place, and time.   Psychiatric:         Mood and Affect: Mood normal.         Behavior: Behavior normal.       Assessment:       Problem List Items Addressed This Visit          Psychiatric    Recurrent major depressive disorder, in partial remission    Relevant Orders    TSH    Basic Metabolic Panel    Lipid Panel    Generalized anxiety disorder    Relevant Orders    TSH    Basic Metabolic Panel    Lipid Panel       ENT    Chronic sialoadenitis    Relevant Orders    TSH    Basic Metabolic Panel    Lipid Panel       Cardiac/Vascular    Hypertension - Primary    Relevant Orders    TSH    Basic Metabolic Panel    Lipid Panel    Chronic heart failure with preserved ejection fraction    Relevant Medications    carvediloL (COREG) 6.25 MG tablet    Other Relevant Orders    TSH    Basic Metabolic Panel    Lipid Panel       Renal/    Mixed incontinence    Relevant Orders    TSH    Basic Metabolic Panel    Lipid Panel       Orthopedic    Lumbar spondylolysis    Relevant Orders    TSH    Basic Metabolic Panel    Lipid Panel     Other Visit Diagnoses       Carotid artery disease, unspecified laterality, unspecified type        Relevant Orders    TSH    Basic Metabolic Panel    Lipid Panel    Bradycardia        Likely related to betablocker    Relevant Orders    TSH    Basic Metabolic Panel    Lipid Panel    Elevated glucose level         Relevant Orders    Hemoglobin A1C    Hyperacusis of both ears        Relevant Orders    Ambulatory referral/consult to Audiology    Conductive hearing loss, bilateral        Relevant Orders    Ambulatory referral/consult to Audiology            Plan:         Luz was seen today for follow-up.    Diagnoses and all orders for this visit:    Primary hypertension  -     TSH; Future  -     Basic Metabolic Panel; Future  -     Lipid Panel; Future    Generalized anxiety disorder  -     TSH; Future  -     Basic Metabolic Panel; Future  -     Lipid Panel; Future    Chronic heart failure with preserved ejection fraction  -     carvediloL (COREG) 6.25 MG tablet; Take 1 tablet (6.25 mg total) by mouth 2 (two) times daily with meals.  -     TSH; Future  -     Basic Metabolic Panel; Future  -     Lipid Panel; Future    Chronic sialoadenitis  -     TSH; Future  -     Basic Metabolic Panel; Future  -     Lipid Panel; Future    Mixed incontinence  -     TSH; Future  -     Basic Metabolic Panel; Future  -     Lipid Panel; Future    Lumbar spondylolysis  -     TSH; Future  -     Basic Metabolic Panel; Future  -     Lipid Panel; Future    Recurrent major depressive disorder, in partial remission  -     TSH; Future  -     Basic Metabolic Panel; Future  -     Lipid Panel; Future    Carotid artery disease, unspecified laterality, unspecified type  -     TSH; Future  -     Basic Metabolic Panel; Future  -     Lipid Panel; Future    Bradycardia  Comments:  Likely related to betablocker  Orders:  -     TSH; Future  -     Basic Metabolic Panel; Future  -     Lipid Panel; Future    Elevated glucose level  -     Hemoglobin A1C; Future    Hyperacusis of both ears  -     Ambulatory referral/consult to Audiology; Future    Conductive hearing loss, bilateral  -     Ambulatory referral/consult to Audiology; Future    Other orders  -     chlorhexidine (PERIDEX) 0.12 % solution; Use as directed 15 mLs in the mouth or throat daily as needed  "(halitosis).  -     alendronate (FOSAMAX) 70 MG tablet; Take 1 tablet (70 mg total) by mouth every 7 days.       Follow-up in a few months, review labs.  Cardiology appointment   Update me as to progress on heart rate in how she feels with adjusting carvedilol              Portions of this note may have been created with voice recognition software. Occasional "wrong-word" or "sound-a-like" substitutions may have occurred due to the inherent limitations of voice recognition software. Please, read the note carefully and recognize, using context, where substitutions have occurred.  "

## 2024-03-11 ENCOUNTER — LAB VISIT (OUTPATIENT)
Dept: LAB | Facility: HOSPITAL | Age: 84
End: 2024-03-11
Attending: INTERNAL MEDICINE
Payer: MEDICARE

## 2024-03-11 ENCOUNTER — PATIENT MESSAGE (OUTPATIENT)
Dept: INTERNAL MEDICINE | Facility: CLINIC | Age: 84
End: 2024-03-11
Payer: MEDICARE

## 2024-03-11 ENCOUNTER — CLINICAL SUPPORT (OUTPATIENT)
Dept: AUDIOLOGY | Facility: CLINIC | Age: 84
End: 2024-03-11
Payer: MEDICARE

## 2024-03-11 DIAGNOSIS — I10 PRIMARY HYPERTENSION: ICD-10-CM

## 2024-03-11 DIAGNOSIS — F33.41 RECURRENT MAJOR DEPRESSIVE DISORDER, IN PARTIAL REMISSION: ICD-10-CM

## 2024-03-11 DIAGNOSIS — H93.13 TINNITUS, BILATERAL: ICD-10-CM

## 2024-03-11 DIAGNOSIS — N39.46 MIXED INCONTINENCE: ICD-10-CM

## 2024-03-11 DIAGNOSIS — F41.1 GENERALIZED ANXIETY DISORDER: ICD-10-CM

## 2024-03-11 DIAGNOSIS — R73.09 ELEVATED GLUCOSE LEVEL: ICD-10-CM

## 2024-03-11 DIAGNOSIS — I50.32 CHRONIC HEART FAILURE WITH PRESERVED EJECTION FRACTION: ICD-10-CM

## 2024-03-11 DIAGNOSIS — R00.1 BRADYCARDIA: ICD-10-CM

## 2024-03-11 DIAGNOSIS — H93.233 HYPERACUSIS OF BOTH EARS: ICD-10-CM

## 2024-03-11 DIAGNOSIS — H90.3 SENSORINEURAL HEARING LOSS (SNHL), BILATERAL: Primary | ICD-10-CM

## 2024-03-11 DIAGNOSIS — K11.23 CHRONIC SIALOADENITIS: ICD-10-CM

## 2024-03-11 DIAGNOSIS — M43.06 LUMBAR SPONDYLOLYSIS: ICD-10-CM

## 2024-03-11 DIAGNOSIS — I77.9 CAROTID ARTERY DISEASE, UNSPECIFIED LATERALITY, UNSPECIFIED TYPE: ICD-10-CM

## 2024-03-11 DIAGNOSIS — H90.0 CONDUCTIVE HEARING LOSS, BILATERAL: ICD-10-CM

## 2024-03-11 LAB
ANION GAP SERPL CALC-SCNC: 10 MMOL/L (ref 8–16)
BUN SERPL-MCNC: 14 MG/DL (ref 8–23)
CALCIUM SERPL-MCNC: 9.4 MG/DL (ref 8.7–10.5)
CHLORIDE SERPL-SCNC: 104 MMOL/L (ref 95–110)
CHOLEST SERPL-MCNC: 177 MG/DL (ref 120–199)
CHOLEST/HDLC SERPL: 2.3 {RATIO} (ref 2–5)
CO2 SERPL-SCNC: 26 MMOL/L (ref 23–29)
CREAT SERPL-MCNC: 0.8 MG/DL (ref 0.5–1.4)
EST. GFR  (NO RACE VARIABLE): >60 ML/MIN/1.73 M^2
ESTIMATED AVG GLUCOSE: 100 MG/DL (ref 68–131)
GLUCOSE SERPL-MCNC: 111 MG/DL (ref 70–110)
HBA1C MFR BLD: 5.1 % (ref 4–5.6)
HDLC SERPL-MCNC: 76 MG/DL (ref 40–75)
HDLC SERPL: 42.9 % (ref 20–50)
LDLC SERPL CALC-MCNC: 86.4 MG/DL (ref 63–159)
NONHDLC SERPL-MCNC: 101 MG/DL
POTASSIUM SERPL-SCNC: 4.2 MMOL/L (ref 3.5–5.1)
SODIUM SERPL-SCNC: 140 MMOL/L (ref 136–145)
TRIGL SERPL-MCNC: 73 MG/DL (ref 30–150)
TSH SERPL DL<=0.005 MIU/L-ACNC: 0.85 UIU/ML (ref 0.4–4)

## 2024-03-11 PROCEDURE — 99999 PR PBB SHADOW E&M-EST. PATIENT-LVL I: CPT | Mod: PBBFAC,,,

## 2024-03-11 PROCEDURE — 36415 COLL VENOUS BLD VENIPUNCTURE: CPT | Performed by: INTERNAL MEDICINE

## 2024-03-11 PROCEDURE — 80061 LIPID PANEL: CPT | Performed by: INTERNAL MEDICINE

## 2024-03-11 PROCEDURE — 99211 OFF/OP EST MAY X REQ PHY/QHP: CPT | Mod: PBBFAC,25

## 2024-03-11 PROCEDURE — 83036 HEMOGLOBIN GLYCOSYLATED A1C: CPT | Performed by: INTERNAL MEDICINE

## 2024-03-11 PROCEDURE — 80048 BASIC METABOLIC PNL TOTAL CA: CPT | Performed by: INTERNAL MEDICINE

## 2024-03-11 PROCEDURE — 92567 TYMPANOMETRY: CPT | Mod: PBBFAC

## 2024-03-11 PROCEDURE — 92557 COMPREHENSIVE HEARING TEST: CPT | Mod: PBBFAC

## 2024-03-11 PROCEDURE — 84443 ASSAY THYROID STIM HORMONE: CPT | Performed by: INTERNAL MEDICINE

## 2024-03-11 NOTE — PROGRESS NOTES
Luz Amaro, a 83 y.o. female, was seen today in the clinic for an audiologic evaluation.  The patient's main complaints were constant bilateral tinnitus and hearing difficulty.  The patient denied aural fullness, otalgia, and dizziness.    Tympanometry revealed Type A in the right ear and Type A in the left ear.  Audiogram results revealed a mild sloping to profound sensorineural hearing loss (SNHL) in the right ear and a mild sloping to profound SNHL in the left ear.  Speech reception thresholds were noted at 30 dB in the right ear and 20 dB in the left ear.  Speech discrimination scores were 84% in the right ear and 92% in the left ear.    Recommendations:  Otologic evaluation as needed  Hearing protection when in noise  Hearing aid consultation  Annual audiogram or sooner if change in hearing is perceived

## 2024-03-31 ENCOUNTER — EXTERNAL CHRONIC CARE MANAGEMENT (OUTPATIENT)
Dept: PRIMARY CARE CLINIC | Facility: CLINIC | Age: 84
End: 2024-03-31
Payer: MEDICARE

## 2024-03-31 PROCEDURE — 99490 CHRNC CARE MGMT STAFF 1ST 20: CPT | Mod: PBBFAC | Performed by: INTERNAL MEDICINE

## 2024-03-31 PROCEDURE — 99490 CHRNC CARE MGMT STAFF 1ST 20: CPT | Mod: S$PBB,,, | Performed by: INTERNAL MEDICINE

## 2024-04-25 ENCOUNTER — OFFICE VISIT (OUTPATIENT)
Dept: INTERNAL MEDICINE | Facility: CLINIC | Age: 84
End: 2024-04-25
Payer: MEDICARE

## 2024-04-25 VITALS
HEART RATE: 57 BPM | OXYGEN SATURATION: 99 % | BODY MASS INDEX: 27.85 KG/M2 | WEIGHT: 147.5 LBS | SYSTOLIC BLOOD PRESSURE: 118 MMHG | HEIGHT: 61 IN | DIASTOLIC BLOOD PRESSURE: 70 MMHG

## 2024-04-25 DIAGNOSIS — M54.31 SCIATICA OF RIGHT SIDE: Primary | ICD-10-CM

## 2024-04-25 DIAGNOSIS — M43.06 LUMBAR SPONDYLOLYSIS: ICD-10-CM

## 2024-04-25 PROCEDURE — 99213 OFFICE O/P EST LOW 20 MIN: CPT | Mod: S$PBB,,, | Performed by: NURSE PRACTITIONER

## 2024-04-25 PROCEDURE — 99215 OFFICE O/P EST HI 40 MIN: CPT | Mod: PBBFAC | Performed by: NURSE PRACTITIONER

## 2024-04-25 PROCEDURE — 99999 PR PBB SHADOW E&M-EST. PATIENT-LVL V: CPT | Mod: PBBFAC,,, | Performed by: NURSE PRACTITIONER

## 2024-04-25 RX ORDER — TIZANIDINE 4 MG/1
4 TABLET ORAL EVERY 8 HOURS PRN
Qty: 10 TABLET | Refills: 0 | Status: SHIPPED | OUTPATIENT
Start: 2024-04-25 | End: 2024-05-05

## 2024-04-25 NOTE — PROGRESS NOTES
INTERNAL MEDICINE URGENT CARE NOTE    CHIEF COMPLAINT     Chief Complaint   Patient presents with    Sciatica     buttocks       HPI     Luz Amaro is a 83 y.o. female who presents for an urgent visit today.    Pt reports sciatica is a chronic issue but had resolved in January and returned a couple of weeks ago   Located on the right side to the buttocks. Radiates to mid calf sometimes.   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 Wyatt on 3/16/2023    Strabismus        Home Medications:  Prior to Admission medications    Medication Sig Start Date End Date Taking? Authorizing Provider   alendronate (FOSAMAX) 70 MG tablet Take 1 tablet (70 mg total) by mouth every 7 days. 3/5/24  Yes Suman Moss MD   aspirin (ECOTRIN) 81 MG EC tablet Take 81 mg by mouth once daily.   Yes Provider, Historical   atorvastatin (LIPITOR) 20 MG tablet Take 1 tablet (20 mg total) by mouth once daily. 8/29/23  Yes Julieta Hairston MD   calcium carbonate/vitamin D3 (VITAMIN D-3 ORAL) Take by mouth.   Yes Provider, Historical   carvediloL (COREG) 6.25 MG tablet Take 1 tablet (6.25 mg total) by mouth 2 (two) times daily with meals. 3/5/24 3/5/25 Yes Suman Moss MD   cetirizine (ZYRTEC) 10 MG tablet Take 10 mg by mouth once daily.   Yes Provider, Historical   chlorhexidine (PERIDEX) 0.12 % solution Use as  directed 15 mLs in the mouth or throat daily as needed (halitosis). 3/5/24  Yes Suman Moss MD   cyanocobalamin (VITAMIN B-12) 1000 MCG tablet Take 100 mcg by mouth once daily.   Yes Provider, Historical   DULCOLAX, BISACODYL, ORAL Take 1 tablet by mouth every evening.   Yes Provider, Historical   empagliflozin (JARDIANCE) 10 mg tablet Take 1 tablet (10 mg total) by mouth once daily. 11/27/23 11/26/24 Yes Julieta Hairston MD   FLUoxetine 40 MG capsule Take 1 capsule (40 mg total) by mouth once daily. 7/14/23  Yes Diamond Rosas NP   ketoconazole (NIZORAL) 2 % shampoo Apply topically twice a week. 11/6/23  Yes Gris Montoya MD   mometasone (ELOCON) 0.1 % solution Mix entire bottle in jar of cerave cream and aaa qd- bid prn itching 5/12/22  Yes Cecile Gibson MD   NIFEdipine (PROCARDIA-XL) 30 MG (OSM) 24 hr tablet Take 1 tablet (30 mg total) by mouth once daily. 1/30/24  Yes Suman Moss MD   cholecalciferol, vitamin D3, 125 mcg (5,000 unit) Tab Take 5,000 Units by mouth once daily.  Patient not taking: Reported on 4/25/2024    Provider, Historical   LORazepam (ATIVAN) 0.5 MG tablet Take 1 tablet (0.5 mg total) by mouth On call Procedure for Anxiety. Take 1 tab 30 min before MRI. May take 2nd if needed 3/7/23 4/6/23  Angelica Bell PA-C       Review of Systems:  Review of Systems   Constitutional:  Negative for fever.   Cardiovascular:  Negative for chest pain.   Gastrointestinal:  Negative for abdominal pain.   Genitourinary:  Negative for dysuria and hematuria.   Musculoskeletal:  Positive for back pain.   Neurological:  Positive for weakness. Negative for numbness and headaches.       Health Maintainence:   Immunizations:  Health Maintenance         Date Due Completion Date    Shingles Vaccine (1 of 2) Never done ---    COVID-19 Vaccine (6 - 2023-24 season) 09/01/2023 9/19/2022    DEXA Scan 01/22/2026 1/22/2024    TETANUS VACCINE 09/27/2026 9/27/2016    Lipid Panel 03/11/2029 3/11/2024       "       PHYSICAL EXAM     /70 (BP Location: Left arm, Patient Position: Sitting, BP Method: Medium (Manual))   Pulse (!) 57   Ht 5' 1" (1.549 m)   Wt 66.9 kg (147 lb 7.8 oz)   SpO2 99%   BMI 27.87 kg/m²     Physical Exam  Constitutional:       Appearance: She is well-developed.   HENT:      Head: Normocephalic and atraumatic.   Eyes:      Pupils: Pupils are equal, round, and reactive to light.   Cardiovascular:      Rate and Rhythm: Normal rate and regular rhythm.   Pulmonary:      Effort: Pulmonary effort is normal.   Musculoskeletal:        Back:    Neurological:      Mental Status: She is alert and oriented to person, place, and time.         LABS     Lab Results   Component Value Date    HGBA1C 5.1 03/11/2024     CMP  Sodium   Date Value Ref Range Status   03/11/2024 140 136 - 145 mmol/L Final     Potassium   Date Value Ref Range Status   03/11/2024 4.2 3.5 - 5.1 mmol/L Final     Chloride   Date Value Ref Range Status   03/11/2024 104 95 - 110 mmol/L Final     CO2   Date Value Ref Range Status   03/11/2024 26 23 - 29 mmol/L Final     Glucose   Date Value Ref Range Status   03/11/2024 111 (H) 70 - 110 mg/dL Final     BUN   Date Value Ref Range Status   03/11/2024 14 8 - 23 mg/dL Final     Creatinine   Date Value Ref Range Status   03/11/2024 0.8 0.5 - 1.4 mg/dL Final     Calcium   Date Value Ref Range Status   03/11/2024 9.4 8.7 - 10.5 mg/dL Final     Total Protein   Date Value Ref Range Status   05/31/2023 7.9 6.0 - 8.4 g/dL Final     Albumin   Date Value Ref Range Status   05/31/2023 3.9 3.5 - 5.2 g/dL Final     Total Bilirubin   Date Value Ref Range Status   05/31/2023 0.6 0.1 - 1.0 mg/dL Final     Comment:     For infants and newborns, interpretation of results should be based  on gestational age, weight and in agreement with clinical  observations.    Premature Infant recommended reference ranges:  Up to 24 hours.............<8.0 mg/dL  Up to 48 hours............<12.0 mg/dL  3-5 " days..................<15.0 mg/dL  6-29 days.................<15.0 mg/dL       Alkaline Phosphatase   Date Value Ref Range Status   05/31/2023 173 (H) 55 - 135 U/L Final     AST   Date Value Ref Range Status   05/31/2023 28 10 - 40 U/L Final     ALT   Date Value Ref Range Status   05/31/2023 26 10 - 44 U/L Final     Anion Gap   Date Value Ref Range Status   03/11/2024 10 8 - 16 mmol/L Final     eGFR if    Date Value Ref Range Status   05/11/2022 >60.0 >60 mL/min/1.73 m^2 Final     eGFR if non    Date Value Ref Range Status   05/11/2022 >60.0 >60 mL/min/1.73 m^2 Final     Comment:     Calculation used to obtain the estimated glomerular filtration  rate (eGFR) is the CKD-EPI equation.        Lab Results   Component Value Date    WBC 4.69 05/03/2023    HGB 11.9 (L) 05/03/2023    HCT 38.7 05/03/2023     (H) 05/03/2023     05/03/2023     Lab Results   Component Value Date    CHOL 177 03/11/2024    CHOL 166 08/23/2021    CHOL 216 (H) 04/26/2021     Lab Results   Component Value Date    HDL 76 (H) 03/11/2024    HDL 69 08/23/2021    HDL 79 (H) 04/26/2021     Lab Results   Component Value Date    LDLCALC 86.4 03/11/2024    LDLCALC 72.2 08/23/2021    LDLCALC 120.2 04/26/2021     Lab Results   Component Value Date    TRIG 73 03/11/2024    TRIG 124 08/23/2021    TRIG 84 04/26/2021     Lab Results   Component Value Date    CHOLHDL 42.9 03/11/2024    CHOLHDL 41.6 08/23/2021    CHOLHDL 36.6 04/26/2021     Lab Results   Component Value Date    TSH 0.847 03/11/2024       ASSESSMENT/PLAN     Luz Deysi Prem is a 83 y.o. female     Sciatica of right side  -     Ambulatory referral/consult to Pain Clinic; Future; Expected date: 05/02/2024    Lumbar spondylolysis  -     Ambulatory referral/consult to Pain Clinic; Future; Expected date: 05/02/2024    Other orders  -     tiZANidine (ZANAFLEX) 4 MG tablet; Take 1 tablet (4 mg total) by mouth every 8 (eight) hours as needed (back pain).   Dispense: 10 tablet; Refill: 0           Follow up with PCP    Patient education provided from Javier. Patient was counseled on when and how to seek emergent care.       Gwendolyn GUEVARA, AGUS, MARYP-c   Department of Internal Medicine - [facility]  9:16 AM    Answers submitted by the patient for this visit:  Back Pain Questionnaire (Submitted on 4/24/2024)  Chief Complaint: Back pain  Chronicity: recurrent  Onset: 1 to 4 weeks ago  Frequency: constantly  Progression since onset: gradually worsening  Pain location: sacro-iliac  Pain quality: aching, burning, shooting  Radiates to: right knee, right thigh  Pain - numeric: 6/10  Pain is: worse during the day  Aggravated by: bending, sitting  Stiffness is present: in the morning  bladder incontinence: Yes  bowel incontinence: No  leg pain: Yes  perianal numbness: No  tingling: No  weight loss: No  genital pain: No  Pain severity: moderate  Improvement on treatment: no relief

## 2024-04-26 ENCOUNTER — TELEPHONE (OUTPATIENT)
Dept: PAIN MEDICINE | Facility: CLINIC | Age: 84
End: 2024-04-26
Payer: MEDICARE

## 2024-04-26 ENCOUNTER — OFFICE VISIT (OUTPATIENT)
Dept: PAIN MEDICINE | Facility: CLINIC | Age: 84
End: 2024-04-26
Payer: MEDICARE

## 2024-04-26 VITALS
BODY MASS INDEX: 27.85 KG/M2 | HEART RATE: 55 BPM | WEIGHT: 147.5 LBS | HEIGHT: 61 IN | SYSTOLIC BLOOD PRESSURE: 99 MMHG | DIASTOLIC BLOOD PRESSURE: 44 MMHG

## 2024-04-26 DIAGNOSIS — M54.31 SCIATICA OF RIGHT SIDE: ICD-10-CM

## 2024-04-26 DIAGNOSIS — M43.06 LUMBAR SPONDYLOLYSIS: ICD-10-CM

## 2024-04-26 DIAGNOSIS — M54.17 LUMBOSACRAL RADICULOPATHY: Primary | ICD-10-CM

## 2024-04-26 DIAGNOSIS — M51.37 DEGENERATION, INTERVERTEBRAL DISC, LUMBOSACRAL: ICD-10-CM

## 2024-04-26 DIAGNOSIS — M51.37 DDD (DEGENERATIVE DISC DISEASE), LUMBOSACRAL: ICD-10-CM

## 2024-04-26 PROCEDURE — 99214 OFFICE O/P EST MOD 30 MIN: CPT | Mod: PBBFAC,PO | Performed by: EMERGENCY MEDICINE

## 2024-04-26 PROCEDURE — 99999 PR PBB SHADOW E&M-EST. PATIENT-LVL IV: CPT | Mod: PBBFAC,,, | Performed by: EMERGENCY MEDICINE

## 2024-04-26 PROCEDURE — 99214 OFFICE O/P EST MOD 30 MIN: CPT | Mod: S$PBB,ICN,, | Performed by: EMERGENCY MEDICINE

## 2024-04-26 NOTE — PROGRESS NOTES
Chief Complaint   Patient presents with    Low-back Pain     Buttock Pain on the right side        Original HPI 04/26/24: Luz Amaro is a 83 y.o. 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 She presents in referral from [facility] for right buttock pain    Original Pain Description:  The pain is located in the lower back bilaterally and is radiating to the left roman . The pain is described as sharp. Exacerbating factors: Walking and Getting out of bed/chair. Mitigating factors rest. Symptoms interfere with daily activity. The patient feels like symptoms have been worsening. Patient denies night fever/night sweats, urinary incontinence, bowel incontinence, significant weight loss, significant motor weakness, and loss of sensations.    PAIN SCORES:  Best: Pain is 2  Current: Pain is 5  Worst: Pain is 10        4/26/2024    11:26 AM   Last 3 PDI Scores   Pain Disability Index (PDI) 15       6 weeks of Conservative therapy:  PT: Completed  Chiro:  HEP: Participating      Treatments / Medications: (Ice/Heat/NSAIDS/APAP/etc):  Fluoxetine 40 mg  Tizanidine 4 mg  Ibu     Antiplatelets/Anticoagulants:  Asa 81mg    Interventional Pain Procedures: (Previous injections)  03/09/2023 L4/5 Right TFESI     IMAGING:    MRI LUMBAR SPINE WITHOUT CONTRAST  03/07/2023  L4: remote compression fracture.    L2-3: mild DB   L3-4: mild DB, mod facet joint arthropathy, small posterior annular tear=>mild spinal canal stenosis and mild right-sided neural foraminal narrowing.  L4-5: small right paracentral DP ? impinging on right L5 NR.  Mod facet joint arthropathy =>moderate spinal canal stenosis.    L5-S1: mild DB, facet joint arthropathy and mild disc height loss =>mod  left + mild right neural foraminal narrowing.       XR LUMBAR SPINE AP AND LATERAL  2023  disc space height loss at L3-L4, L4-L5, and L5-S1.  The facet joints are aligned noting lower lumbar facet arthropathy.  The sacral coccygeal segments are aligned.  AP spinal alignment is unremarkable.  The sacroiliac joints are intact noting degenerative change.  There is calcification of the aorta.                                              Past Surgical History:   Procedure Laterality Date    APPENDECTOMY      CHOLECYSTECTOMY      COLONOSCOPY N/A 2020    Procedure: COLONOSCOPY;  Surgeon: Giovanny Chao MD;  Location: [facility] ENDO (4TH FLR);  Service: Endoscopy;  Laterality: N/A;  COVID test on 20 at [facility]    ECTOPIC PREGNANCY SURGERY      OOPHORECTOMY      SKIN BIOPSY      TRANSFORAMINAL EPIDURAL INJECTION OF STEROID Right 3/9/2023    Procedure: INJECTION, STEROID, EPIDURAL, TRANSFORAMINAL APPROACH, RIGHT L4/L5  URGENT;  Surgeon: Palmer Tinsley MD;  Location: [facility] PAIN MGT;  Service: Pain Management;  Laterality: Right;       Social History     Socioeconomic History    Marital status:    Tobacco Use    Smoking status: Former     Current packs/day: 0.00     Types: Cigarettes     Quit date: 2/10/1983     Years since quittin.2    Smokeless tobacco: Never   Substance and Sexual Activity    Alcohol use: Not Currently    Drug use: No    Sexual activity: Not Currently     Partners: Male   Social History Narrative    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     Social Determinants of Health     Financial Resource Strain: Low Risk  (2023)    Overall Financial Resource Strain (CARDIA)     Difficulty of Paying Living Expenses: Not hard at all   Food Insecurity: No Food Insecurity (2023)    Hunger Vital Sign     Worried About Running Out of Food in the Last Year: Never true     Ran Out of Food in the Last Year: Never true   Transportation Needs: No Transportation  "Needs (11/24/2023)    PRAPARE - Transportation     Lack of Transportation (Medical): No     Lack of Transportation (Non-Medical): No   Physical Activity: Insufficiently Active (11/24/2023)    Exercise Vital Sign     Days of Exercise per Week: 3 days     Minutes of Exercise per Session: 30 min   Stress: Stress Concern Present (11/24/2023)    [employer] - Occupational Stress Questionnaire     Feeling of Stress : To some extent   Social Connections: Unknown (11/24/2023)    Social Connection and Isolation Panel [NHANES]     Frequency of Communication with Friends and Family: More than three times a week     Frequency of Social Gatherings with Friends and Family: Twice a week     Active Member of Clubs or Organizations: Yes     Attends Club or Organization Meetings: 1 to 4 times per year     Marital Status:    Housing Stability: Low Risk  (11/24/2023)    Housing Stability Vital Sign     Unable to Pay for Housing in the Last Year: No     Number of Places Lived in the Last Year: 1     Unstable Housing in the Last Year: No       Medications/Allergies: See med card    ROS:  GENERAL: No fever. No chills. No fatigue. Denies weight loss. Denies weight gain.  Back / musculoskeletal / neuro : See HPI    VITALS:   Vitals:    04/26/24 1125   BP: (!) 99/44   Pulse: (!) 55   Weight: 66.9 kg (147 lb 7.8 oz)   Height: 5' 1" (1.549 m)   PainSc:   5   PainLoc: Buttocks     Body mass index is 27.87 kg/m².      4/26/2024    11:26 AM   Last 3 PDI Scores   Pain Disability Index (PDI) 15       PHYSICAL EXAM:   GENERAL: Well appearing, in no acute distress, alert and oriented x3.  PSYCH:  Mood and affect appropriate.  SKIN: Skin color, texture, turgor normal, no rashes or lesions.  HEENT:  Normocephalic, atraumatic. Cranial nerves grossly intact.  NECK: No pain to palpation over the cervical paraspinous muscles. No pain to palpation over facets. No pain with neck flexion, extension, or lateral flexion. "   PULM: No evidence of respiratory difficulty, symmetric chest rise.  GI:  Non-distended  BACK:  Limited range of motion. No pain to palpation over the spinous processes.  Positive pain to palpation over lumbar facet joints.  Pain with axial loading. Positive tenderness to palpation over right SIJ. Positive ERROL,Ganselin, thigh thrust tests. FADIR negative.    There is no pain with palpation over the sacroiliac joints bilaterally.   EXTREMITIES: No deformities, edema, or skin discoloration.   MUSCULOSKELETAL: Shoulder, hip, and knee provocative maneuvers are negative. No atrophy is noted.  NEURO: Sensation is equal and appropriate bilaterally. Bilateral upper and lower extremity strength is normal and symmetric. Bilateral upper and lower extremity coordination and muscle stretch reflexes are physiologic and symmetric. Plantar response are downgoing. Straight leg raising in the supine position is positive to radicular pain on the right.   GAIT: normal.      LABS:    Lab Results   Component Value Date    HGBA1C 5.1 03/11/2024       Lab Results   Component Value Date    WBC 4.69 05/03/2023    HGB 11.9 (L) 05/03/2023    HCT 38.7 05/03/2023     (H) 05/03/2023     05/03/2023           ASSESSMENT: 83 y.o. year old female with pain, consistent with:    Encounter Diagnoses   Name Primary?    Sciatica of right side     Lumbar spondylolysis        DISCUSSION: Luz Amaro is a patient who has multiple pain generators who comes to us with radicular pain which was previously successfully treated with a transforaminal injection.  She would like us to try another transforaminal before dressing her other issues      PLAN:  - I have stressed the importance of physical activity and a home exercise plan to help with pain and improve health.  - Patient can continue with medications for now since they are providing benefits, using them appropriately, and without side effects.  - Counseled patient regarding the  importance of activity modification and constant sleeping habits.  - Interventions: Schedule for a right Transforaminal epidural steroid injection at L4/5 to help with their pain and progress with a home exercise plan.. Explained the risks and benefits of the procedure in detail with the patient today in clinic along with alternative treatment options, and the patient elected to pursue the intervention at this time.    - Imaging: Reviewed available imaging with patient and answered any questions they had regarding study.  - The patient's pathophysiology was explained in detail with reference to x-rays, models, other visual aids as appropriate.   - Follow up visit: return to clinic in 3 4 weeks after proceed      I would like to thank Gwendolyn Ayala,* for the opportunity to assist in the care of this patient. We had a very nice visit and I look forward to continuing their care. Please let me know if I can be of further assistance.     Jose Marin MD  04/26/2024

## 2024-04-26 NOTE — TELEPHONE ENCOUNTER
----- Message from Jose Marin MD sent at 2024 11:44 AM CDT -----  Regarding: Order for LYNDSEY DIAZ    Patient Name: LYNDSEY DIAZ([id number])  Sex: Female  : 1940      PCP: MAYTE LAIRD    Center: [facility] CENTRAL BILLING OFFICE     Level of Service:01412     [place] OFFICE/OUTPT VISIT, NEW, LEVL IV, 45-59 MIN    Types of orders made on 2024: Outpatient Referral, Procedure Request    Order Date:2024    Z1 Ordering User:JOSE MARIN [[id number]]  Encounter Provider:Jose Marin MD [[id number]]  Authorizing Provider: Jose Marin MD [[id number]]  Department:[facility] PAIN MANAGEMENT[[id number]]    Common Order Information  Procedure -> Transforaminal Injection (Specify level and laterality) Cmt: L4/5             Right TFESI    Order Specific Information  Order: Procedure Order to Pain Management [Custom: [id number]]  Order #:            [id number]: 1 FUTURE    Priority: Routine  Class: Clinic Performed    Future Order Information      Expires on:2025            Expected by:2024                   Associated Diagnoses      M54.17 Lumbosacral radiculopathy      M51.37 DDD (degenerative disc disease), lumbosacral      Physician -> marin         Is patient on anti-coagulants? -> Yes Cmt: asa        Facility [facility]: -> [facility]           Priority: Routine  Class: Clinic Performed    Future Order Information      Expires on:2025            Expected by:2024                   Associated Diagnoses      M54.17 Lumbosacral radiculopathy      M51.37 DDD (degenerative disc disease), lumbosacral      Procedure -> Transforaminal Injection (Specify level and laterality) Cmt:                 L4/5 Right TFESI        Physician -> yusuf  ilera         Is patient on anti-coagulants? -> Yes Cmt: asa        Facility Name: -> [facility]

## 2024-04-26 NOTE — H&P (VIEW-ONLY)
Chief Complaint   Patient presents with    Low-back Pain     Buttock Pain on the right side        Original HPI 04/26/24: Luz Amaro is a 83 y.o. 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 She presents in referral from [facility] for right buttock pain    Original Pain Description:  The pain is located in the lower back bilaterally and is radiating to the left roman . The pain is described as sharp. Exacerbating factors: Walking and Getting out of bed/chair. Mitigating factors rest. Symptoms interfere with daily activity. The patient feels like symptoms have been worsening. Patient denies night fever/night sweats, urinary incontinence, bowel incontinence, significant weight loss, significant motor weakness, and loss of sensations.    PAIN SCORES:  Best: Pain is 2  Current: Pain is 5  Worst: Pain is 10        4/26/2024    11:26 AM   Last 3 PDI Scores   Pain Disability Index (PDI) 15       6 weeks of Conservative therapy:  PT: Completed  Chiro:  HEP: Participating      Treatments / Medications: (Ice/Heat/NSAIDS/APAP/etc):  Fluoxetine 40 mg  Tizanidine 4 mg  Ibu     Antiplatelets/Anticoagulants:  Asa 81mg    Interventional Pain Procedures: (Previous injections)  03/09/2023 L4/5 Right TFESI     IMAGING:    MRI LUMBAR SPINE WITHOUT CONTRAST  03/07/2023  L4: remote compression fracture.    L2-3: mild DB   L3-4: mild DB, mod facet joint arthropathy, small posterior annular tear=>mild spinal canal stenosis and mild right-sided neural foraminal narrowing.  L4-5: small right paracentral DP ? impinging on right L5 NR.  Mod facet joint arthropathy =>moderate spinal canal stenosis.    L5-S1: mild DB, facet joint arthropathy and mild disc height loss =>mod  left + mild right neural foraminal narrowing.       XR LUMBAR SPINE AP AND LATERAL  2023  disc space height loss at L3-L4, L4-L5, and L5-S1.  The facet joints are aligned noting lower lumbar facet arthropathy.  The sacral coccygeal segments are aligned.  AP spinal alignment is unremarkable.  The sacroiliac joints are intact noting degenerative change.  There is calcification of the aorta.                                              Past Surgical History:   Procedure Laterality Date    APPENDECTOMY      CHOLECYSTECTOMY      COLONOSCOPY N/A 2020    Procedure: COLONOSCOPY;  Surgeon: Giovanny Chao MD;  Location: [facility] ENDO (4TH FLR);  Service: Endoscopy;  Laterality: N/A;  COVID test on 20 at [facility]    ECTOPIC PREGNANCY SURGERY      OOPHORECTOMY      SKIN BIOPSY      TRANSFORAMINAL EPIDURAL INJECTION OF STEROID Right 3/9/2023    Procedure: INJECTION, STEROID, EPIDURAL, TRANSFORAMINAL APPROACH, RIGHT L4/L5  URGENT;  Surgeon: Palmer Tinsley MD;  Location: [facility] PAIN MGT;  Service: Pain Management;  Laterality: Right;       Social History     Socioeconomic History    Marital status:    Tobacco Use    Smoking status: Former     Current packs/day: 0.00     Types: Cigarettes     Quit date: 2/10/1983     Years since quittin.2    Smokeless tobacco: Never   Substance and Sexual Activity    Alcohol use: Not Currently    Drug use: No    Sexual activity: Not Currently     Partners: Male   Social History Narrative    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     Social Determinants of Health     Financial Resource Strain: Low Risk  (2023)    Overall Financial Resource Strain (CARDIA)     Difficulty of Paying Living Expenses: Not hard at all   Food Insecurity: No Food Insecurity (2023)    Hunger Vital Sign     Worried About Running Out of Food in the Last Year: Never true     Ran Out of Food in the Last Year: Never true   Transportation Needs: No Transportation  "Needs (11/24/2023)    PRAPARE - Transportation     Lack of Transportation (Medical): No     Lack of Transportation (Non-Medical): No   Physical Activity: Insufficiently Active (11/24/2023)    Exercise Vital Sign     Days of Exercise per Week: 3 days     Minutes of Exercise per Session: 30 min   Stress: Stress Concern Present (11/24/2023)    [employer] [place] [employer] - Occupational Stress Questionnaire     Feeling of Stress : To some extent   Social Connections: Unknown (11/24/2023)    Social Connection and Isolation Panel [NHANES]     Frequency of Communication with Friends and Family: More than three times a week     Frequency of Social Gatherings with Friends and Family: Twice a week     Active Member of Clubs or Organizations: Yes     Attends Club or Organization Meetings: 1 to 4 times per year     Marital Status:    Housing Stability: Low Risk  (11/24/2023)    Housing Stability Vital Sign     Unable to Pay for Housing in the Last Year: No     Number of Places Lived in the Last Year: 1     Unstable Housing in the Last Year: No       Medications/Allergies: See med card    ROS:  GENERAL: No fever. No chills. No fatigue. Denies weight loss. Denies weight gain.  Back / musculoskeletal / neuro : See HPI    VITALS:   Vitals:    04/26/24 1125   BP: (!) 99/44   Pulse: (!) 55   Weight: 66.9 kg (147 lb 7.8 oz)   Height: 5' 1" (1.549 m)   PainSc:   5   PainLoc: Buttocks     Body mass index is 27.87 kg/m².      4/26/2024    11:26 AM   Last 3 PDI Scores   Pain Disability Index (PDI) 15       PHYSICAL EXAM:   GENERAL: Well appearing, in no acute distress, alert and oriented x3.  PSYCH:  Mood and affect appropriate.  SKIN: Skin color, texture, turgor normal, no rashes or lesions.  HEENT:  Normocephalic, atraumatic. Cranial nerves grossly intact.  NECK: No pain to palpation over the cervical paraspinous muscles. No pain to palpation over facets. No pain with neck flexion, extension, or lateral flexion. "   PULM: No evidence of respiratory difficulty, symmetric chest rise.  GI:  Non-distended  BACK:  Limited range of motion. No pain to palpation over the spinous processes.  Positive pain to palpation over lumbar facet joints.  Pain with axial loading. Positive tenderness to palpation over right SIJ. Positive ERROL,Ganselin, thigh thrust tests. FADIR negative.    There is no pain with palpation over the sacroiliac joints bilaterally.   EXTREMITIES: No deformities, edema, or skin discoloration.   MUSCULOSKELETAL: Shoulder, hip, and knee provocative maneuvers are negative. No atrophy is noted.  NEURO: Sensation is equal and appropriate bilaterally. Bilateral upper and lower extremity strength is normal and symmetric. Bilateral upper and lower extremity coordination and muscle stretch reflexes are physiologic and symmetric. Plantar response are downgoing. Straight leg raising in the supine position is positive to radicular pain on the right.   GAIT: normal.      LABS:    Lab Results   Component Value Date    HGBA1C 5.1 03/11/2024       Lab Results   Component Value Date    WBC 4.69 05/03/2023    HGB 11.9 (L) 05/03/2023    HCT 38.7 05/03/2023     (H) 05/03/2023     05/03/2023           ASSESSMENT: 83 y.o. year old female with pain, consistent with:    Encounter Diagnoses   Name Primary?    Sciatica of right side     Lumbar spondylolysis        DISCUSSION: Luz Amaro is a patient who has multiple pain generators who comes to us with radicular pain which was previously successfully treated with a transforaminal injection.  She would like us to try another transforaminal before dressing her other issues      PLAN:  - I have stressed the importance of physical activity and a home exercise plan to help with pain and improve health.  - Patient can continue with medications for now since they are providing benefits, using them appropriately, and without side effects.  - Counseled patient regarding the  importance of activity modification and constant sleeping habits.  - Interventions: Schedule for a right Transforaminal epidural steroid injection at L4/5 to help with their pain and progress with a home exercise plan.. Explained the risks and benefits of the procedure in detail with the patient today in clinic along with alternative treatment options, and the patient elected to pursue the intervention at this time.    - Imaging: Reviewed available imaging with patient and answered any questions they had regarding study.  - The patient's pathophysiology was explained in detail with reference to x-rays, models, other visual aids as appropriate.   - Follow up visit: return to clinic in 3 4 weeks after proceed      I would like to thank Gwendolyn Ayala,* for the opportunity to assist in the care of this patient. We had a very nice visit and I look forward to continuing their care. Please let me know if I can be of further assistance.     Jose Marin MD  04/26/2024

## 2024-04-29 ENCOUNTER — TELEPHONE (OUTPATIENT)
Dept: PAIN MEDICINE | Facility: CLINIC | Age: 84
End: 2024-04-29
Payer: MEDICARE

## 2024-04-29 ENCOUNTER — HOSPITAL ENCOUNTER (OUTPATIENT)
Dept: CARDIOLOGY | Facility: HOSPITAL | Age: 84
Discharge: HOME | End: 2024-04-29
Attending: INTERNAL MEDICINE
Payer: MEDICARE

## 2024-04-29 VITALS
HEART RATE: 56 BPM | HEIGHT: 61 IN | DIASTOLIC BLOOD PRESSURE: 77 MMHG | WEIGHT: 147 LBS | BODY MASS INDEX: 27.75 KG/M2 | SYSTOLIC BLOOD PRESSURE: 163 MMHG

## 2024-04-29 DIAGNOSIS — I35.0 NONRHEUMATIC AORTIC VALVE STENOSIS: ICD-10-CM

## 2024-04-29 DIAGNOSIS — I77.9 CAROTID ARTERY DISEASE, UNSPECIFIED LATERALITY, UNSPECIFIED TYPE: ICD-10-CM

## 2024-04-29 LAB
ASCENDING AORTA: 2.85 CM
AV INDEX (PROSTH): 0.28
AV MEAN GRADIENT: 31 MMHG
AV PEAK GRADIENT: 51 MMHG
AV REGURGITATION PRESSURE HALF TIME: 438.88 MS
AV VALVE AREA BY VELOCITY RATIO: 0.76 CM²
AV VALVE AREA: 0.72 CM²
AV VELOCITY RATIO: 0.3
BSA FOR ECHO PROCEDURE: 1.69 M2
CV ECHO LV RWT: 0.32 CM
DOP CALC AO PEAK VEL: 3.58 M/S
DOP CALC AO VTI: 104.8 CM
DOP CALC LVOT AREA: 2.5 CM2
DOP CALC LVOT DIAMETER: 1.8 CM
DOP CALC LVOT PEAK VEL: 1.07 M/S
DOP CALC LVOT STROKE VOLUME: 75.28 CM3
DOP CALC MV VTI: 17.1 CM
DOP CALCLVOT PEAK VEL VTI: 29.6 CM
E WAVE DECELERATION TIME: 200.79 MSEC
E/A RATIO: 0.9
E/E' RATIO: 16.92 M/S
ECHO LV POSTERIOR WALL: 0.78 CM (ref 0.6–1.1)
FRACTIONAL SHORTENING: 33 % (ref 28–44)
HR MV ECHO: 56 BPM
INTERVENTRICULAR SEPTUM: 0.6 CM (ref 0.6–1.1)
IVC DIAMETER: 1.82 CM
IVRT: 71.36 MSEC
LA MAJOR: 6.07 CM
LA MINOR: 6.03 CM
LA WIDTH: 4.12 CM
LEFT ARM DIASTOLIC BLOOD PRESSURE: 77 MMHG
LEFT ARM SYSTOLIC BLOOD PRESSURE: 163 MMHG
LEFT ATRIUM SIZE: 3.82 CM
LEFT ATRIUM VOLUME INDEX MOD: 36.1 ML/M2
LEFT ATRIUM VOLUME INDEX: 48.8 ML/M2
LEFT ATRIUM VOLUME MOD: 59.94 CM3
LEFT ATRIUM VOLUME: 80.93 CM3
LEFT CBA DIAS: 15 CM/S
LEFT CBA SYS: 69 CM/S
LEFT CCA DIST DIAS: 17 CM/S
LEFT CCA DIST SYS: 76 CM/S
LEFT CCA MID DIAS: 20 CM/S
LEFT CCA MID SYS: 97 CM/S
LEFT CCA PROX DIAS: 16 CM/S
LEFT CCA PROX SYS: 75 CM/S
LEFT ECA DIAS: 17 CM/S
LEFT ECA SYS: 123 CM/S
LEFT ICA DIST DIAS: 23 CM/S
LEFT ICA DIST SYS: 88 CM/S
LEFT ICA MID DIAS: 24 CM/S
LEFT ICA MID SYS: 86 CM/S
LEFT ICA PROX DIAS: 18 CM/S
LEFT ICA PROX SYS: 85 CM/S
LEFT INTERNAL DIMENSION IN SYSTOLE: 3.21 CM (ref 2.1–4)
LEFT VENTRICLE DIASTOLIC VOLUME INDEX: 64.98 ML/M2
LEFT VENTRICLE DIASTOLIC VOLUME: 107.87 ML
LEFT VENTRICLE MASS INDEX: 63 G/M2
LEFT VENTRICLE SYSTOLIC VOLUME INDEX: 24.8 ML/M2
LEFT VENTRICLE SYSTOLIC VOLUME: 41.15 ML
LEFT VENTRICULAR INTERNAL DIMENSION IN DIASTOLE: 4.81 CM (ref 3.5–6)
LEFT VENTRICULAR MASS: 105.34 G
LEFT VERTEBRAL DIAS: 8 CM/S
LEFT VERTEBRAL SYS: 26 CM/S
LV LATERAL E/E' RATIO: 15.71 M/S
LV SEPTAL E/E' RATIO: 18.33 M/S
LVOT MG: 2.41 MMHG
LVOT MV: 0.74 CM/S
MV A" WAVE DURATION": 9.51 MSEC
MV MEAN GRADIENT: 1 MMHG
MV PEAK A VEL: 1.22 M/S
MV PEAK E VEL: 1.1 M/S
MV PEAK GRADIENT: 2 MMHG
MV STENOSIS PRESSURE HALF TIME: 58.23 MS
MV VALVE AREA BY CONTINUITY EQUATION: 4.4 CM2
MV VALVE AREA P 1/2 METHOD: 3.78 CM2
OHS CV CAROTID RIGHT ICA EDV HIGHEST: 33
OHS CV CAROTID ULTRASOUND LEFT ICA/CCA RATIO: 1.16
OHS CV CAROTID ULTRASOUND RIGHT ICA/CCA RATIO: 2.64
OHS CV PV CAROTID LEFT HIGHEST CCA: 97
OHS CV PV CAROTID LEFT HIGHEST ICA: 88
OHS CV PV CAROTID RIGHT HIGHEST CCA: 67
OHS CV PV CAROTID RIGHT HIGHEST ICA: 156
OHS CV RV/LV RATIO: 0.66 CM
OHS CV US CAROTID LEFT HIGHEST EDV: 24
OHS LV EJECTION FRACTION SIMPSONS BIPLANE MOD: 67 %
PISA AR MAX VEL: 4.9 M/S
PISA MRMAX VEL: 5.25 M/S
PISA TR MAX VEL: 2.8 M/S
PULM VEIN S/D RATIO: 1.38
PV PEAK D VEL: 0.4 M/S
PV PEAK S VEL: 0.55 M/S
RA MAJOR: 5.05 CM
RA PRESSURE ESTIMATED: 3 MMHG
RA VOL SYS: 36.07 ML
RA WIDTH: 3.61 CM
RIGHT ARM DIASTOLIC BLOOD PRESSURE: 81 MMHG
RIGHT ARM SYSTOLIC BLOOD PRESSURE: 167 MMHG
RIGHT ATRIAL AREA: 14.6 CM2
RIGHT CBA DIAS: 19 CM/S
RIGHT CBA SYS: 93 CM/S
RIGHT CCA DIST DIAS: 14 CM/S
RIGHT CCA DIST SYS: 59 CM/S
RIGHT CCA MID DIAS: 13 CM/S
RIGHT CCA MID SYS: 67 CM/S
RIGHT CCA PROX DIAS: 12 CM/S
RIGHT CCA PROX SYS: 59 CM/S
RIGHT ECA DIAS: 11 CM/S
RIGHT ECA SYS: 107 CM/S
RIGHT ICA DIST DIAS: 14 CM/S
RIGHT ICA DIST SYS: 83 CM/S
RIGHT ICA MID DIAS: 22 CM/S
RIGHT ICA MID SYS: 142 CM/S
RIGHT ICA PROX DIAS: 33 CM/S
RIGHT ICA PROX SYS: 156 CM/S
RIGHT VENTRICULAR END-DIASTOLIC DIMENSION: 3.19 CM
RIGHT VERTEBRAL DIAS: 11 CM/S
RIGHT VERTEBRAL SYS: 43 CM/S
RV TB RVSP: 6 MMHG
RV TISSUE DOPPLER FREE WALL SYSTOLIC VELOCITY 1 (APICAL 4 CHAMBER VIEW): 15.29 CM/S
SINUS: 2.52 CM
STJ: 2.1 CM
TDI LATERAL: 0.07 M/S
TDI SEPTAL: 0.06 M/S
TDI: 0.07 M/S
TR MAX PG: 31 MMHG
TRICUSPID ANNULAR PLANE SYSTOLIC EXCURSION: 2.1 CM
TV REST PULMONARY ARTERY PRESSURE: 34 MMHG
Z-SCORE OF LEFT VENTRICULAR DIMENSION IN END DIASTOLE: 0.34
Z-SCORE OF LEFT VENTRICULAR DIMENSION IN END SYSTOLE: 0.85

## 2024-04-29 PROCEDURE — 93880 EXTRACRANIAL BILAT STUDY: CPT | Mod: 26,,, | Performed by: INTERNAL MEDICINE

## 2024-04-29 PROCEDURE — 93306 TTE W/DOPPLER COMPLETE: CPT | Mod: 26,,, | Performed by: INTERNAL MEDICINE

## 2024-04-29 PROCEDURE — 93306 TTE W/DOPPLER COMPLETE: CPT

## 2024-04-29 PROCEDURE — 93880 EXTRACRANIAL BILAT STUDY: CPT

## 2024-04-29 RX ORDER — MELOXICAM 7.5 MG/1
7.5 TABLET ORAL DAILY PRN
Qty: 30 TABLET | Refills: 0 | Status: SHIPPED | OUTPATIENT
Start: 2024-04-29 | End: 2024-05-29

## 2024-04-29 RX ORDER — TRIAMCINOLONE ACETONIDE 40 MG/ML
40 INJECTION, SUSPENSION INTRA-ARTICULAR; INTRAMUSCULAR
Status: SHIPPED | OUTPATIENT
Start: 2024-04-29

## 2024-04-29 NOTE — TELEPHONE ENCOUNTER
Spoke to patient, sure is 100% sure she in not allergic to NSAIDS. Aware rx was sent to pharmacy. Verbalized understanding and denies any further questions at this time.

## 2024-04-29 NOTE — TELEPHONE ENCOUNTER
Spoke to patient and her daughter. They state she is not allergic to NSAIDs and would like something to get through the pain until 5/15. Patient states she is unable to walk due to pain. Advised patient and daughter I would notify Dr Marin.

## 2024-04-29 NOTE — TELEPHONE ENCOUNTER
----- Message from Basilio Singleton sent at 4/29/2024  8:46 AM CDT -----  Regarding: Medication  Contact: Ashlee [phone]  Pt's daughter Ashlee is calling in ref to pt's medication. Pt was told she would prescribe Meloxicam pharmacy never received prescription says Ashlee. Patient Requesting Call Back @ Ashlee [phone]      [id number] [address] AT [facility] & [address] [address]  Phone: [phone] Fax: [phone]

## 2024-04-30 ENCOUNTER — EXTERNAL CHRONIC CARE MANAGEMENT (OUTPATIENT)
Dept: PRIMARY CARE CLINIC | Facility: CLINIC | Age: 84
End: 2024-04-30
Payer: MEDICARE

## 2024-04-30 PROCEDURE — 99490 CHRNC CARE MGMT STAFF 1ST 20: CPT | Mod: S$PBB,,, | Performed by: INTERNAL MEDICINE

## 2024-04-30 PROCEDURE — 99490 CHRNC CARE MGMT STAFF 1ST 20: CPT | Mod: PBBFAC | Performed by: INTERNAL MEDICINE

## 2024-05-01 ENCOUNTER — TELEPHONE (OUTPATIENT)
Dept: PAIN MEDICINE | Facility: CLINIC | Age: 84
End: 2024-05-01
Payer: MEDICARE

## 2024-05-01 NOTE — TELEPHONE ENCOUNTER
----- Message from Taryn Batista sent at 5/1/2024 12:02 PM CDT -----  Regarding: Returning Missed Call  Contact: [phone]  Returning a Missed Call    Caller: Patient     Returning call to: Saba Alexander    Nature of call: In regards to moving pt's procedure date up

## 2024-05-07 NOTE — PRE-PROCEDURE INSTRUCTIONS
Unable to reach pt via phone.  Left voicemail with arrival time also informing pt of need for responsible  accompaniment and instructing pt to follow pre-procedure instructions provided via [facility] portal.  The following message was sent to pt's portal.      Dear Luz,     You are scheduled for a procedure with Dr. Marin on 05/09/2024  Your scheduled arrival time is 09:00 am.  This arrival time is roughly 1 hour before your anticipated procedure time to allow sufficient time for pre-op..  Please wear comfortable clothes.  Most patients do not need to change into a gown.  Please do not wear a dress.  This procedure will take place at the [facility] at the corner of [facility] and [facility].  [facility] [place] [facility] to [facility].  The address is:     [address] [place] [address].  [place] [address]     After entering the building, you will proceed to the second floor where you can check in with registration. You should take any medications that you routinely take for blood pressure, heart medications, thyroid, cholesterol, etc.      The fasting restrictions are dependent on whether or not you are receiving sedation.  Sedation is not available for all procedures.      Your fasting instructions are as follow:  IV sedation. You should not eat for 8 hours and can only drink clear liquids (water or black coffee without cream/sugar) up until 2 hours before your scheduled time.  You CANNOT drive yourself and must have a .     If you are on blood thinners, you need to follow the anticoagulation instructions that had been discussed previously.  You should only stop the blood thinners if it was approved by your primary care physician or your cardiologist.  In the event that you are not able to stop your blood thinners, a blood thinner was not listed on your medication list, or we were not able to get clearance from your cardiologist, then the procedure may have to  be postponed/canceled.      IF you were told to stop your blood thinners, this is how long you should generally hold some of the more common ones.  Remember that stopping blood thinners is only necessary for certain procedures. If you are unsure of your instructions, please call us.   Aspirin - 5 days  Plavix/Clopidogrel - 7 days  Warfarin / Coumadin - 5 days  Eliquis - 3 days  Pradaxa/Dabigatran - 4 days  Xarelto/Rivaroxaban - 3 days     If you are a diabetic, do not take your medication if you will be fasting, but bring it with you. Please plan on being here for roughly 2 hours.     Please call us if you have been sick (running fever, having any flu-like symptoms) or have been taking antibiotics in the past 2 weeks or had any outpatient procedures other than with us (colonoscopy, endoscopy, OBGYN, dental, etc.). If you have been previously COVID positive, you will need to hold off on your procedure until you are symptom free for 10 days. If you did not have any symptoms, you can have your procedure 10 days from your positive test result.       *HOLD ALL VITAMINS, MINERALS, HERBS (INCLUDING HERBAL TEAS) AND SUPPLEMENTS  *SHOWER WITH ANTIBACTERIAL SOAP (EX. DIAL) NIGHT BEFORE AND MORNING OF PROCEDURE  *DO NOT APPLY ANY LOTIONS, OILS, POWDERS, PERFUME/COLOGNE, OINTMENTS, GELS, CREAMS, MAKEUP OR DEODORANT TO YOUR SKIN MORNING OF PROCEDURE  *LEAVE JEWELRY AND ANY VALUABLES AT HOME  *WEAR LOOSE COMFORTABLE CLOTHING (PREFERABLY A BUTTON UP SHIRT)        Thank you,  [facility]  [place], LPN [facility]  Pre-Admit

## 2024-05-09 ENCOUNTER — TELEPHONE (OUTPATIENT)
Dept: PAIN MEDICINE | Facility: CLINIC | Age: 84
End: 2024-05-09
Payer: MEDICARE

## 2024-05-09 ENCOUNTER — HOSPITAL ENCOUNTER (OUTPATIENT)
Facility: HOSPITAL | Age: 84
Discharge: HOME | End: 2024-05-09
Attending: EMERGENCY MEDICINE | Admitting: EMERGENCY MEDICINE
Payer: MEDICARE

## 2024-05-09 VITALS
RESPIRATION RATE: 16 BRPM | SYSTOLIC BLOOD PRESSURE: 134 MMHG | TEMPERATURE: 97 F | OXYGEN SATURATION: 96 % | HEART RATE: 61 BPM | DIASTOLIC BLOOD PRESSURE: 64 MMHG

## 2024-05-09 DIAGNOSIS — G89.29 CHRONIC PAIN: ICD-10-CM

## 2024-05-09 LAB — POCT GLUCOSE: 106 MG/DL (ref 70–110)

## 2024-05-09 PROCEDURE — 82962 GLUCOSE BLOOD TEST: CPT | Performed by: EMERGENCY MEDICINE

## 2024-05-09 PROCEDURE — 63600175 PHARM REV CODE 636 W HCPCS: Performed by: EMERGENCY MEDICINE

## 2024-05-09 PROCEDURE — 64483 NJX AA&/STRD TFRM EPI L/S 1: CPT | Mod: RT,,, | Performed by: EMERGENCY MEDICINE

## 2024-05-09 PROCEDURE — 25500020 PHARM REV CODE 255: Performed by: EMERGENCY MEDICINE

## 2024-05-09 PROCEDURE — 64483 NJX AA&/STRD TFRM EPI L/S 1: CPT | Mod: RT | Performed by: EMERGENCY MEDICINE

## 2024-05-09 PROCEDURE — 25000003 PHARM REV CODE 250: Performed by: EMERGENCY MEDICINE

## 2024-05-09 RX ORDER — DEXAMETHASONE SODIUM PHOSPHATE 10 MG/ML
INJECTION INTRAMUSCULAR; INTRAVENOUS
Status: DISCONTINUED | OUTPATIENT
Start: 2024-05-09 | End: 2024-05-09 | Stop reason: HOSPADM

## 2024-05-09 RX ORDER — MIDAZOLAM HYDROCHLORIDE 1 MG/ML
INJECTION INTRAMUSCULAR; INTRAVENOUS
Status: DISCONTINUED | OUTPATIENT
Start: 2024-05-09 | End: 2024-05-09 | Stop reason: HOSPADM

## 2024-05-09 RX ORDER — LIDOCAINE HYDROCHLORIDE 20 MG/ML
INJECTION, SOLUTION EPIDURAL; INFILTRATION; INTRACAUDAL; PERINEURAL
Status: DISCONTINUED | OUTPATIENT
Start: 2024-05-09 | End: 2024-05-09 | Stop reason: HOSPADM

## 2024-05-09 RX ORDER — FENTANYL CITRATE 50 UG/ML
INJECTION, SOLUTION INTRAMUSCULAR; INTRAVENOUS
Status: DISCONTINUED | OUTPATIENT
Start: 2024-05-09 | End: 2024-05-09 | Stop reason: HOSPADM

## 2024-05-09 RX ORDER — LIDOCAINE HYDROCHLORIDE 10 MG/ML
INJECTION INFILTRATION; PERINEURAL
Status: DISCONTINUED | OUTPATIENT
Start: 2024-05-09 | End: 2024-05-09 | Stop reason: HOSPADM

## 2024-05-09 NOTE — DISCHARGE SUMMARY
Discharge Note  Short Stay      SUMMARY     Admit Date: 5/9/2024    Attending Physician: Jose Marin      Discharge Physician: Jose Marin      Discharge Date: 5/9/2024 11:45 AM    Procedure(s) (LRB):  TFESI L4/5 RT (Right)    Final Diagnosis: Degeneration, intervertebral disc, lumbosacral [M51.37]  Lumbosacral radiculopathy [M54.17]    Disposition: Home or self care    Patient Instructions:   Current Discharge Medication List        CONTINUE these medications which have NOT CHANGED    Details   atorvastatin (LIPITOR) 20 MG tablet Take 1 tablet (20 mg total) by mouth once daily.  Qty: 90 tablet, Refills: 3    Associated Diagnoses: Hyperlipidemia, unspecified hyperlipidemia type      calcium carbonate/vitamin D3 (VITAMIN D-3 ORAL) Take by mouth.      carvediloL (COREG) 6.25 MG tablet Take 1 tablet (6.25 mg total) by mouth 2 (two) times daily with meals.  Qty: 60 tablet, Refills: 11    Comments: .  Associated Diagnoses: Chronic heart failure with preserved ejection fraction      cetirizine (ZYRTEC) 10 MG tablet Take 10 mg by mouth once daily.      chlorhexidine (PERIDEX) 0.12 % solution Use as directed 15 mLs in the mouth or throat daily as needed (halitosis).  Qty: 473 mL, Refills: 3      cholecalciferol, vitamin D3, 125 mcg (5,000 unit) Tab Take 5,000 Units by mouth once daily.      cyanocobalamin (VITAMIN B-12) 1000 MCG tablet Take 100 mcg by mouth once daily.      empagliflozin (JARDIANCE) 10 mg tablet Take 1 tablet (10 mg total) by mouth once daily.  Qty: 90 tablet, Refills: 3      FLUoxetine 40 MG capsule Take 1 capsule (40 mg total) by mouth once daily.  Qty: 30 capsule, Refills: 11    Associated Diagnoses: Recurrent major depressive disorder, in partial remission; Generalized anxiety disorder      NIFEdipine (PROCARDIA-XL) 30 MG (OSM) 24 hr tablet Take 1 tablet (30 mg total) by mouth once daily.  Qty: 90 tablet, Refills: 3    Comments: .  Associated Diagnoses: Hypertension, unspecified type       alendronate (FOSAMAX) 70 MG tablet Take 1 tablet (70 mg total) by mouth every 7 days.  Qty: 12 tablet, Refills: 4      aspirin (ECOTRIN) 81 MG EC tablet Take 81 mg by mouth once daily.      DULCOLAX, BISACODYL, ORAL Take 1 tablet by mouth every evening.      ketoconazole (NIZORAL) 2 % shampoo Apply topically twice a week.  Qty: 120 mL, Refills: 2      LORazepam (ATIVAN) 0.5 MG tablet Take 1 tablet (0.5 mg total) by mouth On call Procedure for Anxiety. Take 1 tab 30 min before MRI. May take 2nd if needed  Qty: 2 tablet, Refills: 0      meloxicam (MOBIC) 7.5 MG tablet Take 1 tablet (7.5 mg total) by mouth daily as needed for Pain.  Qty: 30 tablet, Refills: 0      mometasone (ELOCON) 0.1 % solution Mix entire bottle in jar of cerave cream and aaa qd- bid prn itching  Qty: 60 mL, Refills: 3    Associated Diagnoses: Lichen planus                 Discharge Diagnosis: Degeneration, intervertebral disc, lumbosacral [M51.37]  Lumbosacral radiculopathy [M54.17]  Condition on Discharge: Stable with no complications to procedure   Diet on Discharge: Same as before.  Activity: as per instruction sheet.  Discharge to: Home with a responsible adult.  Follow up: 2-4 weeks       Please call my office or pager at [phone] if experienced any weakness or loss of sensation, fever > 101.5, pain uncontrolled with oral medications, persistent nausea/vomiting/or diarrhea, redness or drainage from the incisions, or any other worrisome concerns. If physician on call was not reached or could not communicate with our office for any reason please go to the nearest emergency department

## 2024-05-09 NOTE — PLAN OF CARE
Luz Deysi Amaro has met all discharge criteria from Phase II. Vital Signs are stable, ambulating  without difficulty. Discharge instructions given, patient verbalized understanding. Discharged from facility via wheelchair in stable condition.

## 2024-05-09 NOTE — DISCHARGE INSTRUCTIONS
[facility] [place]  Dr. Jose Marin  Messaging service # [phone]    POST-PROCEDURE INSTRUCTIONS:    Today you had an injection that included a steroid medications.  The steroid may or may not have been mixed with a local anesthetic when it was injected.   If the injection was in the neck, you may feel some pressure, numbness, or slight weakness in the arm after the procedure for a short period of time (this is a normal response), if this persists for longer than 1 day please contact our office or go to the emergency room.  If the injection was in the low back, you may feel some pressure, numbness, or slight weakness in the leg after the procedure for a short period of time (this is a normal response), if this persists for longer than 1 day please contact our office or go to the emergency room.  You may get side effects from the steroid.  This is not uncommon.  Symptoms include: elevated blood sugar, elevated blood pressure, headache, flushing, nausea, insomnia.  These symptoms are transient and will resolve within 1-3 days.  If symptoms last longer than this please contact our office or head to the emergency room.  Steroid medications can take anywhere from 3-14 days to take effect (rarely longer).  You may notice that your pain worsens for a short period of time after the injection, this would not be unusual due to the pressure and trauma from the needle.    If you do not have a follow up appointment scheduled, please contact my office (or the office of the physician who referred you for the procedure) to get a post-procedure follow up scheduled 2-4 weeks after the procedure.  This can be done as a virtual visit if that is more convenient for you.      What you need to do:    Keep a record of your response to the injection you had today.    How much relief did you get?   When did the relief start and how long did it last?  Were you able to decrease the use of any of your pain  medications?  Were you able to increase your level of activity?  How long did the relief last?    What to watch out for:    If you experience any of the following symptoms after your procedure, please notify the messaging service immediately (see above for contact information):   fever (increased oral temperature)   bleeding or swelling at the injection site,    drainage, rash or redness at the injection site    possible signs of infection    increased pain at the injection site   worsening of your usual pain   severe headache   new or worsening numbness    new arm and/or leg weakness, or    changes in bowel and/or bladder function: urinating or defecating on yourself and not knowing that you did it.    PLEASE FOLLOW ALL INSTRUCTIONS CAREFULLY     Do not engage in strenuous activity (e.g., lifting or pushing heavy objects or repeated bending) for 24 hours.     Do not take a bath, swim or use Jacuzzi for 24 hours after procedure. (A shower is fine).   Remove any Band-Aids when you get home.    Use cold/ice, as needed for comfort.  We recommend the use of cold therapy alternating on for 20 minutes, off for 20 minutes.    Do not apply direct heat (heating pad or heat packs) to the injection site for 24 hours.     Resume your usual medications, unless instructed otherwise by your Pain Physician.     If you are on warfarin (Coumadin) or other blood thinner, resume this medication as instructed by your prescribing Physician.    IF AT ANY POINT YOU ARE VERY CONCERNED ABOUT YOUR SYMPTOMS, PLEASE GO TO THE EMERGENCY ROOM.    If you develop worsening pain, weakness, numbness, lose bowel or bladder control (i.e., having an accident where you did not even know you had to go to the bathroom and suddenly noticed you soiled yourself), saddle anesthesia (a loss of sensation restricted to the area of the buttocks, anus and between the legs -- i.e., those parts of your body that would touch a saddle if you were sitting on one) you  need to go immediately to the emergency department for evaluation and treatment.    ----------------------------------------------------------------------------------------------------------------------------------------------------------------  If you received Sedation please read the following instructions:  POST SEDATION INSTRUCTIONS    Today you received intravenous medication (also known as sedation) that was used to help you relax and/or decrease discomfort during your procedure. This medication will be acting in your body for the next 24 hours, so you might feel a little tired or sleepy. This feeling will slowly wear off.   Common side effects associated with these medications include: drowsiness, dizziness, sleepiness, confusion, feeling excited, difficulty remembering things, lack of steadiness with walking or balance, loss of fine muscle control, slowed reflexes, difficulty focusing, and blurred vision.  Some over-the-counter and prescription medications (e.g., muscle relaxants, opioids, mood-altering medications, sedatives/hypnotics, antihistamines) can interact with the intravenous medication you received and cause an increased risk of the side effects listed above in addition to other potentially life threatening side effects. Use extreme caution if you are taking such medications, and consult with your Pain Physician or prescribing physician if you have any questions.  For the next 12-24 hours:    DO NOT--Drive a car, operate machinery or power tools   DO NOT--Drink any alcoholic beverages (not even beer), they may dangerously increase the risk of side effects.    DO NOT--Make any important legal or business decisions or sign important documents.  We advise you to have someone to assist you at home. Move slowly and carefully. Do not make sudden changes in position. Be aware of dizziness or light-headedness and move accordingly.   If you seek medical treatment within 24 hours, let the nurse or doctor  caring for you know that you have received the above medications. If you have any questions or concerns related to your sedation or treatment today please contact us.

## 2024-05-09 NOTE — PLAN OF CARE
Pt in preop bay 29, VSS and IV inserted. Pt denies any open wounds on body or the use of any immunizations or antibiotics in the past 2 weeks. Pt needs site marking and consents, otherwise ready to roll.

## 2024-05-09 NOTE — OP NOTE
Lumbar Transforaminal Epidural Steroid Injection under Fluoroscopic Guidance    The procedure, risks, benefits, and options were discussed with the patient. There are no contraindications to the procedure. The patent expressed understanding and agreed to the procedure. Informed written consent was obtained prior to the start of the procedure and can be found in the patient's chart.    PATIENT NAME: Luz Amaro   MRN: [id number]     DATE OF PROCEDURE: 05/09/2024    PROCEDURE:  Right  L4/5 Lumbar Transforaminal Epidural Steroid Injection under Fluoroscopic Guidance    PRE-OP DIAGNOSIS: Degeneration, intervertebral disc, lumbosacral [M51.37]  Lumbosacral radiculopathy [M54.17] Lumbar radiculopathy [M54.16]    POST-OP DIAGNOSIS: Same    PHYSICIAN: Jose Marin MD    MEDICATIONS INJECTED: Preservative-free Decadron 10mg with 5cc of Lidocaine 1% MPF     LOCAL ANESTHETIC INJECTED: Xylocaine 2%     SEDATION: Versed 1mg and Fentanyl 50mcg                                                                                                                                                                                     Conscious sedation ordered by M.D. Patient re-evaluation prior to administration of conscious sedation. No changes noted in patient's status from initial evaluation. The patient's vital signs were monitored by RN and patient remained hemodynamically stable throughout the procedure.    Event Time In   Sedation Start 1136   Sedation End 1144       ESTIMATED BLOOD LOSS: None    COMPLICATIONS: None    TECHNIQUE: Time-out was performed to identify the patient and procedure to be performed. With the patient laying in a prone position, the surgical area was prepped and draped in the usual sterile fashion using ChloraPrep and a fenestrated drape.The levels were determined under fluoroscopy guidance. Skin anesthesia was achieved by injecting Lidocaine 2% over the injection sites. The transforaminal spaces were then  approached with a 25 gauge, 3.5 inch spinal quinke needle that was introduced under fluoroscopic guidance in the AP and Lateral views. Once the needle tip was in the area of the transforaminal space, and there was no blood, CSF or paraesthesias, contrast dye Omnipaque (300mg/mL) was injected to confirm placement and there was no vascular runoff. Fluoroscopic imaging in the AP and lateral views revealed a clear outline of the spinal nerve with proximal spread of agent through the neural foramen into the epidural space. 3 mL of the medication mixture listed above was injected slowly at each site. Displacement of the radio opaque contrast after injection of the medication confirmed that the medication went into the area of the transforaminal spaces. The needles were removed and bleeding was nil. A sterile dressing was applied. No specimens collected. The patient tolerated the procedure well.     The patient was monitored after the procedure in the recovery area. They were given post-procedure and discharge instructions to follow at home. The patient was discharged in a stable condition.      Jose Marin MD

## 2024-05-27 ENCOUNTER — OFFICE VISIT (OUTPATIENT)
Dept: CARDIOLOGY | Facility: CLINIC | Age: 84
End: 2024-05-27
Payer: MEDICARE

## 2024-05-27 VITALS
HEIGHT: 61 IN | HEART RATE: 64 BPM | WEIGHT: 147.25 LBS | BODY MASS INDEX: 27.8 KG/M2 | DIASTOLIC BLOOD PRESSURE: 66 MMHG | SYSTOLIC BLOOD PRESSURE: 122 MMHG

## 2024-05-27 DIAGNOSIS — I50.32 CHRONIC HEART FAILURE WITH PRESERVED EJECTION FRACTION: ICD-10-CM

## 2024-05-27 DIAGNOSIS — I70.0 AORTIC ATHEROSCLEROSIS: Primary | ICD-10-CM

## 2024-05-27 DIAGNOSIS — E78.5 HYPERLIPIDEMIA, UNSPECIFIED HYPERLIPIDEMIA TYPE: ICD-10-CM

## 2024-05-27 DIAGNOSIS — I35.0 NONRHEUMATIC AORTIC VALVE STENOSIS: ICD-10-CM

## 2024-05-27 DIAGNOSIS — I10 PRIMARY HYPERTENSION: ICD-10-CM

## 2024-05-27 PROCEDURE — 99999 PR PBB SHADOW E&M-EST. PATIENT-LVL III: CPT | Mod: PBBFAC,,, | Performed by: INTERNAL MEDICINE

## 2024-05-27 PROCEDURE — 99214 OFFICE O/P EST MOD 30 MIN: CPT | Mod: S$PBB,,, | Performed by: INTERNAL MEDICINE

## 2024-05-27 PROCEDURE — 99213 OFFICE O/P EST LOW 20 MIN: CPT | Mod: PBBFAC | Performed by: INTERNAL MEDICINE

## 2024-05-27 NOTE — PROGRESS NOTES
HISTORY:    83-year-old female with a history of HFpEF, moderate aortic stenosis, hypertension, hyperlipidemia, and back pain presenting for follow-up evaluation.      Patient presented to ED with acute onset SOB in April '23. Elevated BNP with pulm edema on CXR. Diuresed and dc'd. Followed in the HF clinic and optimized. Never had symptoms other than the day of presentation.     No issues since and reports feeling well. No CP, SOB, or CAGLE. No light headedness, edema, or orthopnea.    She is trying to remain active. Limited by back pain. Doing water aerobics 2-3x/week.     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     Tolerates asa 81x1, carvedilol 6.25x2, empagliflozin 10x1, nifedipine 30x1, and atorvastatin 20x1. Bps controlled at home. Did not tolerate higher doses of atorvastatin.     PHYSICAL EXAM:    Vitals:    05/27/24 0946   BP: 122/66   Pulse: 64         NAD, A+Ox3.  No jvd, no bruit.  RRR nml s1,s2. 3/6 AS murmur.  CTA B no wheezes or crackles.  No edema.    LABS/STUDIES (imaging reviewed during clinic visit):    March 2024 creatinine 0.8/BUN 14.  / HDL 76/LDL 86/TG 73. May 2023 hb 11.9. April '23  -> May BNP 45.  EKG April 2023 revealing for normal sinus rhythm no Q-waves or ST changes.    TTE   April 2024 normal LV size and wall thickness with EF of 60-65%.  Grade 1 diastology.  Moderate aortic valve stenosis with a peak velocity of 3.6 m/sec.  Mild AI.  CVP 3.  Carotid   April 2024 bilateral carotid atherosclerosis with no significant ICA stenosis bilaterally.  Renal duplex 2020 no evidence of renal artery stenosis.  Venous duplex August 2023 no evidence of right lower extremity DVT.      ASSESSMENT & PLAN:    1. Aortic atherosclerosis    2. Chronic heart failure with preserved ejection fraction    3. Hyperlipidemia, unspecified hyperlipidemia type    4. Primary hypertension    5. Nonrheumatic aortic valve stenosis                   One time  episode of HFpEF in early '23 with unclear trigger. Asymptomatic since with nml BNP and euvolemic exam.     On empagliflozin 10x1. Doesn't require diuretic tx.    Bps controlled on carvedilol 6.25x2 and nifedipine 30x1.     Moderate AS, stable on April '24 TTE. Repeat at follow-up.     Tolerating atorvastatin at 20x1. Did not tolerate higher doses.     Patient is DNR/DNI. This is part of advanced directive planning with no impending medical issue. Can rescind for elective procedures.     Follow up in about 6 months (around 11/27/2024).      Julieta Hairston MD

## 2024-05-31 ENCOUNTER — EXTERNAL CHRONIC CARE MANAGEMENT (OUTPATIENT)
Dept: PRIMARY CARE CLINIC | Facility: CLINIC | Age: 84
End: 2024-05-31
Payer: MEDICARE

## 2024-05-31 PROCEDURE — 99439 CHRNC CARE MGMT STAF EA ADDL: CPT | Mod: PBBFAC | Performed by: INTERNAL MEDICINE

## 2024-05-31 PROCEDURE — 99490 CHRNC CARE MGMT STAFF 1ST 20: CPT | Mod: S$PBB,,, | Performed by: INTERNAL MEDICINE

## 2024-05-31 PROCEDURE — 99439 CHRNC CARE MGMT STAF EA ADDL: CPT | Mod: S$PBB,,, | Performed by: INTERNAL MEDICINE

## 2024-05-31 PROCEDURE — 99490 CHRNC CARE MGMT STAFF 1ST 20: CPT | Mod: PBBFAC | Performed by: INTERNAL MEDICINE

## 2024-06-03 ENCOUNTER — OFFICE VISIT (OUTPATIENT)
Dept: PAIN MEDICINE | Facility: CLINIC | Age: 84
End: 2024-06-03
Payer: MEDICARE

## 2024-06-03 VITALS
DIASTOLIC BLOOD PRESSURE: 75 MMHG | SYSTOLIC BLOOD PRESSURE: 127 MMHG | WEIGHT: 147.5 LBS | HEIGHT: 61 IN | BODY MASS INDEX: 27.85 KG/M2 | HEART RATE: 56 BPM

## 2024-06-03 DIAGNOSIS — M54.17 LUMBOSACRAL RADICULOPATHY: Primary | ICD-10-CM

## 2024-06-03 PROCEDURE — 99999 PR PBB SHADOW E&M-EST. PATIENT-LVL III: CPT | Mod: PBBFAC,,, | Performed by: EMERGENCY MEDICINE

## 2024-06-03 PROCEDURE — 99213 OFFICE O/P EST LOW 20 MIN: CPT | Mod: PBBFAC,PN | Performed by: EMERGENCY MEDICINE

## 2024-06-03 PROCEDURE — 99213 OFFICE O/P EST LOW 20 MIN: CPT | Mod: S$PBB,,, | Performed by: EMERGENCY MEDICINE

## 2024-06-03 NOTE — PROGRESS NOTES
Chronic Pain-Established (Follow up visit)     Interval History 06/03/24: The patient had a right TFESI at L4/5 on 05/09/2024 and they report 100% pain relief. Patient was started on medications during their last visit and they have not been taking it because their pain is resolved.  They are participating in physical therapy and are participating in home exercise plan.    Original HPI 04/26/24: Luz Amaro is a 83 y.o. 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 She presents in referral from No ref. provider found for right buttock pain    Original Pain Description:  The pain is located in the lower back bilaterally and is radiating to the left roman . The pain is described as sharp. Exacerbating factors: Walking and Getting out of bed/chair. Mitigating factors rest. Symptoms interfere with daily activity. The patient feels like symptoms have been worsening. Patient denies night fever/night sweats, urinary incontinence, bowel incontinence, significant weight loss, significant motor weakness, and loss of sensations.    PAIN SCORES:  Best: Pain is 2  Current: Pain is 5  Worst: Pain is 10        6/3/2024     1:44 PM   Last 3 PDI Scores   Pain Disability Index (PDI) 7       6 weeks of Conservative therapy:  PT: Completed  Chiro:  HEP: Participating      Treatments / Medications: (Ice/Heat/NSAIDS/APAP/etc):  Fluoxetine 40 mg  Tizanidine 4 mg  Ibu     Antiplatelets/Anticoagulants:  Asa 81mg    Interventional Pain Procedures: (Previous injections)  05/09/24 L4/5 right TFESI  03/09/20 L4/5 Right TFESI     IMAGING:    MRI LUMBAR SPINE WITHOUT CONTRAST  03/07/2023  L4: remote compression fracture.    L2-3: mild DB   L3-4: mild DB, mod facet joint arthropathy, small  posterior annular tear=>mild spinal canal stenosis and mild right-sided neural foraminal narrowing.  L4-5: small right paracentral DP ? impinging on right L5 NR.  Mod facet joint arthropathy =>moderate spinal canal stenosis.    L5-S1: mild DB, facet joint arthropathy and mild disc height loss =>mod left + mild right neural foraminal narrowing.       XR LUMBAR SPINE AP AND LATERAL  2023  disc space height loss at L3-L4, L4-L5, and L5-S1.  The facet joints are aligned noting lower lumbar facet arthropathy.  The sacral coccygeal segments are aligned.  AP spinal alignment is unremarkable.  The sacroiliac joints are intact noting degenerative change.  There is calcification of the aorta.                                              Past Surgical History:   Procedure Laterality Date    APPENDECTOMY      CHOLECYSTECTOMY      COLONOSCOPY N/A 2020    Procedure: COLONOSCOPY;  Surgeon: Giovanny Chao MD;  Location: [facility] ENDO ([address] [place] [address]);  Service: Endoscopy;  Laterality: N/A;  COVID test on 20 at Primary Care -     ECTOPIC PREGNANCY SURGERY      INJECTION, SPINE, LUMBOSACRAL, TRANSFORAMINAL APPROACH Right 2024    Procedure: TFESI L4/5 RT;  Surgeon: Jose Marin MD;  Location: [facility] PAIN MANAGEMENT;  Service: Pain Management;  Laterality: Right;  20mins- ASA 5d-takes on her own    OOPHORECTOMY      SKIN BIOPSY      TRANSFORAMINAL EPIDURAL INJECTION OF STEROID Right 3/9/2023    Procedure: INJECTION, STEROID, EPIDURAL, TRANSFORAMINAL APPROACH, RIGHT L4/L5  URGENT;  Surgeon: Palmer Tinsley MD;  Location: [facility] PAIN MGT;  Service: Pain Management;  Laterality: Right;       Social History     Socioeconomic History    Marital status:    Tobacco Use    Smoking status: Former     Current packs/day: 0.00     Types: Cigarettes     Quit date: 2/10/1983     Years since quittin.3    Smokeless tobacco: Never   Substance and Sexual Activity    Alcohol use: Not Currently    Drug use: No    Sexual  "activity: Not Currently     Partners: Male   Social History Narrative    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     Social Determinants of Health     Financial Resource Strain: Low Risk  (11/24/2023)    Overall Financial Resource Strain (CARDIA)     Difficulty of Paying Living Expenses: Not hard at all   Food Insecurity: No Food Insecurity (11/24/2023)    Hunger Vital Sign     Worried About Running Out of Food in the Last Year: Never true     Ran Out of Food in the Last Year: Never true   Transportation Needs: No Transportation Needs (11/24/2023)    PRAPARE - Transportation     Lack of Transportation (Medical): No     Lack of Transportation (Non-Medical): No   Physical Activity: Insufficiently Active (11/24/2023)    Exercise Vital Sign     Days of Exercise per Week: 3 days     Minutes of Exercise per Session: 30 min   Stress: Stress Concern Present (11/24/2023)    [employer] [place] [employer] - Occupational Stress Questionnaire     Feeling of Stress : To some extent   Housing Stability: Low Risk  (11/24/2023)    Housing Stability Vital Sign     Unable to Pay for Housing in the Last Year: No     Number of Places Lived in the Last Year: 1     Unstable Housing in the Last Year: No       Medications/Allergies: See med card    ROS:  GENERAL: No fever. No chills. No fatigue. Denies weight loss. Denies weight gain.  Back / musculoskeletal / neuro : See HPI    VITALS:   Vitals:    06/03/24 1343   BP: 127/75   Pulse: (!) 56   Weight: 66.9 kg (147 lb 7.8 oz)   Height: 5' 1" (1.549 m)   PainSc:   1   PainLoc: Back     Body mass index is 27.87 kg/m².      6/3/2024     1:44 PM 4/26/2024    11:26 AM   Last 3 PDI Scores   Pain Disability Index (PDI) 7 15       PHYSICAL EXAM:   GENERAL: Well appearing, in no acute distress, alert and oriented x3.  PSYCH:  Mood and affect appropriate.  SKIN: Skin color, texture, turgor normal, no rashes or lesions.  HEENT:  Normocephalic, atraumatic. Cranial nerves grossly " intact.  NECK: No pain to palpation over the cervical paraspinous muscles. No pain to palpation over facets. No pain with neck flexion, extension, or lateral flexion.   PULM: No evidence of respiratory difficulty, symmetric chest rise.  GI:  Non-distended  BACK:  Limited range of motion. No pain to palpation over the spinous processes.  Positive pain to palpation over lumbar facet joints.  Pain with axial loading. Positive tenderness to palpation over right SIJ. Positive ERROL,Ganselin, thigh thrust tests. FADIR negative.    There is no pain with palpation over the sacroiliac joints bilaterally.   EXTREMITIES: No deformities, edema, or skin discoloration.   MUSCULOSKELETAL: Shoulder, hip, and knee provocative maneuvers are negative. No atrophy is noted.  NEURO: Sensation is equal and appropriate bilaterally. Bilateral upper and lower extremity strength is normal and symmetric. Bilateral upper and lower extremity coordination and muscle stretch reflexes are physiologic and symmetric. Plantar response are downgoing. Straight leg raising in the supine position is positive to radicular pain on the right.   GAIT: normal.      LABS:    Lab Results   Component Value Date    HGBA1C 5.1 03/11/2024       Lab Results   Component Value Date    WBC 4.69 05/03/2023    HGB 11.9 (L) 05/03/2023    HCT 38.7 05/03/2023     (H) 05/03/2023     05/03/2023           ASSESSMENT: 83 y.o. year old female with pain, consistent with:    Encounter Diagnosis   Name Primary?    Lumbosacral radiculopathy Yes         DISCUSSION: Luz Amaro is a patient who has multiple pain generators who comes to us with radicular pain which was previously successfully treated with a transforaminal injection.  Her pain was successfully relieved with transforaminal injection.    PLAN:  - I have stressed the importance of physical activity and a home exercise plan to help with pain and improve health.  - Patient can continue with  medications for now since they are providing benefits, using them appropriately, and without side effects.  - Counseled patient regarding the importance of activity modification and constant sleeping habits.  - Imaging: Reviewed available imaging with patient and answered any questions they had regarding study.  - The patient's pathophysiology was explained in detail with reference to x-rays, models, other visual aids as appropriate.   - Follow up visit: return to clinic p.reben Marin MD  06/03/2024

## 2024-06-10 ENCOUNTER — PATIENT MESSAGE (OUTPATIENT)
Dept: INTERNAL MEDICINE | Facility: CLINIC | Age: 84
End: 2024-06-10
Payer: MEDICARE

## 2024-07-30 DIAGNOSIS — Z00.00 ENCOUNTER FOR MEDICARE ANNUAL WELLNESS EXAM: ICD-10-CM

## 2024-08-13 ENCOUNTER — TELEPHONE (OUTPATIENT)
Dept: ADMINISTRATIVE | Facility: CLINIC | Age: 84
End: 2024-08-13
Payer: MEDICARE

## 2024-08-15 ENCOUNTER — OFFICE VISIT (OUTPATIENT)
Dept: INTERNAL MEDICINE | Facility: CLINIC | Age: 84
End: 2024-08-15
Payer: MEDICARE

## 2024-08-15 VITALS
HEIGHT: 61 IN | RESPIRATION RATE: 18 BRPM | TEMPERATURE: 97 F | DIASTOLIC BLOOD PRESSURE: 72 MMHG | BODY MASS INDEX: 27.85 KG/M2 | WEIGHT: 147.5 LBS | OXYGEN SATURATION: 96 % | HEART RATE: 57 BPM | SYSTOLIC BLOOD PRESSURE: 120 MMHG

## 2024-08-15 DIAGNOSIS — F33.41 RECURRENT MAJOR DEPRESSIVE DISORDER, IN PARTIAL REMISSION: ICD-10-CM

## 2024-08-15 DIAGNOSIS — Z00.00 ENCOUNTER FOR MEDICARE ANNUAL WELLNESS EXAM: ICD-10-CM

## 2024-08-15 DIAGNOSIS — Z00.00 ENCOUNTER FOR PREVENTIVE HEALTH EXAMINATION: ICD-10-CM

## 2024-08-15 DIAGNOSIS — I70.0 AORTIC ATHEROSCLEROSIS: ICD-10-CM

## 2024-08-15 DIAGNOSIS — F41.1 GENERALIZED ANXIETY DISORDER: Primary | ICD-10-CM

## 2024-08-15 DIAGNOSIS — I50.32 CHRONIC HEART FAILURE WITH PRESERVED EJECTION FRACTION: ICD-10-CM

## 2024-08-15 PROCEDURE — 99215 OFFICE O/P EST HI 40 MIN: CPT | Mod: PBBFAC,PO | Performed by: NURSE PRACTITIONER

## 2024-08-15 PROCEDURE — 99999 PR PBB SHADOW E&M-EST. PATIENT-LVL V: CPT | Mod: PBBFAC,,, | Performed by: NURSE PRACTITIONER

## 2024-08-15 NOTE — PATIENT INSTRUCTIONS
Counseling and Referral of Other Preventative  (Italic type indicates deductible and co-insurance are waived)    Patient Name: Luz Amaro  Today's Date: 8/15/2024    Health Maintenance       Date Due Completion Date    Shingles Vaccine (1 of 2) Never done ---    COVID-19 Vaccine (6 - 2023-24 season) 09/01/2023 9/19/2022    Influenza Vaccine (1) 09/01/2024 9/28/2023    DEXA Scan 01/22/2026 1/22/2024    TETANUS VACCINE 09/27/2026 9/27/2016    Lipid Panel 03/11/2029 3/11/2024        No orders of the defined types were placed in this encounter.    The following information is provided to all patients.  This information is to help you find resources for any of the problems found today that may be affecting your health:                  Living healthy guide: www.[facility].[place].gov      Understanding Diabetes: www.diabetes.org      Eating healthy: www.cdc.gov/healthyweight      CDC home safety checklist: www.cdc.gov/steadi/patient.html      Agency on Aging: www.goea.[place].[facility]      Alcoholics anonymous (AA): www.aa.org      Physical Activity: www.tayler.nih.gov/ma8phzu      Tobacco use: www.quitwithusla.org

## 2024-08-15 NOTE — PROGRESS NOTES
"  Luz Amaro presented for a  Medicare AWV and comprehensive Health Risk Assessment today. The following components were reviewed and updated:    Medical history  Family History  Social history  Allergies and Current Medications  Health Risk Assessment  Health Maintenance  Care Team         ** See Completed Assessments for Annual Wellness Visit within the encounter summary.**         The following assessments were completed:  Living Situation  CAGE  Depression Screening  Timed Get Up and Go  Whisper Test  Cognitive Function Screening  Nutrition Screening  ADL Screening  PAQ Screening      Opioid documentation:      Patient does not have a current opioid prescription.        Vitals:    08/15/24 1403   BP: 120/72   BP Location: Right arm   Patient Position: Sitting   BP Method: Small (Manual)   Pulse: (!) 57   Resp: 18   Temp: 97.2 °F (36.2 °C)   TempSrc: Temporal   SpO2: 96%   Weight: 66.9 kg (147 lb 7.8 oz)   Height: 5' 1" (1.549 m)     Body mass index is 27.87 kg/m².  Physical Exam  Vitals and nursing note reviewed.   Constitutional:       Appearance: She is well-developed.   HENT:      Head: Normocephalic and atraumatic.      Right Ear: External ear normal.      Left Ear: External ear normal.      Nose: Nose normal.   Eyes:      Pupils: Pupils are equal, round, and reactive to light.   Cardiovascular:      Rate and Rhythm: Normal rate and regular rhythm.      Heart sounds: Normal heart sounds.   Pulmonary:      Effort: Pulmonary effort is normal.      Breath sounds: Normal breath sounds.   Musculoskeletal:         General: Normal range of motion.      Cervical back: Normal range of motion.   Skin:     General: Skin is warm and dry.   Neurological:      Mental Status: She is alert and oriented to person, place, and time.               Diagnoses and health risks identified today and associated recommendations/orders:    1. Encounter for Medicare annual wellness exam  Health Maintenance updated   Records reviewed "   Exam done   - Ambulatory Referral/Consult to Enhanced Annual Wellness Visit (eAWV)    2. Generalized anxiety disorder  Stable and chronic. Continue current medications. Followed by PCP.     3. Recurrent major depressive disorder, in partial remission  PHQ 0 today. Stable and chronic. Continue current medications. Followed by PCP.     4. Aortic atherosclerosis  Stable and chronic. Continue current medications. Followed by PCP.     5. Chronic heart failure with preserved ejection fraction  Stable and chronic. Continue current medications. Followed by PCP.     6. Encounter for preventive health examination  Health Maintenance updated   Records reviewed   Exam done       Counseling and Referral of Other Preventative  (Italic type indicates deductible and co-insurance are waived)    Patient Name: Luz Amaro  Today's Date: 8/15/2024    Health Maintenance         Date Due Completion Date    Shingles Vaccine (1 of 2) Never done ---    COVID-19 Vaccine (6 - 2023-24 season) 09/01/2023 9/19/2022    Influenza Vaccine (1) 09/01/2024 9/28/2023    DEXA Scan 01/22/2026 1/22/2024    TETANUS VACCINE 09/27/2026 9/27/2016    Lipid Panel 03/11/2029 3/11/2024          No orders of the defined types were placed in this encounter.    Provided Luz with a 5-10 year written screening schedule and personal prevention plan. Recommendations were developed using the USPSTF age appropriate recommendations. Education, counseling, and referrals were provided as needed. After Visit Summary printed and given to patient which includes a list of additional screenings\tests needed.    Follow up in about 1 year (around 8/15/2025) for medicare annual wellness visit.    Uma Mccarthy NP      I offered to discuss advanced care planning, including how to pick a person who would make decisions for you if you were unable to make them for yourself, called a health care power of , and what kind of decisions you might make such as use of life  sustaining treatments such as ventilators and tube feeding when faced with a life limiting illness recorded on a living will that they will need to know. (How you want to be cared for as you near the end of your natural life)     X Patient is interested in learning more about how to make advanced directives.  I provided them paperwork and offered to discuss this with them.

## 2024-08-17 DIAGNOSIS — F41.1 GENERALIZED ANXIETY DISORDER: ICD-10-CM

## 2024-08-17 DIAGNOSIS — F33.41 RECURRENT MAJOR DEPRESSIVE DISORDER, IN PARTIAL REMISSION: ICD-10-CM

## 2024-08-19 RX ORDER — FLUOXETINE HYDROCHLORIDE 40 MG/1
40 CAPSULE ORAL
Qty: 30 CAPSULE | Refills: 11 | Status: SHIPPED | OUTPATIENT
Start: 2024-08-19

## 2024-08-31 ENCOUNTER — EXTERNAL CHRONIC CARE MANAGEMENT (OUTPATIENT)
Dept: PRIMARY CARE CLINIC | Facility: CLINIC | Age: 84
End: 2024-08-31
Payer: MEDICARE

## 2024-08-31 PROCEDURE — 99490 CHRNC CARE MGMT STAFF 1ST 20: CPT | Mod: S$PBB,,, | Performed by: INTERNAL MEDICINE

## 2024-08-31 PROCEDURE — 99439 CHRNC CARE MGMT STAF EA ADDL: CPT | Mod: PBBFAC | Performed by: INTERNAL MEDICINE

## 2024-08-31 PROCEDURE — 99490 CHRNC CARE MGMT STAFF 1ST 20: CPT | Mod: PBBFAC | Performed by: INTERNAL MEDICINE

## 2024-08-31 PROCEDURE — 99439 CHRNC CARE MGMT STAF EA ADDL: CPT | Mod: S$PBB,,, | Performed by: INTERNAL MEDICINE

## 2024-09-30 ENCOUNTER — HOSPITAL ENCOUNTER (EMERGENCY)
Facility: HOSPITAL | Age: 84
Discharge: HOME | End: 2024-10-01
Attending: STUDENT IN AN ORGANIZED HEALTH CARE EDUCATION/TRAINING PROGRAM
Payer: MEDICARE

## 2024-09-30 VITALS
BODY MASS INDEX: 27.75 KG/M2 | DIASTOLIC BLOOD PRESSURE: 62 MMHG | RESPIRATION RATE: 18 BRPM | OXYGEN SATURATION: 97 % | WEIGHT: 147 LBS | SYSTOLIC BLOOD PRESSURE: 144 MMHG | HEIGHT: 61 IN | TEMPERATURE: 99 F | HEART RATE: 71 BPM

## 2024-09-30 DIAGNOSIS — M25.531 FOREARM JOINT PAIN, RIGHT: ICD-10-CM

## 2024-09-30 DIAGNOSIS — S09.90XA CLOSED HEAD INJURY, INITIAL ENCOUNTER: ICD-10-CM
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
## 2024-09-30 DIAGNOSIS — S41.111A LACERATION OF RIGHT UPPER EXTREMITY, INITIAL ENCOUNTER: ICD-10-CM

## 2024-09-30 DIAGNOSIS — S01.81XA CHIN LACERATION, INITIAL ENCOUNTER: ICD-10-CM

## 2024-09-30 DIAGNOSIS — S01.01XA LACERATION OF SCALP, INITIAL ENCOUNTER: ICD-10-CM

## 2024-09-30 LAB
ALBUMIN SERPL BCP-MCNC: 3.9 G/DL (ref 3.5–5.2)
ALP SERPL-CCNC: 127 U/L (ref 55–135)
ALT SERPL W/O P-5'-P-CCNC: 10 U/L (ref 10–44)
ANION GAP SERPL CALC-SCNC: 11 MMOL/L (ref 8–16)
APTT PPP: 25.3 SEC (ref 21–32)
AST SERPL-CCNC: 18 U/L (ref 10–40)
BACTERIA #/AREA URNS AUTO: ABNORMAL /HPF
BASOPHILS # BLD AUTO: 0.05 K/UL (ref 0–0.2)
BASOPHILS NFR BLD: 0.7 % (ref 0–1.9)
BILIRUB SERPL-MCNC: 0.4 MG/DL (ref 0.1–1)
BILIRUB UR QL STRIP: NEGATIVE
BNP SERPL-MCNC: 94 PG/ML (ref 0–99)
BUN SERPL-MCNC: 16 MG/DL (ref 8–23)
CALCIUM SERPL-MCNC: 9.3 MG/DL (ref 8.7–10.5)
CHLORIDE SERPL-SCNC: 104 MMOL/L (ref 95–110)
CLARITY UR REFRACT.AUTO: ABNORMAL
CO2 SERPL-SCNC: 21 MMOL/L (ref 23–29)
COLOR UR AUTO: YELLOW
CREAT SERPL-MCNC: 0.7 MG/DL (ref 0.5–1.4)
DIFFERENTIAL METHOD BLD: ABNORMAL
EOSINOPHIL # BLD AUTO: 0.3 K/UL (ref 0–0.5)
EOSINOPHIL NFR BLD: 4.5 % (ref 0–8)
ERYTHROCYTE [DISTWIDTH] IN BLOOD BY AUTOMATED COUNT: 12.5 % (ref 11.5–14.5)
EST. GFR  (NO RACE VARIABLE): >60 ML/MIN/1.73 M^2
GLUCOSE SERPL-MCNC: 85 MG/DL (ref 70–110)
GLUCOSE UR QL STRIP: ABNORMAL
HCT VFR BLD AUTO: 39.9 % (ref 37–48.5)
HGB BLD-MCNC: 13 G/DL (ref 12–16)
HGB UR QL STRIP: NEGATIVE
IMM GRANULOCYTES # BLD AUTO: 0.06 K/UL (ref 0–0.04)
IMM GRANULOCYTES NFR BLD AUTO: 0.9 % (ref 0–0.5)
INR PPP: 1 (ref 0.8–1.2)
KETONES UR QL STRIP: NEGATIVE
LEUKOCYTE ESTERASE UR QL STRIP: ABNORMAL
LYMPHOCYTES # BLD AUTO: 1.3 K/UL (ref 1–4.8)
LYMPHOCYTES NFR BLD: 18.2 % (ref 18–48)
MCH RBC QN AUTO: 32.7 PG (ref 27–31)
MCHC RBC AUTO-ENTMCNC: 32.6 G/DL (ref 32–36)
MCV RBC AUTO: 101 FL (ref 82–98)
MICROSCOPIC COMMENT: ABNORMAL
MONOCYTES # BLD AUTO: 0.5 K/UL (ref 0.3–1)
MONOCYTES NFR BLD: 7.8 % (ref 4–15)
NEUTROPHILS # BLD AUTO: 4.7 K/UL (ref 1.8–7.7)
NEUTROPHILS NFR BLD: 67.9 % (ref 38–73)
NITRITE UR QL STRIP: NEGATIVE
NRBC BLD-RTO: 0 /100 WBC
PH UR STRIP: 7 [PH] (ref 5–8)
PLATELET # BLD AUTO: 224 K/UL (ref 150–450)
PMV BLD AUTO: 9.1 FL (ref 9.2–12.9)
POTASSIUM SERPL-SCNC: 3.9 MMOL/L (ref 3.5–5.1)
PROT SERPL-MCNC: 7.4 G/DL (ref 6–8.4)
PROT UR QL STRIP: NEGATIVE
PROTHROMBIN TIME: 11 SEC (ref 9–12.5)
RBC # BLD AUTO: 3.97 M/UL (ref 4–5.4)
RBC #/AREA URNS AUTO: 3 /HPF (ref 0–4)
SODIUM SERPL-SCNC: 136 MMOL/L (ref 136–145)
SP GR UR STRIP: 1.01 (ref 1–1.03)
SQUAMOUS #/AREA URNS AUTO: 22 /HPF
TROPONIN I SERPL DL<=0.01 NG/ML-MCNC: <0.006 NG/ML (ref 0–0.03)
URN SPEC COLLECT METH UR: ABNORMAL
WBC # BLD AUTO: 6.94 K/UL (ref 3.9–12.7)
WBC #/AREA URNS AUTO: 7 /HPF (ref 0–5)
YEAST UR QL AUTO: ABNORMAL

## 2024-09-30 PROCEDURE — 80053 COMPREHEN METABOLIC PANEL: CPT | Performed by: STUDENT IN AN ORGANIZED HEALTH CARE EDUCATION/TRAINING PROGRAM

## 2024-09-30 PROCEDURE — 12011 RPR F/E/E/N/L/M 2.5 CM/<: CPT

## 2024-09-30 PROCEDURE — 81001 URINALYSIS AUTO W/SCOPE: CPT | Performed by: STUDENT IN AN ORGANIZED HEALTH CARE EDUCATION/TRAINING PROGRAM

## 2024-09-30 PROCEDURE — 12002 RPR S/N/AX/GEN/TRNK2.6-7.5CM: CPT

## 2024-09-30 PROCEDURE — 85730 THROMBOPLASTIN TIME PARTIAL: CPT | Performed by: STUDENT IN AN ORGANIZED HEALTH CARE EDUCATION/TRAINING PROGRAM

## 2024-09-30 PROCEDURE — 25000003 PHARM REV CODE 250

## 2024-09-30 PROCEDURE — 99285 EMERGENCY DEPT VISIT HI MDM: CPT | Mod: 25

## 2024-09-30 PROCEDURE — 85025 COMPLETE CBC W/AUTO DIFF WBC: CPT | Performed by: STUDENT IN AN ORGANIZED HEALTH CARE EDUCATION/TRAINING PROGRAM

## 2024-09-30 PROCEDURE — 93005 ELECTROCARDIOGRAM TRACING: CPT

## 2024-09-30 PROCEDURE — 93010 ELECTROCARDIOGRAM REPORT: CPT | Mod: ,,, | Performed by: INTERNAL MEDICINE

## 2024-09-30 PROCEDURE — 83880 ASSAY OF NATRIURETIC PEPTIDE: CPT | Performed by: STUDENT IN AN ORGANIZED HEALTH CARE EDUCATION/TRAINING PROGRAM

## 2024-09-30 PROCEDURE — 84484 ASSAY OF TROPONIN QUANT: CPT | Performed by: STUDENT IN AN ORGANIZED HEALTH CARE EDUCATION/TRAINING PROGRAM

## 2024-09-30 PROCEDURE — 84484 ASSAY OF TROPONIN QUANT: CPT

## 2024-09-30 PROCEDURE — 63600175 PHARM REV CODE 636 W HCPCS: Performed by: STUDENT IN AN ORGANIZED HEALTH CARE EDUCATION/TRAINING PROGRAM

## 2024-09-30 PROCEDURE — 85610 PROTHROMBIN TIME: CPT | Performed by: STUDENT IN AN ORGANIZED HEALTH CARE EDUCATION/TRAINING PROGRAM

## 2024-09-30 PROCEDURE — 90471 IMMUNIZATION ADMIN: CPT | Performed by: STUDENT IN AN ORGANIZED HEALTH CARE EDUCATION/TRAINING PROGRAM

## 2024-09-30 PROCEDURE — 90715 TDAP VACCINE 7 YRS/> IM: CPT | Performed by: STUDENT IN AN ORGANIZED HEALTH CARE EDUCATION/TRAINING PROGRAM

## 2024-09-30 RX ORDER — LIDOCAINE HYDROCHLORIDE 10 MG/ML
5 INJECTION, SOLUTION EPIDURAL; INFILTRATION; INTRACAUDAL; PERINEURAL
Status: COMPLETED | OUTPATIENT
Start: 2024-09-30 | End: 2024-09-30

## 2024-09-30 RX ORDER — METHOCARBAMOL 500 MG/1
500 TABLET, FILM COATED ORAL 3 TIMES DAILY
Qty: 15 TABLET | Refills: 0 | Status: SHIPPED | OUTPATIENT
Start: 2024-09-30 | End: 2024-10-05

## 2024-09-30 RX ADMIN — LIDOCAINE HYDROCHLORIDE 50 MG: 10 SOLUTION INTRAVENOUS at 10:09

## 2024-09-30 RX ADMIN — TETANUS TOXOID, REDUCED DIPHTHERIA TOXOID AND ACELLULAR PERTUSSIS VACCINE, ADSORBED 0.5 ML: 5; 2.5; 8; 8; 2.5 SUSPENSION INTRAMUSCULAR at 07:09

## 2024-09-30 NOTE — ED PROVIDER NOTES
Encounter Date: 9/30/2024       History     Chief Complaint   Patient presents with    Fall     Arrived via ems from home with c/o fall down approx 4 steps, +head injury, +LOC, lacs noted, denies blood thinner use, pt does not recall falling     HPI  Patient is an 84-year-old female with history of hypertension, hyperlipidemia, nonrheumatic aortic valve stenosis, heart failure with preserved EF who presents after a fall at home.  History obtained from both family at bedside, the patient and EMS.  She was an obvious head laceration.  Patient does not remember what happened or what caused her fall.  She is not on blood thinners except daily 81 mg ASA.  She was coming down the stairs with her dinner tray and a glass of wine.  She fell on the last step and hit her head on a tile floor.  She did not fall down a significant number of steps per family.  Patient denies chest pain, shortness of breath, palpitations, lightheadedness, dizziness.  She reports mild occipital headache.  She has a scalp laceration.  She was also reporting right forearm pain has an overlying laceration in his area.  She also reports right shoulder and right clavicle pain.  No neck pain or back pain.  No abdominal pain.  No focal extremity weakness or numbness/tingling.  Patient was overall in her usual state of health until this fall.  She was unsure when her last tetanus vaccination was.      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 Wyatt on 3/16/2023    Strabismus      Past Surgical History:   Procedure Laterality Date    APPENDECTOMY      CHOLECYSTECTOMY      COLONOSCOPY N/A 2020    Procedure: COLONOSCOPY;  Surgeon: Giovanny Chao MD;  [facility]  Service: Endoscopy;  Laterality: N/A;  COVID test on 20 at Primary Care -     ECTOPIC PREGNANCY SURGERY      INJECTION, SPINE, LUMBOSACRAL, TRANSFORAMINAL APPROACH Right 2024    Procedure: TFESI L4/5 RT;  Surgeon: Jose Marin MD;  Location: [facility] PAIN MANAGEMENT;  Service: Pain Management;  Laterality: Right;  20mins- ASA 5d-takes on her own    OOPHORECTOMY      SKIN BIOPSY      TRANSFORAMINAL EPIDURAL INJECTION OF STEROID Right 3/9/2023    Procedure: INJECTION, STEROID, EPIDURAL, TRANSFORAMINAL APPROACH, RIGHT L4/L5  URGENT;  Surgeon: Palmer Tinsley MD;  Location: [facility] [place] PAIN MGT;  Service: Pain Management;  Laterality: Right;     Family History   Problem Relation Name Age of Onset    Hypertension Mother      Stroke Mother      Cancer Father          prostate cancer    Asthma Sister      Amblyopia Neg Hx      Blindness Neg Hx      Cataracts Neg Hx      Diabetes Neg Hx      Glaucoma Neg Hx      Macular degeneration Neg Hx      Retinal detachment Neg Hx      Strabismus Neg Hx      Thyroid disease Neg Hx      Melanoma Neg Hx       Social History     Tobacco Use    Smoking status: Former     Current packs/day: 0.00     Types: Cigarettes     Quit date: 2/10/1983     Years since quittin.6    Smokeless tobacco: Never   Substance Use Topics    Alcohol use: Not Currently    Drug use: No     Review of Systems  A full review of systems was obtained, see HPI for pertinent positives.      Physical Exam     Initial Vitals [24 1803]   BP Pulse Resp Temp SpO2   (!) 119/58 65 18 97.8 °F (36.6 °C) (!) 94 %      MAP       --         Physical Exam  Constitutional: No acute distress  HENT:  Matted hair and blood over the  posterior occiput, no palpable skull fracture, no raccoon eyes or wheat sign, small superficial 1 cm laceration to the chin, no facial tenderness, nares patent, moist mucous membranes, no trismus or malocclusion  Eyes: PERRL, EOMI  Spine: No C/T/L-spine tenderness, no step offs or deformities, in cervical collar  Respiratory: Breath sounds equal bilaterally, nonlabored  Cardiovascular: Regular rate and rhythm, intact distal pulses in all extremities  Gastrointestinal: Soft, non-tender, non-distended  Pelvis: Stable  Musculoskeletal: No deformity  Integumentary: Warm and dry, L-shaped laceration to the right forearm measuring approximately 4 cm, small 1 cm chin laceration that is superficial  Neurological: Awake and alert, follows commands in all extremities, 5/5 motor and sensation light touch intact in all extremities, cranial nerves 2-12 grossly intact, oriented to person place and time, she was oriented to situation now but does not remember the events that occurred to make her fall      ED Course   Lac Repair    Date/Time: 10/1/2024 12:00 AM    Performed by: Radha Angel MD  Authorized by: Elizabeth Odonnell MD    Consent:     Consent obtained:  Verbal    Consent given by:  Patient    Risks discussed:  Infection and pain  Universal protocol:     Patient identity confirmed:  Verbally with patient  Anesthesia:     Anesthesia method:  Local infiltration    Local anesthetic:  Lidocaine 1% w/o epi  Laceration details:     Location:  Shoulder/arm    Shoulder/arm location:  L lower arm  Pre-procedure details:     Preparation:  Patient was prepped and draped in usual sterile fashion  Treatment:     Area cleansed with:  Saline    Amount of cleaning:  Standard    Irrigation solution:  Sterile saline    Irrigation method:  Syringe    Visualized foreign bodies/material removed: no    Skin repair:     Repair method:  Sutures    Suture size:  4-0    Suture technique:  Simple interrupted    Number of sutures:   9  Approximation:     Approximation:  Close  Post-procedure details:     Dressing:  Non-adherent dressing  Lac Repair    Date/Time: 10/1/2024 12:03 AM    Performed by: Radha Angel MD  Authorized by: Elizabeth Odonnell MD    Consent:     Consent obtained:  Verbal    Consent given by:  Patient    Risks discussed:  Pain and infection  Universal protocol:     Patient identity confirmed:  Verbally with patient  Laceration details:     Location:  Scalp    Scalp location:  R parietal  Treatment:     Area cleansed with:  Saline    Amount of cleaning:  Standard    Irrigation solution:  Sterile saline    Irrigation method:  Syringe  Skin repair:     Repair method:  Staples    Number of staples:  2  Post-procedure details:     Dressing:  Open (no dressing)  Lac Repair    Date/Time: 10/1/2024 12:06 AM    Performed by: Radha Angel MD  Authorized by: Elizabeth Odonnell MD    Consent:     Consent obtained:  Verbal    Consent given by:  Patient  Universal protocol:     Patient identity confirmed:  Verbally with patient  Laceration details:     Location:  Face    Face location:  Chin  Treatment:     Area cleansed with:  Saline    Amount of cleaning:  Standard    Irrigation solution:  Sterile saline    Irrigation method:  Syringe  Skin repair:     Repair method:  Tissue adhesive  Approximation:     Approximation:  Close  Post-procedure details:     Dressing:  Open (no dressing)            Labs Reviewed   CBC W/ AUTO DIFFERENTIAL   COMPREHENSIVE METABOLIC PANEL   APTT   PROTIME-INR   B-TYPE NATRIURETIC PEPTIDE   TROPONIN I          Imaging Results    None          Medications - No data to display  Medical Decision Making  Patient is an 84-year-old female who presents for evaluation after a fall.  She denies any preceding symptoms but does not remember exactly what caused her to fall.  Her family reports that she tripped on the last step going down the stairs and hit her head on the tile.  She has a scalp laceration,  right forearm laceration and chin laceration.  Labs and imaging ordered to assess for traumatic injuries.  She was hemodynamically stable.  GCS 15.  Normal neurologic exam.  She was not anticoagulated.    Labs and imaging reviewed and reassuring.  Her cervical collar was cleared.  No traumatic injuries identified on imaging.  Her lacerations were repaired.  Her scalp was repaired with staples, her forearm was repaired with sutures and her chin was repaired with glue.  She was counseled on wound care and timeline for suture and staple removal.  She was counseled on return precautions prior to discharge and advised to follow up with the primary care doctor.    Amount and/or Complexity of Data Reviewed  Labs: ordered.  Radiology: ordered.    Risk  Prescription drug management.               ED Course as of 10/01/24 0004   Mon Sep 30, 2024   1851 CTH negative [NN]   1857 EKG with normal sinus rhythm, rate 64, no STEMI [NN]   1857 CT cervical spine negative for acute traumatic injury [NN]   1858 Will clear cervical collar [NN]   2038 XR shoulder impression:     No evidence of a fracture or dislocation.     Osteoarthrosis of the right shoulder.   [NN]   2218 No snuffbox tenderness [NN]   2218 No fracture seen on x-ray imaging [NN]   Tue Oct 01, 2024   0000 Chin, forearm and scalp lacerations all repaired.  No identifiable traumatic injuries on imaging.  Patient comfortable on re-evaluation.  Stable for discharge home.  She was counseled on return precautions.  She was counseled on wound care and timeline for suture removal.  Will send home with Robaxin to help with her muscle spasm as she was going to be sore after her injury today.  She was also advised to trial Tylenol or ibuprofen as needed for home.  She was given strict return precautions prior to discharge. [NN]      ED Course User Index  [NN] Elizabeth Odonnell MD                             Clinical Impression:  Final diagnoses:  [W19.XXXA] Fall                  Elizabeth Odonnell MD  10/01/24 0037

## 2024-10-01 LAB
OHS QRS DURATION: 92 MS
OHS QRS DURATION: 94 MS
OHS QTC CALCULATION: 447 MS
OHS QTC CALCULATION: 455 MS

## 2024-10-01 NOTE — ED NOTES
Received report from Vero FLORES. Assumed care of patient. Patient is alert and resting comfortably in bed in NAD. BP, cardiac, and O2 monitoring continued.  Patient updated on plan of care, pt denies any needs or complaints at this time. Bed locked in lowest position, side rails up x2, call light within reach. VSS. Physician at bedside stitching pt at this time.

## 2024-10-01 NOTE — DISCHARGE INSTRUCTIONS
You were seen after a fall today. You had a scalp laceration repaired with staples, an arm laceration repaired with sutures, and a small chin laceration that was closed with glue.     Keep wound clean and dry, you can still wash your hair and affected areas with gentle  soap and water, pat dry and cover with dressing until completely healed.  Return to the emergency department or you doctor to have your sutures and staples out in 7-10 days.  Return to the ER sooner if signs of infection develop such as redness, heat, drainage, or increased pain. May return to the ER for suture removal or follow up with primary care provider.    You have been prescribed a muscle relaxer to help with your muscle spasms. You can also take tylenol or ibuprofen at home as needed for pain.

## 2024-10-01 NOTE — ED TRIAGE NOTES
Luz Amaro, a 84 y.o. female presents to the ED w/ complaint of head, arm, and chin lacerations and R clavicle pain. Pt reports falling down 4 steps and hitting her head.    Triage note:  Chief Complaint   Patient presents with    Fall     Arrived via ems from home with c/o fall down approx 4 steps, +head injury, +LOC, lacs noted, denies blood thinner use, pt does not recall falling     Review of patient's allergies indicates:   Allergen Reactions    Ace inhibitors     Arb-angiotensin receptor antagonist     Amlodipine Swelling     Tongue swells     Past Medical History:   Diagnosis Date    Amblyopia     Angio-edema     Angioedema 02/11/2015    Secondary to use of NSAIDs    Basal cell carcinoma 2011    left cheek     BCC (basal cell carcinoma) 2010    left neck    Cataract     Hyperlipidemia     Hypertension     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 Wyatt on 3/16/2023    Strabismus

## 2024-10-01 NOTE — PLAN OF CARE
[facility] - Emergency Dept  Discharge Assessment    Primary Care Provider: Suman Moss MD     Discharge Assessment (most recent)       BRIEF DISCHARGE ASSESSMENT - 09/30/24 2012          Discharge Planning    Assessment Type Discharge Planning Assessment (P)      Resource/Environmental Concerns none (P)      Support Systems Children (P)      Equipment Currently Used at Home cane, straight (P)      Current Living Arrangements home (P)      Patient/Family Anticipates Transition to home (P)      Patient/Family Anticipated Services at Transition none (P)      DME Needed Upon Discharge  none (P)      Discharge Plan A Home with family (P)      Discharge Plan B Home with family (P)         Physical Activity    On average, how many days per week do you engage in moderate to strenuous exercise (like a brisk walk)? 7 days (P)      On average, how many minutes do you engage in exercise at this level? 30 min (P)         Financial Resource Strain    How hard is it for you to pay for the very basics like food, housing, medical care, and heating? Not hard at all (P)         Housing Stability    In the last 12 months, was there a time when you were not able to pay the mortgage or rent on time? No (P)      At any time in the past 12 months, were you homeless or living in a shelter (including now)? No (P)         Transportation Needs    Has the lack of transportation kept you from medical appointments, meetings, work or from getting things needed for daily living? No (P)         Food Insecurity    Within the past 12 months, you worried that your food would run out before you got the money to buy more. Never true (P)      Within the past 12 months, the food you bought just didn't last and you didn't have money to get more. Never true (P)         Stress    Do you feel stress - tense, restless, nervous, or anxious, or unable to sleep at night because your mind is troubled all the time - these days? Not at all (P)         Social  Isolation    How often do you feel lonely or isolated from those around you?  Never (P)         Alcohol Use    Q1: How often do you have a drink containing alcohol? Never (P)      Q2: How many drinks containing alcohol do you have on a typical day when you are drinking? 1 or 2 (P)      Q3: How often do you have six or more drinks on one occasion? Daily or almost daily (P)         Utilities    In the past 12 months has the electric, gas, oil, or water company threatened to shut off services in your home? No (P)         Health Literacy    How often do you need to have someone help you when you read instructions, pamphlets, or other written material from your doctor or pharmacy? Never (P)                    Pt has no cute case management needs at this time.      Marshall Davis LMSW  Case Management  Emergency Department  [phone]

## 2024-10-02 ENCOUNTER — PATIENT OUTREACH (OUTPATIENT)
Dept: EMERGENCY MEDICINE | Facility: HOSPITAL | Age: 84
End: 2024-10-02
Payer: MEDICARE

## 2024-10-03 NOTE — PROGRESS NOTES
Call placed per ED Navigator to f/u from last encounter. Pt states she is doing a lot better and ws well taken care of in the ED. She states she lives with her son and grandson and has no financial needs at this time. She does not wish to schedule any additional appt's at this time. ED navigator will follow-up with patient periodically and assist as needed.

## 2024-10-21 DIAGNOSIS — E78.5 HYPERLIPIDEMIA, UNSPECIFIED HYPERLIPIDEMIA TYPE: ICD-10-CM

## 2024-10-21 RX ORDER — ATORVASTATIN CALCIUM 20 MG/1
20 TABLET, FILM COATED ORAL
Qty: 90 TABLET | Refills: 3 | Status: SHIPPED | OUTPATIENT
Start: 2024-10-21

## 2024-10-23 ENCOUNTER — PATIENT MESSAGE (OUTPATIENT)
Dept: BEHAVIORAL HEALTH | Facility: CLINIC | Age: 84
End: 2024-10-23
Payer: MEDICARE

## 2024-10-23 ENCOUNTER — OFFICE VISIT (OUTPATIENT)
Dept: INTERNAL MEDICINE | Facility: CLINIC | Age: 84
End: 2024-10-23
Payer: MEDICARE

## 2024-10-23 VITALS
HEART RATE: 68 BPM | BODY MASS INDEX: 27.68 KG/M2 | OXYGEN SATURATION: 98 % | SYSTOLIC BLOOD PRESSURE: 124 MMHG | HEIGHT: 61 IN | WEIGHT: 146.63 LBS | DIASTOLIC BLOOD PRESSURE: 60 MMHG

## 2024-10-23 DIAGNOSIS — F41.1 GENERALIZED ANXIETY DISORDER: ICD-10-CM

## 2024-10-23 DIAGNOSIS — I50.32 CHRONIC HEART FAILURE WITH PRESERVED EJECTION FRACTION: ICD-10-CM

## 2024-10-23 DIAGNOSIS — F33.41 RECURRENT MAJOR DEPRESSIVE DISORDER, IN PARTIAL REMISSION: ICD-10-CM

## 2024-10-23 DIAGNOSIS — G89.29 CHRONIC PAIN OF BOTH SHOULDERS: ICD-10-CM

## 2024-10-23 DIAGNOSIS — R29.6 RECURRENT FALLS: Primary | ICD-10-CM

## 2024-10-23 DIAGNOSIS — M25.512 CHRONIC PAIN OF BOTH SHOULDERS: ICD-10-CM

## 2024-10-23 DIAGNOSIS — M25.511 CHRONIC PAIN OF BOTH SHOULDERS: ICD-10-CM

## 2024-10-23 DIAGNOSIS — R19.6 HALITOSIS: ICD-10-CM

## 2024-10-23 PROCEDURE — 99215 OFFICE O/P EST HI 40 MIN: CPT | Mod: PBBFAC | Performed by: PHYSICIAN ASSISTANT

## 2024-10-23 PROCEDURE — 99999 PR PBB SHADOW E&M-EST. PATIENT-LVL V: CPT | Mod: PBBFAC,,, | Performed by: PHYSICIAN ASSISTANT

## 2024-10-23 RX ORDER — ATORVASTATIN CALCIUM 20 MG/1
20 TABLET, FILM COATED ORAL DAILY
Qty: 90 TABLET | Refills: 3 | Status: CANCELLED | OUTPATIENT
Start: 2024-10-23

## 2024-10-23 RX ORDER — CHLORHEXIDINE GLUCONATE ORAL RINSE 1.2 MG/ML
15 SOLUTION DENTAL DAILY PRN
Qty: 473 ML | Refills: 3 | Status: SHIPPED | OUTPATIENT
Start: 2024-10-23

## 2024-10-23 RX ORDER — FLUOXETINE HYDROCHLORIDE 20 MG/1
20 CAPSULE ORAL DAILY
Qty: 30 CAPSULE | Refills: 11 | Status: SHIPPED | OUTPATIENT
Start: 2024-10-23 | End: 2025-10-23

## 2024-10-23 RX ORDER — FLUOXETINE HYDROCHLORIDE 40 MG/1
40 CAPSULE ORAL DAILY
Qty: 30 CAPSULE | Refills: 11 | Status: CANCELLED | OUTPATIENT
Start: 2024-10-23

## 2024-10-23 RX ORDER — CARVEDILOL 6.25 MG/1
6.25 TABLET ORAL 2 TIMES DAILY WITH MEALS
Qty: 180 TABLET | Refills: 2 | Status: SHIPPED | OUTPATIENT
Start: 2024-10-23

## 2024-10-23 NOTE — PROGRESS NOTES
Subjective     Patient ID: Luz Amaro is a 84 y.o. female.    Chief Complaint: Fall (3 falls in 5 months)    HPI      Established pt of Suman Moss MD     Pt attended by dtr    Here with concerns of bilateral shoulder pain, chronic issues worse since last fall that occurred one month ago, fell down a few steps. Seen in the ED, imaging negative for acute fracture, suffered forearm, scalp and chin laceration.     Prior fall was at [employer], also down a step. Fall in her bathroom, raising up after bending over, lost balance.     No syncope    She is interested in PT. ([facility])  She is taking ibuprofen which helps  Pain has made her feel more down, inquiries about dose adjustment of fluoxetine.     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 Wyatt on 3/16/2023    Strabismus      Social History     Tobacco Use    Smoking status: Former     Current packs/day: 0.00     Types: Cigarettes     Quit date: 2/10/1983     Years since quittin.7    Smokeless tobacco: Never   Substance Use Topics    Alcohol use: Not Currently    Drug use: No     Review of patient's allergies indicates:   Allergen Reactions    Ace inhibitors     Arb-angiotensin receptor antagonist     Amlodipine Swelling     Tongue swells       Review of Systems   Constitutional:  Positive for activity change. Negative for unexpected weight change.   HENT:  Positive for hearing loss. Negative for rhinorrhea and trouble swallowing.    Eyes:  Negative for discharge and visual disturbance.   Respiratory:  Negative for chest tightness and wheezing.   "  Cardiovascular:  Negative for chest pain and palpitations.   Gastrointestinal:  Negative for blood in stool, constipation, diarrhea and vomiting.   Endocrine: Negative for polydipsia and polyuria.   Genitourinary:  Negative for difficulty urinating, dysuria, hematuria and menstrual problem.   Musculoskeletal:  Positive for arthralgias. Negative for joint swelling and neck pain.   Neurological:  Positive for weakness. Negative for headaches.   Psychiatric/Behavioral:  Negative for confusion and dysphoric mood.      Answers submitted by the patient for this visit:  Review of Systems Questionnaire (Submitted on 10/17/2024)  activity change: Yes  unexpected weight change: No  neck pain: No  hearing loss: Yes  rhinorrhea: No  trouble swallowing: No  eye discharge: No  visual disturbance: No  chest tightness: No  wheezing: No  chest pain: No  palpitations: No  blood in stool: No  constipation: No  vomiting: No  diarrhea: No  polydipsia: No  polyuria: No  difficulty urinating: No  hematuria: No  menstrual problem: No  dysuria: No  joint swelling: No  arthralgias: Yes  headaches: No  weakness: Yes  confusion: No  dysphoric mood: No     Objective  /60   Pulse 68   Ht 5' 1" (1.549 m)   Wt 66.5 kg (146 lb 9.7 oz)   SpO2 98%   BMI 27.70 kg/m²       Physical Exam  Vitals reviewed.   Constitutional:       General: She is not in acute distress.     Appearance: She is well-developed. She is not ill-appearing.   HENT:      Head: Normocephalic and atraumatic.   Cardiovascular:      Rate and Rhythm: Normal rate and regular rhythm.      Heart sounds: No murmur heard.  Pulmonary:      Effort: Pulmonary effort is normal.      Breath sounds: Normal breath sounds. No wheezing or rales.   Abdominal:      General: Bowel sounds are normal.      Palpations: Abdomen is soft.      Tenderness: There is no abdominal tenderness.   Musculoskeletal:      Right shoulder: Tenderness (posterior shoulder) present. No deformity, bony " tenderness or crepitus. Decreased range of motion.      Left shoulder: Tenderness (posterior shoulder) present. No deformity, bony tenderness or crepitus. Decreased range of motion.      Right lower leg: No edema.      Left lower leg: No edema.      Comments: Ambulating with cane   Skin:     General: Skin is warm and dry.      Findings: No rash.   Neurological:      Mental Status: She is alert and oriented to person, place, and time.   Psychiatric:         Mood and Affect: Mood is depressed.         Speech: Speech normal.         Behavior: Behavior normal. Behavior is cooperative.         Thought Content: Thought content does not include homicidal or suicidal ideation.            Assessment and Plan     1. Recurrent falls  -     Ambulatory referral/consult to Physical/Occupational Therapy; Future; Expected date: 10/30/2024    2. Chronic pain of both shoulders  -     Ambulatory referral/consult to Physical/Occupational Therapy; Future; Expected date: 10/30/2024    3. Chronic heart failure with preserved ejection fraction  -     carvediloL (COREG) 6.25 MG tablet; Take 1 tablet (6.25 mg total) by mouth 2 (two) times daily with meals.  Dispense: 180 tablet; Refill: 2    4. Recurrent major depressive disorder, in partial remission  -     Ambulatory referral/consult to Primary Care Behavioral Health (Non-Opioids); Future; Expected date: 10/30/2024  -     FLUoxetine 20 MG capsule; Take 1 capsule (20 mg total) by mouth once daily. Take with 40mg capsule for total 60mg daily  Dispense: 30 capsule; Refill: 11    5. Generalized anxiety disorder  -     FLUoxetine 20 MG capsule; Take 1 capsule (20 mg total) by mouth once daily. Take with 40mg capsule for total 60mg daily  Dispense: 30 capsule; Refill: 11    6. Halitosis  -     chlorhexidine (PERIDEX) 0.12 % solution; Use as directed 15 mLs in the mouth or throat daily as needed (halitosis).  Dispense: 473 mL; Refill: 3            Follow up in about 3 months (around 1/23/2025)  for with PCP. Or sooner if needed.

## 2024-11-11 ENCOUNTER — TELEPHONE (OUTPATIENT)
Dept: INTERNAL MEDICINE | Facility: CLINIC | Age: 84
End: 2024-11-11
Payer: MEDICARE

## 2024-11-11 ENCOUNTER — TELEPHONE (OUTPATIENT)
Dept: BEHAVIORAL HEALTH | Facility: CLINIC | Age: 84
End: 2024-11-11
Payer: MEDICARE

## 2024-11-11 DIAGNOSIS — F41.1 GENERALIZED ANXIETY DISORDER: Primary | ICD-10-CM

## 2024-11-11 DIAGNOSIS — F33.41 RECURRENT MAJOR DEPRESSIVE DISORDER, IN PARTIAL REMISSION: ICD-10-CM

## 2024-11-11 NOTE — TELEPHONE ENCOUNTER
Spoke with pt--said that she had a referral already and got it scheduled and but wasn't ready to go for it and blew the first one. Now she wants to try again and give it a shot and needs a new referral placed.

## 2024-11-11 NOTE — TELEPHONE ENCOUNTER
----- Message from Angelica sent at 11/11/2024  3:04 PM CST -----  Contact: [phone] Patient  Patient would like to get a referral.  Referral to what specialty:  psychology  Does the patient want the referral with a specific physician:  any  [facility] the [facility]:  [facility]  Reason (be specific):  behavioral health - therapy sessions  Does the patient already have the specialty clinic appointment scheduled:  n/a  If yes, what date is the appointment scheduled:   n/a  Is the insurance listed in Epic correct? (this is important for a referral):  yes  Advised patient that once provider approves this either a nurse or  will return their call?: yes  Would the patient like a call back, or a response through their [facility] portal?:   call back  Comments:  Pt states she blew the 1st call - will be more open and helpful for next reach out.

## 2024-11-11 NOTE — TELEPHONE ENCOUNTER
New referral placed.   She may also call [phone]     Orders Placed This Encounter   Procedures    Ambulatory referral/consult to Primary Care Behavioral Health (Non-Opioids)

## 2024-11-11 NOTE — PROGRESS NOTES
Behavioral Health Community Health Worker  Initial Assessment  Completed by:  Milad Worthington    Date:  11/11/2024    Patient Enrollment in Behavioral Health Program:  Patient verbalized understanding of Behavioral Health Integration services to include:  Patient understands that CHW, LCSW, PharmD and consulting Psychiatrist are members of the care team working collaboratively with his/her primary care provider: Yes  Patient understands that activation of their [facility] patient portal account is required for accessing the full scope of team services: Yes  Patient understands that some counseling sessions may occur via video: Yes  Clinic visits with the psychiatrist may be subject to a co-pay based on your insurance: Yes  Patient consents to enroll in BHI program: Yes    Assessments     Single Item Health Literacy Scale:  How often do you need to have someone help you read instructions, pamphlets or other written material from your doctor or pharmacy?: Never    Promis 10:  Promis 10 Responses  In general, would you say your health is: Good  In general, would you say your quality of life is: Very good  In general, how would you rate your physical health?: Good  In general, how would you rate your mental health, including your mood and your ability to think?: Fair  In general, how would you rate your satisfaction with your social activities and relationships?: Good  In general, please rate how well you carry out your usual social activities and roles. (This includes activities at home, at work and in your community, and responsibilities as a parent, child, spouse, employee, friend, etc.): Good  To what extent are you able to carry out your everyday physical activities such as walking, climbing stairs, carrying groceries, or moving a chair? : Completely  How often have you been bothered by emotional problems such as feeling anxious, depressed or irritable?: Often  In the past 7 days, how would you rate your fatigue on  average?: Moderate  In the past 7 days, on a scale of 0 to 10 (where 0 is no pain and 10 is the worst pain imaginable) how would you rate your pain on average?: 0  Global Physical Health: 11  Global Mental health Score: 13    Depression PHQ:      8/15/2024     2:03 PM 6/3/2024     1:48 PM 3/5/2024     8:27 AM 2023    11:09 AM 2023     8:38 AM 2023     8:09 AM 3/2/2023     8:28 AM   PHQ-9 Depression Patient Health Questionnaire   Over the last two weeks how often have you been bothered by little interest or pleasure in doing things 0 0 0 0 0 1 0   Over the last two weeks how often have you been bothered by feeling down, depressed or hopeless 0 0 0 0 0 1 0          Generalized Anxiety Disorder 7-Item Scale:      2024     4:05 PM   GAD7   1. Feeling nervous, anxious, or on edge? 1   2. Not being able to stop or control worrying? 3   3. Worrying too much about different things? 3   4. Trouble relaxing? 1   5. Being so restless that it is hard to sit still? 0   6. Becoming easily annoyed or irritable? 0   7. Feeling afraid as if something awful might happen? 3   8. If you checked off any problems, how difficult have these problems made it for you to do your work, take care of things at home, or get along with other people? 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     Social Drivers of Health     Financial Resource Strain: Low Risk  (10/3/2024)    Overall Financial Resource Strain (CARDIA)     Difficulty of Paying Living Expenses: Not hard at all   Food  "Insecurity: No Food Insecurity (10/3/2024)    Hunger Vital Sign     Worried About Running Out of Food in the Last Year: Never true     Ran Out of Food in the Last Year: Never true   Transportation Needs: No Transportation Needs (10/3/2024)    PRAPARE - Transportation     Lack of Transportation (Medical): No     Lack of Transportation (Non-Medical): No   Physical Activity: Sufficiently Active (10/3/2024)    Exercise Vital Sign     Days of Exercise per Week: 7 days     Minutes of Exercise per Session: 30 min   Stress: No Stress Concern Present (10/3/2024)    [employer] - Occupational Stress Questionnaire     Feeling of Stress : Not at all   Housing Stability: Low Risk  (10/3/2024)    Housing Stability Vital Sign     Unable to Pay for Housing in the Last Year: No     Homeless in the Last Year: No       Call Summary   Patient was referred to the BHI (Non-opioid) program by Primary Care Provider, Dr. Lopez CHW contacted Luz Amaro who reports depression that limits [his/her] activities of daily living (ADLs).   Patient scored "8" on the PHQ9 and "11" on the BARRON 7. Based on these scores patient is eligible for the Behavioral health Integration (Non-opioid) Program. CHW completed the intake and scheduled an appointment for patient with Angy Prater LCSW, on 1/10/25.          "

## 2024-11-26 ENCOUNTER — OFFICE VISIT (OUTPATIENT)
Dept: CARDIOLOGY | Facility: CLINIC | Age: 84
End: 2024-11-26
Payer: MEDICARE

## 2024-11-26 VITALS
BODY MASS INDEX: 27.22 KG/M2 | DIASTOLIC BLOOD PRESSURE: 67 MMHG | WEIGHT: 144.19 LBS | HEIGHT: 61 IN | SYSTOLIC BLOOD PRESSURE: 112 MMHG | HEART RATE: 68 BPM

## 2024-11-26 DIAGNOSIS — E78.5 HYPERLIPIDEMIA, UNSPECIFIED HYPERLIPIDEMIA TYPE: ICD-10-CM

## 2024-11-26 DIAGNOSIS — E78.2 MIXED HYPERLIPIDEMIA: ICD-10-CM

## 2024-11-26 DIAGNOSIS — I50.32 CHRONIC HEART FAILURE WITH PRESERVED EJECTION FRACTION: Primary | ICD-10-CM

## 2024-11-26 DIAGNOSIS — I10 HYPERTENSION, UNSPECIFIED TYPE: ICD-10-CM

## 2024-11-26 DIAGNOSIS — I70.0 AORTIC ATHEROSCLEROSIS: ICD-10-CM

## 2024-11-26 DIAGNOSIS — I35.0 NONRHEUMATIC AORTIC VALVE STENOSIS: ICD-10-CM

## 2024-11-26 PROCEDURE — 99214 OFFICE O/P EST MOD 30 MIN: CPT | Mod: S$PBB,,, | Performed by: INTERNAL MEDICINE

## 2024-11-26 PROCEDURE — 99999 PR PBB SHADOW E&M-EST. PATIENT-LVL IV: CPT | Mod: PBBFAC,,, | Performed by: INTERNAL MEDICINE

## 2024-11-26 PROCEDURE — 99214 OFFICE O/P EST MOD 30 MIN: CPT | Mod: PBBFAC | Performed by: INTERNAL MEDICINE

## 2024-11-26 RX ORDER — ATORVASTATIN CALCIUM 20 MG/1
20 TABLET, FILM COATED ORAL DAILY
Qty: 90 TABLET | Refills: 3 | Status: SHIPPED | OUTPATIENT
Start: 2024-11-26

## 2024-11-26 NOTE — PROGRESS NOTES
HISTORY:    84-year-old female with a history of HFpEF, moderate aortic stenosis, hypertension, hyperlipidemia, and back pain presenting for follow-up evaluation.      Patient presented to ED with acute onset SOB in April '23. Elevated BNP with pulm edema on CXR. Diuresed and dc'd. Followed in the HF clinic and optimized. Never had symptoms other than the day of presentation. Has done well since from a CV standpoint.     No CP, SOB, or CAGLE. No light headedness, edema, or orthopnea.    She is trying to remain active. Limited by back pain. Doing water aerobics 2-3x/week.     Since last visit she had a mechanical fall. Recovering.     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     Tolerates asa 81x1, carvedilol 6.25x2, empagliflozin 10x1, nifedipine 30x1, and atorvastatin 20x1. Bps controlled at home. Did not tolerate higher doses of atorvastatin.     PHYSICAL EXAM:    Vitals:    11/26/24 1034   BP: 112/67   Pulse: 68       NAD, A+Ox3.  No jvd, no bruit.  RRR nml s1,s2. 3/6 AS murmur.  CTA B no wheezes or crackles.  No edema.    LABS/STUDIES (imaging reviewed during clinic visit):    September 2024 CBC and CMP unremarkable.  BNP normal.  Troponin negative.  2024 A1c and TSH normal.  April '23  -> May BNP 45.  EKG September 2024 revealing for normal sinus rhythm no Q-waves or ST changes.    TTE   April 2024 normal LV size and wall thickness with EF of 60-65%.  Grade 1 diastology.  Moderate aortic valve stenosis with a peak velocity of 3.6 m/sec.  Mild AI.  CVP 3.  Carotid   April 2024 bilateral carotid atherosclerosis with no significant ICA stenosis bilaterally.  Renal duplex 2020 no evidence of renal artery stenosis.  Venous duplex August 2023 no evidence of right lower extremity DVT.      ASSESSMENT & PLAN:    1. Chronic heart failure with preserved ejection fraction    2. Aortic atherosclerosis    3. Mixed hyperlipidemia    4. Hypertension, unspecified type    5.  Nonrheumatic aortic valve stenosis    6. Hyperlipidemia, unspecified hyperlipidemia type                Orders Placed This Encounter    Echo    empagliflozin (JARDIANCE) 10 mg tablet    atorvastatin (LIPITOR) 20 MG tablet       One time episode of HFpEF in early '23 with unclear trigger. Asymptomatic since with nml BNP and euvolemic exam since.     On empagliflozin 10x1. Doesn't require diuretic tx.    Bps controlled on carvedilol 6.25x2 and nifedipine 30x1.     Moderate AS, stable on April '24 TTE. Repeat at follow-up.     Tolerating atorvastatin at 20x1. Did not tolerate higher doses.     Patient is DNR/DNI. This is part of advanced directive planning with no impending medical issue. Can rescind for elective procedures.     Follow up in about 6 months (around 5/26/2025).      Julieta Hairston MD

## 2024-11-30 ENCOUNTER — EXTERNAL CHRONIC CARE MANAGEMENT (OUTPATIENT)
Dept: PRIMARY CARE CLINIC | Facility: CLINIC | Age: 84
End: 2024-11-30
Payer: MEDICARE

## 2024-11-30 PROCEDURE — 99439 CHRNC CARE MGMT STAF EA ADDL: CPT | Mod: S$PBB,,, | Performed by: INTERNAL MEDICINE

## 2024-11-30 PROCEDURE — 99439 CHRNC CARE MGMT STAF EA ADDL: CPT | Mod: PBBFAC | Performed by: INTERNAL MEDICINE

## 2024-11-30 PROCEDURE — 99490 CHRNC CARE MGMT STAFF 1ST 20: CPT | Mod: S$PBB,,, | Performed by: INTERNAL MEDICINE

## 2024-11-30 PROCEDURE — 99490 CHRNC CARE MGMT STAFF 1ST 20: CPT | Mod: PBBFAC | Performed by: INTERNAL MEDICINE

## 2024-12-23 RX ORDER — KETOCONAZOLE 20 MG/ML
SHAMPOO, SUSPENSION TOPICAL
Qty: 120 ML | Refills: 3 | Status: SHIPPED | OUTPATIENT
Start: 2024-12-23

## 2025-01-02 ENCOUNTER — TELEPHONE (OUTPATIENT)
Dept: INTERNAL MEDICINE | Facility: CLINIC | Age: 85
End: 2025-01-02
Payer: MEDICARE

## 2025-01-02 PROCEDURE — 93005 ELECTROCARDIOGRAM TRACING: CPT | Mod: S$GLB,,, | Performed by: FAMILY MEDICINE

## 2025-01-02 PROCEDURE — 93010 ELECTROCARDIOGRAM REPORT: CPT | Mod: S$GLB,,, | Performed by: INTERNAL MEDICINE

## 2025-01-03 ENCOUNTER — OFFICE VISIT (OUTPATIENT)
Dept: URGENT CARE | Facility: CLINIC | Age: 85
End: 2025-01-03
Payer: MEDICARE

## 2025-01-03 VITALS
RESPIRATION RATE: 16 BRPM | HEART RATE: 65 BPM | WEIGHT: 144 LBS | TEMPERATURE: 99 F | SYSTOLIC BLOOD PRESSURE: 118 MMHG | OXYGEN SATURATION: 98 % | BODY MASS INDEX: 27.21 KG/M2 | DIASTOLIC BLOOD PRESSURE: 76 MMHG

## 2025-01-03 DIAGNOSIS — R42 VERTIGO: ICD-10-CM

## 2025-01-03 DIAGNOSIS — H65.92 OME (OTITIS MEDIA WITH EFFUSION), LEFT: ICD-10-CM

## 2025-01-03 DIAGNOSIS — J06.9 VIRAL URI WITH COUGH: Primary | ICD-10-CM

## 2025-01-03 DIAGNOSIS — R42 DIZZINESS: ICD-10-CM

## 2025-01-03 DIAGNOSIS — R05.9 COUGH, UNSPECIFIED TYPE: ICD-10-CM

## 2025-01-03 DIAGNOSIS — I95.1 ORTHOSTATIC HYPOTENSION: ICD-10-CM

## 2025-01-03 LAB
CTP QC/QA: YES
GLUCOSE SERPL-MCNC: 118 MG/DL (ref 70–110)
OHS QRS DURATION: 86 MS
OHS QTC CALCULATION: 465 MS
SARS-COV-2 AG RESP QL IA.RAPID: NEGATIVE

## 2025-01-03 RX ORDER — FLUTICASONE PROPIONATE 50 MCG
1 SPRAY, SUSPENSION (ML) NASAL DAILY
Qty: 16 G | Refills: 0 | Status: SHIPPED | OUTPATIENT
Start: 2025-01-03

## 2025-01-03 RX ORDER — BENZONATATE 100 MG/1
200 CAPSULE ORAL 3 TIMES DAILY PRN
Qty: 60 CAPSULE | Refills: 0 | Status: SHIPPED | OUTPATIENT
Start: 2025-01-03 | End: 2025-01-13

## 2025-01-03 RX ORDER — GUAIFENESIN 600 MG/1
1200 TABLET, EXTENDED RELEASE ORAL 2 TIMES DAILY
Qty: 40 TABLET | Refills: 0 | Status: SHIPPED | OUTPATIENT
Start: 2025-01-03 | End: 2025-01-13

## 2025-01-03 RX ORDER — CETIRIZINE HYDROCHLORIDE 10 MG/1
10 TABLET ORAL DAILY
Qty: 30 TABLET | Refills: 0 | Status: SHIPPED | OUTPATIENT
Start: 2025-01-03 | End: 2025-02-02

## 2025-01-03 NOTE — PATIENT INSTRUCTIONS
General Discharge Instructions   PLEASE READ YOUR DISCHARGE INSTRUCTIONS ENTIRELY AS IT CONTAINS IMPORTANT INFORMATION.  If you were prescribed a narcotic or controlled medication, do not drive or operate heavy equipment or machinery while taking these medications.  If you were prescribed antibiotics, please take them to completion.  You must understand that you've received an Urgent Care treatment only and that you may be released before all your medical problems are known or treated. You, the patient, will arrange for follow up care as instructed.    OVER THE COUNTER RECOMMENDATIONS/SUGGESTIONS.    Make sure to stay well hydrated.    Use Nasal Saline to mechanically move any post nasal drip from your eustachian tube or from the back of your throat.    Use warm salt water gargles to ease your throat pain. Warm salt water gargles as needed for sore throat- 1/2 tsp salt to 1 cup warm water, gargle as desired.    Use an antihistamine such as Claritin, Zyrtec or Allegra to dry you out.    Use pseudoephedrine (behind the counter) to decongest. Pseudoephedrine 30 mg up to 240 mg /day. It can raise your blood pressure and give you palpitations.    Use mucinex (guaifenesin) to break up mucous up to 2400mg/day to loosen any mucous.    The mucinex DM pill has a cough suppressant that can be sedating. It can be used at night to stop the tickle at the back of your throat.    You can use Mucinex D (it has guaifenesin and a high dose of pseudoephedrine) in the mornings to help decongest.    Use Afrin in each nare for no longer than 3 days, as it is addictive. It can also dry out your mucous membranes and cause elevated blood pressure. This is especially useful if you are flying.    Use Flonase 1-2 sprays/nostril per day. It is a local acting steroid nasal spray, if you develop a bloody nose, stop using the medication immediately.    Sometimes Nyquil at night is beneficial to help you get some rest, however it is sedating and it  does have an antihistamine, and tylenol.    Honey is a natural cough suppressant that can be used.    Tylenol up to 4,000 mg a day is safe for short periods and can be used for body aches, pain, and fever. However in high doses and prolonged use it can cause liver irritation.    Ibuprofen is a non-steroidal anti-inflammatory that can be used for body aches, pain, and fever.However it can also cause stomach irritation if over used.     Follow up with your PCP or specialty clinic as instructed in the next 2-3 days if not improved or as needed. You can call [phone] to schedule an appointment with appropriate provider.      If you condition worsens, we recommend that you receive another evaluation at the emergency room immediately or contact your primary medical clinic's after hours call service to discuss your concerns.      Please return here or go to the Emergency Department for any concerns or worsening condition.   You can also call [phone] to schedule an appointment with the appropriate provider.    Please return here or go to the Emergency Department for any concerns or worsening of condition.    Thank you for choosing [facility]!    Our goal in the Urgent Care is to always provide outstanding medical care. You may receive a survey by mail or e-mail in the next week regarding your experience today. We would greatly appreciate you completing and returning the survey. Your feedback provides us with a way to recognize our staff who provide very good care, and it helps us learn how to improve when your experience was below our aspiration of excellence.      We appreciate you trusting us with your medical care. We hope you feel better soon. We will be happy to take care of you for all of your future medical needs.    Sincerely,    KELLEY Oneil    PLEASE READ YOUR DISCHARGE INSTRUCTIONS ENTIRELY AS IT CONTAINS IMPORTANT INFORMATION.    You can take the meclizine three times daily as you  need it.     You can also try an antihistamine like zyrtec, Claritin or allegra and a decongestant like sudafed.     Try the eply maneuvers at home which help you reset the structures of the inner ear.     Please go to the emergency room if you experience chest pain, shortness of breath, funny heart beats, headache, blurred vision, weakness in one arm or leg, slurred speech, numbness, inability to walk or talk, confusion.     A referral to ENT has been placed for you. You will be contacted in the next day or two about scheduling an appointment. Please call [phone] if you are not contacted.       LEFT EAR        RIGHT EAR      FROM [facility]    Please arrange follow up with your primary medical clinic as soon as possible. You must understand that you've received an Urgent Care treatment only and that you may be released before all of your medical problems are known or treated. You, the patient, will arrange for follow up as instructed. If your symptoms worsen or fail to improve you should go to the Emergency Room.  WE CANNOT RULE OUT ALL POSSIBLE CAUSES OF YOUR SYMPTOMS IN THE URGENT CARE SETTING PLEASE GO TO THE ER IF YOU FEELS YOUR CONDITION IS WORSENING OR YOU WOULD LIKE EMERGENT EVALUATION.

## 2025-01-03 NOTE — TELEPHONE ENCOUNTER
----- Message from Bria sent at 1/2/2025  2:24 PM CST -----  Contact: Pt [phone]  [email or web address]     Patient is calling for Medical Advice regarding:question     Patient wants a call back or thru [facility]:Call back     Comments:Pt would like for nurse to call her today she has questions     Please advise patient replies from provider may take up to 48 hours.

## 2025-01-10 ENCOUNTER — OFFICE VISIT (OUTPATIENT)
Dept: BEHAVIORAL HEALTH | Facility: CLINIC | Age: 85
End: 2025-01-10
Payer: MEDICARE

## 2025-01-10 DIAGNOSIS — F41.1 GENERALIZED ANXIETY DISORDER: ICD-10-CM

## 2025-01-10 DIAGNOSIS — F33.41 RECURRENT MAJOR DEPRESSIVE DISORDER, IN PARTIAL REMISSION: ICD-10-CM

## 2025-01-10 PROCEDURE — 99211 OFF/OP EST MAY X REQ PHY/QHP: CPT | Mod: PBBFAC

## 2025-01-10 PROCEDURE — 99999 PR PBB SHADOW E&M-EST. PATIENT-LVL I: CPT | Mod: PBBFAC,,,
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
## 2025-01-21 ENCOUNTER — TELEPHONE (OUTPATIENT)
Dept: BEHAVIORAL HEALTH | Facility: CLINIC | Age: 85
End: 2025-01-21
Payer: MEDICARE

## 2025-05-26 NOTE — PROGRESS NOTES
"Subjective:      Patient ID: Luz Amaro is a 84 y.o. female.    Vitals:  weight is 65.3 kg (144 lb). Her oral temperature is 98.6 °F (37 °C). Her blood pressure is 118/76 and her pulse is 65. Her respiration is 16 and oxygen saturation is 98%.     Chief Complaint: Cough    This is a 84 y.o. female who presents today with a chief complaint of productive cough, nasal congestion since 12/24 with unchanged sx she was around family with URI symptoms, she has a dry cough. Pt says he has also been having "flashes of dizziness" x 5-6 days. Pt received phone call from cardiology about her elevated b/p pt 208/101 on eliseo 2 x 2 back to back. No ear px, nausea, vomiting, diarrhea, abd px, fever, sore throat  or headache. Daughter present says she coughs all night. She denies any dizziness at this time, she says she feels dizzy when she rises.  She drinks a glass of wine daily for many years, last drink was 12/25.     Home tx: mucinex    PPMH: tinnitus, vertigo    Cough  This is a new problem. The current episode started in the past 7 days. The problem has been unchanged. The problem occurs constantly. The cough is Productive of sputum. Associated symptoms include nasal congestion and rhinorrhea. Pertinent negatives include no chest pain, chills, ear congestion, ear pain, fever, headaches, heartburn, hemoptysis, myalgias, postnasal drip, rash, sore throat, shortness of breath, sweats, weight loss or wheezing. Her past medical history is significant for bronchitis, environmental allergies and pneumonia. There is no history of asthma, bronchiectasis, COPD or emphysema.       Constitution: Negative for activity change, appetite change, chills, sweating, fatigue, fever, unexpected weight change and generalized weakness.   HENT:  Negative for ear pain, postnasal drip and sore throat.    Cardiovascular:  Negative for chest pain.   Respiratory:  Positive for cough. Negative for chest tightness, sputum production, bloody " Non-adherent gauze applied to left index finger and wrapped.   sputum, shortness of breath and wheezing.    Gastrointestinal:  Negative for vomiting, diarrhea and heartburn.   Musculoskeletal:  Negative for muscle ache.   Skin:  Negative for rash.   Allergic/Immunologic: Positive for environmental allergies.   Neurological:  Positive for dizziness and history of vertigo. Negative for light-headedness, passing out, facial drooping, speech difficulty, coordination disturbances, loss of balance, headaches, history of migraines, disorientation, altered mental status, loss of consciousness, numbness, tingling, seizures and tremors.   Psychiatric/Behavioral:  Negative for altered mental status and disorientation.       Objective:     Physical Exam   Constitutional: She is oriented to person, place, and time. She appears well-developed. She is cooperative.  Non-toxic appearance. She does not appear ill. No distress.      Comments:Daughter present.  Pt is well appearing   awake  HENT:   Head: Normocephalic and atraumatic.   Ears:   Right Ear: External ear normal. Tympanic membrane is not erythematous and not bulging. No middle ear effusion.   Left Ear: External ear normal. Tympanic membrane is not erythematous and not bulging. A middle ear effusion is present.   Nose: Nose normal.   Mouth/Throat: Oropharynx is clear and moist.   Eyes: Conjunctivae, EOM and lids are normal. Pupils are equal, round, and reactive to light.   Neck: Trachea normal and phonation normal. Neck supple.   Cardiovascular: S1 normal and S2 normal.   Pulmonary/Chest: Effort normal and breath sounds normal. No tachypnea. No respiratory distress.   Musculoskeletal: Normal range of motion.         General: Normal range of motion.   Neurological: She is alert and oriented to person, place, and time. She has normal motor skills and normal sensation. She displays no weakness and no tremor. No sensory deficit. She exhibits normal muscle tone. She shows no pronator drift. Gait and coordination normal. Coordination and gait  normal.      Comments: in changing positions and had movements, patient reports dizziness.   Skin: Skin is warm, dry, intact and not diaphoretic.   Psychiatric: Her speech is normal and behavior is normal. Judgment and thought content normal.   Nursing note and vitals reviewed.    Results for orders placed or performed in visit on 01/03/25   SARS Coronavirus 2 Antigen, POCT Manual Read    Collection Time: 01/03/25  2:47 PM   Result Value Ref Range    SARS Coronavirus 2 Antigen Negative Negative     Acceptable Yes    POCT Glucose, Hand-Held Device    Collection Time: 01/03/25  3:34 PM   Result Value Ref Range    POC Glucose 118 (A) 70 - 110 MG/DL      Vent rate= 57 bpm  MI interval= 164 ms  QRS duration= 86 ms  QT/QTc  478/465 ms   P-R-T axes 28 26 70   Interpretation:  Sinus bradycardia, no ST elevation.    Orthostatics positive, she did have some dizziness with position changes during orthostatics.    Assessment:     1. Viral URI with cough    2. Cough, unspecified type    3. Dizziness    4. Vertigo    5. Orthostatic hypotension    6. OME (otitis media with effusion), left        Plan:     Orthostatics positive, dizziness reproducible with movement, we reviewed Epley exercises at home, increasing hydration, Zyrtec, Flonase, Mucinex for URI symptoms, lungs clear to auscultation bilaterally and neurologically intact.  She will follow up with PCP next week if symptoms do not improve.  Hydration stressed    Discussed results/diagnosis/plan with patient in clinic. Strict precautions given to patient to monitor for worsening signs and symptoms. Advised to follow up with PCP or specialist.    Explained side effects of medications prescribed with patient and informed him/her to discontinue use if he/she has any side effects and to inform UC or PCP if this occurs. All questions answered. Strict ED verses clinic return precautions stressed and given in depth. Advised if symptoms worsens of fail to improve  he/she should go to the Emergency Room. Discharge and follow-up instructions given verbally/printed with the patient who expressed understanding and willingness to comply with my recommendations. Patient voiced understanding and in agreement with current treatment plan. Patient exits the exam room in no acute distress. Conversant and engaged during discharge discussion, verbalized understanding.      Viral URI with cough  -     guaiFENesin (MUCINEX) 600 mg 12 hr tablet; Take 2 tablets (1,200 mg total) by mouth 2 (two) times daily. for 10 days  Dispense: 40 tablet; Refill: 0  -     benzonatate (TESSALON) 100 MG capsule; Take 2 capsules (200 mg total) by mouth 3 (three) times daily as needed for Cough.  Dispense: 60 capsule; Refill: 0  -     cetirizine (ZYRTEC) 10 MG tablet; Take 1 tablet (10 mg total) by mouth once daily.  Dispense: 30 tablet; Refill: 0  -     fluticasone propionate (FLONASE) 50 mcg/actuation nasal spray; 1 spray (50 mcg total) by Each Nostril route once daily.  Dispense: 16 g; Refill: 0    Cough, unspecified type  -     SARS Coronavirus 2 Antigen, POCT Manual Read    Dizziness  -     Orthostatic vital signs  -     IN OFFICE EKG 12-LEAD (to Muse)  -     POCT Glucose, Hand-Held Device    Vertigo    Orthostatic hypotension    OME (otitis media with effusion), left

## (undated) DEVICE — DRESSING LEUKOPLAST FLEX 1X3IN